# Patient Record
Sex: MALE | Race: WHITE | NOT HISPANIC OR LATINO | Employment: OTHER | URBAN - METROPOLITAN AREA
[De-identification: names, ages, dates, MRNs, and addresses within clinical notes are randomized per-mention and may not be internally consistent; named-entity substitution may affect disease eponyms.]

---

## 2017-09-25 ENCOUNTER — ANESTHESIA EVENT (OUTPATIENT)
Dept: SURGERY | Facility: CLINIC | Age: 77
End: 2017-09-25
Payer: COMMERCIAL

## 2017-09-25 ENCOUNTER — HOSPITAL ENCOUNTER (INPATIENT)
Facility: CLINIC | Age: 77
LOS: 1 days | Discharge: HOME | End: 2017-09-26
Attending: ORTHOPAEDIC SURGERY | Admitting: ORTHOPAEDIC SURGERY
Payer: COMMERCIAL

## 2017-09-25 ENCOUNTER — APPOINTMENT (OUTPATIENT)
Dept: GENERAL RADIOLOGY | Facility: CLINIC | Age: 77
End: 2017-09-25
Attending: ORTHOPAEDIC SURGERY
Payer: COMMERCIAL

## 2017-09-25 ENCOUNTER — ANESTHESIA (OUTPATIENT)
Dept: SURGERY | Facility: CLINIC | Age: 77
End: 2017-09-25
Payer: COMMERCIAL

## 2017-09-25 DIAGNOSIS — M48.02 SPINAL STENOSIS IN CERVICAL REGION: Primary | ICD-10-CM

## 2017-09-25 LAB
ABO + RH BLD: NORMAL
ABO + RH BLD: NORMAL
BLD GP AB SCN SERPL QL: NORMAL
BLD PROD TYP BPU: NORMAL
BLOOD BANK CMNT PATIENT-IMP: NORMAL
CREAT SERPL-MCNC: 1.01 MG/DL (ref 0.66–1.25)
GFR SERPL CREATININE-BSD FRML MDRD: 71 ML/MIN/1.7M2
HGB BLD-MCNC: 10.6 G/DL (ref 13.3–17.7)
NUM BPU REQUESTED: 2
POTASSIUM SERPL-SCNC: 4.6 MMOL/L (ref 3.4–5.3)
SPECIMEN EXP DATE BLD: NORMAL

## 2017-09-25 PROCEDURE — 25000128 H RX IP 250 OP 636: Performed by: ANESTHESIOLOGY

## 2017-09-25 PROCEDURE — 95865 NEEDLE EMG LARYNX: CPT | Performed by: ORTHOPAEDIC SURGERY

## 2017-09-25 PROCEDURE — 37000009 ZZH ANESTHESIA TECHNICAL FEE, EACH ADDTL 15 MIN: Performed by: ORTHOPAEDIC SURGERY

## 2017-09-25 PROCEDURE — 36415 COLL VENOUS BLD VENIPUNCTURE: CPT | Performed by: ANESTHESIOLOGY

## 2017-09-25 PROCEDURE — 95940 IONM IN OPERATNG ROOM 15 MIN: CPT | Performed by: ORTHOPAEDIC SURGERY

## 2017-09-25 PROCEDURE — 71000013 ZZH RECOVERY PHASE 1 LEVEL 1 EA ADDTL HR: Performed by: ORTHOPAEDIC SURGERY

## 2017-09-25 PROCEDURE — C1713 ANCHOR/SCREW BN/BN,TIS/BN: HCPCS | Performed by: ORTHOPAEDIC SURGERY

## 2017-09-25 PROCEDURE — 86900 BLOOD TYPING SEROLOGIC ABO: CPT | Performed by: ANESTHESIOLOGY

## 2017-09-25 PROCEDURE — 36000071 ZZH SURGERY LEVEL 5 W FLUORO 1ST 30 MIN: Performed by: ORTHOPAEDIC SURGERY

## 2017-09-25 PROCEDURE — C1762 CONN TISS, HUMAN(INC FASCIA): HCPCS | Performed by: ORTHOPAEDIC SURGERY

## 2017-09-25 PROCEDURE — A9270 NON-COVERED ITEM OR SERVICE: HCPCS | Mod: GY | Performed by: PHYSICIAN ASSISTANT

## 2017-09-25 PROCEDURE — 95926 SOMATOSENSORY TESTING: CPT | Performed by: ORTHOPAEDIC SURGERY

## 2017-09-25 PROCEDURE — 71000012 ZZH RECOVERY PHASE 1 LEVEL 1 FIRST HR: Performed by: ORTHOPAEDIC SURGERY

## 2017-09-25 PROCEDURE — 86901 BLOOD TYPING SEROLOGIC RH(D): CPT | Performed by: ANESTHESIOLOGY

## 2017-09-25 PROCEDURE — 01N10ZZ RELEASE CERVICAL NERVE, OPEN APPROACH: ICD-10-PCS | Performed by: ORTHOPAEDIC SURGERY

## 2017-09-25 PROCEDURE — 85018 HEMOGLOBIN: CPT | Performed by: ANESTHESIOLOGY

## 2017-09-25 PROCEDURE — 25000128 H RX IP 250 OP 636: Performed by: ORTHOPAEDIC SURGERY

## 2017-09-25 PROCEDURE — 95885 MUSC TST DONE W/NERV TST LIM: CPT | Performed by: ORTHOPAEDIC SURGERY

## 2017-09-25 PROCEDURE — 07DR3ZZ EXTRACTION OF ILIAC BONE MARROW, PERCUTANEOUS APPROACH: ICD-10-PCS | Performed by: ORTHOPAEDIC SURGERY

## 2017-09-25 PROCEDURE — 37000008 ZZH ANESTHESIA TECHNICAL FEE, 1ST 30 MIN: Performed by: ORTHOPAEDIC SURGERY

## 2017-09-25 PROCEDURE — 99207 ZZC CONSULT E&M CHANGED TO INITIAL LEVEL: CPT | Performed by: PHYSICIAN ASSISTANT

## 2017-09-25 PROCEDURE — A9270 NON-COVERED ITEM OR SERVICE: HCPCS | Mod: GY | Performed by: ORTHOPAEDIC SURGERY

## 2017-09-25 PROCEDURE — 40000170 ZZH STATISTIC PRE-PROCEDURE ASSESSMENT II: Performed by: ORTHOPAEDIC SURGERY

## 2017-09-25 PROCEDURE — 84132 ASSAY OF SERUM POTASSIUM: CPT | Performed by: ANESTHESIOLOGY

## 2017-09-25 PROCEDURE — 25000125 ZZHC RX 250: Performed by: NURSE ANESTHETIST, CERTIFIED REGISTERED

## 2017-09-25 PROCEDURE — 86850 RBC ANTIBODY SCREEN: CPT | Performed by: ANESTHESIOLOGY

## 2017-09-25 PROCEDURE — 99222 1ST HOSP IP/OBS MODERATE 55: CPT | Performed by: PHYSICIAN ASSISTANT

## 2017-09-25 PROCEDURE — 25000128 H RX IP 250 OP 636: Performed by: NURSE ANESTHETIST, CERTIFIED REGISTERED

## 2017-09-25 PROCEDURE — 0RG10A0 FUSION OF CERVICAL VERTEBRAL JOINT WITH INTERBODY FUSION DEVICE, ANTERIOR APPROACH, ANTERIOR COLUMN, OPEN APPROACH: ICD-10-PCS | Performed by: ORTHOPAEDIC SURGERY

## 2017-09-25 PROCEDURE — 95925 SOMATOSENSORY TESTING: CPT | Performed by: ORTHOPAEDIC SURGERY

## 2017-09-25 PROCEDURE — 25000125 ZZHC RX 250: Performed by: ORTHOPAEDIC SURGERY

## 2017-09-25 PROCEDURE — 25000132 ZZH RX MED GY IP 250 OP 250 PS 637: Mod: GY | Performed by: PHYSICIAN ASSISTANT

## 2017-09-25 PROCEDURE — 40000277 XR SURGERY CARM FLUORO LESS THAN 5 MIN W STILLS

## 2017-09-25 PROCEDURE — 27210794 ZZH OR GENERAL SUPPLY STERILE: Performed by: ORTHOPAEDIC SURGERY

## 2017-09-25 PROCEDURE — 36000069 ZZH SURGERY LEVEL 5 EA 15 ADDTL MIN: Performed by: ORTHOPAEDIC SURGERY

## 2017-09-25 PROCEDURE — 27211022 ZZHC OR IOM SUPPLIES OPNP: Performed by: ORTHOPAEDIC SURGERY

## 2017-09-25 PROCEDURE — 4A10X4G MONITORING OF CENTRAL NERVOUS ELECTRICAL ACTIVITY, INTRAOPERATIVE, EXTERNAL APPROACH: ICD-10-PCS | Performed by: ORTHOPAEDIC SURGERY

## 2017-09-25 PROCEDURE — 95928 C MOTOR EVOKED UPPR LIMBS: CPT | Performed by: ORTHOPAEDIC SURGERY

## 2017-09-25 PROCEDURE — 72052 X-RAY EXAM NECK SPINE 6/>VWS: CPT

## 2017-09-25 PROCEDURE — 82565 ASSAY OF CREATININE: CPT | Performed by: ANESTHESIOLOGY

## 2017-09-25 PROCEDURE — 95929 C MOTOR EVOKED LWR LIMBS: CPT | Performed by: ORTHOPAEDIC SURGERY

## 2017-09-25 PROCEDURE — 25000125 ZZHC RX 250

## 2017-09-25 PROCEDURE — 12000007 ZZH R&B INTERMEDIATE

## 2017-09-25 PROCEDURE — 25000125 ZZHC RX 250: Performed by: ANESTHESIOLOGY

## 2017-09-25 PROCEDURE — 0RB30ZZ EXCISION OF CERVICAL VERTEBRAL DISC, OPEN APPROACH: ICD-10-PCS | Performed by: ORTHOPAEDIC SURGERY

## 2017-09-25 PROCEDURE — 27210995 ZZH RX 272: Performed by: ORTHOPAEDIC SURGERY

## 2017-09-25 PROCEDURE — 25000132 ZZH RX MED GY IP 250 OP 250 PS 637: Mod: GY | Performed by: ORTHOPAEDIC SURGERY

## 2017-09-25 DEVICE — IMP PLATE CERV MEDT ZEVO 21MM 1 LVL 3001021: Type: IMPLANTABLE DEVICE | Site: SPINE CERVICAL | Status: FUNCTIONAL

## 2017-09-25 DEVICE — IMP SCR MEDT ZEVO 3.5X13MM ST VA 7723513: Type: IMPLANTABLE DEVICE | Site: SPINE CERVICAL | Status: FUNCTIONAL

## 2017-09-25 DEVICE — IMPLANTABLE DEVICE: Type: IMPLANTABLE DEVICE | Site: SPINE CERVICAL | Status: FUNCTIONAL

## 2017-09-25 RX ORDER — ONDANSETRON 4 MG/1
4 TABLET, ORALLY DISINTEGRATING ORAL EVERY 6 HOURS PRN
Status: DISCONTINUED | OUTPATIENT
Start: 2017-09-25 | End: 2017-09-26 | Stop reason: HOSPADM

## 2017-09-25 RX ORDER — OXYCODONE HYDROCHLORIDE 5 MG/1
5-10 TABLET ORAL EVERY 4 HOURS PRN
Status: DISCONTINUED | OUTPATIENT
Start: 2017-09-25 | End: 2017-09-26 | Stop reason: HOSPADM

## 2017-09-25 RX ORDER — ACETAMINOPHEN 325 MG/1
975 TABLET ORAL EVERY 8 HOURS
Status: DISCONTINUED | OUTPATIENT
Start: 2017-09-25 | End: 2017-09-26 | Stop reason: HOSPADM

## 2017-09-25 RX ORDER — HYDROMORPHONE HYDROCHLORIDE 1 MG/ML
.3-.5 INJECTION, SOLUTION INTRAMUSCULAR; INTRAVENOUS; SUBCUTANEOUS
Status: DISCONTINUED | OUTPATIENT
Start: 2017-09-25 | End: 2017-09-26 | Stop reason: HOSPADM

## 2017-09-25 RX ORDER — HYDROMORPHONE HYDROCHLORIDE 1 MG/ML
.3-.5 INJECTION, SOLUTION INTRAMUSCULAR; INTRAVENOUS; SUBCUTANEOUS EVERY 5 MIN PRN
Status: CANCELLED | OUTPATIENT
Start: 2017-09-25

## 2017-09-25 RX ORDER — FINASTERIDE 5 MG/1
5 TABLET, FILM COATED ORAL
Status: DISCONTINUED | OUTPATIENT
Start: 2017-09-25 | End: 2017-09-26 | Stop reason: HOSPADM

## 2017-09-25 RX ORDER — METOCLOPRAMIDE HYDROCHLORIDE 5 MG/ML
5 INJECTION INTRAMUSCULAR; INTRAVENOUS EVERY 6 HOURS PRN
Status: DISCONTINUED | OUTPATIENT
Start: 2017-09-25 | End: 2017-09-26 | Stop reason: HOSPADM

## 2017-09-25 RX ORDER — ONDANSETRON 2 MG/ML
INJECTION INTRAMUSCULAR; INTRAVENOUS PRN
Status: DISCONTINUED | OUTPATIENT
Start: 2017-09-25 | End: 2017-09-25

## 2017-09-25 RX ORDER — ACETAMINOPHEN 325 MG/1
650 TABLET ORAL EVERY 4 HOURS PRN
Status: DISCONTINUED | OUTPATIENT
Start: 2017-09-28 | End: 2017-09-26 | Stop reason: HOSPADM

## 2017-09-25 RX ORDER — HYDROMORPHONE HYDROCHLORIDE 1 MG/ML
.3-.5 INJECTION, SOLUTION INTRAMUSCULAR; INTRAVENOUS; SUBCUTANEOUS EVERY 5 MIN PRN
Status: DISCONTINUED | OUTPATIENT
Start: 2017-09-25 | End: 2017-09-25 | Stop reason: HOSPADM

## 2017-09-25 RX ORDER — PROCHLORPERAZINE MALEATE 5 MG
5 TABLET ORAL EVERY 6 HOURS PRN
Status: DISCONTINUED | OUTPATIENT
Start: 2017-09-25 | End: 2017-09-26 | Stop reason: HOSPADM

## 2017-09-25 RX ORDER — PREDNISONE 5 MG/1
5 TABLET ORAL EVERY OTHER DAY
Status: DISCONTINUED | OUTPATIENT
Start: 2017-09-25 | End: 2017-09-26 | Stop reason: HOSPADM

## 2017-09-25 RX ORDER — ONDANSETRON 2 MG/ML
4 INJECTION INTRAMUSCULAR; INTRAVENOUS EVERY 30 MIN PRN
Status: CANCELLED | OUTPATIENT
Start: 2017-09-25

## 2017-09-25 RX ORDER — ONDANSETRON 2 MG/ML
4 INJECTION INTRAMUSCULAR; INTRAVENOUS EVERY 6 HOURS PRN
Status: DISCONTINUED | OUTPATIENT
Start: 2017-09-25 | End: 2017-09-26 | Stop reason: HOSPADM

## 2017-09-25 RX ORDER — LIDOCAINE 40 MG/G
CREAM TOPICAL
Status: DISCONTINUED | OUTPATIENT
Start: 2017-09-25 | End: 2017-09-26 | Stop reason: HOSPADM

## 2017-09-25 RX ORDER — ONDANSETRON 2 MG/ML
4 INJECTION INTRAMUSCULAR; INTRAVENOUS EVERY 30 MIN PRN
Status: DISCONTINUED | OUTPATIENT
Start: 2017-09-25 | End: 2017-09-25 | Stop reason: HOSPADM

## 2017-09-25 RX ORDER — SODIUM CHLORIDE, SODIUM LACTATE, POTASSIUM CHLORIDE, CALCIUM CHLORIDE 600; 310; 30; 20 MG/100ML; MG/100ML; MG/100ML; MG/100ML
INJECTION, SOLUTION INTRAVENOUS CONTINUOUS
Status: DISCONTINUED | OUTPATIENT
Start: 2017-09-25 | End: 2017-09-25 | Stop reason: HOSPADM

## 2017-09-25 RX ORDER — HYDROXYZINE HYDROCHLORIDE 10 MG/1
10 TABLET, FILM COATED ORAL EVERY 6 HOURS PRN
Status: DISCONTINUED | OUTPATIENT
Start: 2017-09-25 | End: 2017-09-26 | Stop reason: HOSPADM

## 2017-09-25 RX ORDER — FENTANYL CITRATE 50 UG/ML
25-50 INJECTION, SOLUTION INTRAMUSCULAR; INTRAVENOUS
Status: CANCELLED | OUTPATIENT
Start: 2017-09-25

## 2017-09-25 RX ORDER — CEFAZOLIN SODIUM 1 G/3ML
1 INJECTION, POWDER, FOR SOLUTION INTRAMUSCULAR; INTRAVENOUS EVERY 8 HOURS
Status: COMPLETED | OUTPATIENT
Start: 2017-09-25 | End: 2017-09-26

## 2017-09-25 RX ORDER — EPHEDRINE SULFATE 50 MG/ML
INJECTION, SOLUTION INTRAMUSCULAR; INTRAVENOUS; SUBCUTANEOUS PRN
Status: DISCONTINUED | OUTPATIENT
Start: 2017-09-25 | End: 2017-09-25

## 2017-09-25 RX ORDER — SODIUM CHLORIDE, SODIUM LACTATE, POTASSIUM CHLORIDE, CALCIUM CHLORIDE 600; 310; 30; 20 MG/100ML; MG/100ML; MG/100ML; MG/100ML
INJECTION, SOLUTION INTRAVENOUS CONTINUOUS
Status: CANCELLED | OUTPATIENT
Start: 2017-09-25

## 2017-09-25 RX ORDER — CEFAZOLIN SODIUM 2 G/100ML
2 INJECTION, SOLUTION INTRAVENOUS
Status: COMPLETED | OUTPATIENT
Start: 2017-09-25 | End: 2017-09-25

## 2017-09-25 RX ORDER — TAMSULOSIN HYDROCHLORIDE 0.4 MG/1
0.4 CAPSULE ORAL EVERY EVENING
Status: DISCONTINUED | OUTPATIENT
Start: 2017-09-25 | End: 2017-09-26 | Stop reason: HOSPADM

## 2017-09-25 RX ORDER — PROPOFOL 10 MG/ML
INJECTION, EMULSION INTRAVENOUS PRN
Status: DISCONTINUED | OUTPATIENT
Start: 2017-09-25 | End: 2017-09-25

## 2017-09-25 RX ORDER — TIMOLOL MALEATE 5 MG/ML
1 SOLUTION/ DROPS OPHTHALMIC DAILY
Status: ON HOLD | COMMUNITY
End: 2018-06-11

## 2017-09-25 RX ORDER — PROPOFOL 10 MG/ML
INJECTION, EMULSION INTRAVENOUS CONTINUOUS PRN
Status: DISCONTINUED | OUTPATIENT
Start: 2017-09-25 | End: 2017-09-25

## 2017-09-25 RX ORDER — ROSUVASTATIN CALCIUM 20 MG/1
20 TABLET, COATED ORAL
Status: DISCONTINUED | OUTPATIENT
Start: 2017-09-25 | End: 2017-09-26 | Stop reason: HOSPADM

## 2017-09-25 RX ORDER — NALOXONE HYDROCHLORIDE 0.4 MG/ML
.1-.4 INJECTION, SOLUTION INTRAMUSCULAR; INTRAVENOUS; SUBCUTANEOUS
Status: DISCONTINUED | OUTPATIENT
Start: 2017-09-25 | End: 2017-09-26 | Stop reason: HOSPADM

## 2017-09-25 RX ORDER — SODIUM CHLORIDE 450 MG/100ML
INJECTION, SOLUTION INTRAVENOUS CONTINUOUS
Status: DISCONTINUED | OUTPATIENT
Start: 2017-09-25 | End: 2017-09-26 | Stop reason: HOSPADM

## 2017-09-25 RX ORDER — CYCLOBENZAPRINE HCL 5 MG
5 TABLET ORAL 3 TIMES DAILY PRN
Status: DISCONTINUED | OUTPATIENT
Start: 2017-09-25 | End: 2017-09-26 | Stop reason: HOSPADM

## 2017-09-25 RX ORDER — SODIUM CHLORIDE, SODIUM LACTATE, POTASSIUM CHLORIDE, CALCIUM CHLORIDE 600; 310; 30; 20 MG/100ML; MG/100ML; MG/100ML; MG/100ML
INJECTION, SOLUTION INTRAVENOUS CONTINUOUS PRN
Status: DISCONTINUED | OUTPATIENT
Start: 2017-09-25 | End: 2017-09-25

## 2017-09-25 RX ORDER — ONDANSETRON 4 MG/1
4 TABLET, ORALLY DISINTEGRATING ORAL EVERY 30 MIN PRN
Status: CANCELLED | OUTPATIENT
Start: 2017-09-25

## 2017-09-25 RX ORDER — FENTANYL CITRATE 50 UG/ML
25-50 INJECTION, SOLUTION INTRAMUSCULAR; INTRAVENOUS
Status: DISCONTINUED | OUTPATIENT
Start: 2017-09-25 | End: 2017-09-25 | Stop reason: HOSPADM

## 2017-09-25 RX ORDER — TIMOLOL MALEATE 5 MG/ML
1 SOLUTION/ DROPS OPHTHALMIC DAILY
Status: DISCONTINUED | OUTPATIENT
Start: 2017-09-26 | End: 2017-09-26 | Stop reason: HOSPADM

## 2017-09-25 RX ORDER — LISINOPRIL 10 MG/1
10 TABLET ORAL EVERY EVENING
Status: DISCONTINUED | OUTPATIENT
Start: 2017-09-25 | End: 2017-09-26 | Stop reason: HOSPADM

## 2017-09-25 RX ORDER — FENTANYL CITRATE 50 UG/ML
INJECTION, SOLUTION INTRAMUSCULAR; INTRAVENOUS PRN
Status: DISCONTINUED | OUTPATIENT
Start: 2017-09-25 | End: 2017-09-25

## 2017-09-25 RX ORDER — METOCLOPRAMIDE 5 MG/1
5 TABLET ORAL EVERY 6 HOURS PRN
Status: DISCONTINUED | OUTPATIENT
Start: 2017-09-25 | End: 2017-09-26 | Stop reason: HOSPADM

## 2017-09-25 RX ORDER — LATANOPROST 50 UG/ML
1 SOLUTION/ DROPS OPHTHALMIC AT BEDTIME
Status: DISCONTINUED | OUTPATIENT
Start: 2017-09-25 | End: 2017-09-26 | Stop reason: HOSPADM

## 2017-09-25 RX ORDER — CEFAZOLIN SODIUM 1 G/3ML
1 INJECTION, POWDER, FOR SOLUTION INTRAMUSCULAR; INTRAVENOUS SEE ADMIN INSTRUCTIONS
Status: DISCONTINUED | OUTPATIENT
Start: 2017-09-25 | End: 2017-09-25 | Stop reason: HOSPADM

## 2017-09-25 RX ORDER — ONDANSETRON 4 MG/1
4 TABLET, ORALLY DISINTEGRATING ORAL EVERY 30 MIN PRN
Status: DISCONTINUED | OUTPATIENT
Start: 2017-09-25 | End: 2017-09-25 | Stop reason: HOSPADM

## 2017-09-25 RX ORDER — ALLOPURINOL 300 MG/1
300 TABLET ORAL DAILY
Status: DISCONTINUED | OUTPATIENT
Start: 2017-09-26 | End: 2017-09-26 | Stop reason: HOSPADM

## 2017-09-25 RX ORDER — MEPERIDINE HYDROCHLORIDE 25 MG/ML
12.5 INJECTION INTRAMUSCULAR; INTRAVENOUS; SUBCUTANEOUS EVERY 5 MIN PRN
Status: DISCONTINUED | OUTPATIENT
Start: 2017-09-25 | End: 2017-09-25 | Stop reason: HOSPADM

## 2017-09-25 RX ORDER — LIDOCAINE HYDROCHLORIDE 20 MG/ML
INJECTION, SOLUTION INFILTRATION; PERINEURAL PRN
Status: DISCONTINUED | OUTPATIENT
Start: 2017-09-25 | End: 2017-09-25

## 2017-09-25 RX ADMIN — ROSUVASTATIN CALCIUM 20 MG: 20 TABLET, FILM COATED ORAL at 14:16

## 2017-09-25 RX ADMIN — LIDOCAINE HYDROCHLORIDE 100 MG: 20 INJECTION, SOLUTION INFILTRATION; PERINEURAL at 10:05

## 2017-09-25 RX ADMIN — HYDROMORPHONE HYDROCHLORIDE 0.5 MG: 1 INJECTION, SOLUTION INTRAMUSCULAR; INTRAVENOUS; SUBCUTANEOUS at 13:08

## 2017-09-25 RX ADMIN — FENTANYL CITRATE 100 MCG: 50 INJECTION, SOLUTION INTRAMUSCULAR; INTRAVENOUS at 10:05

## 2017-09-25 RX ADMIN — PREDNISONE 5 MG: 5 TABLET ORAL at 14:16

## 2017-09-25 RX ADMIN — PHENYLEPHRINE HYDROCHLORIDE 0.25 MCG/KG/MIN: 10 INJECTION, SOLUTION INTRAMUSCULAR; INTRAVENOUS; SUBCUTANEOUS at 10:19

## 2017-09-25 RX ADMIN — SUCCINYLCHOLINE CHLORIDE 100 MG: 20 INJECTION, SOLUTION INTRAMUSCULAR; INTRAVENOUS at 10:05

## 2017-09-25 RX ADMIN — HYDROCORTISONE SODIUM SUCCINATE 100 MG: 100 INJECTION, POWDER, FOR SOLUTION INTRAMUSCULAR; INTRAVENOUS at 10:13

## 2017-09-25 RX ADMIN — ACETAMINOPHEN 975 MG: 325 TABLET, FILM COATED ORAL at 22:05

## 2017-09-25 RX ADMIN — ONDANSETRON 4 MG: 2 INJECTION INTRAMUSCULAR; INTRAVENOUS at 11:39

## 2017-09-25 RX ADMIN — ACETAMINOPHEN 975 MG: 325 TABLET, FILM COATED ORAL at 14:16

## 2017-09-25 RX ADMIN — SODIUM CHLORIDE, POTASSIUM CHLORIDE, SODIUM LACTATE AND CALCIUM CHLORIDE: 600; 310; 30; 20 INJECTION, SOLUTION INTRAVENOUS at 09:53

## 2017-09-25 RX ADMIN — LIDOCAINE HYDROCHLORIDE 10 ML: 20 JELLY TOPICAL at 22:08

## 2017-09-25 RX ADMIN — SODIUM CHLORIDE, POTASSIUM CHLORIDE, SODIUM LACTATE AND CALCIUM CHLORIDE: 600; 310; 30; 20 INJECTION, SOLUTION INTRAVENOUS at 10:13

## 2017-09-25 RX ADMIN — FENTANYL CITRATE 50 MCG: 50 INJECTION, SOLUTION INTRAMUSCULAR; INTRAVENOUS at 10:42

## 2017-09-25 RX ADMIN — LIDOCAINE HYDROCHLORIDE 1 ML: 10 INJECTION, SOLUTION EPIDURAL; INFILTRATION; INTRACAUDAL; PERINEURAL at 09:30

## 2017-09-25 RX ADMIN — RANITIDINE 150 MG: 150 TABLET ORAL at 22:05

## 2017-09-25 RX ADMIN — DEXMEDETOMIDINE HYDROCHLORIDE 0.5 MCG/KG/HR: 100 INJECTION, SOLUTION INTRAVENOUS at 10:10

## 2017-09-25 RX ADMIN — CEFAZOLIN SODIUM 2 G: 2 INJECTION, SOLUTION INTRAVENOUS at 10:09

## 2017-09-25 RX ADMIN — SODIUM CHLORIDE: 4.5 INJECTION, SOLUTION INTRAVENOUS at 14:17

## 2017-09-25 RX ADMIN — CEFAZOLIN SODIUM 1 G: 1 INJECTION, POWDER, FOR SOLUTION INTRAMUSCULAR; INTRAVENOUS at 18:07

## 2017-09-25 RX ADMIN — MIDAZOLAM HYDROCHLORIDE 1 MG: 1 INJECTION, SOLUTION INTRAMUSCULAR; INTRAVENOUS at 10:01

## 2017-09-25 RX ADMIN — PROPOFOL 180 MG: 10 INJECTION, EMULSION INTRAVENOUS at 10:05

## 2017-09-25 RX ADMIN — LIDOCAINE HYDROCHLORIDE 10 ML: 20 JELLY TOPICAL at 17:18

## 2017-09-25 RX ADMIN — SODIUM CHLORIDE, POTASSIUM CHLORIDE, SODIUM LACTATE AND CALCIUM CHLORIDE: 600; 310; 30; 20 INJECTION, SOLUTION INTRAVENOUS at 09:30

## 2017-09-25 RX ADMIN — TAMSULOSIN HYDROCHLORIDE 0.4 MG: 0.4 CAPSULE ORAL at 20:06

## 2017-09-25 RX ADMIN — FENTANYL CITRATE 50 MCG: 50 INJECTION, SOLUTION INTRAMUSCULAR; INTRAVENOUS at 10:21

## 2017-09-25 RX ADMIN — LISINOPRIL 10 MG: 10 TABLET ORAL at 20:06

## 2017-09-25 RX ADMIN — Medication 5 MG: at 10:30

## 2017-09-25 RX ADMIN — FINASTERIDE 5 MG: 5 TABLET, FILM COATED ORAL at 14:16

## 2017-09-25 RX ADMIN — PROPOFOL 200 MCG/KG/MIN: 10 INJECTION, EMULSION INTRAVENOUS at 10:08

## 2017-09-25 RX ADMIN — CYCLOBENZAPRINE HYDROCHLORIDE 5 MG: 5 TABLET, FILM COATED ORAL at 22:47

## 2017-09-25 ASSESSMENT — ACTIVITIES OF DAILY LIVING (ADL): COGNITION: 0 - NO COGNITION ISSUES REPORTED

## 2017-09-25 NOTE — OP NOTE
DATE OF PROCEDURE:  09/25/2017       ASSISTANT:  APOORVA Moe      PREOPERATIVE DIAGNOSIS:  Severe cervical stenosis with progressive myelopathy.      POSTOPERATIVE DIAGNOSIS:  Severe cervical stenosis with progressive myelopathy.      PROCEDURE:  Anterior partial corpectomy, C3 and C4, with anterior interbody reconstruction C3-C4 with nonsegmental instrumentation and left iliac crest bone marrow aspiration.      INDICATION:  Rose Duong is a 77-year-old male who has severe focal stenosis associated with obvious intramedullary signal changes.  There is suggestion of an inherently unstable segment, a cervical spondylolisthesis.  Associated with this, he had classical features of ongoing and slowly progressive cervical myelopathy.  Based on his symptoms and the nature of the radiographic findings, surgical procedure was chosen.      DESCRIPTION OF PROCEDURE:  The patient was taken to the operating room, given satisfactory general endotracheal anesthetic, was appropriately positioned, prepared and draped for anterior cervical surgery.  Great care was used to maintain the neck well within the objectively adhered to preoperative range of motion.  No tapes were used.  Careful positioning, prepping and draping ensued.      Four-lead spinal cord monitoring was applied.  This registered barely detectable findings on the right upper and lower extremity.  This is consistent with his preoperative clinical features.  These were felt, however, to be reproducible and thus sufficient enough to allow for operative intervention.       The anterior aspect of the neck was widely and aseptically prepped and draped.  Standard timeout was accomplished.  Intravenous antibiotics were assured.  The left iliac crest region was widely and aseptically prepped and draped as well.      A transverse incision was fashioned just under the angle of the jaw.  This location was necessary in order to obtain reasonable visualization of the  C3-C4 interspace.  Skin, subcutaneous tissue and platysma were divided transversely.  The anterior border of sternocleidomastoid was mobilized proximally and distally and released, the middle layer bluntly dissected.  Great care was used in the digital dissection just medial to the common carotid neurovascular structures in order to avoid the tracheoesophageal groove and recurrent laryngeal nerve, neither of which were seen or sought after.  The pretracheal and prevertebral fascia were bluntly split, thus exposing the anterior spine.  We carefully cleared the pretracheal and prevertebral fascia off the anterior aspect of the spine, identified the disk space, presumed to be C3-C4, and inserted a needle.  Two independent observers reached the same conclusion.  C3-C4 was indelibly marked.  Careful symmetric bilateral subperiosteal reflection of the longus colli ensued.  Self-retaining retractors were placed.  Up-down exposure was maintained with a [place] pin post device.  Minimal distraction was performed until we completed the majority of the decompression.      Once the annulus was identified, we marked out the local registering and landmarks for corpectomy, then proximal one-half corpectomy at C3 and C4 was removed with motorized dissection.  This was done parallel to the endplate down to the posterior vertebral cortex.  We identified an island of bone and disk material as we circumferentially dissected about the disk space itself.  We identified the posterior longitudinal ligament.  There were obvious changes of thickening due to abnormal segmental mobility.  This was consistent with a degenerative spondylolisthesis.  We cautiously circumferentially cleared these fairly densely adherent posterior longitudinal ligament to the epidural space and identified the dura.  We carefully circumferentially removed this island of bone and disk material, effecting a suitable decompression.  We also undercut the remaining  vertebral body edges and identified the takeoff of the nerve roots bilaterally.  This was all done in order to preserve some remnants of stability with some residual posterior ligament.  Having said that, at the termination of decompression, all appeared to be sound and secure.  The dura clearly had bulged more appropriately.  Interestingly, as the surgical procedure was being performed, particularly with right-sided decompression, the monitoring signals actually improved in the arms and legs.      From time to time we did perform motor evoked potentials on a observational basis.  We carefully prepared the endplates for arthrodesis.  It was noted that this gentleman had fairly obvious osteopenia.  He required judicious attention to hemostasis with topical application of Gelfoam on the soft tissue aspects and bone wax for complete bleeding surfaces.  A flat bleeding bone surface was generated, and an 11 mm graft was chosen with good fit, feel without significant preload.  Pre-manufactured graft of that size (Medtronic Cornerstone) was inserted after soaking the graft in a 2 mL aliquot of bone marrow aspirate, which was harvested from the left anterior superior iliac spine laterally cortical laterally-based puncture.  This was used to soak the graft. The graft inserted with good fit and feel.  So we did have minimal amounts of autogenous bone.  This was used to bone graft around the sides of the graft.      A 21 mm Medtronic Zevo plate was sculpted to accept the anterior cervical lordosis, and incremental insertion of 13 mm purchase length screws ensued.  The fixation was sufficient to allow for retention of the graft, but he was osteopenic.  Locking mechanisms were deployed per 's instructions.      We inspected the posterior aspect of esophagus.  It was clean and dry.  There was no evidence of trauma, tear or leakage.  Indigo carmine was instilled.  There was no evidence of leakage.  A suction drain  consisting of 3 in the platysma, 4 in the subcutaneous tissue and 5-0 Prolene in the skin completed the operation.  Blood loss was 25 mL, primarily due to the osseous bleeding and somewhat fragile nature of his soft tissues.  He was quite dry at the time of bone graft placement and closure.  Final motors and sensory were perfectly stable.      Final clinical diagnosis was marked cervical stenosis with the intraoperative findings notable for acute on chronic disk herniations and cord compression requiring anterior decompression and fusion.      Nurse Alina Sanchez was in attendance at all times.  She was critical to the safe and effective performance of this procedure.  Her tasks included protection, retraction and mobilization of neurovascular and visceral structures.  She was critical to the safe and efficient performance of this surgery.         QUEENIE JOHNSTON MD             D: 2017 11:53   T: 2017 13:57   MT: [email or web address]#[id number]      Name:     VALERIE BALLARD   MRN:      [id number]        Account:        [id number]   :      1940           Procedure Date: 2017      Document: [id number]

## 2017-09-25 NOTE — PROGRESS NOTES
Admission medication history interview status for the 9/25/2017  admission is complete. See EPIC admission navigator for prior to admission medications     Medication history source reliability:Good    Medication history interview source(s):Patient    Medication history resources (including written lists, pill bottles, clinic record):None    Primary [facility]    Additional medication history information not noted on PTA med list :None    Time spent in this activity: 45 minutes    Prior to Admission medications    Medication Sig Last Dose Taking? Auth Provider   timolol (TIMOPTIC) 0.5 % ophthalmic solution Place 1 drop into both eyes daily  9/25/2017 at am Yes Reported, Patient   FINASTERIDE PO Take 5 mg by mouth daily 9/24/2017 at 1200 Yes Reported, Patient   Acetaminophen (TYLENOL PO) Take 500 mg by mouth every 3 hours as needed for mild pain or fever 9/25/2017 at am Yes Reported, Patient   Rosuvastatin Calcium (CRESTOR PO) Take 20 mg by mouth daily 9/24/2017 at 1200 Yes Unknown, Entered By History   tamsulosin (FLOMAX) 0.4 MG 24 hr capsule Take 0.4 mg by mouth every evening  9/24/2017 at pm Yes Unknown, Entered By History   LISINOPRIL PO Take 10 mg by mouth daily 9/24/2017 at pm Yes Unknown, Entered By History   latanoprost (XALATAN) 0.005 % ophthalmic solution Place 1 drop into the right eye At Bedtime 9/24/2017 at pm Yes Unknown, Entered By History   Ranitidine HCl (ZANTAC PO) Take 150 mg by mouth At Bedtime 9/24/2017 at pm Yes Unknown, Entered By History   allopurinol (ZYLOPRIM) 300 MG tablet Take 300 mg by mouth daily. 9/24/2017 at am Yes Reported, Patient   predniSONE (DELTASONE) 5 MG tablet Take 5 mg by mouth every other day  9/23/2017 at am Yes Reported, Patient

## 2017-09-25 NOTE — CONSULTS
ADDENDUM:  Case discussed with Namrata Cruz PA-C. Her note reflects our joint assessment and plans.    Lv Baker Jr., MD  [phone] (p)

## 2017-09-25 NOTE — BRIEF OP NOTE
[facility] Operative Note    Pre-operative diagnosis: CERVICAL MYELOPATHY   Post-operative diagnosis same   Procedure: Procedure(s):  ANTERIOR CERVICAL DECOMPRESSION AND FUSIONC3-4 WITH INSTRUMENTATION, ALLOGRAFT AND BONE MARROW ASPIRATE OF THE LEFT ILIAC CREST  - Wound Class: I-Clean   - Wound Class: I-Clean   Surgeon(s): Surgeon(s) and Role:     * Moises Elias MD - Primary   Estimated blood loss: same    Specimens: none   Findings: See dictated op note

## 2017-09-25 NOTE — ANESTHESIA POSTPROCEDURE EVALUATION
Patient: Rose SANCHEZ Pelo    Procedure(s):  ANTERIOR CERVICAL DECOMPRESSION AND FUSIONC3-4 WITH INSTRUMENTATION, ALLOGRAFT AND BONE MARROW ASPIRATE OF THE LEFT ILIAC CREST  - Wound Class: I-Clean   - Wound Class: I-Clean    Diagnosis:CERVICAL MYELOPATHY  Diagnosis Additional Information: No value filed.    Anesthesia Type:  General    Note:  Anesthesia Post Evaluation    Patient location during evaluation: bedside  Patient participation: Able to fully participate in evaluation  Level of consciousness: awake  Pain management: adequate  Airway patency: patent  Cardiovascular status: acceptable  Respiratory status: acceptable  Hydration status: acceptable  PONV: none     Anesthetic complications: None    Comments: No anesthetic complications noted.         Last vitals:  Vitals:    09/25/17 1320 09/25/17 1350 09/25/17 1420   BP: 118/61 130/61 138/73   Resp: 13 14 14   Temp:  36.5  C (97.7  F) 36.4  C (97.5  F)   SpO2: 97% 100% 99%         Electronically Signed By: John Phan DO, DO  September 25, 2017  2:51 PM

## 2017-09-25 NOTE — IP AVS SNAPSHOT
MRN:[id number]                      After Visit Summary   9/25/2017    Rose Duong    MRN: [id number]           Thank you!     Thank you for choosing [facility] for your care. Our goal is always to provide you with excellent care. Hearing back from our patients is one way we can continue to improve our services. Please take a few minutes to complete the written survey that you may receive in the mail after you visit with us. Thank you!        Patient Information     Date Of Birth          1940        Designated Caregiver       Most Recent Value    Caregiver    Will someone help with your care after discharge? yes    Name of designated caregiver Kassidy Duong    Phone number of caregiver [phone]    Caregiver address [facility]       About your hospital stay     You were admitted on:  September 25, 2017 You last received care in the:  [facility] [address] [facility]    You were discharged on:  September 26, 2017       Who to Call     For medical emergencies, please call 911.  For non-urgent questions about your medical care, please call your primary care provider or clinic, [phone]  For questions related to your surgery, please call your surgery clinic        Attending Provider     Provider Moises Cunha MD Orthopedics       Primary Care Provider Office Phone # Fax #    Cedrick Siddiqi [phone]      Further instructions from your care team       Discharge Instructions following Cervical Spine Surgery      Wound Care:    Your incision(s) are closed with a suture that will need to be removed.  You will have steri strips (butterfly tapes) across your incision(s).  Leave the steri strips in place until you come to the office to have your sutures removed.  You will need to make an appointment for 1 to 2 weeks following your surgery to have your suture(s) removed and to have an x-ray check.  Please call [phone] to make an appointment  if you haven t already done so.      You do not need to cover your incisions with plastic when you are showering, once you are home from the hospital.  Let the water run over your incision when in the shower, then pat the incision dry.  Cover your incision with a clean bandage after you shower to keep you incision clean until your sutures are removed.    If you had bone graft taken from your iliac crest for your procedure, do not take a bath or sit in a hot tub until your sutures are removed and your incision is well healed.    Pain Medication:    You will be sent home with pain medication from the hospital.  Gradually decrease your usage of the pain medication as you start to feel better.  If you are in need of a refill of pain medication, please call the office and talk to Alina.  Please plan ahead and call during regular office hours if you need a refill.  Our office hours are Monday through Friday from 8:00 am until 4:30 pm.  Pain medication cannot be refilled in the evening or on weekends.  Narcotic pain medication is addictive and will not be prescribed on a long term basis.  Alina will be able to assist you in weaning from these medications.    Avoid the use of any non-steroidal anti-inflammatory medications (Advil, Aleve, Celebrex etc.) for 6 weeks after your surgery.  These medications inhibit bone growth and Dr. Elias would prefer if you would avoid these medications.  If you do not need narcotic pain medication, Tylenol products would be a good alternative for mild pain control.      Activity:    You have a 20 pound lifting restriction for at least 6 weeks.  Dr. Elias will inform you at your 6-week follow-up appointment if you can increase your weight limit.      Walking is your best activity.  Remember to walk daily and increase your distance and time as tolerated. Continue the daily isometric exercises that you were taught by the physical therapist while you were in the hospital.  This will help with the  "stiffness caused by the brace.    You cannot drive if you have taken narcotic pain medication within eight hours.  You may resume driving once you are not taking narcotic pain medication and you feel comfortable to operate your vehicle safely.    If you have any questions or concerns once you are home from the hospital, please call the office and speak with Dr. Elias s nurse, Alina.  She can be reached at [phone].      Pending Results     Date and Time Order Name Status Description    2017 1147 XR Surgery STEVIE L/T 5 Min Fluoro w Stills In process     2017 0828 XR Cervical Spine Comp w Obl & Flex/Ext In process             Statement of Approval     Ordered          17 0825  I have reviewed and agree with all the recommendations and orders detailed in this document.  EFFECTIVE NOW     Approved and electronically signed by:  Moises Elias MD             Admission Information     Date & Time Provider Department Dept. Phone    2017 Moises Elias MD [address] [facility] [phone]      Your Vitals Were     Blood Pressure Pulse Temperature Respirations Height Weight    115/54 78 98.5  F (36.9  C) (Oral) 16 1.702 m (5' 7\") 62.5 kg (137 lb 12.6 oz)    Pulse Oximetry BMI (Body Mass Index)                97% 21.58 kg/m2          [employer] Information     [employer] lets you send messages to your doctor, view your test results, renew your prescriptions, schedule appointments and more. To sign up, go to [email or web address] . Click on \"Log in\" on the left side of the screen, which will take you to the Welcome page. Then click on \"Sign up Now\" on the right side of the page.     You will be asked to enter the access code listed below, as well as some personal information. Please follow the directions to create your username and password.     Your access code is: [email or web address]  Expires: 2017 12:07 PM     Your access code will  in 90 days. If you need help " or a new code, please call your [facility] clinic or [phone].        Care EveryWhere ID     This is your Care EveryWhere ID. This could be used by other organizations to access your [facility] medical records  [id number]        Equal Access to Services     KANE KNOTT : Hadii aad ku hadpennysaira Carmen, wadustyda luqadaha, qaybta kaalmada adecorrie, brad mick neyelmer irenelucy silveira slick joseph. So [facility] [phone].    ATENCIÓN: Si habla español, tiene a horn disposición servicios gratuitos de asistencia lingüística. Llame al [phone].    We comply with applicable federal civil rights laws and [place] laws. We do not discriminate on the basis of race, color, national origin, age, disability sex, sexual orientation or gender identity.               Review of your medicines      START taking        Dose / Directions    cyclobenzaprine 5 MG tablet   Commonly known as:  FLEXERIL        Dose:  5 mg   Take 1 tablet (5 mg) by mouth 3 times daily as needed for muscle spasms   Quantity:  30 tablet   Refills:  0       HYDROcodone-acetaminophen 5-325 MG per tablet   Commonly known as:  NORCO        Dose:  1-2 tablet   Take 1-2 tablets by mouth every 4 hours as needed for moderate to severe pain   Quantity:  50 tablet   Refills:  0         CONTINUE these medicines which have NOT CHANGED        Dose / Directions    allopurinol 300 MG tablet   Commonly known as:  ZYLOPRIM        Dose:  300 mg   Take 300 mg by mouth daily.   Refills:  0       CRESTOR PO        Dose:  20 mg   Take 20 mg by mouth daily   Refills:  0       FINASTERIDE PO        Dose:  5 mg   Take 5 mg by mouth daily   Refills:  0       latanoprost 0.005 % ophthalmic solution   Commonly known as:  XALATAN        Dose:  1 drop   Place 1 drop into the right eye At Bedtime   Refills:  0       LISINOPRIL PO        Dose:  10 mg   Take 10 mg by mouth daily   Refills:  0       predniSONE 5 MG tablet   Commonly known as:  DELTASONE        Dose:  5 mg   Take 5 mg by mouth every  other day   Refills:  0       tamsulosin 0.4 MG capsule   Commonly known as:  FLOMAX        Dose:  0.4 mg   Take 0.4 mg by mouth every evening   Refills:  0       timolol 0.5 % ophthalmic solution   Commonly known as:  TIMOPTIC        Dose:  1 drop   Place 1 drop into both eyes daily   Refills:  0       TYLENOL PO        Dose:  500 mg   Take 500 mg by mouth every 3 hours as needed for mild pain or fever   Refills:  0       ZANTAC PO        Dose:  150 mg   Take 150 mg by mouth At Bedtime   Refills:  0            Where to get your medicines      Some of these will need a paper prescription and others can be bought over the counter. Ask your nurse if you have questions.     You don't need a prescription for these medications     cyclobenzaprine 5 MG tablet    HYDROcodone-acetaminophen 5-325 MG per tablet                Protect others around you: Learn how to safely use, store and throw away your medicines at www.disposemymeds.org.             Medication List: This is a list of all your medications and when to take them. Check marks below indicate your daily home schedule. Keep this list as a reference.      Medications           Morning Afternoon Evening Bedtime As Needed    allopurinol 300 MG tablet   Commonly known as:  ZYLOPRIM   Take 300 mg by mouth daily.   Last time this was given:  300 mg on 9/26/2017  8:28 AM                                   CRESTOR PO   Take 20 mg by mouth daily   Last time this was given:  20 mg on 9/25/2017  2:16 PM                                   cyclobenzaprine 5 MG tablet   Commonly known as:  FLEXERIL   Take 1 tablet (5 mg) by mouth 3 times daily as needed for muscle spasms   Last time this was given:  5 mg on 9/26/2017  6:09 AM                                   FINASTERIDE PO   Take 5 mg by mouth daily   Last time this was given:  5 mg on 9/25/2017  2:16 PM                                   HYDROcodone-acetaminophen 5-325 MG per tablet   Commonly known as:  NORCO   Take 1-2 tablets  by mouth every 4 hours as needed for moderate to severe pain                                   latanoprost 0.005 % ophthalmic solution   Commonly known as:  XALATAN   Place 1 drop into the right eye At Bedtime                                   LISINOPRIL PO   Take 10 mg by mouth daily   Last time this was given:  10 mg on 9/25/2017  8:06 PM                                   predniSONE 5 MG tablet   Commonly known as:  DELTASONE   Take 5 mg by mouth every other day   Last time this was given:  5 mg on 9/25/2017  2:16 PM   Next Dose Due:  9/27/2017                                tamsulosin 0.4 MG capsule   Commonly known as:  FLOMAX   Take 0.4 mg by mouth every evening   Last time this was given:  0.4 mg on 9/25/2017  8:06 PM                                   timolol 0.5 % ophthalmic solution   Commonly known as:  TIMOPTIC   Place 1 drop into both eyes daily   Last time this was given:  1 drop on 9/26/2017  8:28 AM                                   TYLENOL PO   Take 500 mg by mouth every 3 hours as needed for mild pain or fever   Last time this was given:  975 mg on 9/26/2017  6:09 AM                                   ZANTAC PO   Take 150 mg by mouth At Bedtime   Last time this was given:  150 mg on 9/25/2017 10:05 PM

## 2017-09-25 NOTE — CONSULTS
PRIMARY CARE PROVIDER:  Cedrick Siddiqi MD.       REQUESTING PHYSICIAN:  Moises Elias MD.       REASON FOR CONSULTATION:  Hospitalist consultation.      HISTORY OF PRESENT ILLNESS:  Rose Duong is a 77-year-old male with past medical history of gout and BPH as well as hypertension who is admitted in the care of orthopedics and is status post anterior cervical decompression and fusion of C3 through C4.  Procedure was performed by Dr. Moises Elias under general anesthesia.  The patient tolerated the procedure well with blood loss of 25 cc.      Presently the patient is evaluated in his room.  He offers no complaints at this time.  Denies chest pain, shortness breath.  No nausea or vomiting.      PAST MEDICAL HISTORY:   1.  BPH   2.  Dyslipidemia.   3.  Gout.   4.  Hypertension.        PRIOR TO ADMISSION MEDICATIONS:    Prior to Admission medications    Medication Sig Last Dose Taking? Auth Provider   HYDROcodone-acetaminophen (NORCO) 5-325 MG per tablet Take 1-2 tablets by mouth every 4 hours as needed for moderate to severe pain  Yes Moises Elias MD   cyclobenzaprine (FLEXERIL) 5 MG tablet Take 1 tablet (5 mg) by mouth 3 times daily as needed for muscle spasms  Yes Moises Elias MD   timolol (TIMOPTIC) 0.5 % ophthalmic solution Place 1 drop into both eyes daily  9/25/2017 at am Yes Reported, Patient   FINASTERIDE PO Take 5 mg by mouth daily 9/24/2017 at 1200 Yes Reported, Patient   Acetaminophen (TYLENOL PO) Take 500 mg by mouth every 3 hours as needed for mild pain or fever 9/25/2017 at am Yes Reported, Patient   Rosuvastatin Calcium (CRESTOR PO) Take 20 mg by mouth daily 9/24/2017 at 1200 Yes Unknown, Entered By History   tamsulosin (FLOMAX) 0.4 MG 24 hr capsule Take 0.4 mg by mouth every evening  9/24/2017 at pm Yes Unknown, Entered By History   LISINOPRIL PO Take 10 mg by mouth daily 9/24/2017 at pm Yes Unknown, Entered By History   latanoprost (XALATAN) 0.005 % ophthalmic solution Place 1  drop into the right eye At Bedtime 9/24/2017 at pm Yes Unknown, Entered By History   Ranitidine HCl (ZANTAC PO) Take 150 mg by mouth At Bedtime 9/24/2017 at pm Yes Unknown, Entered By History   allopurinol (ZYLOPRIM) 300 MG tablet Take 300 mg by mouth daily. 9/24/2017 at am Yes Reported, Patient   predniSONE (DELTASONE) 5 MG tablet Take 5 mg by mouth every other day  9/23/2017 at am Yes Reported, Patient          PAST SURGICAL HISTORY:     1.  Rotator cuff repair.   2.  Colonoscopy   3.  appendectomy.      FAMILY HISTORY:  Father had oral cancer.  Mother had uterine cancer.       SOCIAL HISTORY:  No nonsmoker, drinks alcohol 3 beers per night.  Denies any history of withdrawal.        REVIEW OF SYSTEMS:  A 10-point review of systems was completed.  Pertinent positives are noted in HPI.  All other systems negative.      PHYSICAL EXAMINATION:   GENERAL:  The patient is a well-developed, well-nourished 77-year-old male who appears his stated age, in no acute distress.     VITAL SIGNS:  Blood pressure is 130/73, heart rate 74, temperature 97.5.   HEENT:  Normocephalic, atraumatic.   NECK:  C collar in place.   CARDIOVASCULAR:  Regular rate and rhythm, no murmurs.   PULMONARY:  Normal effort.  Lungs clear to auscultation anteriorly.   ABDOMEN:  Nondistended, nontender, normal bowel sounds.   EXTREMITIES:  Moves all 4 extremities.  Dorsalis pedis radial pulses palpated bilaterally.   NEUROLOGIC:  Alert and oriented.  Cranial nerves 2 through grossly intact.      LABORATORY EVALUATION:  Reviewed in Epic.        ASSESSMENT:  Rose Duong is a 77-year-old male with past medical history of gout and hypertension admitted under the care of orthopedics and is status post an anterior C3 through C4 fusion.  Hospitalist Service was consulted for medical management.   1.  Status post anterior C3 through C4 fusion  Postoperative day 0.  Routine postoperative cares and pain control will be deferred to orthopedics.   2.  Hypertension.   Blood pressure is well controlled.  Will continue prior to admission lisinopril 10 mg daily.   3.  Gout.  Denies any acute flares.  He takes allopurinol daily as well as prednisone 5 mg every other day.  Will continue this regimen.  No indication for stress dose steroids.   4.  Benign prostatic hypertrophy.  Continue prior to admission finasteride.   5.  Deep venous thrombosis prophylaxis.  PCDS per ortho.      CODE STATUS:  Full code.      We, the Hospitalist Service, thank you for this consult.  Will follow along with you.  Please call if questions.      This patient was discussed with Dr. Britany Fernández of the Hospitalist Service who independently interviewed the patient.  He is in agreement with the above plan.         BRITANY FERNÁNDEZ MD       As dictated by NINA TINSLEY PA-C            D: 2017 15:28   T: 2017 16:02   MT: KATE      Name:     VALERIE BALLARD   MRN:      [id number]        Account:       [id number]   :      1940           Consult Date:  2017      Document: [id number]

## 2017-09-25 NOTE — ANESTHESIA PREPROCEDURE EVALUATION
Anesthesia Evaluation     .             ROS/MED HX    ENT/Pulmonary:      (-) sleep apnea   Neurologic:       Cardiovascular:     (+) Dyslipidemia, hypertension----. : . . . :. .       METS/Exercise Tolerance:     Hematologic:     (+) Anemia, Other Hematologic Disorder-Anemia/leukocytosis with recent BMB      Musculoskeletal:         GI/Hepatic:     (+) GERD Asymptomatic on medication,       Renal/Genitourinary:     (+) BPH,       Endo:         Psychiatric:         Infectious Disease:         Malignancy:         Other:                     Physical Exam  Normal systems: cardiovascular, pulmonary and dental    Airway   Mallampati: II  TM distance: >3 FB  Neck ROM: full    Dental     Cardiovascular       Pulmonary                     Anesthesia Plan      History & Physical Review  History and physical reviewed and following examination; no interval change.    ASA Status:  2 .    NPO Status:  > 8 hours    Plan for General and ETT with Intravenous induction. Maintenance will be Balanced.    PONV prophylaxis:  Ondansetron (or other 5HT-3) and Dexamethasone or Solumedrol  Additional equipment: Videolaryngoscope 100mg hydrocortisone      Postoperative Care  Postoperative pain management:  IV analgesics.      Consents  Anesthetic plan, risks, benefits and alternatives discussed with:  Patient..          Procedure: Procedure(s):  FUSION CERVICAL ANTERIOR ONE LEVEL WITH BONE ALLOGRAFT  Preop diagnosis: CERVICAL MYELOPATHY    No Known Allergies  Past Medical History:   Diagnosis Date     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      Anemia      Bacteremia      BPH (benign prostatic hyperplasia)      Colon polyps      DJD (degenerative joint disease)      Elevated BP without diagnosis of hypertension      Gastroesophageal reflux disease      Gout      Gout      Hyperlipidemia      Hypertension      Leukocytosis      Rotator cuff tear arthropathy, left      Sinusitis      Past Surgical  History:   Procedure Laterality Date     APPENDECTOMY       COLONOSCOPY       ROTATOR CUFF REPAIR RT/LT  2004     Prior to Admission medications    Medication Sig Start Date End Date Taking? Authorizing Provider   timolol (TIMOPTIC) 0.5 % ophthalmic solution Place 1 drop into both eyes daily    Yes Reported, Patient   FINASTERIDE PO Take 5 mg by mouth daily   Yes Reported, Patient   Acetaminophen (TYLENOL PO) Take 500 mg by mouth every 3 hours as needed for mild pain or fever   Yes Reported, Patient   Rosuvastatin Calcium (CRESTOR PO) Take 20 mg by mouth daily   Yes Unknown, Entered By History   tamsulosin (FLOMAX) 0.4 MG 24 hr capsule Take 0.4 mg by mouth every evening    Yes Unknown, Entered By History   LISINOPRIL PO Take 10 mg by mouth daily   Yes Unknown, Entered By History   latanoprost (XALATAN) 0.005 % ophthalmic solution Place 1 drop into the right eye At Bedtime   Yes Unknown, Entered By History   Ranitidine HCl (ZANTAC PO) Take 150 mg by mouth At Bedtime   Yes Unknown, Entered By History   allopurinol (ZYLOPRIM) 300 MG tablet Take 300 mg by mouth daily.   Yes Reported, Patient   predniSONE (DELTASONE) 5 MG tablet Take 5 mg by mouth every other day    Yes Reported, Patient     Current Facility-Administered Medications Ordered in Epic   Medication Dose Route Frequency Last Rate Last Dose     ceFAZolin sodium-dextrose (ANCEF) infusion 2 g  2 g Intravenous Pre-Op/Pre-procedure x 1 dose         ceFAZolin (ANCEF) 1 g vial to attach to  ml bag for ADULT or 50 ml bag for PEDS  1 g Intravenous See Admin Instructions         lidocaine 1 % 1 mL  1 mL Other Q1H PRN         sodium chloride (PF) 0.9% PF flush 3 mL  3 mL Intracatheter Q8H         lactated ringers infusion   Intravenous Continuous         No current [facility]-ordered outpatient prescriptions on file.     Wt Readings from Last 1 Encounters:   09/25/17 62.8 kg (138 lb 7.2 oz)     Temp Readings from Last 1 Encounters:   09/25/17 36.1  C (97  F) (Temporal)      BP Readings from Last 6 Encounters:   09/25/17 131/64   03/21/16 132/71   12/29/11 136/80   12/13/11 144/84     Pulse Readings from Last 4 Encounters:   03/20/16 77     Resp Readings from Last 1 Encounters:   09/25/17 16     SpO2 Readings from Last 1 Encounters:   09/25/17 100%     Recent Labs   Lab Test  03/20/16   0620  03/18/16   0650   NA  143  144   POTASSIUM  3.7  3.4   CHLORIDE  108  110*   CO2  28  27   ANIONGAP  7  7   GLC  95  91   BUN  10  8   CR  0.81  0.85   CONNIE  8.4*  8.1*     No results for input(s): AST, ALT in the last 84522 hours.    Invalid input(s): ALP, BILT, LPSE  Recent Labs   Lab Test  03/20/16   0620  03/19/16   0615   WBC  12.8*  14.2*   HGB  9.6*  9.6*   PLT  253  265     No results for input(s): INR in the last 06240 hours.    Invalid input(s): APTT   No results for input(s): TROPI in the last 38022 hours.  RECENT LABS:   ECG:   ECHO:   CXR:                        .

## 2017-09-25 NOTE — IP AVS SNAPSHOT
[facility]    [address]    [place] [address]    Phone:  [phone]                                       After Visit Summary   9/25/2017    Rose Duong    MRN: [id number]           After Visit Summary Signature Page     I have received my discharge instructions, and my questions have been answered. I have discussed any challenges I see with this plan with the nurse or doctor.    ..........................................................................................................................................  Patient/Patient Representative Signature      ..........................................................................................................................................  Patient Representative Print Name and Relationship to Patient    ..................................................               ................................................  Date                                            Time    ..........................................................................................................................................  Reviewed by Signature/Title    ...................................................              ..............................................  Date                                                            Time

## 2017-09-25 NOTE — PLAN OF CARE
Problem: Patient Care Overview  Goal: Plan of Care/Patient Progress Review  Outcome: No Change  A&Ox4. 2L NC otherwise VSS. Denies pain. Denies nausea. BLE dorsiflexion strong/plantarflexion moderate. BUE numbness/tingling; baseline. LS diminished. Left hip bandaid; CDI. Dressing CDI; soft collar in place. RUDI drain patent. Tolerating sips of clears. Voided in urinal. RN will continue to monitor.

## 2017-09-25 NOTE — ANESTHESIA CARE TRANSFER NOTE
Patient: Roes SANCHEZ Pelo    Procedure(s):  ANTERIOR CERVICAL DECOMPRESSION AND FUSIONC3-4 WITH INSTRUMENTATION, ALLOGRAFT AND BONE MARROW ASPIRATE OF THE LEFT ILIAC CREST  - Wound Class: I-Clean   - Wound Class: I-Clean    Diagnosis: CERVICAL MYELOPATHY  Diagnosis Additional Information: No value filed.    Anesthesia Type:   General     Note:  Airway :Face Mask  Patient transferred to:PACU  Comments: Spontaneous respirations, tidal volume > 500, oxygen saturation > 97%, TOF 4/4 with > 5 seconds sustained tetany, follows commands.  Suctioned and extubated with cuff down to facemask.  Exchanging well.Transferred to PACU, spontaneous respirations, 10L oxygen via facemask.  All monitors and alarms on and functioning, VSS.  Patient awake, comfortable.  Report to PACU RN.      Vitals: (Last set prior to Anesthesia Care Transfer)    CRNA VITALS  9/25/2017 1133 - 9/25/2017 1213      9/25/2017             NIBP: 115/69    Pulse: 66    NIBP Mean: 78    SpO2: 99 %    Resp Rate (set): 10                Electronically Signed By: DRE Alvarenga CRNA  September 25, 2017  12:13 PM

## 2017-09-26 ENCOUNTER — APPOINTMENT (OUTPATIENT)
Dept: PHYSICAL THERAPY | Facility: CLINIC | Age: 77
End: 2017-09-26
Attending: ORTHOPAEDIC SURGERY
Payer: COMMERCIAL

## 2017-09-26 VITALS
BODY MASS INDEX: 21.63 KG/M2 | OXYGEN SATURATION: 97 % | WEIGHT: 137.79 LBS | DIASTOLIC BLOOD PRESSURE: 54 MMHG | TEMPERATURE: 98.5 F | SYSTOLIC BLOOD PRESSURE: 115 MMHG | RESPIRATION RATE: 16 BRPM | HEART RATE: 78 BPM | HEIGHT: 67 IN

## 2017-09-26 LAB
ANION GAP SERPL CALCULATED.3IONS-SCNC: 9 MMOL/L (ref 3–14)
BUN SERPL-MCNC: 14 MG/DL (ref 7–30)
CALCIUM SERPL-MCNC: 9 MG/DL (ref 8.5–10.1)
CHLORIDE SERPL-SCNC: 110 MMOL/L (ref 94–109)
CO2 SERPL-SCNC: 22 MMOL/L (ref 20–32)
CREAT SERPL-MCNC: 0.91 MG/DL (ref 0.66–1.25)
GFR SERPL CREATININE-BSD FRML MDRD: 80 ML/MIN/1.7M2
GLUCOSE SERPL-MCNC: 126 MG/DL (ref 70–99)
POTASSIUM SERPL-SCNC: 3.7 MMOL/L (ref 3.4–5.3)
SODIUM SERPL-SCNC: 141 MMOL/L (ref 133–144)

## 2017-09-26 PROCEDURE — 90662 IIV NO PRSV INCREASED AG IM: CPT | Performed by: INTERNAL MEDICINE

## 2017-09-26 PROCEDURE — 99232 SBSQ HOSP IP/OBS MODERATE 35: CPT | Performed by: PHYSICIAN ASSISTANT

## 2017-09-26 PROCEDURE — 25000132 ZZH RX MED GY IP 250 OP 250 PS 637: Mod: GY | Performed by: ORTHOPAEDIC SURGERY

## 2017-09-26 PROCEDURE — 25000125 ZZHC RX 250: Performed by: ORTHOPAEDIC SURGERY

## 2017-09-26 PROCEDURE — 97161 PT EVAL LOW COMPLEX 20 MIN: CPT | Mod: GP | Performed by: PHYSICAL THERAPIST

## 2017-09-26 PROCEDURE — 25000128 H RX IP 250 OP 636: Performed by: ORTHOPAEDIC SURGERY

## 2017-09-26 PROCEDURE — 97116 GAIT TRAINING THERAPY: CPT | Mod: GP | Performed by: PHYSICAL THERAPIST

## 2017-09-26 PROCEDURE — 25000132 ZZH RX MED GY IP 250 OP 250 PS 637: Mod: GY | Performed by: PHYSICIAN ASSISTANT

## 2017-09-26 PROCEDURE — 25000128 H RX IP 250 OP 636: Performed by: INTERNAL MEDICINE

## 2017-09-26 PROCEDURE — 97530 THERAPEUTIC ACTIVITIES: CPT | Mod: GP | Performed by: PHYSICAL THERAPIST

## 2017-09-26 PROCEDURE — 40000894 ZZH STATISTIC OT IP EVAL DEFER: Performed by: OCCUPATIONAL THERAPIST

## 2017-09-26 PROCEDURE — A9270 NON-COVERED ITEM OR SERVICE: HCPCS | Mod: GY | Performed by: ORTHOPAEDIC SURGERY

## 2017-09-26 PROCEDURE — 40000193 ZZH STATISTIC PT WARD VISIT: Performed by: PHYSICAL THERAPIST

## 2017-09-26 PROCEDURE — A9270 NON-COVERED ITEM OR SERVICE: HCPCS | Mod: GY | Performed by: PHYSICIAN ASSISTANT

## 2017-09-26 PROCEDURE — 36415 COLL VENOUS BLD VENIPUNCTURE: CPT | Performed by: PHYSICIAN ASSISTANT

## 2017-09-26 PROCEDURE — 80048 BASIC METABOLIC PNL TOTAL CA: CPT | Performed by: PHYSICIAN ASSISTANT

## 2017-09-26 RX ORDER — HYDROCODONE BITARTRATE AND ACETAMINOPHEN 5; 325 MG/1; MG/1
1-2 TABLET ORAL EVERY 4 HOURS PRN
Qty: 50 TABLET | Refills: 0 | Status: ON HOLD
Start: 2017-09-26 | End: 2018-05-01

## 2017-09-26 RX ORDER — HYDROCODONE BITARTRATE AND ACETAMINOPHEN 5; 325 MG/1; MG/1
1-2 TABLET ORAL EVERY 4 HOURS PRN
Status: DISCONTINUED | OUTPATIENT
Start: 2017-09-26 | End: 2017-09-26 | Stop reason: HOSPADM

## 2017-09-26 RX ORDER — CYCLOBENZAPRINE HCL 5 MG
5 TABLET ORAL 3 TIMES DAILY PRN
Qty: 30 TABLET | Refills: 0 | Status: ON HOLD
Start: 2017-09-26 | End: 2018-05-01

## 2017-09-26 RX ADMIN — ACETAMINOPHEN 975 MG: 325 TABLET, FILM COATED ORAL at 06:09

## 2017-09-26 RX ADMIN — CYCLOBENZAPRINE HYDROCHLORIDE 5 MG: 5 TABLET, FILM COATED ORAL at 06:09

## 2017-09-26 RX ADMIN — INFLUENZA A VIRUSA/MICHIGAN/45/2015 X-275 (H1N1) ANTIGEN (FORMALDEHYDE INACTIVATED), INFLUENZA A VIRUS A/HONG KONG/4801/2014 X-263B (H3N2) ANTIGEN (FORMALDEHYDE INACTIVATED), AND INFLUENZA B VIRUS B/BRISBANE/60/2008 ANTIGEN (FORMALDEHYDE INACTIVATED) 0.5 ML: 60; 60; 60 INJECTION, SUSPENSION INTRAMUSCULAR at 08:35

## 2017-09-26 RX ADMIN — CEFAZOLIN SODIUM 1 G: 1 INJECTION, POWDER, FOR SOLUTION INTRAMUSCULAR; INTRAVENOUS at 02:34

## 2017-09-26 RX ADMIN — ALLOPURINOL 300 MG: 300 TABLET ORAL at 08:28

## 2017-09-26 RX ADMIN — TIMOLOL MALEATE 1 DROP: 5 SOLUTION/ DROPS OPHTHALMIC at 08:28

## 2017-09-26 NOTE — PROGRESS NOTES
[facility]    Hospitalist Progress Note      Assessment & Plan   Rose Duong is a 77-year-old male with past medical history of gout and hypertension admitted under the care of orthopedics and is status post an anterior C3 through C4 fusion.  Hospitalist Service was consulted for medical management.     Status post anterior C3 through C4 fusion:  - POD 1  - Routine post-op cares and pain control per Ortho      Essential Hypertension:  - BP well controlled on PTA lisinopril 10 mg daily.     Gout:  - Denies any acute flares.   - Continue PTA allopurinol and prednisone qod     Benign prostatic hypertrophy.    -Continue prior to admission finasteride.     DVT Prophylaxis: Pneumatic Compression Devices  Code Status: Full Code    Disposition: Ok with d/c today per Ortho    Namrata Cruz    Interval History   Doing well today. Pain well controlled. Denies dizziness. No N/V    -Data reviewed today: I reviewed all new labs and imaging results over the last 24 hours. I personally reviewed no images or EKG's today.    Physical Exam   Temp: 98.4  F (36.9  C) Temp src: Oral BP: 118/46 Pulse: 81 Heart Rate: 96 Resp: 16 SpO2: 97 % O2 Device: None (Room air) Oxygen Delivery: 2 LPM  Vitals:    09/25/17 0830 09/26/17 0700   Weight: 62.8 kg (138 lb 7.2 oz) 62.5 kg (137 lb 12.6 oz)     Vital Signs with Ranges  Temp:  [97.5  F (36.4  C)-98.4  F (36.9  C)] 98.4  F (36.9  C)  Pulse:  [77-82] 81  Heart Rate:  [55-99] 96  Resp:  [12-30] 16  BP: (107-138)/(46-73) 118/46  SpO2:  [96 %-100 %] 97 %  I/O last 3 completed shifts:  In: 4160 [P.O.:1200; I.V.:2960]  Out: 3782 [Urine:3750; Drains:7; Blood:25]    Constitutional: Alert, resting comfortably in NAD  Respiratory: Normal effort, symmetric expansion, no crackles or wheezing  Cardiovascular: RRR, soft systolic murmur noted  GI: Non distended, normal bowels sounds, no tenderness or guarding  MSK: LE without edema. Dorsalis pedis pulse palpated bilaterally.    Skin/Integumen: Clear  Neuro: CN II-XII grossly intact  Psych:  Alert and oriented x 3. Normal affect      Medications     NaCl Stopped (09/26/17 0427)       finasteride (PROSCAR) tablet 5 mg  5 mg Oral Daily with lunch     latanoprost  1 drop Right Eye At Bedtime     lisinopril (PRINIVIL/ZESTRIL) tablet 10 mg  10 mg Oral QPM     predniSONE  5 mg Oral Every Other Day     ranitidine  150 mg Oral At Bedtime     rosuvastatin (CRESTOR) tablet 20 mg  20 mg Oral Daily with lunch     tamsulosin  0.4 mg Oral QPM     timolol  1 drop Both Eyes Daily     sodium chloride (PF)  3 mL Intracatheter Q8H     acetaminophen  975 mg Oral Q8H     allopurinol  300 mg Oral Daily       Data     Recent Labs  Lab 09/26/17  0811 09/25/17  0905   HGB  --  10.6*     --    POTASSIUM 3.7 4.6   CHLORIDE 110*  --    CO2 22  --    BUN 14  --    CR 0.91 1.01   ANIONGAP 9  --    CONNIE 9.0  --    *  --        No results found for this or any previous visit (from the past 24 hour(s)).

## 2017-09-26 NOTE — PLAN OF CARE
Problem: Patient Care Overview  Goal: Plan of Care/Patient Progress Review  OT: Following chart review and consult with PT, pt's IP needs are met with PT. Orders completed

## 2017-09-26 NOTE — PROGRESS NOTES
09/26/17 1018   Quick Adds   Type of Visit Initial PT Evaluation   Living Environment   Lives With spouse   Living Arrangements house   Home Accessibility stairs to enter home;stairs within home   Number of Stairs to Enter Home 1   Number of Stairs Within Home 6   Stair Railings at Home outside, present at both sides;inside, present at both sides   Transportation Available family or friend will provide;car   Self-Care   Usual Activity Tolerance moderate   Current Activity Tolerance moderate   Regular Exercise no   Equipment Currently Used at Home none   Functional Level Prior   Ambulation 0-->independent   Transferring 0-->independent   Toileting 0-->independent   Bathing 0-->independent   Dressing 0-->independent   Eating 0-->independent   Communication 0-->understands/communicates without difficulty   Swallowing 0-->swallows foods/liquids without difficulty   Cognition 0 - no cognition issues reported   Fall history within last six months no   Which of the above functional risks had a recent onset or change? ambulation   Prior Functional Level Comment Pt was having increasing difficulty getting around at home d/t LE weakness from cervical stenosis   General Information   Onset of Illness/Injury or Date of Surgery - Date 09/25/17   Referring Physician Dr. Moises Elias   Patient/Family Goals Statement return home today   Pertinent History of Current Problem (include personal factors and/or comorbidities that impact the POC) Pt had increasing numbness/tingling in hands, weakness in LE's over past month, found to have severe cervical stenosis. S/p ant cervical decompression and fusion C3-4 on 9/25/17. PMH also includes L rotator cuff tear, gout, BPH, HTN.   Precautions/Limitations spinal precautions   General Info Comments Pt is to avoid lifting more than 5 pounds   Cognitive Status Examination   Orientation orientation to person, place and time   Level of Consciousness alert   Follows Commands and Answers Questions  "100% of the time   Personal Safety and Judgment intact   Memory intact   Pain Assessment   Patient Currently in Pain No   Integumentary/Edema   Integumentary/Edema Comments soft collar on, ant incision underneath.   Posture    Posture Not impaired   Range of Motion (ROM)   ROM Comment UE ROM mildly limited on L d/t RC injury, WFL on R.   Strength   Strength Comments Pt had greater than antigravity strength all 4 extremities, weakest on L UE d/t rotator cuff injury (chronic)   Bed Mobility   Bed Mobility Comments Supine to/from sit with rail and mod ind.   Transfer Skills   Transfer Comments sit to/from stand ind   Gait   Gait Comments gait 50' without AD and supervision   Balance   Balance Comments static and dynamic sitting balance intact, standing balance mildly impaired   Coordination   Coordination Comments pt had trouble finger to nose on L d/t shoulder pain. R WFL   General Therapy Interventions   Planned Therapy Interventions gait training   Clinical Impression   Criteria for Skilled Therapeutic Intervention yes, treatment indicated   PT Diagnosis impaired functional mobility   Influenced by the following impairments weakness and pain, generalized deconditioning   Functional limitations due to impairments limited ability to ambulate in community   Clinical Presentation Stable/Uncomplicated   Clinical Presentation Rationale per clinical judgement   Clinical Decision Making (Complexity) Low complexity   Therapy Frequency` daily   Predicted Duration of Therapy Intervention (days/wks) one session - eval/treatment combined   Anticipated Discharge Disposition Home with Assist   Risk & Benefits of therapy have been explained Yes   Patient, Family & other staff in agreement with plan of care Yes   [facility] [email or web address] TM \"6 Clicks\"   2016, Trustees of [facility], under license to [employer].  All rights reserved.   6 Clicks Short Forms Basic Mobility Inpatient Short Form   [facility] AM-PAC  \"6 " "Clicks\" V.2 Basic Mobility Inpatient Short Form   1. Turning from your back to your side while in a flat bed without using bedrails? 4 - None   2. Moving from lying on your back to sitting on the side of a flat bed without using bedrails? 3 - A Little   3. Moving to and from a bed to a chair (including a wheelchair)? 3 - A Little   4. Standing up from a chair using your arms (e.g., wheelchair, or bedside chair)? 3 - A Little   5. To walk in hospital room? 4 - None   6. Climbing 3-5 steps with a railing? 3 - A Little   Basic Mobility Raw Score (Score out of 24.Lower scores equate to lower levels of function) 20   Total Evaluation Time   Total Evaluation Time (Minutes) 15     "

## 2017-09-26 NOTE — PLAN OF CARE
Problem: Patient Care Overview  Goal: Plan of Care/Patient Progress Review  Outcome: Improving  Pt a/ox4 with VSS and CMS with baseline BUE numbness and tingling in hands, improving since OR. Equally strong dorsi/plantar flexion on exam but pt feels still slightly weaker when up ambulating, no pain in buttocks/hips noted. Ant c-spine dressing CDI with RUDI x1 in place, scant output. L hip graft site CDI. Up in room with SBA. Pain managed with scheduled tylenol and PRN flexeril. Minimal swallow discomfort and no change in vocal quality, encouraging ice and liquids over NOC. Soft collar for comfort at bedside, pt prefers to wear OOB only. BS + and passing flatus, advanced to regular and educated on mechanical soft items for am. Straight cathed x2 for retention, 800/600, has hx of BPH on meds. Fx voiding with PVRs now in low 80s. Plan for d/c home 9/26 with family support.

## 2017-09-26 NOTE — PLAN OF CARE
Problem: Patient Care Overview  Goal: Plan of Care/Patient Progress Review  PT - PT eval completed, pt is a 78 y/o male admitted for anterior C3-4 cervical decompression and fusion on 9/25/17. 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 He was having increasing difficulty getting around over the past month due to numbness/tingling/weakness in extremities d/t severe cervical stenosis, and reports he is already feeling better post-surgery. Currently pt able to ambulate 200' without AD independently, navigated 6 steps with supervision. Pt is doing well, educated r.e. cervical spine surgery precautions and technique for transfers in/out of bed and the car as well as gait and stairs. No further PT indicated, agree with plan for home with spouse when medically ready. Screened for OT needs, pt is doing well with ADL's. He does have chronic L shoulder issues, but this is not new and pt can manage at home; no need for inpatient OT.     Discharge Planner PT   Patient plan for discharge: home with spouse  Current status: ambulating 200' without AD ind., stairs with supervision  Barriers to return to prior living situation: pt has stairs  Recommendations for discharge: home with spouse  Rationale for recommendations: Pt is moving well, transfers ind, gait ind, stairs with supervision.        Entered by: Rachel Gonzalez 09/26/2017 10:17 AM        Physical Therapy Discharge Summary     Reason for therapy discharge:    All goals and outcomes met, no further needs identified.     Progress towards therapy goal(s). See goals on [facility] Plan [facility] electronic [facility] record for goal details.  Goals met     Therapy recommendation(s):    No further therapy is recommended. Continue with walking program at home and following cervical spine precautions.

## 2017-09-26 NOTE — DISCHARGE INSTRUCTIONS
Discharge Instructions following Cervical Spine Surgery      Wound Care:    Your incision(s) are closed with a suture that will need to be removed.  You will have steri strips (butterfly tapes) across your incision(s).  Leave the steri strips in place until you come to the office to have your sutures removed.  You will need to make an appointment for 1 to 2 weeks following your surgery to have your suture(s) removed and to have an x-ray check.  Please call [phone] to make an appointment if you haven t already done so.      You do not need to cover your incisions with plastic when you are showering, once you are home from the hospital.  Let the water run over your incision when in the shower, then pat the incision dry.  Cover your incision with a clean bandage after you shower to keep you incision clean until your sutures are removed.    If you had bone graft taken from your iliac crest for your procedure, do not take a bath or sit in a hot tub until your sutures are removed and your incision is well healed.    Pain Medication:    You will be sent home with pain medication from the hospital.  Gradually decrease your usage of the pain medication as you start to feel better.  If you are in need of a refill of pain medication, please call the office and talk to Alina.  Please plan ahead and call during regular office hours if you need a refill.  Our office hours are Monday through Friday from 8:00 am until 4:30 pm.  Pain medication cannot be refilled in the evening or on weekends.  Narcotic pain medication is addictive and will not be prescribed on a long term basis.  Alina will be able to assist you in weaning from these medications.    Avoid the use of any non-steroidal anti-inflammatory medications (Advil, Aleve, Celebrex etc.) for 6 weeks after your surgery.  These medications inhibit bone growth and Dr. Elias would prefer if you would avoid these medications.  If you do not need narcotic pain medication,  Tylenol products would be a good alternative for mild pain control.      Activity:    You have a 20 pound lifting restriction for at least 6 weeks.  Dr. Elias will inform you at your 6-week follow-up appointment if you can increase your weight limit.      Walking is your best activity.  Remember to walk daily and increase your distance and time as tolerated. Continue the daily isometric exercises that you were taught by the physical therapist while you were in the hospital.  This will help with the stiffness caused by the brace.    You cannot drive if you have taken narcotic pain medication within eight hours.  You may resume driving once you are not taking narcotic pain medication and you feel comfortable to operate your vehicle safely.    If you have any questions or concerns once you are home from the hospital, please call the office and speak with Dr. Elias s nurse, Alina.  She can be reached at [phone].

## 2017-09-26 NOTE — PROGRESS NOTES
VSS. Denies pain. Baseline numbness/tingling improving, moderate hand grasps. RUDI drain pulled, dressing changed. Up with SBA, voiding in BR. William reg diet, soft foods. +gas. Discharged home with wife. All belongings and meds sent at discharge.

## 2017-09-26 NOTE — PROGRESS NOTES
POD #1   Doing well.  Good relief of low back pain - a pleasant event.  Neuro intact, voice ok, swallowing ok  No arm pain, good strength   Drain typical   Plan- mobilization, po pain medication, PT, discharge likely today  Moises Elias MD

## 2017-09-28 NOTE — DISCHARGE SUMMARY
Mr. 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  He underwent uncomplicated partial corpectomies of C3 and C4 and reconstruction.  Spinal cord monitoring remained stable and unchanged and he awoke neurologically intact with good strength, good sensation, good swallowing, good vocal cord function.  He reported good early relief and a positive trajectory for neurologic recovery.  On the first postoperative day morning, he continued to do well.  His pain was easily controlled.  He was able handle a regular diet and he noticed a considerable improvement in his truncal and lower extremity difficulties.  He was able to be discharged home shortly thereafter, to be seen in the outpatient setting in 1 week's time for suture removal and clinical and radiographic followup.         QUEENIE JOHNSTON MD             D: 2017 09:44   T: 2017 11:44   MT: ESTELA      Name:     VALERIE DUONG   MRN:      [id number]        Account:        [id number]   :      1940           Admit Date:     [id number]                                  Discharge Date: 2017      Document: [id number]

## 2017-12-12 ENCOUNTER — ANESTHESIA (OUTPATIENT)
Dept: SURGERY | Facility: CLINIC | Age: 77
End: 2017-12-12
Payer: COMMERCIAL

## 2017-12-12 ENCOUNTER — SURGERY (OUTPATIENT)
Age: 77
End: 2017-12-12

## 2017-12-12 ENCOUNTER — HOSPITAL ENCOUNTER (OUTPATIENT)
Facility: CLINIC | Age: 77
Discharge: HOME | End: 2017-12-12
Attending: OPHTHALMOLOGY | Admitting: OPHTHALMOLOGY
Payer: COMMERCIAL

## 2017-12-12 ENCOUNTER — ANESTHESIA EVENT (OUTPATIENT)
Dept: SURGERY | Facility: CLINIC | Age: 77
End: 2017-12-12
Payer: COMMERCIAL

## 2017-12-12 VITALS
HEIGHT: 66 IN | RESPIRATION RATE: 14 BRPM | SYSTOLIC BLOOD PRESSURE: 126 MMHG | OXYGEN SATURATION: 99 % | WEIGHT: 140.21 LBS | TEMPERATURE: 96.3 F | DIASTOLIC BLOOD PRESSURE: 74 MMHG | BODY MASS INDEX: 22.53 KG/M2

## 2017-12-12 PROCEDURE — 71000028 ZZH EYE RECOVERY PHASE 2 EACH 15 MINS: Performed by: OPHTHALMOLOGY

## 2017-12-12 PROCEDURE — 37000009 ZZH ANESTHESIA TECHNICAL FEE, EACH ADDTL 15 MIN: Performed by: OPHTHALMOLOGY

## 2017-12-12 PROCEDURE — 25000125 ZZHC RX 250: Performed by: NURSE ANESTHETIST, CERTIFIED REGISTERED

## 2017-12-12 PROCEDURE — 25000125 ZZHC RX 250: Performed by: ANESTHESIOLOGY

## 2017-12-12 PROCEDURE — 40000170 ZZH STATISTIC PRE-PROCEDURE ASSESSMENT II: Performed by: OPHTHALMOLOGY

## 2017-12-12 PROCEDURE — 25000128 H RX IP 250 OP 636: Performed by: NURSE ANESTHETIST, CERTIFIED REGISTERED

## 2017-12-12 PROCEDURE — 25000128 H RX IP 250 OP 636: Performed by: OPHTHALMOLOGY

## 2017-12-12 PROCEDURE — 36000104 ZZH EYE SURGERY LEVEL 4 1ST 30 MIN: Performed by: OPHTHALMOLOGY

## 2017-12-12 PROCEDURE — 25000128 H RX IP 250 OP 636: Performed by: ANESTHESIOLOGY

## 2017-12-12 PROCEDURE — 25000132 ZZH RX MED GY IP 250 OP 250 PS 637: Performed by: OPHTHALMOLOGY

## 2017-12-12 PROCEDURE — 66711 ECP CILIARY BODY DESTRUCTION: CPT | Performed by: OPHTHALMOLOGY

## 2017-12-12 PROCEDURE — 27210794 ZZH OR GENERAL SUPPLY STERILE: Performed by: OPHTHALMOLOGY

## 2017-12-12 PROCEDURE — 25000125 ZZHC RX 250: Performed by: OPHTHALMOLOGY

## 2017-12-12 PROCEDURE — V2632 POST CHMBR INTRAOCULAR LENS: HCPCS | Performed by: OPHTHALMOLOGY

## 2017-12-12 PROCEDURE — 37000008 ZZH ANESTHESIA TECHNICAL FEE, 1ST 30 MIN: Performed by: OPHTHALMOLOGY

## 2017-12-12 PROCEDURE — 36000105 ZZH EYE SURGERY LEVEL 4 EA 15 ADDTL MIN: Performed by: OPHTHALMOLOGY

## 2017-12-12 DEVICE — EYE IMP IOL AMO PCL TECNIS ZCB00 21.5: Type: IMPLANTABLE DEVICE | Site: EYE | Status: FUNCTIONAL

## 2017-12-12 RX ORDER — DICLOFENAC SODIUM 1 MG/ML
1 SOLUTION/ DROPS OPHTHALMIC
Status: COMPLETED | OUTPATIENT
Start: 2017-12-12 | End: 2017-12-12

## 2017-12-12 RX ORDER — LIDOCAINE HYDROCHLORIDE 10 MG/ML
INJECTION, SOLUTION EPIDURAL; INFILTRATION; INTRACAUDAL; PERINEURAL PRN
Status: DISCONTINUED | OUTPATIENT
Start: 2017-12-12 | End: 2017-12-12 | Stop reason: HOSPADM

## 2017-12-12 RX ORDER — HYDROMORPHONE HYDROCHLORIDE 1 MG/ML
.3-.5 INJECTION, SOLUTION INTRAMUSCULAR; INTRAVENOUS; SUBCUTANEOUS EVERY 5 MIN PRN
Status: DISCONTINUED | OUTPATIENT
Start: 2017-12-12 | End: 2017-12-12 | Stop reason: HOSPADM

## 2017-12-12 RX ORDER — SODIUM CHLORIDE, SODIUM LACTATE, POTASSIUM CHLORIDE, CALCIUM CHLORIDE 600; 310; 30; 20 MG/100ML; MG/100ML; MG/100ML; MG/100ML
INJECTION, SOLUTION INTRAVENOUS CONTINUOUS
Status: DISCONTINUED | OUTPATIENT
Start: 2017-12-12 | End: 2017-12-12 | Stop reason: HOSPADM

## 2017-12-12 RX ORDER — ONDANSETRON 2 MG/ML
4 INJECTION INTRAMUSCULAR; INTRAVENOUS EVERY 30 MIN PRN
Status: DISCONTINUED | OUTPATIENT
Start: 2017-12-12 | End: 2017-12-12 | Stop reason: HOSPADM

## 2017-12-12 RX ORDER — TROPICAMIDE 10 MG/ML
1 SOLUTION/ DROPS OPHTHALMIC
Status: COMPLETED | OUTPATIENT
Start: 2017-12-12 | End: 2017-12-12

## 2017-12-12 RX ORDER — PHENYLEPHRINE HYDROCHLORIDE 25 MG/ML
1 SOLUTION/ DROPS OPHTHALMIC
Status: COMPLETED | OUTPATIENT
Start: 2017-12-12 | End: 2017-12-12

## 2017-12-12 RX ORDER — FENTANYL CITRATE 50 UG/ML
25-50 INJECTION, SOLUTION INTRAMUSCULAR; INTRAVENOUS
Status: DISCONTINUED | OUTPATIENT
Start: 2017-12-12 | End: 2017-12-12 | Stop reason: HOSPADM

## 2017-12-12 RX ORDER — PROPARACAINE HYDROCHLORIDE 5 MG/ML
1 SOLUTION/ DROPS OPHTHALMIC ONCE
Status: COMPLETED | OUTPATIENT
Start: 2017-12-12 | End: 2017-12-12

## 2017-12-12 RX ORDER — MOXIFLOXACIN 5 MG/ML
1 SOLUTION/ DROPS OPHTHALMIC
Status: COMPLETED | OUTPATIENT
Start: 2017-12-12 | End: 2017-12-12

## 2017-12-12 RX ORDER — ACETAZOLAMIDE 500 MG/1
500 CAPSULE, EXTENDED RELEASE ORAL ONCE
Status: COMPLETED | OUTPATIENT
Start: 2017-12-12 | End: 2017-12-12

## 2017-12-12 RX ORDER — ONDANSETRON 4 MG/1
4 TABLET, ORALLY DISINTEGRATING ORAL EVERY 30 MIN PRN
Status: DISCONTINUED | OUTPATIENT
Start: 2017-12-12 | End: 2017-12-12 | Stop reason: HOSPADM

## 2017-12-12 RX ORDER — BALANCED SALT SOLUTION 6.4; .75; .48; .3; 3.9; 1.7 MG/ML; MG/ML; MG/ML; MG/ML; MG/ML; MG/ML
SOLUTION OPHTHALMIC PRN
Status: DISCONTINUED | OUTPATIENT
Start: 2017-12-12 | End: 2017-12-12 | Stop reason: HOSPADM

## 2017-12-12 RX ADMIN — PROPARACAINE HYDROCHLORIDE 1 DROP: 5 SOLUTION/ DROPS OPHTHALMIC at 08:00

## 2017-12-12 RX ADMIN — MOXIFLOXACIN 1 DROP: 5 SOLUTION/ DROPS OPHTHALMIC at 08:07

## 2017-12-12 RX ADMIN — Medication 1 ML: at 10:04

## 2017-12-12 RX ADMIN — BALANCED SALT SOLUTION 15 ML: 6.4; .75; .48; .3; 3.9; 1.7 SOLUTION OPHTHALMIC at 10:03

## 2017-12-12 RX ADMIN — DICLOFENAC SODIUM 1 DROP: 1 SOLUTION OPHTHALMIC at 08:01

## 2017-12-12 RX ADMIN — PHENYLEPHRINE HYDROCHLORIDE 1 DROP: 2.5 SOLUTION/ DROPS OPHTHALMIC at 08:06

## 2017-12-12 RX ADMIN — EPINEPHRINE 500 ML: 1 INJECTION, SOLUTION, CONCENTRATE INTRAVENOUS at 10:04

## 2017-12-12 RX ADMIN — DEXMEDETOMIDINE HYDROCHLORIDE 8 MCG: 100 INJECTION, SOLUTION INTRAVENOUS at 10:27

## 2017-12-12 RX ADMIN — MIDAZOLAM 1 MG: 1 INJECTION INTRAMUSCULAR; INTRAVENOUS at 09:53

## 2017-12-12 RX ADMIN — SODIUM CHLORIDE, POTASSIUM CHLORIDE, SODIUM LACTATE AND CALCIUM CHLORIDE: 600; 310; 30; 20 INJECTION, SOLUTION INTRAVENOUS at 08:12

## 2017-12-12 RX ADMIN — DICLOFENAC SODIUM 1 DROP: 1 SOLUTION OPHTHALMIC at 08:06

## 2017-12-12 RX ADMIN — DICLOFENAC SODIUM 1 DROP: 1 SOLUTION OPHTHALMIC at 08:12

## 2017-12-12 RX ADMIN — MOXIFLOXACIN 1 DROP: 5 SOLUTION/ DROPS OPHTHALMIC at 08:12

## 2017-12-12 RX ADMIN — TROPICAMIDE 1 DROP: 10 SOLUTION/ DROPS OPHTHALMIC at 08:00

## 2017-12-12 RX ADMIN — ACETAZOLAMIDE 500 MG: 500 CAPSULE, EXTENDED RELEASE ORAL at 10:48

## 2017-12-12 RX ADMIN — Medication 0.1 ML: at 10:23

## 2017-12-12 RX ADMIN — MIDAZOLAM 1 MG: 1 INJECTION INTRAMUSCULAR; INTRAVENOUS at 09:56

## 2017-12-12 RX ADMIN — LIDOCAINE HYDROCHLORIDE 0.5 ML: 35 GEL OPHTHALMIC at 10:04

## 2017-12-12 RX ADMIN — PHENYLEPHRINE HYDROCHLORIDE 1 DROP: 2.5 SOLUTION/ DROPS OPHTHALMIC at 08:01

## 2017-12-12 RX ADMIN — TROPICAMIDE 1 DROP: 10 SOLUTION/ DROPS OPHTHALMIC at 08:12

## 2017-12-12 RX ADMIN — MOXIFLOXACIN 1 DROP: 5 SOLUTION/ DROPS OPHTHALMIC at 08:01

## 2017-12-12 RX ADMIN — Medication 0.8 ML: at 10:05

## 2017-12-12 RX ADMIN — LIDOCAINE HYDROCHLORIDE 1 ML: 10 INJECTION, SOLUTION EPIDURAL; INFILTRATION; INTRACAUDAL; PERINEURAL at 08:12

## 2017-12-12 RX ADMIN — LIDOCAINE HYDROCHLORIDE 1 ML: 10 INJECTION, SOLUTION EPIDURAL; INFILTRATION; INTRACAUDAL; PERINEURAL at 10:04

## 2017-12-12 RX ADMIN — TROPICAMIDE 1 DROP: 10 SOLUTION/ DROPS OPHTHALMIC at 08:06

## 2017-12-12 RX ADMIN — PHENYLEPHRINE HYDROCHLORIDE 1 DROP: 2.5 SOLUTION/ DROPS OPHTHALMIC at 08:12

## 2017-12-12 RX ADMIN — Medication 1 DROP: at 10:24

## 2017-12-12 ASSESSMENT — ENCOUNTER SYMPTOMS
DYSRHYTHMIAS: 0
SEIZURES: 0

## 2017-12-12 NOTE — IP AVS SNAPSHOT
[facility]    [address]    [place] [address]    Phone:  [phone]    Fax:  [phone]                                       After Visit Summary   12/12/2017    Rose Duong    MRN: [id number]           After Visit Summary Signature Page     I have received my discharge instructions, and my questions have been answered. I have discussed any challenges I see with this plan with the nurse or doctor.    ..........................................................................................................................................  Patient/Patient Representative Signature      ..........................................................................................................................................  Patient Representative Print Name and Relationship to Patient    ..................................................               ................................................  Date                                            Time    ..........................................................................................................................................  Reviewed by Signature/Title    ...................................................              ..............................................  Date                                                            Time

## 2017-12-12 NOTE — DISCHARGE INSTRUCTIONS
[facility] Discharge  Instructions  Anesthesia ([facility])   Adult Discharge Instructions    For 24 hours after surgery    1. Get plenty of rest.  Make arrangements to have a responsible adult stay with you for at least 6 hours after you leave the hospital.  2. Do not drive or use heavy equipment for 24 hours.    3. Do not drink alcohol for 24 hours.  4. Do not sign legal documents or make important decisions for 24 hours.  5. Avoid strenuous or risky activities. You may feel lightheaded.  If so, sit for a few minutes before standing.  Have someone help you get up.   6. Conscious sedation patients may resume a regular diet..  7. Any questions of medical nature, call your physician.    Dr. Alli Mott  [facility]  [phone]  Post Operative Cataract Instructions    If you have a clear eye shield on, you should wear the eye shield or glasses to protect the eye on the day of surgery and begin eye drops today as directed.         OR    If you have a gauze eye patch on, you may remove it in approximately 3 hours and begin your eye drops at this time.  Wear just the clear eye shield or glasses for the rest of the day to protect the eye.    The clear eye shield should be worn overnight and brought to the clinic at your post op visit.    Do not rub the operated eye.    Light sensitivity may be noticed. Sunglasses may be worn for comfort.    Some discomfort and irritation may be noticed. Acetaminophen (Tylenol) or Ibuprofen (Advil) may be taken for discomfort. If pain persists please call Dr. Mott's office.    Keep the operated eye dry. You may wash your hair, bathe or shower, but keep the operated eye closed while doing so. No swimming pools, hot tubs, saunas, etc. for 10 days.    Use medication exactly as prescribed by your doctor. You may restart your regular home medications.    If your procedure included an ECP (Endoscopic Cyclophotocoagulation), you should take  Diamox 500 mg at bedtime as directed by your physician.     Bring all eye medications with you to the clinic on your first post operative visit.    Call Dr. Mott's office if any of the following should occur:      Any sudden vision changes    Nausea or severe headache    Increase in pain not controlled      Or signs of infection (pus, increasing redness or tenderness)        11/8/16

## 2017-12-12 NOTE — IP AVS SNAPSHOT
MRN:[id number]                      After Visit Summary   12/12/2017    Rose Duong    MRN: [id number]           Thank you!     Thank you for choosing [facility] for your care. Our goal is always to provide you with excellent care. Hearing back from our patients is one way we can continue to improve our services. Please take a few minutes to complete the written survey that you may receive in the mail after you visit with us. Thank you!        Patient Information     Date Of Birth          1940        About your hospital stay     You were admitted on:  December 12, 2017 You last received care in the:  [facility]    You were discharged on:  December 12, 2017       Who to Call     For medical emergencies, please call 911.  For non-urgent questions about your medical care, please call your primary care provider or clinic, [phone]  For questions related to your surgery, please call your surgery clinic        Attending Provider     Provider Specialty    Alli Mott MD Ophthalmology       Primary Care Provider Office Phone # Fax #    Cedrick Siddiqi [phone]      Further instructions from your care team       [facility] Discharge  Instructions  Anesthesia ([facility])   Adult Discharge Instructions    For 24 hours after surgery    1. Get plenty of rest.  Make arrangements to have a responsible adult stay with you for at least 6 hours after you leave the hospital.  2. Do not drive or use heavy equipment for 24 hours.    3. Do not drink alcohol for 24 hours.  4. Do not sign legal documents or make important decisions for 24 hours.  5. Avoid strenuous or risky activities. You may feel lightheaded.  If so, sit for a few minutes before standing.  Have someone help you get up.   6. Conscious sedation patients may resume a regular diet..  7. Any questions of medical nature, call your physician.    Dr. Carson  [place]  [facility]  [phone]  Post Operative Cataract Instructions    If you have a clear eye shield on, you should wear the eye shield or glasses to protect the eye on the day of surgery and begin eye drops today as directed.         OR    If you have a gauze eye patch on, you may remove it in approximately 3 hours and begin your eye drops at this time.  Wear just the clear eye shield or glasses for the rest of the day to protect the eye.    The clear eye shield should be worn overnight and brought to the clinic at your post op visit.    Do not rub the operated eye.    Light sensitivity may be noticed. Sunglasses may be worn for comfort.    Some discomfort and irritation may be noticed. Acetaminophen (Tylenol) or Ibuprofen (Advil) may be taken for discomfort. If pain persists please call Dr. Mott's office.    Keep the operated eye dry. You may wash your hair, bathe or shower, but keep the operated eye closed while doing so. No swimming pools, hot tubs, saunas, etc. for 10 days.    Use medication exactly as prescribed by your doctor. You may restart your regular home medications.    If your procedure included an ECP (Endoscopic Cyclophotocoagulation), you should take Diamox 500 mg at bedtime as directed by your physician.     Bring all eye medications with you to the clinic on your first post operative visit.    Call Dr. Mott's office if any of the following should occur:      Any sudden vision changes    Nausea or severe headache    Increase in pain not controlled      Or signs of infection (pus, increasing redness or tenderness)        11/8/16    Pending Results     No orders found from 12/10/2017 to 12/13/2017.            Admission Information     Date & Time Provider Department Dept. Phone    12/12/2017 Alli Mott MD [facility] [phone]      Your Vitals Were     Blood Pressure Temperature Respirations Height Weight Pulse Oximetry    133/76 96.3  F (35.7  C)  "(Temporal) 16 1.664 m (5' 5.5\") 63.6 kg (140 lb 3.4 oz) 100%    BMI (Body Mass Index)                   22.98 kg/m2           [facility] Information     [facility] lets you send messages to your doctor, view your test results, renew your prescriptions, schedule appointments and more. To sign up, go to [email or web address] . Click on \"Log in\" on the left side of the screen, which will take you to the Welcome page. Then click on \"Sign up Now\" on the right side of the page.     You will be asked to enter the access code listed below, as well as some personal information. Please follow the directions to create your username and password.     Your access code is: [email or web address]  Expires: 2017 11:07 AM     Your access code will  in 90 days. If you need help or a new code, please call your [facility] clinic or [phone].        Care EveryWhere ID     This is your Care EveryWhere ID. This could be used by other organizations to access your [facility] medical records  [id number]        Equal Access to Services     TAI KNOTT : Hadii miguel Morales, wadustyda altaf, qaybta kaalmada thomas, brad kruger . So [facility] [phone].    ATENCIÓN: Si habla español, tiene a horn disposición servicios gratuitos de asistencia lingüística. Llame al [phone].    We comply with applicable federal civil rights laws and [place] laws. We do not discriminate on the basis of race, color, national origin, age, disability, sex, sexual orientation, or gender identity.               Review of your medicines      UNREVIEWED medicines. Ask your doctor about these medicines        Dose / Directions    allopurinol 300 MG tablet   Commonly known as:  ZYLOPRIM        Dose:  300 mg   Take 300 mg by mouth daily.   Refills:  0       CRESTOR PO        Dose:  20 mg   Take 20 mg by mouth daily   Refills:  0       cyclobenzaprine 5 MG tablet   Commonly known as:  FLEXERIL   Used for:  Spinal stenosis in " cervical region        Dose:  5 mg   Take 1 tablet (5 mg) by mouth 3 times daily as needed for muscle spasms   Quantity:  30 tablet   Refills:  0       FINASTERIDE PO        Dose:  5 mg   Take 5 mg by mouth daily   Refills:  0       HYDROcodone-acetaminophen 5-325 MG per tablet   Commonly known as:  NORCO   Used for:  Spinal stenosis in cervical region        Dose:  1-2 tablet   Take 1-2 tablets by mouth every 4 hours as needed for moderate to severe pain   Quantity:  50 tablet   Refills:  0       latanoprost 0.005 % ophthalmic solution   Commonly known as:  XALATAN        Dose:  1 drop   Place 1 drop into the right eye At Bedtime   Refills:  0       LISINOPRIL PO        Dose:  10 mg   Take 10 mg by mouth daily   Refills:  0       predniSONE 5 MG tablet   Commonly known as:  DELTASONE        Dose:  5 mg   Take 5 mg by mouth every other day   Refills:  0       PRILOSEC PO        Dose:  20 mg   Take 20 mg by mouth every morning   Refills:  0       sulfacetaminde-prednisoLONE ophthalmic ointment   Commonly known as:  BLEPHAMIDE        Dose:  1 drop   1 drop every 3 hours   Refills:  0       tamsulosin 0.4 MG capsule   Commonly known as:  FLOMAX        Dose:  0.4 mg   Take 0.4 mg by mouth every evening   Refills:  0       timolol 0.5 % ophthalmic solution   Commonly known as:  TIMOPTIC        Dose:  1 drop   Place 1 drop into both eyes daily   Refills:  0       TYLENOL PO        Dose:  500 mg   Take 500 mg by mouth every 3 hours as needed for mild pain or fever   Refills:  0       ZANTAC PO        Dose:  150 mg   Take 150 mg by mouth At Bedtime   Refills:  0                Protect others around you: Learn how to safely use, store and throw away your medicines at www.disposemymeds.org.             Medication List: This is a list of all your medications and when to take them. Check marks below indicate your daily home schedule. Keep this list as a reference.      Medications           Morning Afternoon Evening Bedtime As  Needed    allopurinol 300 MG tablet   Commonly known as:  ZYLOPRIM   Take 300 mg by mouth daily.                                CRESTOR PO   Take 20 mg by mouth daily                                cyclobenzaprine 5 MG tablet   Commonly known as:  FLEXERIL   Take 1 tablet (5 mg) by mouth 3 times daily as needed for muscle spasms                                FINASTERIDE PO   Take 5 mg by mouth daily                                HYDROcodone-acetaminophen 5-325 MG per tablet   Commonly known as:  NORCO   Take 1-2 tablets by mouth every 4 hours as needed for moderate to severe pain                                latanoprost 0.005 % ophthalmic solution   Commonly known as:  XALATAN   Place 1 drop into the right eye At Bedtime                                LISINOPRIL PO   Take 10 mg by mouth daily                                predniSONE 5 MG tablet   Commonly known as:  DELTASONE   Take 5 mg by mouth every other day                                PRILOSEC PO   Take 20 mg by mouth every morning                                sulfacetaminde-prednisoLONE ophthalmic ointment   Commonly known as:  BLEPHAMIDE   1 drop every 3 hours                                tamsulosin 0.4 MG capsule   Commonly known as:  FLOMAX   Take 0.4 mg by mouth every evening                                timolol 0.5 % ophthalmic solution   Commonly known as:  TIMOPTIC   Place 1 drop into both eyes daily                                TYLENOL PO   Take 500 mg by mouth every 3 hours as needed for mild pain or fever                                ZANTAC PO   Take 150 mg by mouth At Bedtime

## 2017-12-12 NOTE — ANESTHESIA CARE TRANSFER NOTE
Patient: Rose SANCHEZ Pelo    Procedure(s):  COMPLEX RIGHT EYE COMBINED PHACOEMULSIFICATION CLEAR CORNEA WITH STANDARD INTRAOCULAR LENS IMPLANT, ENDOSCOPIC CYCLOPHOTOCOAGULATION  - Wound Class: I-Clean    Diagnosis: CATARACT AND GLAUCOMA   Diagnosis Additional Information: No value filed.    Anesthesia Type:   MAC     Note:  Airway :Room Air  Patient transferred to:PACU  Handoff Report: Identifed the Patient, Identified the Reponsible Provider, Reviewed the pertinent medical history, Discussed the surgical course, Reviewed Intra-OP anesthesia mangement and issues during anesthesia, Set expectations for post-procedure period and Allowed opportunity for questions and acknowledgement of understanding    [facility]red to [facility] recovery room in recliner with armrests up, spontaneous respirations, O2 saturation maintained greater than 92% with oxygen via room air. All monitors and alarms on and functioning, clinically stable vital signs. Report given to recovery RN and questions answered. Patient alert and following verbal directions.    Electronically Signed By: DRE Gonzalez CRNA  December 12, 2017  10:36 AM

## 2017-12-12 NOTE — ANESTHESIA POSTPROCEDURE EVALUATION
Patient: Rose SANCHEZ Pelo    Procedure(s):  COMPLEX RIGHT EYE COMBINED PHACOEMULSIFICATION CLEAR CORNEA WITH STANDARD INTRAOCULAR LENS IMPLANT, ENDOSCOPIC CYCLOPHOTOCOAGULATION  - Wound Class: I-Clean    Diagnosis:CATARACT AND GLAUCOMA   Diagnosis Additional Information: No value filed.    Anesthesia Type:  MAC    Note:  Anesthesia Post Evaluation    Patient location during evaluation: Phase 2  Patient participation: Able to fully participate in evaluation  Level of consciousness: awake and alert  Pain management: adequate  Airway patency: patent  Cardiovascular status: acceptable  Respiratory status: acceptable  Hydration status: acceptable  PONV: none     Anesthetic complications: None          Last vitals:  Vitals:    12/12/17 0809 12/12/17 1000 12/12/17 1053   BP: 111/49 133/76 126/74   Resp: 18 16 14   Temp: 35.7  C (96.3  F)     SpO2: 100% 100% 99%         Electronically Signed By: Viola Cronin MD  December 12, 2017  1:05 PM

## 2017-12-12 NOTE — OR NURSING
Patient came out of OR alert, vitals are stable, Mlucila gave verbal order for diamox 500 mg stat,same was given.Patient is ready for discharge, waiting for daughter to come for discharge instruction and to take him home.

## 2017-12-12 NOTE — OP NOTE
Pre-Operative Diagnosis: 1. Visually Significant Cataract, right eye.  2. Primary open angle glaucoma, right eye.  3. Pupillary miosis, right eye    Post-Operative Diagnosis: 1. Visually Significant Cataract, right eye.  2. Primary open angle glaucoma, right eye.  3. Floppy iris syndrome (tamsulosin related), right eye    Procedure:  1.  Complex cataract Extraction requiring mechanical pupillary dilation with iris prolapse requiring repositioning with Intraocular Lens placement, right eye.  2. Endocyclophotocoagulation, right eye    Surgeon: Alli Mott M.D.    Anesthesia: MAC, Topical.    Estimated blood loss: None.    Implant: ERIKA model ZCB00 21.5 Diopter lens.    Complication: None.    Description of Procedure:  After informed consent was obtained, and operative site was marked, the patient was brought to the operative room and prepped and draped in the standard ophthalmic fashion.  A paracentesis was made.  Lidocaine 1% non preserved was injected into the anterior chamber.  The anterior chamber was filled with viscoelastic.A beveled incision was made. Due to flomax use and a relatively small pupil, a malyugin ring was used to augment dilation and secure the iris. A cystotome and utrata forceps were used to create a continuous capsulorhexis.  The lens was hydrodissected from the capsular bag until it rotated freely.  The lens was phacoemulsified in the stop-and-chop method.  The cortex was aspirated out of the eye.  The above-named lens was injected into the capsular bag after it was re-inflated with viscoelastic.  Amvisc plus was placed in the anterior chamber and used to inflate the sulcus.  endocyhclophotocoagulation was used on approximately 280 degrees with good shrinkage of the ciliary processes.  The viscoelastic was aspirated out of the eye.  The iris prolapsed with some bleeding through both the main wound and the paracentesis.  This was repositioned with corneal massage and BSS fluid jet.  The wounds  were hydrated and found to be water-tight.   1 mg (1mg/0.1cc in non preserved sterile BSS)of cefuroxime was injected into the anterior chamber.  A drop of 5% betadine was placed on the ocular surface.  A drop of gatifloxacin 0.5%, prednisolone acetate 1%, and Ketorolac were placed in the eye. The eye was shielded.     The patient tolerated the procedure well, and left the operative in stable and satisfactory condition.  He will follow up tomorrow in the eye clinic.  500 mg diamox sequel was administered orally prior to discharge - side effects were discussed.

## 2017-12-12 NOTE — ANESTHESIA PREPROCEDURE EVALUATION
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      Anemia      Bacteremia      BPH (benign prostatic hyperplasia)      Colon polyps      DJD (degenerative joint disease)      Elevated BP without diagnosis of hypertension      Gastroesophageal reflux disease      Gout      Gout      Hyperlipidemia      Hypertension      Leukocytosis      Rotator cuff tear arthropathy, left      Sinusitis      Past Surgical History:   Procedure Laterality Date     APPENDECTOMY       ASPIRATION NEEDLE HIP Left 9/25/2017    Procedure: ASPIRATION NEEDLE HIP;;  Surgeon: Moises Elias MD;  Location:  OR     BACK SURGERY  11/07/2017    cervical spine surgery      COLONOSCOPY       FUSION CERVICAL ANTERIOR ONE LEVEL WITH BONE ALLOGRAFT N/A 9/25/2017    Procedure: FUSION CERVICAL ANTERIOR ONE LEVEL WITH BONE ALLOGRAFT;  ANTERIOR CERVICAL DECOMPRESSION AND FUSIONC3-4 WITH INSTRUMENTATION, ALLOGRAFT AND BONE MARROW ASPIRATE OF THE LEFT ILIAC CREST ;  Surgeon: Moises Elias MD;  Location:  OR     ROTATOR CUFF REPAIR RT/LT  2004     Prior to Admission medications    Medication Sig Start Date End Date Taking? Authorizing Provider   cyclobenzaprine (FLEXERIL) 5 MG tablet Take 1 tablet (5 mg) by mouth 3 times daily as needed for muscle spasms 9/26/17  Yes Moises Elias MD   timolol (TIMOPTIC) 0.5 % ophthalmic solution Place 1 drop into both eyes daily    Yes Reported, Patient   FINASTERIDE PO Take 5 mg by mouth daily   Yes Reported, Patient   Acetaminophen (TYLENOL PO) Take 500 mg by mouth every 3 hours as needed for mild pain or fever   Yes Reported, Patient   Rosuvastatin Calcium (CRESTOR PO) Take 20 mg by mouth daily   Yes Unknown,  Entered By History   tamsulosin (FLOMAX) 0.4 MG 24 hr capsule Take 0.4 mg by mouth every evening    Yes Unknown, Entered By History   LISINOPRIL PO Take 10 mg by mouth daily   Yes Unknown, Entered By History   latanoprost (XALATAN) 0.005 % ophthalmic solution Place 1 drop into the right eye At Bedtime   Yes Unknown, Entered By History   Ranitidine HCl (ZANTAC PO) Take 150 mg by mouth At Bedtime   Yes Unknown, Entered By History   allopurinol (ZYLOPRIM) 300 MG tablet Take 300 mg by mouth daily.   Yes Reported, Patient   predniSONE (DELTASONE) 5 MG tablet Take 5 mg by mouth every other day    Yes Reported, Patient   Omeprazole (PRILOSEC PO) Take 20 mg by mouth every morning    Reported, Patient   sulfacetaminde-prednisoLONE (BLEPHAMIDE) ophthalmic ointment 1 drop every 3 hours    Reported, Patient   HYDROcodone-acetaminophen (NORCO) 5-325 MG per tablet Take 1-2 tablets by mouth every 4 hours as needed for moderate to severe pain 9/26/17   Moises Elias MD     Current Facility-Administered Medications Ordered in Epic   Medication Dose Route Frequency Last Rate Last Dose     lidocaine (AKTEN) ophthalmic gel 0.5 mL  0.5 mL Ophthalmic Once         lidocaine 1 % 1 mL  1 mL Other Q1H PRN   1 mL at 12/12/17 0812     lactated ringers infusion   Intravenous Continuous 25 mL/hr at 12/12/17 0812       No current [facility]-ordered outpatient prescriptions on file.     Wt Readings from Last 1 Encounters:   12/11/17 63.6 kg (140 lb 3.4 oz)     Temp Readings from Last 1 Encounters:   12/12/17 35.7  C (96.3  F) (Temporal)     BP Readings from Last 6 Encounters:   12/12/17 111/49   09/26/17 115/54   03/21/16 132/71   12/29/11 136/80   12/13/11 144/84     Pulse Readings from Last 4 Encounters:   09/26/17 78   03/20/16 77     Resp Readings from Last 1 Encounters:   12/12/17 18     SpO2 Readings from Last 1 Encounters:   12/12/17 100%     Recent Labs   Lab Test  09/26/17   0811  09/25/17   0905  03/20/16   0620   NA  141   --   143    POTASSIUM  3.7  4.6  3.7   CHLORIDE  110*   --   108   CO2  22   --   28   ANIONGAP  9   --   7   GLC  126*   --   95   BUN  14   --   10   CR  0.91  1.01  0.81   CONNIE  9.0   --   8.4*     Recent Labs   Lab Test  09/25/17   0905  03/20/16   0620  03/19/16   0615   WBC   --   12.8*  14.2*   HGB  10.6*  9.6*  9.6*   PLT   --   253  265     No results for input(s): INR in the last 26596 hours.    Invalid input(s): APTT   RECENT LABS:   ECG:   ECHO:   CXR:        Anesthesia Evaluation     .             ROS/MED HX    ENT/Pulmonary:      (-) asthma and sleep apnea   Neurologic:     (+)other neuro s/p ACDF   (-) seizures, CVA and migraines   Cardiovascular:     (+) Dyslipidemia, hypertension----. : . . . :. .      (-) SANFORD and arrhythmias   METS/Exercise Tolerance:     Hematologic:     (+) Anemia, Other Hematologic Disorder-Anemia/leukocytosis with recent BMB      Musculoskeletal:   (+) arthritis, , , other musculoskeletal- gout, DJD      GI/Hepatic:     (+) GERD Asymptomatic on medication,      (-) liver disease   Renal/Genitourinary:     (+) BPH,    (-) renal disease   Endo:      (-) Type I DM, Type II DM and thyroid disease   Psychiatric:         Infectious Disease:        (-) Recent Fever   Malignancy:         Other:                     Physical Exam  Normal systems: cardiovascular, pulmonary and dental    Airway   Mallampati: III  TM distance: >3 FB  Neck ROM: full    Dental     Cardiovascular   Rhythm and rate: regular and normal  (-) no murmur    Pulmonary    breath sounds clear to auscultation                    Anesthesia Plan      History & Physical Review      ASA Status:  3 .    NPO Status:  > 8 hours    Plan for MAC Reason for MAC:  Deep or markedly invasive procedure (G8)  PONV prophylaxis:  Ondansetron (or other 5HT-3)  Precedex      Postoperative Care  Postoperative pain management:  Multi-modal analgesia.      Consents  Anesthetic plan, risks, benefits and alternatives discussed with:  Patient..                           .

## 2018-05-01 ENCOUNTER — HOSPITAL ENCOUNTER (OUTPATIENT)
Facility: CLINIC | Age: 78
Discharge: HOME | End: 2018-05-01
Attending: OPHTHALMOLOGY | Admitting: OPHTHALMOLOGY
Payer: COMMERCIAL

## 2018-05-01 ENCOUNTER — SURGERY (OUTPATIENT)
Age: 78
End: 2018-05-01

## 2018-05-01 ENCOUNTER — ANESTHESIA EVENT (OUTPATIENT)
Dept: SURGERY | Facility: CLINIC | Age: 78
End: 2018-05-01
Payer: COMMERCIAL

## 2018-05-01 ENCOUNTER — ANESTHESIA (OUTPATIENT)
Dept: SURGERY | Facility: CLINIC | Age: 78
End: 2018-05-01
Payer: COMMERCIAL

## 2018-05-01 VITALS
WEIGHT: 145.06 LBS | HEIGHT: 67 IN | OXYGEN SATURATION: 98 % | SYSTOLIC BLOOD PRESSURE: 130 MMHG | TEMPERATURE: 97.5 F | BODY MASS INDEX: 22.77 KG/M2 | RESPIRATION RATE: 20 BRPM | DIASTOLIC BLOOD PRESSURE: 72 MMHG

## 2018-05-01 PROCEDURE — 25000128 H RX IP 250 OP 636: Performed by: ANESTHESIOLOGY

## 2018-05-01 PROCEDURE — 37000008 ZZH ANESTHESIA TECHNICAL FEE, 1ST 30 MIN: Performed by: OPHTHALMOLOGY

## 2018-05-01 PROCEDURE — 25000132 ZZH RX MED GY IP 250 OP 250 PS 637: Performed by: OPHTHALMOLOGY

## 2018-05-01 PROCEDURE — 25000125 ZZHC RX 250: Performed by: OPHTHALMOLOGY

## 2018-05-01 PROCEDURE — 66711 ECP CILIARY BODY DESTRUCTION: CPT | Performed by: OPHTHALMOLOGY

## 2018-05-01 PROCEDURE — 40000170 ZZH STATISTIC PRE-PROCEDURE ASSESSMENT II: Performed by: OPHTHALMOLOGY

## 2018-05-01 PROCEDURE — 25000128 H RX IP 250 OP 636: Performed by: OPHTHALMOLOGY

## 2018-05-01 PROCEDURE — 71000028 ZZH EYE RECOVERY PHASE 2 EACH 15 MINS: Performed by: OPHTHALMOLOGY

## 2018-05-01 PROCEDURE — 25000125 ZZHC RX 250: Performed by: NURSE ANESTHETIST, CERTIFIED REGISTERED

## 2018-05-01 PROCEDURE — V2632 POST CHMBR INTRAOCULAR LENS: HCPCS | Performed by: OPHTHALMOLOGY

## 2018-05-01 PROCEDURE — 37000009 ZZH ANESTHESIA TECHNICAL FEE, EACH ADDTL 15 MIN: Performed by: OPHTHALMOLOGY

## 2018-05-01 PROCEDURE — 25000128 H RX IP 250 OP 636: Performed by: NURSE ANESTHETIST, CERTIFIED REGISTERED

## 2018-05-01 PROCEDURE — 36000105 ZZH EYE SURGERY LEVEL 4 EA 15 ADDTL MIN: Performed by: OPHTHALMOLOGY

## 2018-05-01 PROCEDURE — 36000104 ZZH EYE SURGERY LEVEL 4 1ST 30 MIN: Performed by: OPHTHALMOLOGY

## 2018-05-01 PROCEDURE — 27210794 ZZH OR GENERAL SUPPLY STERILE: Performed by: OPHTHALMOLOGY

## 2018-05-01 DEVICE — EYE IMP IOL AMO PCL TECNIS ZCB00 20.5: Type: IMPLANTABLE DEVICE | Site: EYE | Status: FUNCTIONAL

## 2018-05-01 RX ORDER — TROPICAMIDE 10 MG/ML
1 SOLUTION/ DROPS OPHTHALMIC
Status: COMPLETED | OUTPATIENT
Start: 2018-05-01 | End: 2018-05-01

## 2018-05-01 RX ORDER — MOXIFLOXACIN 5 MG/ML
1 SOLUTION/ DROPS OPHTHALMIC
Status: COMPLETED | OUTPATIENT
Start: 2018-05-01 | End: 2018-05-01

## 2018-05-01 RX ORDER — ACETAZOLAMIDE 500 MG/1
500 CAPSULE, EXTENDED RELEASE ORAL ONCE
Status: COMPLETED | OUTPATIENT
Start: 2018-05-01 | End: 2018-05-01

## 2018-05-01 RX ORDER — DICLOFENAC SODIUM 1 MG/ML
1 SOLUTION/ DROPS OPHTHALMIC
Status: COMPLETED | OUTPATIENT
Start: 2018-05-01 | End: 2018-05-01

## 2018-05-01 RX ORDER — ONDANSETRON 2 MG/ML
INJECTION INTRAMUSCULAR; INTRAVENOUS PRN
Status: DISCONTINUED | OUTPATIENT
Start: 2018-05-01 | End: 2018-05-01

## 2018-05-01 RX ORDER — PROPARACAINE HYDROCHLORIDE 5 MG/ML
1 SOLUTION/ DROPS OPHTHALMIC ONCE
Status: COMPLETED | OUTPATIENT
Start: 2018-05-01 | End: 2018-05-01

## 2018-05-01 RX ORDER — SODIUM CHLORIDE, SODIUM LACTATE, POTASSIUM CHLORIDE, CALCIUM CHLORIDE 600; 310; 30; 20 MG/100ML; MG/100ML; MG/100ML; MG/100ML
500 INJECTION, SOLUTION INTRAVENOUS CONTINUOUS
Status: DISCONTINUED | OUTPATIENT
Start: 2018-05-01 | End: 2018-05-01 | Stop reason: HOSPADM

## 2018-05-01 RX ORDER — BALANCED SALT SOLUTION 6.4; .75; .48; .3; 3.9; 1.7 MG/ML; MG/ML; MG/ML; MG/ML; MG/ML; MG/ML
SOLUTION OPHTHALMIC PRN
Status: DISCONTINUED | OUTPATIENT
Start: 2018-05-01 | End: 2018-05-01 | Stop reason: HOSPADM

## 2018-05-01 RX ORDER — LIDOCAINE HYDROCHLORIDE 10 MG/ML
INJECTION, SOLUTION EPIDURAL; INFILTRATION; INTRACAUDAL; PERINEURAL PRN
Status: DISCONTINUED | OUTPATIENT
Start: 2018-05-01 | End: 2018-05-01 | Stop reason: HOSPADM

## 2018-05-01 RX ORDER — PROPARACAINE HYDROCHLORIDE 5 MG/ML
1 SOLUTION/ DROPS OPHTHALMIC ONCE
Status: DISCONTINUED | OUTPATIENT
Start: 2018-05-01 | End: 2018-05-01 | Stop reason: HOSPADM

## 2018-05-01 RX ORDER — PHENYLEPHRINE HYDROCHLORIDE 25 MG/ML
1 SOLUTION/ DROPS OPHTHALMIC
Status: COMPLETED | OUTPATIENT
Start: 2018-05-01 | End: 2018-05-01

## 2018-05-01 RX ADMIN — MOXIFLOXACIN 1 DROP: 5 SOLUTION/ DROPS OPHTHALMIC at 08:13

## 2018-05-01 RX ADMIN — Medication 0.8 ML: at 09:55

## 2018-05-01 RX ADMIN — ONDANSETRON 4 MG: 2 INJECTION INTRAMUSCULAR; INTRAVENOUS at 09:41

## 2018-05-01 RX ADMIN — SODIUM CHLORIDE, SODIUM LACTATE, POTASSIUM CHLORIDE, CALCIUM CHLORIDE: 600; 310; 30; 20 INJECTION, SOLUTION INTRAVENOUS at 09:43

## 2018-05-01 RX ADMIN — ACETAZOLAMIDE 500 MG: 500 CAPSULE, EXTENDED RELEASE ORAL at 10:26

## 2018-05-01 RX ADMIN — DICLOFENAC SODIUM 1 DROP: 1 SOLUTION/ DROPS OPHTHALMIC at 08:13

## 2018-05-01 RX ADMIN — PHENYLEPHRINE HYDROCHLORIDE 1 DROP: 2.5 SOLUTION/ DROPS OPHTHALMIC at 08:13

## 2018-05-01 RX ADMIN — PHENYLEPHRINE HYDROCHLORIDE 1 DROP: 2.5 SOLUTION/ DROPS OPHTHALMIC at 08:06

## 2018-05-01 RX ADMIN — TROPICAMIDE 1 DROP: 10 SOLUTION/ DROPS OPHTHALMIC at 08:06

## 2018-05-01 RX ADMIN — LIDOCAINE HYDROCHLORIDE 1 ML: 10 INJECTION, SOLUTION EPIDURAL; INFILTRATION; INTRACAUDAL; PERINEURAL at 09:55

## 2018-05-01 RX ADMIN — Medication 1 ML: at 09:55

## 2018-05-01 RX ADMIN — PHENYLEPHRINE HYDROCHLORIDE 1 DROP: 2.5 SOLUTION/ DROPS OPHTHALMIC at 08:28

## 2018-05-01 RX ADMIN — EPINEPHRINE 500 ML: 1 INJECTION, SOLUTION, CONCENTRATE INTRAVENOUS at 09:54

## 2018-05-01 RX ADMIN — SODIUM CHLORIDE, SODIUM LACTATE, POTASSIUM CHLORIDE, CALCIUM CHLORIDE 500 ML: 600; 310; 30; 20 INJECTION, SOLUTION INTRAVENOUS at 08:29

## 2018-05-01 RX ADMIN — LIDOCAINE HYDROCHLORIDE 0.5 ML: 35 GEL OPHTHALMIC at 09:55

## 2018-05-01 RX ADMIN — CEFUROXIME 0.1 ML: 1.5 INJECTION, POWDER, FOR SOLUTION INTRAVENOUS at 09:55

## 2018-05-01 RX ADMIN — DICLOFENAC SODIUM 1 DROP: 1 SOLUTION/ DROPS OPHTHALMIC at 08:28

## 2018-05-01 RX ADMIN — DEXMEDETOMIDINE HYDROCHLORIDE 8 MCG: 100 INJECTION, SOLUTION INTRAVENOUS at 09:43

## 2018-05-01 RX ADMIN — BALANCED SALT SOLUTION 1 APPLICATOR: 6.4; .75; .48; .3; 3.9; 1.7 SOLUTION OPHTHALMIC at 09:54

## 2018-05-01 RX ADMIN — TROPICAMIDE 1 DROP: 10 SOLUTION/ DROPS OPHTHALMIC at 08:13

## 2018-05-01 RX ADMIN — TROPICAMIDE 1 DROP: 10 SOLUTION/ DROPS OPHTHALMIC at 08:28

## 2018-05-01 RX ADMIN — MOXIFLOXACIN 1 DROP: 5 SOLUTION/ DROPS OPHTHALMIC at 08:07

## 2018-05-01 RX ADMIN — MOXIFLOXACIN 1 DROP: 5 SOLUTION/ DROPS OPHTHALMIC at 08:28

## 2018-05-01 RX ADMIN — MIDAZOLAM 1 MG: 1 INJECTION INTRAMUSCULAR; INTRAVENOUS at 09:41

## 2018-05-01 RX ADMIN — PROPARACAINE HYDROCHLORIDE 1 DROP: 5 SOLUTION/ DROPS OPHTHALMIC at 08:06

## 2018-05-01 RX ADMIN — DICLOFENAC SODIUM 1 DROP: 1 SOLUTION/ DROPS OPHTHALMIC at 08:07

## 2018-05-01 ASSESSMENT — ENCOUNTER SYMPTOMS
DYSRHYTHMIAS: 0
SEIZURES: 0

## 2018-05-01 NOTE — ANESTHESIA PREPROCEDURE EVALUATION
Anesthesia Evaluation     .             ROS/MED HX    ENT/Pulmonary:      (-) asthma and sleep apnea   Neurologic:     (+)other neuro s/p ACDF   (-) seizures, CVA and migraines   Cardiovascular:     (+) Dyslipidemia, hypertension----. : . . . :. .      (-) SANFORD and arrhythmias   METS/Exercise Tolerance:     Hematologic:     (+) Anemia, Other Hematologic Disorder-Anemia/leukocytosis with recent BMB      Musculoskeletal:   (+) arthritis, , , other musculoskeletal- gout, DJD      GI/Hepatic:     (+) GERD Asymptomatic on medication,      (-) liver disease   Renal/Genitourinary:     (+) BPH,    (-) renal disease   Endo:      (-) Type I DM, Type II DM and thyroid disease   Psychiatric:         Infectious Disease:        (-) Recent Fever   Malignancy:         Other:                     Physical Exam  Normal systems: cardiovascular, pulmonary and dental    Airway   Mallampati: III  TM distance: >3 FB  Neck ROM: full    Dental     Cardiovascular   Rhythm and rate: regular and normal  (-) no murmur    Pulmonary    breath sounds clear to auscultation                        Anesthesia Plan      History & Physical Review  History and physical reviewed and following examination; no interval change.    ASA Status:  3 .    NPO Status:  > 8 hours    Plan for MAC Reason for MAC:  Procedure to face, neck, head or breast  PONV prophylaxis:  Ondansetron (or other 5HT-3)  Precedex      Postoperative Care  Postoperative pain management:  Multi-modal analgesia.      Consents  Anesthetic plan, risks, benefits and alternatives discussed with:  Patient..          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      Anemia      Bacteremia      BPH (benign prostatic  hyperplasia)      Colon polyps      DJD (degenerative joint disease)      Elevated BP without diagnosis of hypertension      Gastroesophageal reflux disease      Gout      Gout      Hyperlipidemia      Hypertension      Leukocytosis      Rotator cuff tear arthropathy, left      Sinusitis      Past Surgical History:   Procedure Laterality Date     APPENDECTOMY       ASPIRATION NEEDLE HIP Left 9/25/2017    Procedure: ASPIRATION NEEDLE HIP;;  Surgeon: Moises Elias MD;  Location:  OR     BACK SURGERY  11/07/2017    cervical spine surgery      COLONOSCOPY       FUSION CERVICAL ANTERIOR ONE LEVEL WITH BONE ALLOGRAFT N/A 9/25/2017    Procedure: FUSION CERVICAL ANTERIOR ONE LEVEL WITH BONE ALLOGRAFT;  ANTERIOR CERVICAL DECOMPRESSION AND FUSIONC3-4 WITH INSTRUMENTATION, ALLOGRAFT AND BONE MARROW ASPIRATE OF THE LEFT ILIAC CREST ;  Surgeon: Moises Elias MD;  Location:  OR     PHACOEMULSIFICATION CLEAR CORNEA W/ STANDARD IOL IMPLANT, ENDOSCOPIC CYCLOPHOTOCOAGULATION, COMBINED Right 12/12/2017    Procedure: COMBINED PHACOEMULSIFICATION CLEAR CORNEA WITH STANDARD INTRAOCULAR LENS IMPLANT, ENDOSCOPIC CYCLOPHOTOCOAGULATION;  COMPLEX RIGHT EYE COMBINED PHACOEMULSIFICATION CLEAR CORNEA WITH STANDARD INTRAOCULAR LENS IMPLANT, ENDOSCOPIC CYCLOPHOTOCOAGULATION ;  Surgeon: Alli Mott MD;  Location:  [facility]     ROTATOR CUFF REPAIR RT/LT  2004     Prior to Admission medications    Medication Sig Start Date End Date Taking? Authorizing Provider   Acetaminophen (TYLENOL PO) Take 500 mg by mouth every 3 hours as needed for mild pain or fever   Yes Reported, Patient   allopurinol (ZYLOPRIM) 300 MG tablet Take 300 mg by mouth daily.   Yes Reported, Patient   FINASTERIDE PO Take 5 mg by mouth daily   Yes Reported, Patient   latanoprost (XALATAN) 0.005 % ophthalmic solution Place 1 drop into the right eye At Bedtime   Yes Unknown, Entered By History   LISINOPRIL PO Take 10 mg by mouth daily   Yes Unknown, Entered By  History   Omeprazole (PRILOSEC PO) Take 20 mg by mouth every morning   Yes Reported, Patient   predniSONE (DELTASONE) 5 MG tablet Take 5 mg by mouth every other day    Yes Reported, Patient   Ranitidine HCl (ZANTAC PO) Take 150 mg by mouth At Bedtime   Yes Unknown, Entered By History   Rosuvastatin Calcium (CRESTOR PO) Take 20 mg by mouth daily   Yes Unknown, Entered By History   sulfacetaminde-prednisoLONE (BLEPHAMIDE) ophthalmic ointment 1 drop every 3 hours   Yes Reported, Patient   tamsulosin (FLOMAX) 0.4 MG 24 hr capsule Take 0.4 mg by mouth every evening    Yes Unknown, Entered By History   timolol (TIMOPTIC) 0.5 % ophthalmic solution Place 1 drop into both eyes daily    Yes Reported, Patient     Current Facility-Administered Medications Ordered in Epic   Medication Dose Route Frequency Last Rate Last Dose     diclofenac (VOLTAREN) 0.1 % ophthalmic solution 1 drop  1 drop Ophthalmic q5 Min Prior to Surgery   1 drop at 05/01/18 0813     lactated ringers infusion  500 mL Intravenous Continuous         lidocaine (AKTEN) ophthalmic gel 0.5 mL  0.5 mL Ophthalmic Once         lidocaine 1 % 1 mL  1 mL Other Q1H PRN         moxifloxacin (VIGAMOX) 0.5 % ophthalmic solution 1 drop  1 drop Ophthalmic q5 Min Prior to Surgery   1 drop at 05/01/18 0813     phenylephrine (MYDFRIN /WENDI-SYNEPHRINE) 2.5 % ophthalmic solution 1 drop  1 drop Ophthalmic q5 Min Prior to Surgery   1 drop at 05/01/18 0813     povidone-iodine 5 % ophthalmic solution 1 drop  1 drop Ophthalmic Once         proparacaine (ALCAINE) 0.5 % ophthalmic solution 1 drop  1 drop Ophthalmic Once         tropicamide (MYDRIACYL) 1 % ophthalmic solution 1 drop  1 drop Ophthalmic q5 Min Prior to Surgery   1 drop at 05/01/18 0813     No current [facility]-ordered outpatient prescriptions on file.     Wt Readings from Last 1 Encounters:   05/01/18 65.8 kg (145 lb 1 oz)     Temp Readings from Last 1 Encounters:   05/01/18 36.4  C (97.5  F) (Temporal)     BP Readings from Last  6 Encounters:   05/01/18 125/60   12/12/17 126/74   09/26/17 115/54   03/21/16 132/71   12/29/11 136/80   12/13/11 144/84     Pulse Readings from Last 4 Encounters:   09/26/17 78   03/20/16 77     Resp Readings from Last 1 Encounters:   05/01/18 16     SpO2 Readings from Last 1 Encounters:   05/01/18 96%     Recent Labs   Lab Test  09/26/17   0811  09/25/17   0905  03/20/16   0620   NA  141   --   143   POTASSIUM  3.7  4.6  3.7   CHLORIDE  110*   --   108   CO2  22   --   28   ANIONGAP  9   --   7   GLC  126*   --   95   BUN  14   --   10   CR  0.91  1.01  0.81   CONNIE  9.0   --   8.4*     Recent Labs   Lab Test  09/25/17   0905  03/20/16   0620  03/19/16   0615   WBC   --   12.8*  14.2*   HGB  10.6*  9.6*  9.6*   PLT   --   253  265     No results for input(s): INR in the last 82757 hours.    Invalid input(s): APTT   RECENT LABS:   ECG:   ECHO:   CXR:                    .

## 2018-05-01 NOTE — OP NOTE
Pre-Operative Diagnosis: 1. Visually Significant Cataract, left eye. 2. Primary open angle glaucoma, moderate stage, left eye. 2. Flomax-induced floppy iris syndrome, left eye.    Post-Operative Diagnosis: 1. Visually Significant Cataract, left eye. 2. Primary open angle glaucoma, moderate stage, left eye. 2. Flomax-induced floppy iris syndrome, left eye.    Procedure: 1. Complicated cataract Extraction requiring mechanical pupillary dilation due to floppy iris syndrome with Intraocular Lens placement, left eye.  2. Endocyclophotocoagulation, left eye.    Surgeon: Alli Mtot M.D.    Anesthesia: MAC, Topical.    Estimated blood loss: None.    Implant: ERIKA model [id number] 20.5 Diopter lens.    Complication: None.    Description of Procedure:  After informed consent was obtained, and operative site was marked, the patient was brought to the operative room and prepped and draped in the standard ophthalmic fashion.  A paracentesis was made.  Lidocaine 1% non preserved was injected into the anterior chamber.  The anterior chamber was filled with viscoelastic. A 7.0 mm malyugin ring was placed without complication due to floppy iris syndrome.  A beveled incision was made.  A cystotome and utrata forceps were used to create a continuous capsulorhexis.  The lens was hydrodissected from the capsular bag until it rotated freely.  The lens was phacoemulsified in the stop-and-chop method.  The cortex was aspirated out of the eye.  The above-named lens was injected into the capsular bag after it was re-inflated with viscoelastic.  The viscoelastic was aspirated out of the eye.   Lidocaine 1% non preserved was instilled in the sulcus, and the anterior chamber and sulcus were refilled with amvisc plus.  endocyclophotocoagulation was performed with sufficient power to shrink the ciliary processes for about 270 degrees. The viscoelastic was aspirated out of the eye.  The wounds were hydrated and found to be water-tight.   1 mg  (1mg/0.1cc in non preserved sterile BSS)of cefuroxime was injected into the anterior chamber.  A drop of 5% betadine was placed on the ocular surface.  A drop of gatifloxacin 0.5%, prednisolone acetate 1%, and Ketorolac were placed in the eye along with timolol.  The eye was shielded.     The patient tolerated the procedure well, and left the operative in stable and satisfactory condition.  He will follow up tomorrow in the eye clinic.

## 2018-05-01 NOTE — DISCHARGE INSTRUCTIONS
[facility] Anesthesia [facility] Discharge  Instructions  Anesthesia ([facility] Center)   Adult Discharge Instructions    For 24 hours after surgery    1. Get plenty of rest.  Make arrangements to have a responsible adult stay with you for at least 6 hours after you leave the hospital.  2. Do not drive or use heavy equipment for 24 hours.    3. Do not drink alcohol for 24 hours.  4. Do not sign legal documents or make important decisions for 24 hours.  5. Avoid strenuous or risky activities. You may feel lightheaded.  If so, sit for a few minutes before standing.  Have someone help you get up.   6. Conscious sedation patients may resume a regular diet..  7. Any questions of medical nature, call your physician.    Dr. Alli Mott  [facility]  [phone]  Post Operative Cataract Instructions    If you have a clear eye shield on, you should wear the eye shield or glasses to protect the eye on the day of surgery and begin eye drops today as directed.    Eyedrops 1 drop into affected eye three times a day.    The clear eye shield should be worn overnight and brought to the clinic at your post op visit.    Do not rub the operated eye.    Light sensitivity may be noticed. Sunglasses may be worn for comfort.    Some discomfort and irritation may be noticed. Acetaminophen (Tylenol) or Ibuprofen (Advil) may be taken for discomfort. If pain persists please call Dr. Mott's office.    Keep the operated eye dry. You may wash your hair, bathe or shower, but keep the operated eye closed while doing so. No swimming pools, hot tubs, saunas, etc. for 10 days.    Use medication exactly as prescribed by your doctor. You may restart your regular home medications.      Bring all eye medications with you to the clinic on your first post operative visit.    Call Dr. Mott's office if any of the following should occur:      Any sudden vision changes    Nausea or severe headache    Increase in pain not  controlled      Or signs of infection (pus, increasing redness or tenderness)          11/8/16

## 2018-05-01 NOTE — ANESTHESIA POSTPROCEDURE EVALUATION
Patient: Rose SANCHEZ Pelo    Procedure(s):  COMPLEX LEFT EYE PHACOEMULSIFICATION CLEAR CORNEA WITH STANDARD INTRAOCULAR LENS IMPLANT, ENDOSCOPIC CYCLOPHOTOCOAGULATION  - Wound Class: I-Clean    Diagnosis:CATARACTS  Diagnosis Additional Information: No value filed.    Anesthesia Type:  MAC    Note:  Anesthesia Post Evaluation    Patient location during evaluation: PACU  Patient participation: Able to fully participate in evaluation  Level of consciousness: awake and alert  Pain management: satisfactory to patient  Airway patency: patent  Cardiovascular status: hemodynamically stable  Respiratory status: acceptable and unassisted  Hydration status: balanced  PONV: none     Anesthetic complications: None          Last vitals:  Vitals:    05/01/18 0811 05/01/18 1015 05/01/18 1030   BP: 125/60 120/68 130/72   Resp: 16 20 20   Temp: 36.4  C (97.5  F)     SpO2: 96% 98%          Electronically Signed By: Aj Sierra MD  May 1, 2018  11:10 AM

## 2018-05-01 NOTE — ANESTHESIA CARE TRANSFER NOTE
Patient: Rose SANCHEZ Pelo    Procedure(s):  COMPLEX LEFT EYE PHACOEMULSIFICATION CLEAR CORNEA WITH STANDARD INTRAOCULAR LENS IMPLANT, ENDOSCOPIC CYCLOPHOTOCOAGULATION  - Wound Class: I-Clean    Diagnosis: CATARACTS  Diagnosis Additional Information: No value filed.    Anesthesia Type:   MAC     Note:  Airway :Room Air  Patient transferred to:PACU  Comments: Transferred to Eye Center recovery room in recliner with armrests up, spontaneous respirations, O2 saturation maintained at 98% on room air. All monitors and alarms on and functioning, clinically stable vital signs. Report given to recovery RN and questions answered. Patient alert and following verbal directions.Handoff Report: Identifed the Patient, Identified the Reponsible Provider, Reviewed the pertinent medical history, Discussed the surgical course, Reviewed Intra-OP anesthesia mangement and issues during anesthesia, Set expectations for post-procedure period and Allowed opportunity for questions and acknowledgement of understanding      Vitals: (Last set prior to Anesthesia Care Transfer)    CRNA VITALS  5/1/2018 0943 - 5/1/2018 1017      5/1/2018             Resp Rate (set): 10                Electronically Signed By: DRE Centeno CRNA  May 1, 2018  10:17 AM

## 2018-05-01 NOTE — IP AVS SNAPSHOT
MRN:[id number]                      After Visit Summary   5/1/2018    Rose Duong    MRN: [id number]           Thank you!     Thank you for choosing [facility] for your care. Our goal is always to provide you with excellent care. Hearing back from our patients is one way we can continue to improve our services. Please take a few minutes to complete the written survey that you may receive in the mail after you visit with us. Thank you!        Patient Information     Date Of Birth          1940        About your hospital stay     You were admitted on:  May 1, 2018 You last received care in the:  [facility]    You were discharged on:  May 1, 2018       Who to Call     For medical emergencies, please call 911.  For non-urgent questions about your medical care, please call your primary care provider or clinic, [phone]  For questions related to your surgery, please call your surgery clinic        Attending Provider     Provider Alli Steve MD Ophthalmology       Primary Care Provider Office Phone # Fax #    Cedrick Siddiqi [phone]      Further instructions from your care team       [facility] Discharge  Instructions  Anesthesia ([facility])   Adult Discharge Instructions    For 24 hours after surgery    1. Get plenty of rest.  Make arrangements to have a responsible adult stay with you for at least 6 hours after you leave the hospital.  2. Do not drive or use heavy equipment for 24 hours.    3. Do not drink alcohol for 24 hours.  4. Do not sign legal documents or make important decisions for 24 hours.  5. Avoid strenuous or risky activities. You may feel lightheaded.  If so, sit for a few minutes before standing.  Have someone help you get up.   6. Conscious sedation patients may resume a regular diet..  7. Any questions of medical nature, call your physician.    Dr. Alli Mott  [facility]  "Clinic  [phone]  Post Operative Cataract Instructions    If you have a clear eye shield on, you should wear the eye shield or glasses to protect the eye on the day of surgery and begin eye drops today as directed.    Eyedrops 1 drop into affected eye three times a day.    The clear eye shield should be worn overnight and brought to the clinic at your post op visit.    Do not rub the operated eye.    Light sensitivity may be noticed. Sunglasses may be worn for comfort.    Some discomfort and irritation may be noticed. Acetaminophen (Tylenol) or Ibuprofen (Advil) may be taken for discomfort. If pain persists please call Dr. Mott's office.    Keep the operated eye dry. You may wash your hair, bathe or shower, but keep the operated eye closed while doing so. No swimming pools, hot tubs, saunas, etc. for 10 days.    Use medication exactly as prescribed by your doctor. You may restart your regular home medications.      Bring all eye medications with you to the clinic on your first post operative visit.    Call Dr. Mott's office if any of the following should occur:      Any sudden vision changes    Nausea or severe headache    Increase in pain not controlled      Or signs of infection (pus, increasing redness or tenderness)          11/8/16    Pending Results     No orders found from 4/29/2018 to 5/2/2018.            Admission Information     Date & Time Provider Department Dept. Phone    5/1/2018 Alli Mott MD [facility] [phone]      Your Vitals Were     Blood Pressure Temperature Respirations Height Weight Pulse Oximetry    120/68 97.5  F (36.4  C) (Temporal) 20 1.702 m (5' 7\") 65.8 kg (145 lb 1 oz) 98%    BMI (Body Mass Index)                   22.72 kg/m2           MyChart Information     [facility] lets you send messages to your doctor, view your test results, renew your prescriptions, schedule appointments and more. To sign up, go to [email or web address] . Click on \"Log " "in\" on the left side of the screen, which will take you to the Welcome page. Then click on \"Sign up Now\" on the right side of the page.     You will be asked to enter the access code listed below, as well as some personal information. Please follow the directions to create your username and password.     Your access code is: [email or web address]  Expires: 2018 10:27 AM     Your access code will  in 90 days. If you need help or a new code, please call your [facility] clinic or [phone].        Care EveryWhere ID     This is your Care EveryWhere ID. This could be used by other organizations to access your [facility] medical records  [id number]        Equal Access to Services     KANE KNOTT : Catarina Morales, wadiogenes solitario, qaybjustin kaalmaildefonso guzman, brad kruger . [facility] [phone].    ATENCIÓN: Si habla español, tiene a horn disposición servicios gratuitos de asistencia lingüística. Llame al [phone].    We comply with applicable federal civil rights laws and [place] laws. We do not discriminate on the basis of race, color, national origin, age, disability, sex, sexual orientation, or gender identity.               Review of your medicines      UNREVIEWED medicines. Ask your doctor about these medicines        Dose / Directions    allopurinol 300 MG tablet   Commonly known as:  ZYLOPRIM        Dose:  300 mg   Take 300 mg by mouth daily.   Refills:  0       CRESTOR PO        Dose:  20 mg   Take 20 mg by mouth daily   Refills:  0       FINASTERIDE PO        Dose:  5 mg   Take 5 mg by mouth daily   Refills:  0       latanoprost 0.005 % ophthalmic solution   Commonly known as:  XALATAN        Dose:  1 drop   Place 1 drop into the right eye At Bedtime   Refills:  0       LISINOPRIL PO        Dose:  10 mg   Take 10 mg by mouth daily   Refills:  0       predniSONE 5 MG tablet   Commonly known as:  DELTASONE        Dose:  5 mg   Take 5 mg by mouth every other day "   Refills:  0       PRILOSEC PO        Dose:  20 mg   Take 20 mg by mouth every morning   Refills:  0       sulfacetaminde-prednisoLONE ophthalmic ointment   Commonly known as:  BLEPHAMIDE        Dose:  1 drop   1 drop every 3 hours   Refills:  0       tamsulosin 0.4 MG capsule   Commonly known as:  FLOMAX        Dose:  0.4 mg   Take 0.4 mg by mouth every evening   Refills:  0       timolol 0.5 % ophthalmic solution   Commonly known as:  TIMOPTIC        Dose:  1 drop   Place 1 drop into both eyes daily   Refills:  0       TYLENOL PO        Dose:  500 mg   Take 500 mg by mouth every 3 hours as needed for mild pain or fever   Refills:  0       ZANTAC PO        Dose:  150 mg   Take 150 mg by mouth At Bedtime   Refills:  0                Protect others around you: Learn how to safely use, store and throw away your medicines at www.disposemymeds.org.             Medication List: This is a list of all your medications and when to take them. Check marks below indicate your daily home schedule. Keep this list as a reference.      Medications           Morning Afternoon Evening Bedtime As Needed    allopurinol 300 MG tablet   Commonly known as:  ZYLOPRIM   Take 300 mg by mouth daily.                                CRESTOR PO   Take 20 mg by mouth daily                                FINASTERIDE PO   Take 5 mg by mouth daily                                latanoprost 0.005 % ophthalmic solution   Commonly known as:  XALATAN   Place 1 drop into the right eye At Bedtime                                LISINOPRIL PO   Take 10 mg by mouth daily                                predniSONE 5 MG tablet   Commonly known as:  DELTASONE   Take 5 mg by mouth every other day                                PRILOSEC PO   Take 20 mg by mouth every morning                                sulfacetaminde-prednisoLONE ophthalmic ointment   Commonly known as:  BLEPHAMIDE   1 drop every 3 hours                                tamsulosin 0.4 MG capsule    Commonly known as:  FLOMAX   Take 0.4 mg by mouth every evening                                timolol 0.5 % ophthalmic solution   Commonly known as:  TIMOPTIC   Place 1 drop into both eyes daily                                TYLENOL PO   Take 500 mg by mouth every 3 hours as needed for mild pain or fever                                ZANTAC PO   Take 150 mg by mouth At Bedtime

## 2018-05-01 NOTE — IP AVS SNAPSHOT
[facility]    [address]    [place] [address]    Phone:  [phone]    Fax:  [phone]                                       After Visit Summary   5/1/2018    Rose Duong    MRN: [id number]           After Visit Summary Signature Page     I have received my discharge instructions, and my questions have been answered. I have discussed any challenges I see with this plan with the nurse or doctor.    ..........................................................................................................................................  Patient/Patient Representative Signature      ..........................................................................................................................................  Patient Representative Print Name and Relationship to Patient    ..................................................               ................................................  Date                                            Time    ..........................................................................................................................................  Reviewed by Signature/Title    ...................................................              ..............................................  Date                                                            Time

## 2018-06-05 ENCOUNTER — TRANSFERRED RECORDS (OUTPATIENT)
Dept: HEALTH INFORMATION MANAGEMENT | Facility: CLINIC | Age: 78
End: 2018-06-05

## 2018-06-07 ENCOUNTER — TRANSFERRED RECORDS (OUTPATIENT)
Dept: HEALTH INFORMATION MANAGEMENT | Facility: CLINIC | Age: 78
End: 2018-06-07

## 2018-06-07 LAB — EJECTION FRACTION: 73

## 2018-06-08 ENCOUNTER — TRANSFERRED RECORDS (OUTPATIENT)
Dept: HEALTH INFORMATION MANAGEMENT | Facility: CLINIC | Age: 78
End: 2018-06-08

## 2018-06-08 LAB — EJECTION FRACTION: 75

## 2018-06-11 ENCOUNTER — APPOINTMENT (OUTPATIENT)
Dept: GENERAL RADIOLOGY | Facility: CLINIC | Age: 78
End: 2018-06-11
Attending: ORTHOPAEDIC SURGERY
Payer: COMMERCIAL

## 2018-06-11 ENCOUNTER — ANESTHESIA EVENT (OUTPATIENT)
Dept: SURGERY | Facility: CLINIC | Age: 78
End: 2018-06-11
Payer: COMMERCIAL

## 2018-06-11 ENCOUNTER — ANESTHESIA (OUTPATIENT)
Dept: SURGERY | Facility: CLINIC | Age: 78
End: 2018-06-11
Payer: COMMERCIAL

## 2018-06-11 ENCOUNTER — HOSPITAL ENCOUNTER (OUTPATIENT)
Facility: CLINIC | Age: 78
Discharge: HOME | End: 2018-06-12
Attending: ORTHOPAEDIC SURGERY | Admitting: ORTHOPAEDIC SURGERY
Payer: COMMERCIAL

## 2018-06-11 DIAGNOSIS — Z98.890 S/P LAMINECTOMY: Primary | ICD-10-CM

## 2018-06-11 PROBLEM — M48.00 SPINAL STENOSIS: Status: ACTIVE | Noted: 2018-06-11

## 2018-06-11 LAB
ABO + RH BLD: NORMAL
ABO + RH BLD: NORMAL
BLD GP AB SCN SERPL QL: NORMAL
BLOOD BANK CMNT PATIENT-IMP: NORMAL
CREAT SERPL-MCNC: 1.25 MG/DL (ref 0.66–1.25)
GFR SERPL CREATININE-BSD FRML MDRD: 56 ML/MIN/1.7M2
SPECIMEN EXP DATE BLD: NORMAL

## 2018-06-11 PROCEDURE — 25000564 ZZH DESFLURANE, EA 15 MIN: Performed by: ORTHOPAEDIC SURGERY

## 2018-06-11 PROCEDURE — 37000009 ZZH ANESTHESIA TECHNICAL FEE, EACH ADDTL 15 MIN: Performed by: ORTHOPAEDIC SURGERY

## 2018-06-11 PROCEDURE — 40000170 ZZH STATISTIC PRE-PROCEDURE ASSESSMENT II: Performed by: ORTHOPAEDIC SURGERY

## 2018-06-11 PROCEDURE — 71000013 ZZH RECOVERY PHASE 1 LEVEL 1 EA ADDTL HR: Performed by: ORTHOPAEDIC SURGERY

## 2018-06-11 PROCEDURE — 37000008 ZZH ANESTHESIA TECHNICAL FEE, 1ST 30 MIN: Performed by: ORTHOPAEDIC SURGERY

## 2018-06-11 PROCEDURE — 25000128 H RX IP 250 OP 636: Performed by: ORTHOPAEDIC SURGERY

## 2018-06-11 PROCEDURE — 86850 RBC ANTIBODY SCREEN: CPT | Performed by: ANESTHESIOLOGY

## 2018-06-11 PROCEDURE — 36000063 ZZH SURGERY LEVEL 4 EA 15 ADDTL MIN: Performed by: ORTHOPAEDIC SURGERY

## 2018-06-11 PROCEDURE — 71000012 ZZH RECOVERY PHASE 1 LEVEL 1 FIRST HR: Performed by: ORTHOPAEDIC SURGERY

## 2018-06-11 PROCEDURE — 27210794 ZZH OR GENERAL SUPPLY STERILE: Performed by: ORTHOPAEDIC SURGERY

## 2018-06-11 PROCEDURE — 27210995 ZZH RX 272: Performed by: ORTHOPAEDIC SURGERY

## 2018-06-11 PROCEDURE — 25000128 H RX IP 250 OP 636: Performed by: NURSE ANESTHETIST, CERTIFIED REGISTERED

## 2018-06-11 PROCEDURE — 25000125 ZZHC RX 250: Performed by: NURSE ANESTHETIST, CERTIFIED REGISTERED

## 2018-06-11 PROCEDURE — 25000125 ZZHC RX 250: Performed by: ORTHOPAEDIC SURGERY

## 2018-06-11 PROCEDURE — 40000277 XR SURGERY CARM FLUORO LESS THAN 5 MIN W STILLS

## 2018-06-11 PROCEDURE — 36000065 ZZH SURGERY LEVEL 4 W FLUORO 1ST 30 MIN: Performed by: ORTHOPAEDIC SURGERY

## 2018-06-11 PROCEDURE — 25000128 H RX IP 250 OP 636: Performed by: PHYSICIAN ASSISTANT

## 2018-06-11 PROCEDURE — 86900 BLOOD TYPING SEROLOGIC ABO: CPT | Performed by: ANESTHESIOLOGY

## 2018-06-11 PROCEDURE — 25000132 ZZH RX MED GY IP 250 OP 250 PS 637: Performed by: PHYSICIAN ASSISTANT

## 2018-06-11 PROCEDURE — 36415 COLL VENOUS BLD VENIPUNCTURE: CPT | Performed by: ORTHOPAEDIC SURGERY

## 2018-06-11 PROCEDURE — 25000566 ZZH SEVOFLURANE, EA 15 MIN: Performed by: ORTHOPAEDIC SURGERY

## 2018-06-11 PROCEDURE — 86901 BLOOD TYPING SEROLOGIC RH(D): CPT | Performed by: ANESTHESIOLOGY

## 2018-06-11 PROCEDURE — 36415 COLL VENOUS BLD VENIPUNCTURE: CPT | Performed by: ANESTHESIOLOGY

## 2018-06-11 PROCEDURE — 82565 ASSAY OF CREATININE: CPT | Performed by: ORTHOPAEDIC SURGERY

## 2018-06-11 RX ORDER — ONDANSETRON 2 MG/ML
4 INJECTION INTRAMUSCULAR; INTRAVENOUS EVERY 6 HOURS PRN
Status: DISCONTINUED | OUTPATIENT
Start: 2018-06-11 | End: 2018-06-12 | Stop reason: HOSPADM

## 2018-06-11 RX ORDER — PROPOFOL 10 MG/ML
INJECTION, EMULSION INTRAVENOUS PRN
Status: DISCONTINUED | OUTPATIENT
Start: 2018-06-11 | End: 2018-06-11

## 2018-06-11 RX ORDER — NEOSTIGMINE METHYLSULFATE 1 MG/ML
VIAL (ML) INJECTION PRN
Status: DISCONTINUED | OUTPATIENT
Start: 2018-06-11 | End: 2018-06-11

## 2018-06-11 RX ORDER — EPHEDRINE SULFATE 50 MG/ML
INJECTION, SOLUTION INTRAMUSCULAR; INTRAVENOUS; SUBCUTANEOUS PRN
Status: DISCONTINUED | OUTPATIENT
Start: 2018-06-11 | End: 2018-06-11

## 2018-06-11 RX ORDER — NALOXONE HYDROCHLORIDE 0.4 MG/ML
.1-.4 INJECTION, SOLUTION INTRAMUSCULAR; INTRAVENOUS; SUBCUTANEOUS
Status: DISCONTINUED | OUTPATIENT
Start: 2018-06-11 | End: 2018-06-12 | Stop reason: HOSPADM

## 2018-06-11 RX ORDER — LATANOPROST 50 UG/ML
1 SOLUTION/ DROPS OPHTHALMIC AT BEDTIME
Status: DISCONTINUED | OUTPATIENT
Start: 2018-06-11 | End: 2018-06-12 | Stop reason: HOSPADM

## 2018-06-11 RX ORDER — PREDNISONE 5 MG/1
5 TABLET ORAL EVERY OTHER DAY
Status: DISCONTINUED | OUTPATIENT
Start: 2018-06-12 | End: 2018-06-12 | Stop reason: HOSPADM

## 2018-06-11 RX ORDER — LISINOPRIL 10 MG/1
10 TABLET ORAL EVERY EVENING
Status: DISCONTINUED | OUTPATIENT
Start: 2018-06-11 | End: 2018-06-12 | Stop reason: HOSPADM

## 2018-06-11 RX ORDER — HYDROMORPHONE HYDROCHLORIDE 1 MG/ML
.3-.5 INJECTION, SOLUTION INTRAMUSCULAR; INTRAVENOUS; SUBCUTANEOUS EVERY 5 MIN PRN
Status: DISCONTINUED | OUTPATIENT
Start: 2018-06-11 | End: 2018-06-11 | Stop reason: HOSPADM

## 2018-06-11 RX ORDER — ONDANSETRON 4 MG/1
4 TABLET, ORALLY DISINTEGRATING ORAL EVERY 30 MIN PRN
Status: DISCONTINUED | OUTPATIENT
Start: 2018-06-11 | End: 2018-06-11 | Stop reason: HOSPADM

## 2018-06-11 RX ORDER — FINASTERIDE 5 MG/1
5 TABLET, FILM COATED ORAL EVERY MORNING
Status: DISCONTINUED | OUTPATIENT
Start: 2018-06-12 | End: 2018-06-12 | Stop reason: HOSPADM

## 2018-06-11 RX ORDER — ACETAMINOPHEN 325 MG/1
650 TABLET ORAL EVERY 6 HOURS PRN
Status: DISCONTINUED | OUTPATIENT
Start: 2018-06-11 | End: 2018-06-12 | Stop reason: HOSPADM

## 2018-06-11 RX ORDER — TAMSULOSIN HYDROCHLORIDE 0.4 MG/1
0.4 CAPSULE ORAL EVERY EVENING
Status: DISCONTINUED | OUTPATIENT
Start: 2018-06-11 | End: 2018-06-12 | Stop reason: HOSPADM

## 2018-06-11 RX ORDER — ONDANSETRON 4 MG/1
4 TABLET, ORALLY DISINTEGRATING ORAL EVERY 6 HOURS PRN
Status: DISCONTINUED | OUTPATIENT
Start: 2018-06-11 | End: 2018-06-12 | Stop reason: HOSPADM

## 2018-06-11 RX ORDER — ONDANSETRON 2 MG/ML
INJECTION INTRAMUSCULAR; INTRAVENOUS PRN
Status: DISCONTINUED | OUTPATIENT
Start: 2018-06-11 | End: 2018-06-11

## 2018-06-11 RX ORDER — CEFAZOLIN SODIUM 1 G/3ML
1 INJECTION, POWDER, FOR SOLUTION INTRAMUSCULAR; INTRAVENOUS SEE ADMIN INSTRUCTIONS
Status: DISCONTINUED | OUTPATIENT
Start: 2018-06-11 | End: 2018-06-11 | Stop reason: HOSPADM

## 2018-06-11 RX ORDER — GLYCOPYRROLATE 0.2 MG/ML
INJECTION, SOLUTION INTRAMUSCULAR; INTRAVENOUS PRN
Status: DISCONTINUED | OUTPATIENT
Start: 2018-06-11 | End: 2018-06-11

## 2018-06-11 RX ORDER — LIDOCAINE HYDROCHLORIDE 20 MG/ML
INJECTION, SOLUTION INFILTRATION; PERINEURAL PRN
Status: DISCONTINUED | OUTPATIENT
Start: 2018-06-11 | End: 2018-06-11

## 2018-06-11 RX ORDER — LABETALOL HYDROCHLORIDE 5 MG/ML
10 INJECTION, SOLUTION INTRAVENOUS
Status: DISCONTINUED | OUTPATIENT
Start: 2018-06-11 | End: 2018-06-11 | Stop reason: HOSPADM

## 2018-06-11 RX ORDER — FENTANYL CITRATE 50 UG/ML
25-50 INJECTION, SOLUTION INTRAMUSCULAR; INTRAVENOUS EVERY 5 MIN PRN
Status: DISCONTINUED | OUTPATIENT
Start: 2018-06-11 | End: 2018-06-11 | Stop reason: HOSPADM

## 2018-06-11 RX ORDER — CEFAZOLIN SODIUM 1 G/3ML
1 INJECTION, POWDER, FOR SOLUTION INTRAMUSCULAR; INTRAVENOUS EVERY 8 HOURS
Status: DISCONTINUED | OUTPATIENT
Start: 2018-06-11 | End: 2018-06-12 | Stop reason: HOSPADM

## 2018-06-11 RX ORDER — NALOXONE HYDROCHLORIDE 0.4 MG/ML
.1-.4 INJECTION, SOLUTION INTRAMUSCULAR; INTRAVENOUS; SUBCUTANEOUS
Status: DISCONTINUED | OUTPATIENT
Start: 2018-06-11 | End: 2018-06-11

## 2018-06-11 RX ORDER — SODIUM CHLORIDE, SODIUM LACTATE, POTASSIUM CHLORIDE, CALCIUM CHLORIDE 600; 310; 30; 20 MG/100ML; MG/100ML; MG/100ML; MG/100ML
INJECTION, SOLUTION INTRAVENOUS CONTINUOUS
Status: DISCONTINUED | OUTPATIENT
Start: 2018-06-11 | End: 2018-06-11 | Stop reason: HOSPADM

## 2018-06-11 RX ORDER — LIDOCAINE 40 MG/G
CREAM TOPICAL
Status: DISCONTINUED | OUTPATIENT
Start: 2018-06-11 | End: 2018-06-12 | Stop reason: HOSPADM

## 2018-06-11 RX ORDER — HYDROMORPHONE HYDROCHLORIDE 1 MG/ML
0.2 INJECTION, SOLUTION INTRAMUSCULAR; INTRAVENOUS; SUBCUTANEOUS EVERY 30 MIN PRN
Status: DISCONTINUED | OUTPATIENT
Start: 2018-06-11 | End: 2018-06-12 | Stop reason: HOSPADM

## 2018-06-11 RX ORDER — CEFAZOLIN SODIUM 2 G/100ML
2 INJECTION, SOLUTION INTRAVENOUS
Status: COMPLETED | OUTPATIENT
Start: 2018-06-11 | End: 2018-06-11

## 2018-06-11 RX ORDER — SODIUM CHLORIDE, SODIUM LACTATE, POTASSIUM CHLORIDE, CALCIUM CHLORIDE 600; 310; 30; 20 MG/100ML; MG/100ML; MG/100ML; MG/100ML
INJECTION, SOLUTION INTRAVENOUS CONTINUOUS PRN
Status: DISCONTINUED | OUTPATIENT
Start: 2018-06-11 | End: 2018-06-11

## 2018-06-11 RX ORDER — FENTANYL CITRATE 50 UG/ML
INJECTION, SOLUTION INTRAMUSCULAR; INTRAVENOUS PRN
Status: DISCONTINUED | OUTPATIENT
Start: 2018-06-11 | End: 2018-06-11

## 2018-06-11 RX ORDER — ONDANSETRON 2 MG/ML
4 INJECTION INTRAMUSCULAR; INTRAVENOUS EVERY 30 MIN PRN
Status: DISCONTINUED | OUTPATIENT
Start: 2018-06-11 | End: 2018-06-11 | Stop reason: HOSPADM

## 2018-06-11 RX ORDER — BETAMETHASONE SODIUM PHOSPHATE AND BETAMETHASONE ACETATE 3; 3 MG/ML; MG/ML
INJECTION, SUSPENSION INTRA-ARTICULAR; INTRALESIONAL; INTRAMUSCULAR; SOFT TISSUE PRN
Status: DISCONTINUED | OUTPATIENT
Start: 2018-06-11 | End: 2018-06-11 | Stop reason: HOSPADM

## 2018-06-11 RX ORDER — ALLOPURINOL 300 MG/1
300 TABLET ORAL DAILY
Status: DISCONTINUED | OUTPATIENT
Start: 2018-06-11 | End: 2018-06-12 | Stop reason: HOSPADM

## 2018-06-11 RX ORDER — OXYCODONE HYDROCHLORIDE 5 MG/1
5-10 TABLET ORAL
Status: DISCONTINUED | OUTPATIENT
Start: 2018-06-11 | End: 2018-06-12 | Stop reason: HOSPADM

## 2018-06-11 RX ORDER — ROSUVASTATIN CALCIUM 20 MG/1
20 TABLET, COATED ORAL
Status: DISCONTINUED | OUTPATIENT
Start: 2018-06-12 | End: 2018-06-12 | Stop reason: HOSPADM

## 2018-06-11 RX ORDER — HYDRALAZINE HYDROCHLORIDE 20 MG/ML
2.5-5 INJECTION INTRAMUSCULAR; INTRAVENOUS EVERY 10 MIN PRN
Status: DISCONTINUED | OUTPATIENT
Start: 2018-06-11 | End: 2018-06-11 | Stop reason: HOSPADM

## 2018-06-11 RX ORDER — SODIUM CHLORIDE 9 MG/ML
INJECTION, SOLUTION INTRAVENOUS CONTINUOUS
Status: DISCONTINUED | OUTPATIENT
Start: 2018-06-11 | End: 2018-06-12 | Stop reason: HOSPADM

## 2018-06-11 RX ADMIN — LISINOPRIL 10 MG: 10 TABLET ORAL at 20:24

## 2018-06-11 RX ADMIN — ROCURONIUM BROMIDE 50 MG: 10 INJECTION INTRAVENOUS at 10:48

## 2018-06-11 RX ADMIN — PHENYLEPHRINE HYDROCHLORIDE 100 MCG: 10 INJECTION, SOLUTION INTRAMUSCULAR; INTRAVENOUS; SUBCUTANEOUS at 11:30

## 2018-06-11 RX ADMIN — ACETAMINOPHEN 650 MG: 325 TABLET, FILM COATED ORAL at 22:58

## 2018-06-11 RX ADMIN — SODIUM CHLORIDE, POTASSIUM CHLORIDE, SODIUM LACTATE AND CALCIUM CHLORIDE: 600; 310; 30; 20 INJECTION, SOLUTION INTRAVENOUS at 11:30

## 2018-06-11 RX ADMIN — NEOSTIGMINE METHYLSULFATE 3.5 MG: 1 INJECTION, SOLUTION INTRAVENOUS at 12:33

## 2018-06-11 RX ADMIN — SODIUM CHLORIDE: 9 INJECTION, SOLUTION INTRAVENOUS at 14:57

## 2018-06-11 RX ADMIN — TAMSULOSIN HYDROCHLORIDE 0.4 MG: 0.4 CAPSULE ORAL at 20:24

## 2018-06-11 RX ADMIN — SODIUM CHLORIDE, POTASSIUM CHLORIDE, SODIUM LACTATE AND CALCIUM CHLORIDE: 600; 310; 30; 20 INJECTION, SOLUTION INTRAVENOUS at 10:39

## 2018-06-11 RX ADMIN — PHENYLEPHRINE HYDROCHLORIDE 100 MCG: 10 INJECTION, SOLUTION INTRAMUSCULAR; INTRAVENOUS; SUBCUTANEOUS at 11:22

## 2018-06-11 RX ADMIN — PROPOFOL 140 MG: 10 INJECTION, EMULSION INTRAVENOUS at 10:48

## 2018-06-11 RX ADMIN — ONDANSETRON 4 MG: 2 INJECTION INTRAMUSCULAR; INTRAVENOUS at 12:15

## 2018-06-11 RX ADMIN — Medication 5 MG: at 12:12

## 2018-06-11 RX ADMIN — CEFAZOLIN SODIUM 2 G: 2 INJECTION, SOLUTION INTRAVENOUS at 10:58

## 2018-06-11 RX ADMIN — FENTANYL CITRATE 50 MCG: 50 INJECTION, SOLUTION INTRAMUSCULAR; INTRAVENOUS at 10:48

## 2018-06-11 RX ADMIN — PHENYLEPHRINE HYDROCHLORIDE 100 MCG: 10 INJECTION, SOLUTION INTRAMUSCULAR; INTRAVENOUS; SUBCUTANEOUS at 11:27

## 2018-06-11 RX ADMIN — FENTANYL CITRATE 50 MCG: 50 INJECTION, SOLUTION INTRAMUSCULAR; INTRAVENOUS at 12:00

## 2018-06-11 RX ADMIN — PHENYLEPHRINE HYDROCHLORIDE 100 MCG: 10 INJECTION, SOLUTION INTRAMUSCULAR; INTRAVENOUS; SUBCUTANEOUS at 11:37

## 2018-06-11 RX ADMIN — CEFAZOLIN 1 G: 1 INJECTION, POWDER, FOR SOLUTION INTRAMUSCULAR; INTRAVENOUS at 18:22

## 2018-06-11 RX ADMIN — PROPOFOL 40 MG: 10 INJECTION, EMULSION INTRAVENOUS at 11:59

## 2018-06-11 RX ADMIN — ACETAMINOPHEN 650 MG: 325 TABLET, FILM COATED ORAL at 16:58

## 2018-06-11 RX ADMIN — PHENYLEPHRINE HYDROCHLORIDE 100 MCG: 10 INJECTION, SOLUTION INTRAMUSCULAR; INTRAVENOUS; SUBCUTANEOUS at 11:20

## 2018-06-11 RX ADMIN — PHENYLEPHRINE HYDROCHLORIDE 50 MCG: 10 INJECTION, SOLUTION INTRAMUSCULAR; INTRAVENOUS; SUBCUTANEOUS at 11:10

## 2018-06-11 RX ADMIN — OMEPRAZOLE 20 MG: 20 CAPSULE, DELAYED RELEASE ORAL at 20:24

## 2018-06-11 RX ADMIN — Medication 5 MG: at 11:40

## 2018-06-11 RX ADMIN — HYDROMORPHONE HYDROCHLORIDE 0.5 MG: 1 INJECTION, SOLUTION INTRAMUSCULAR; INTRAVENOUS; SUBCUTANEOUS at 12:00

## 2018-06-11 RX ADMIN — LIDOCAINE HYDROCHLORIDE 100 MG: 20 INJECTION, SOLUTION INFILTRATION; PERINEURAL at 10:48

## 2018-06-11 RX ADMIN — FENTANYL CITRATE 50 MCG: 50 INJECTION, SOLUTION INTRAMUSCULAR; INTRAVENOUS at 10:43

## 2018-06-11 RX ADMIN — SODIUM CHLORIDE, POTASSIUM CHLORIDE, SODIUM LACTATE AND CALCIUM CHLORIDE: 600; 310; 30; 20 INJECTION, SOLUTION INTRAVENOUS at 11:19

## 2018-06-11 RX ADMIN — PHENYLEPHRINE HYDROCHLORIDE 50 MCG: 10 INJECTION, SOLUTION INTRAMUSCULAR; INTRAVENOUS; SUBCUTANEOUS at 11:15

## 2018-06-11 RX ADMIN — PHENYLEPHRINE HYDROCHLORIDE 0.25 MCG/KG/MIN: 10 INJECTION, SOLUTION INTRAMUSCULAR; INTRAVENOUS; SUBCUTANEOUS at 11:27

## 2018-06-11 RX ADMIN — GLYCOPYRROLATE 0.4 MG: 0.2 INJECTION, SOLUTION INTRAMUSCULAR; INTRAVENOUS at 12:33

## 2018-06-11 ASSESSMENT — ENCOUNTER SYMPTOMS
SEIZURES: 0
DYSRHYTHMIAS: 0

## 2018-06-11 NOTE — ANESTHESIA PREPROCEDURE EVALUATION
Anesthesia Evaluation     . Pt has had prior anesthetic.     No history of anesthetic complications          ROS/MED HX    ENT/Pulmonary:      (-) asthma and sleep apnea   Neurologic:     (+)other neuro s/p ACDF   (-) seizures, CVA and migraines   Cardiovascular:     (+) Dyslipidemia, hypertension----. : . . . :. .      (-) [place] and arrhythmias   METS/Exercise Tolerance:     Hematologic:     (+) Anemia, Other Hematologic Disorder-Anemia/leukocytosis with recent BMB      Musculoskeletal:   (+) arthritis, , , other musculoskeletal- gout, DJD      GI/Hepatic:     (+) GERD Asymptomatic on medication,      (-) liver disease   Renal/Genitourinary:     (+) BPH,    (-) renal disease   Endo:      (-) Type I DM, Type II DM and thyroid disease   Psychiatric:         Infectious Disease:        (-) Recent Fever   Malignancy:         Other:                     Physical Exam  Normal systems: cardiovascular, pulmonary and dental    Airway   Mallampati: II  TM distance: >3 FB  Neck ROM: limited    Dental     Cardiovascular       Pulmonary                     Anesthesia Plan      History & Physical Review  History and physical reviewed and following examination; no interval change.    ASA Status:  2 .    NPO Status:  > 8 hours    Plan for General with Intravenous and Propofol induction. Maintenance will be Balanced.    PONV prophylaxis:  Ondansetron (or other 5HT-3)       Postoperative Care  Postoperative pain management:  IV analgesics.      Consents  Anesthetic plan, risks, benefits and alternatives discussed with:  Patient..                          .

## 2018-06-11 NOTE — PROGRESS NOTES
Admission medication history interview status for the 6/11/2018  admission is complete. See EPIC admission navigator for prior to admission medications     Medication history source reliability:Good    Medication history interview source(s):Patient    Medication history resources (including written lists, pill bottles, clinic record):phoned in list    Primary [facility]    Additional medication history information not noted on PTA med list :None    Time spent in this activity: 25 minutes    Prior to Admission medications    Medication Sig Last Dose Taking? Auth Provider   Acetaminophen (TYLENOL PO) Take 500 mg by mouth 2 times daily as needed for mild pain or fever  6/9/2018 at Unknown Yes Reported, Patient   allopurinol (ZYLOPRIM) 300 MG tablet Take 300 mg by mouth daily. 6/10/2018 at 0930 Yes Reported, Patient   FINASTERIDE PO Take 5 mg by mouth every morning  6/10/2018 at 0930 Yes Reported, Patient   latanoprost (XALATAN) 0.005 % ophthalmic solution Place 1 drop into both eyes At Bedtime  6/10/2018 at 2000 Yes Unknown, Entered By History   LISINOPRIL PO Take 10 mg by mouth every evening  6/10/2018 at 2000 Yes Unknown, Entered By History   Omeprazole (PRILOSEC PO) Take 20 mg by mouth every evening  6/10/2018 at 1800 Yes Reported, Patient   predniSONE (DELTASONE) 5 MG tablet Take 5 mg by mouth every other day  6/10/2018 at 0900 Yes Reported, Patient   Rosuvastatin Calcium (CRESTOR PO) Take 20 mg by mouth daily 6/10/2018 at 1200 Yes Unknown, Entered By History   tamsulosin (FLOMAX) 0.4 MG 24 hr capsule Take 0.4 mg by mouth every evening  6/10/2018 at 2000 Yes Unknown, Entered By History

## 2018-06-11 NOTE — ANESTHESIA CARE TRANSFER NOTE
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    Anesthesia Type:   General     Note:  Airway :Face Mask  Patient transferred to:PACU  Comments: Transferred to PACU, spontaneous respirations, 6L oxygen via facemask.  All monitors and alarms on and functioning, VSS.  Patient awake, comfortable.  Report to PACU RN.Handoff Report: Identifed the Patient, Identified the Reponsible Provider, Reviewed the pertinent medical history, Discussed the surgical course, Reviewed Intra-OP anesthesia mangement and issues during anesthesia, Set expectations for post-procedure period and Allowed opportunity for questions and acknowledgement of understanding      Vitals: (Last set prior to Anesthesia Care Transfer)    CRNA VITALS  6/11/2018 1210 - 6/11/2018 1247      6/11/2018             Resp Rate (set): 10                Electronically Signed By: DRE Centeno CRNA  June 11, 2018  12:47 PM

## 2018-06-11 NOTE — PLAN OF CARE
Problem: Patient Care Overview  Goal: Plan of Care/Patient Progress Review  Outcome: Improving  Pt up to floor late afternoon. VSS, capnography stable. On 2 LPM oxygen via NC. CMS/NV intact. Good equal strength. Dressing CDI, hemovac to accordian suction. Mild pain in lower back, taking PO prn Tylenol and using ice packs to back along with repositioning. Burns patent. Advanced to full liquid diet, BS positive but not yet passing flatus. No nausea. Orders to begin ambulating this evening but pt not yet OOB.

## 2018-06-11 NOTE — IP AVS SNAPSHOT
MRN:[id number]                      After Visit Summary   6/11/2018    Rose Duong    MRN: [id number]           Thank you!     Thank you for choosing [facility] for your care. Our goal is always to provide you with excellent care. Hearing back from our patients is one way we can continue to improve our services. Please take a few minutes to complete the written survey that you may receive in the mail after you visit with us. Thank you!        Patient Information     Date Of Birth          1940        Designated Caregiver       Most Recent Value    Caregiver    Will someone help with your care after discharge? yes    Name of designated caregiver Kassidy  - wife    Phone number of caregiver [phone]    Caregiver address [address] [place] [address] [place]   [address]      About your hospital stay     You were admitted on:  June 11, 2018 You last received care in the:  [facility] [address] [facility]    You were discharged on:  June 12, 2018       Who to Call     For medical emergencies, please call 911.  For non-urgent questions about your medical care, please call your primary care provider or clinic, [phone]  For questions related to your surgery, please call your surgery clinic        Attending Provider     Provider Sd Lang MD Orthopedics       Primary Care Provider Office Phone # Fax #    Cedrick Siddiqi [phone]      Follow-up Appointments     Follow-up and recommended labs and tests        Give patient discharge instruction sheet and dressing supplies  He has a fu appt to see me                  Further instructions from your care team                 Care after a Discectomy/Decompression - Dr. Sd Vallejo    The following information will help you through your recovery at home.  Pain    It is normal for you to experience some pain in the area of your incision after your surgery.  Some of your leg pain may go away immediately after your  surgery.  Some of the pain will gradually decrease over the next few days or weeks depending on the amount of inflammation present in the nerve.      Sometimes Dr. Vallejo will place a steroid preparation on the nerve during surgery.   This may help decrease the nerve inflammation for the first few days after surgery.  If some of your leg pain returns 3 to 5 days after surgery it may be due to the steroid wearing off.  The pain should not be as bad as your pre-op pain and will diminish over a period of weeks.      You should call Dr. Vallejo s office if your leg pain returns suddenly and does not improve over 24 hours.    Activity    You may increase your activity as tolerated; walking is the best form of exercise after spine surgery.      Avoid bending, lifting, and twisting (BLT).  Avoid activities such as vacuuming, raking and shoveling.    Try to limit your lifting to 10-15lbs during the first 2 weeks and then increase to 20-25lbs over the next 6 weeks.    You may return to work approximately 1- 2 weeks after your surgery, if you have a sedentary or desk type job.  If you have a physical job Dr. Vallejo and his staff will help you determine a return to work plan.      You may resume sexual activity when you feel ready.  Stop if you have pain.    Driving    You may drive if you feel strong enough and are not taking any narcotic pain medications.    Incision Site     The first 3 days after surgery Dr. Vallejo would like you to keep your incision dry.   You may take a sponge bath during this time or cover your dressing with a transparent material called Tegaderm (sold at most pharmacies).      The first 3 days after surgery you should change your gauze dressing at least once a day.  After 3 days you may remove the gauze dressing and leave it off, at this point you may shower without covering your incision, allow water and soap to run over your incision but do not scrub the area or soak in a tub.    Your incision is covered  with steri-strips (narrow white tapes); they will get loose and fall of in 7-10 days.  If they do not fall off after 10 days you may remove them or Dr. Yoni fong staff will remove them at your post-op visit.    Most patients have dissolvable stitches which do not need to be removed.  In some cases nylon stitches are used and they need to be removed, 10-14 days after surgery.  If you have staples or nylon sutures please call for a removal appointment with Dr. Yoni fong nurse.    Pain Management     Take your prescribed pain medication as needed and directed.  Use Tylenol and Ibuprofen for your discomfort when you no longer need the narcotic pain medication.      If you need a refill on your pain medication call [phone], please allow 24 hours for your prescription to be refilled, Dr. Yoni fong office does not refill pain medications on Friday.   Diet     Eat a healthy diet; this will help your recovery.    Drink plenty of fluids, water, milk or juice.    If you have trouble with constipation you should eat more fiber, drink more fluids, increase your walking or try an over the counter laxative.    Follow-up Visits    You should have a post-op appointment that was scheduled for you at the same time your surgery was scheduled. If you do not, please call Dr. Yoni fong office when you get home from your surgery.    Write down any questions you have about your surgery, recovery, return to work and other topics you wished to be covered at your post-op visit.  This way, we will be able to address all of your questions at your next visit.    Call Dr. Yoni fong office if you have any questions or concerns.    When to Call your Doctor:    If you have any redness, warmth or swelling at the incision site.    If your incision opens up.    If you have increasing drainage from your incision.    If you have a temperature greater than 100.5 degrees Fahrenheit.    [facility] [address] [address]  Phone: [phone]  Fax:  [phone]  Web site: [email or web address]    Same Day Surgery Discharge Instructions for  Sedation and General Anesthesia       It's not unusual to feel dizzy, light-headed or faint for up to 24 hours after surgery or while taking pain medication.  If you have these symptoms: sit for a few minutes before standing and have someone assist you when you get up to walk or use the bathroom.      You should rest and relax for the next 24 hours. We recommend you make arrangements to have an adult stay with you for at least 24 hours after your discharge.  Avoid hazardous and strenuous activity.      DO NOT DRIVE any vehicle or operate mechanical equipment for 24 hours following the end of your surgery.  Even though you may feel normal, your reactions may be affected by the medication you have received.      Do not drink alcoholic beverages for 24 hours following surgery.       Slowly progress to your regular diet as you feel able. It's not unusual to feel nauseated and/or vomit after receiving anesthesia.  If you develop these symptoms, drink clear liquids (apple juice, ginger ale, broth, 7-up, etc. ) until you feel better.  If your nausea and vomiting persists for 24 hours, please notify your surgeon.        All narcotic pain medications, along with inactivity and anesthesia, can cause constipation. Drinking plenty of liquids and increasing fiber intake will help.      For any questions of a medical nature, call your surgeon.      Do not make important decisions for 24 hours.      If you had general anesthesia, you may have a sore throat for a couple of days related to the breathing tube used during surgery.  You may use Cepacol lozenges to help with this discomfort.  If it worsens or if you develop a fever, contact your surgeon.       If you feel your pain is not well managed with the pain medications prescribed by your surgeon, please contact your surgeon's office to let them know so they can address your concerns.  "          Pending Results     No orders found for last 3 day(s).            Statement of Approval     Ordered          18 1521  I have reviewed and agree with all the recommendations and orders detailed in this document.  EFFECTIVE NOW     Approved and electronically signed by:  Sd Vallejo MD             Admission Information     Date & Time Provider Department Dept. Phone    2018 Sd Vallejo MD [address] [facility] [phone]      Your Vitals Were     Blood Pressure Temperature Respirations Height Weight Pulse Oximetry    138/64 (BP Location: Left arm) 98  F (36.7  C) (Oral) 16 1.702 m (5' 7\") 64 kg (141 lb) 98%    BMI (Body Mass Index)                   22.08 kg/m2           MyChart Information     [facility] lets you send messages to your doctor, view your test results, renew your prescriptions, schedule appointments and more. To sign up, go to [email or web address] . Click on \"Log in\" on the left side of the screen, which will take you to the Welcome page. Then click on \"Sign up Now\" on the right side of the page.     You will be asked to enter the access code listed below, as well as some personal information. Please follow the directions to create your username and password.     Your access code is: [email or web address]  Expires: 2018 10:27 AM     Your access code will  in 90 days. If you need help or a new code, please call your [facility] clinic or [phone].        Care EveryWhere ID     This is your Care EveryWhere ID. This could be used by other organizations to access your [facility] medical records  [id number]        Equal Access to Services     [facility]: Hadii miguel Morales, wadiogenes solitario, qaaroldo mccurdyalbrad cade. So [facility] [phone].    ATENCIÓN: Si habla español, tiene a horn disposición servicios gratuitos de asistencia lingüística. Llame al [phone].    We comply with applicable " federal civil rights laws and [place] laws. We do not discriminate on the basis of race, color, national origin, age, disability, sex, sexual orientation, or gender identity.               Review of your medicines      START taking        Dose / Directions    oxyCODONE IR 5 MG tablet   Commonly known as:  ROXICODONE   Used for:  S/P laminectomy        Dose:  5-10 mg   Take 1-2 tablets (5-10 mg) by mouth every 3 hours as needed for other (pain control or improvement in physical function. Hold dose for analgesic side effects.)   Quantity:  20 tablet   Refills:  0         CONTINUE these medicines which have NOT CHANGED        Dose / Directions    allopurinol 300 MG tablet   Commonly known as:  ZYLOPRIM        Dose:  300 mg   Take 300 mg by mouth daily.   Refills:  0       CRESTOR PO        Dose:  20 mg   Take 20 mg by mouth daily   Refills:  0       FINASTERIDE PO        Dose:  5 mg   Take 5 mg by mouth every morning   Refills:  0       latanoprost 0.005 % ophthalmic solution   Commonly known as:  XALATAN        Dose:  1 drop   Place 1 drop into both eyes At Bedtime   Refills:  0       LISINOPRIL PO        Dose:  10 mg   Take 10 mg by mouth every evening   Refills:  0       predniSONE 5 MG tablet   Commonly known as:  DELTASONE        Dose:  5 mg   Take 5 mg by mouth every other day   Refills:  0       PRILOSEC PO        Dose:  20 mg   Take 20 mg by mouth every evening   Refills:  0       tamsulosin 0.4 MG capsule   Commonly known as:  FLOMAX        Dose:  0.4 mg   Take 0.4 mg by mouth every evening   Refills:  0       TYLENOL PO        Dose:  500 mg   Take 500 mg by mouth 2 times daily as needed for mild pain or fever   Refills:  0            Where to get your medicines      Some of these will need a paper prescription and others can be bought over the counter. Ask your nurse if you have questions.     Bring a paper prescription for each of these medications     oxyCODONE IR 5 MG tablet                Protect others  around you: Learn how to safely use, store and throw away your medicines at www.disposemymeds.org.        Information about OPIOIDS     PRESCRIPTION OPIOIDS: WHAT YOU NEED TO KNOW   We gave you an opioid (narcotic) pain medicine. It is important to manage your pain, but opioids are not always the best choice. You should first try all the other options your care team gave you. Take this medicine for as short a time (and as few doses) as possible.     These medicines have risks:    DO NOT drive when on new or higher doses of pain medicine. These medicines can affect your alertness and reaction times, and you could be arrested for driving under the influence (DUI). If you need to use opioids long-term, talk to your care team about driving.    DO NOT operate heave machinery    DO NOT do any other dangerous activities while taking these medicines.     DO NOT drink any alcohol while taking these medicines.      If the opioid prescribed includes acetaminophen, DO NOT take with any other medicines that contain acetaminophen. Read all labels carefully. Look for the word  acetaminophen  or  Tylenol.  Ask your pharmacist if you have questions or are unsure.    You can get addicted to pain medicines, especially if you have a history of addiction (chemical, alcohol or substance dependence). Talk to your care team about ways to reduce this risk.    Store your pills in a secure place, locked if possible. We will not replace any lost or stolen medicine. If you don t finish your medicine, please throw away (dispose) as directed by your pharmacist. The [place] Pollution Control Agency has more information about safe disposal: https://www.pca.formerly [facility].[place].us/living-green/managing-unwanted-medications.     All opioids tend to cause constipation. Drink plenty of water and eat foods that have a lot of fiber, such as fruits, vegetables, prune juice, apple juice and high-fiber cereal. Take a laxative (Miralax, milk of magnesia, Colace,  Senna) if you don t move your bowels at least every other day.              Medication List: This is a list of all your medications and when to take them. Check marks below indicate your daily home schedule. Keep this list as a reference.      Medications           Morning Afternoon Evening Bedtime As Needed    allopurinol 300 MG tablet   Commonly known as:  ZYLOPRIM   Take 300 mg by mouth daily.   Last time this was given:  300 mg on 6/12/2018  8:39 AM   Next Dose Due:  Tomorrow 6/13 morning                                   CRESTOR PO   Take 20 mg by mouth daily   Last time this was given:  20 mg on 6/12/2018 12:32 PM   Next Dose Due:  Tomorrow 6/13 morning                                   FINASTERIDE PO   Take 5 mg by mouth every morning   Last time this was given:  5 mg on 6/12/2018  8:39 AM   Next Dose Due:  Tomorrow 6/13 morning                                   latanoprost 0.005 % ophthalmic solution   Commonly known as:  XALATAN   Place 1 drop into both eyes At Bedtime   Next Dose Due:  Tonight 6/12 at bedtime                                   LISINOPRIL PO   Take 10 mg by mouth every evening   Last time this was given:  10 mg on 6/11/2018  8:24 PM   Next Dose Due:  Tonight 6/12                                   oxyCODONE IR 5 MG tablet   Commonly known as:  ROXICODONE   Take 1-2 tablets (5-10 mg) by mouth every 3 hours as needed for other (pain control or improvement in physical function. Hold dose for analgesic side effects.)                                   predniSONE 5 MG tablet   Commonly known as:  DELTASONE   Take 5 mg by mouth every other day   Last time this was given:  5 mg on 6/12/2018  8:39 AM   Next Dose Due:  Thursday 6/14 in the morning                                   PRILOSEC PO   Take 20 mg by mouth every evening   Last time this was given:  20 mg on 6/11/2018  8:24 PM   Next Dose Due:  Tonight 6/12                                     tamsulosin 0.4 MG capsule   Commonly known as:   FLOMAX   Take 0.4 mg by mouth every evening   Last time this was given:  0.4 mg on 6/11/2018  8:24 PM   Next Dose Due:  Tonight 6/12                                   TYLENOL PO   Take 500 mg by mouth 2 times daily as needed for mild pain or fever   Last time this was given:  650 mg on 6/12/2018 12:33 PM

## 2018-06-11 NOTE — ANESTHESIA POSTPROCEDURE EVALUATION
Patient: Rodric J Pelo    Procedure(s):  BILATERAL LUMBAR 3-4 AND LUMBAR 4-5 DECOMPRESSION  - Wound Class: I-Clean    Diagnosis:SPINAL STENOSIS L3-4,  L4-5 WITH BILATERAL LEG RADICULOPATHY  Diagnosis Additional Information: No value filed.    Anesthesia Type:  General    Note:  Anesthesia Post Evaluation    Patient location during evaluation: PACU  Patient participation: Able to fully participate in evaluation  Level of consciousness: awake and alert  Pain management: adequate  Airway patency: patent  Cardiovascular status: acceptable  Respiratory status: acceptable and unassisted  Hydration status: acceptable  PONV: none             Last vitals:  Vitals:    06/11/18 1330 06/11/18 1345 06/11/18 1400   BP: 119/58 112/56 114/56   Resp: 16 17 13   Temp:  36.6  C (97.8  F)    SpO2: 95% 97%          Electronically Signed By: Sophie Fajardo MD  June 11, 2018  2:21 PM

## 2018-06-11 NOTE — BRIEF OP NOTE
[facility]  Spinal Surgery Brief Operative Note    Pre-operative diagnosis: SPINAL STENOSIS L3-4,  L4-5 WITH BILATERAL LEG RADICULOPATHY   Post-operative diagnosis: Same   Procedure: BILATERAL L3-4 AND L4-5 DECOMPRESSION   Surgeon: NOBLE CARBALLO MD   Assistant(s): SEKOU SNIDER PA-C   Anesthesia: General endotracheal anesthesia   Estimated blood loss: 40ML   Total IV fluids: (See anesthesia record)   Blood transfusion: NONE   Drains: Hemovac   Specimens: NONE   Implants: AS ABOVE   Findings: AS ABOVE   Complications: NONE   Condition: STABLE   Comments: SEE DICTATED OPERATIVE REPORT FOR DETAILS

## 2018-06-11 NOTE — OP NOTE
Procedure Date: 06/11/2018      PREOPERATIVE DIAGNOSIS:  Severe spinal stenosis L3-L4 and L4-L5 with bilateral leg radiculopathy.        POSTOPERATIVE DIAGNOSIS:  Severe spinal stenosis L3-L4 and L4-L5 with bilateral leg radiculopathy.      PROCEDURE:  Bilateral L3-L4 and L4-L5 decompression.      SURGEON:  Sd Vallejo MD       ASSISTANT:  Danielle Adorno PA-C       ANESTHESIA:  General.      RI REQUIREMENTS:   1.  The patient was fitted with pneumatic compression boots prior to the surgery.  These were kept operational throughout the surgery and will be continued until he is ambulatory.  Chemical anticoagulants cannot be utilized because of the risk of epidural hematoma.   2.  The patient was given 2 grams of Ancef within 1 hour prior to starting the surgery.  He will be kept on antibiotics for 24 hours and then stopped as appropriate.      DESCRIPTION OF PROCEDURE:  The patient was brought to the operating room.  A general anesthetic was administered.  A Burns catheter was placed.  The patient was positioned prone on the Ledesma frame, carefully padded and positioned and his back was prepped and draped in routine sterile fashion.      A marking needle was placed at what was felt to be the L3-L4 interspace.  A lateral x-ray was obtained.  This confirmed that it was at L3-L4.  This was marked.  The planned skin incision over the L3-L4 and L4-L5 level was then injected with 10 mL of 0.5% Marcaine with epinephrine.      After the timeout, a midline skin incision was made from L3-L4 and L4-L5 in the midline.  Electrocautery was used to develop the incision down to the fascia.  The fascia was incised and a clamp was placed on the spinous process of what was felt to be L4.  Another lateral x-ray was obtained.  This confirmed our level.  A bilateral exposure out to the medial aspect of the facet joints at both L3, L4 and L5 was then performed.  Chidi retractor was placed.  Decompression was performed by removing  the L4 spinous process down to its base.  Then, a partial spinous process removal of L3 and partial spinous process removal of L5.  It is notable that his bone was quite soft.  The L3-L4 level was then decompressed first.  All of the ligamentum flavum between L3 and L4 was removed.  He had very thick hypertrophic facet capsules bilaterally.  The ligamentum flavum was very thick.  The facet joints were arthritic.  Decompression was performed with Kerrisons.  Care was taken not to remove any significant amount of the facet joints.  They were just trimmed slightly.  The hypertrophic facet capsule had to be removed all the way down to the base.      Attention was then turned to the L4-L5 level.  This was the most severely stenotic level.  Again, the ligamentum flavum was removed between both L4 and L5.  The hypertrophic facet capsules were removed.  Both L5 nerve roots were markedly compressed in the lateral recess due to the thickened ligamentum flavum and hypertrophic facet capsule.  Ligamentum flavum was removed all the way up to its attachment on the undersurface of L4 and all the way down to the pedicle level of L5.  When I was done, we had an excellent decompression.  I could pass a probe all the way under the remaining lamina of L4 up to the L3-L4 level on both sides.  The foramen appeared to be open.      The wound was irrigated with antibiotic solution.  Hemostasis was checked with bipolar cautery.  Bone wax was placed over the bleeding cancellous surfaces.  Gelfoam was then placed over the exposed dura.  A Hemovac drain was placed into the fascia.  The wound was closed in routine fashion with interrupted #1 Vicryl suture for the fascia, 2-0 for the subq, and 3-0 Vicryl for the skin.  Dressings were applied, drapes removed, he was transferred to the hospital cart and taken to the recovery room in good condition.        ESTIMATED BLOOD LOSS:   40 mL.      COMPLICATIONS:  None.      DRAINS:  One Hemovac.          NOBLE CARBALLO MD             D: 2018   T: 2018   MT: CANDICE      Name:     VALERIE BALLARD   MRN:      [id number]        Account:        [id number]   :      1940           Procedure Date: 2018      Document: [id number]       cc: Kian Johns MD

## 2018-06-11 NOTE — IP AVS SNAPSHOT
[address] [facility]    [address]    [place] [address]    Phone:  [phone]                                       After Visit Summary   6/11/2018    Rose Duong    MRN: [id number]           After Visit Summary Signature Page     I have received my discharge instructions, and my questions have been answered. I have discussed any challenges I see with this plan with the nurse or doctor.    ..........................................................................................................................................  Patient/Patient Representative Signature      ..........................................................................................................................................  Patient Representative Print Name and Relationship to Patient    ..................................................               ................................................  Date                                            Time    ..........................................................................................................................................  Reviewed by Signature/Title    ...................................................              ..............................................  Date                                                            Time

## 2018-06-11 NOTE — OR NURSING
Report called to [facility], Sally RN.  Pt to room via cart with NA in escort.  All belongings sent with pt.  Family michael notified of transfer.

## 2018-06-12 VITALS
BODY MASS INDEX: 22.13 KG/M2 | HEIGHT: 67 IN | SYSTOLIC BLOOD PRESSURE: 138 MMHG | DIASTOLIC BLOOD PRESSURE: 64 MMHG | TEMPERATURE: 98 F | OXYGEN SATURATION: 98 % | RESPIRATION RATE: 16 BRPM | WEIGHT: 141 LBS

## 2018-06-12 LAB — GLUCOSE BLDC GLUCOMTR-MCNC: 106 MG/DL (ref 70–99)

## 2018-06-12 PROCEDURE — 25000125 ZZHC RX 250: Performed by: PHYSICIAN ASSISTANT

## 2018-06-12 PROCEDURE — 25000132 ZZH RX MED GY IP 250 OP 250 PS 637: Performed by: PHYSICIAN ASSISTANT

## 2018-06-12 PROCEDURE — 25000128 H RX IP 250 OP 636: Performed by: PHYSICIAN ASSISTANT

## 2018-06-12 PROCEDURE — 25000132 ZZH RX MED GY IP 250 OP 250 PS 637: Performed by: ORTHOPAEDIC SURGERY

## 2018-06-12 PROCEDURE — 82962 GLUCOSE BLOOD TEST: CPT

## 2018-06-12 RX ORDER — BISACODYL 10 MG
10 SUPPOSITORY, RECTAL RECTAL DAILY PRN
Status: DISCONTINUED | OUTPATIENT
Start: 2018-06-12 | End: 2018-06-12 | Stop reason: HOSPADM

## 2018-06-12 RX ORDER — BISACODYL 5 MG
10 TABLET, DELAYED RELEASE (ENTERIC COATED) ORAL DAILY PRN
Status: DISCONTINUED | OUTPATIENT
Start: 2018-06-12 | End: 2018-06-12 | Stop reason: HOSPADM

## 2018-06-12 RX ORDER — OXYCODONE HYDROCHLORIDE 5 MG/1
5-10 TABLET ORAL
Qty: 20 TABLET | Refills: 0 | Status: SHIPPED | OUTPATIENT
Start: 2018-06-12 | End: 2019-06-10

## 2018-06-12 RX ORDER — AMOXICILLIN 250 MG
1 CAPSULE ORAL 2 TIMES DAILY
Status: DISCONTINUED | OUTPATIENT
Start: 2018-06-12 | End: 2018-06-12 | Stop reason: HOSPADM

## 2018-06-12 RX ORDER — BISACODYL 5 MG
15 TABLET, DELAYED RELEASE (ENTERIC COATED) ORAL DAILY PRN
Status: DISCONTINUED | OUTPATIENT
Start: 2018-06-12 | End: 2018-06-12 | Stop reason: HOSPADM

## 2018-06-12 RX ORDER — AMOXICILLIN 250 MG
2 CAPSULE ORAL 2 TIMES DAILY
Status: DISCONTINUED | OUTPATIENT
Start: 2018-06-12 | End: 2018-06-12 | Stop reason: HOSPADM

## 2018-06-12 RX ORDER — BISACODYL 5 MG
5 TABLET, DELAYED RELEASE (ENTERIC COATED) ORAL DAILY PRN
Status: DISCONTINUED | OUTPATIENT
Start: 2018-06-12 | End: 2018-06-12 | Stop reason: HOSPADM

## 2018-06-12 RX ADMIN — ALLOPURINOL 300 MG: 300 TABLET ORAL at 08:39

## 2018-06-12 RX ADMIN — ROSUVASTATIN CALCIUM 20 MG: 20 TABLET, FILM COATED ORAL at 12:32

## 2018-06-12 RX ADMIN — ACETAMINOPHEN 650 MG: 325 TABLET, FILM COATED ORAL at 12:33

## 2018-06-12 RX ADMIN — PREDNISONE 5 MG: 5 TABLET ORAL at 08:39

## 2018-06-12 RX ADMIN — BISACODYL 5 MG: 5 TABLET, COATED ORAL at 13:39

## 2018-06-12 RX ADMIN — ACETAMINOPHEN 650 MG: 325 TABLET, FILM COATED ORAL at 06:22

## 2018-06-12 RX ADMIN — CEFAZOLIN 1 G: 1 INJECTION, POWDER, FOR SOLUTION INTRAMUSCULAR; INTRAVENOUS at 01:54

## 2018-06-12 RX ADMIN — FINASTERIDE 5 MG: 5 TABLET, FILM COATED ORAL at 08:39

## 2018-06-12 RX ADMIN — CEFAZOLIN 1 G: 1 INJECTION, POWDER, FOR SOLUTION INTRAMUSCULAR; INTRAVENOUS at 10:55

## 2018-06-12 NOTE — PLAN OF CARE
Problem: Patient Care Overview  Goal: Plan of Care/Patient Progress Review  Outcome: No Change  A&O, VSS on 2L O2.  Pain managed with prn tylenol.  Lumbar incision dressing CDI, ice in place.  Accordion RUDI drain to suction, dark red output, dressing CDI.  Neuros intact.  +BS, no flatus.  Burns patent, good urine output.  Tolerating full liquids.  Up w/asst x1.

## 2018-06-12 NOTE — DISCHARGE INSTRUCTIONS
Care after a Discectomy/Decompression - Dr. Sd Vallejo    The following information will help you through your recovery at home.  Pain    It is normal for you to experience some pain in the area of your incision after your surgery.  Some of your leg pain may go away immediately after your surgery.  Some of the pain will gradually decrease over the next few days or weeks depending on the amount of inflammation present in the nerve.      Sometimes Dr. Vallejo will place a steroid preparation on the nerve during surgery.   This may help decrease the nerve inflammation for the first few days after surgery.  If some of your leg pain returns 3 to 5 days after surgery it may be due to the steroid wearing off.  The pain should not be as bad as your pre-op pain and will diminish over a period of weeks.      You should call Dr. Vallejo s office if your leg pain returns suddenly and does not improve over 24 hours.    Activity    You may increase your activity as tolerated; walking is the best form of exercise after spine surgery.      Avoid bending, lifting, and twisting (BLT).  Avoid activities such as vacuuming, raking and shoveling.    Try to limit your lifting to 10-15lbs during the first 2 weeks and then increase to 20-25lbs over the next 6 weeks.    You may return to work approximately 1- 2 weeks after your surgery, if you have a sedentary or desk type job.  If you have a physical job Dr. Vallejo and his staff will help you determine a return to work plan.      You may resume sexual activity when you feel ready.  Stop if you have pain.    Driving    You may drive if you feel strong enough and are not taking any narcotic pain medications.    Incision Site     The first 3 days after surgery Dr. Vallejo would like you to keep your incision dry.   You may take a sponge bath during this time or cover your dressing with a transparent material called Tegaderm (sold at most pharmacies).      The first 3 days after surgery you  should change your gauze dressing at least once a day.  After 3 days you may remove the gauze dressing and leave it off, at this point you may shower without covering your incision, allow water and soap to run over your incision but do not scrub the area or soak in a tub.    Your incision is covered with steri-strips (narrow white tapes); they will get loose and fall of in 7-10 days.  If they do not fall off after 10 days you may remove them or Dr. Yoni fong staff will remove them at your post-op visit.    Most patients have dissolvable stitches which do not need to be removed.  In some cases nylon stitches are used and they need to be removed, 10-14 days after surgery.  If you have staples or nylon sutures please call for a removal appointment with Dr. Yoni fong nurse.    Pain Management     Take your prescribed pain medication as needed and directed.  Use Tylenol and Ibuprofen for your discomfort when you no longer need the narcotic pain medication.      If you need a refill on your pain medication call [phone], please allow 24 hours for your prescription to be refilled, Dr. Yoni fong office does not refill pain medications on Friday.   Diet     Eat a healthy diet; this will help your recovery.    Drink plenty of fluids, water, milk or juice.    If you have trouble with constipation you should eat more fiber, drink more fluids, increase your walking or try an over the counter laxative.    Follow-up Visits    You should have a post-op appointment that was scheduled for you at the same time your surgery was scheduled. If you do not, please call Dr. Yoni fong office when you get home from your surgery.    Write down any questions you have about your surgery, recovery, return to work and other topics you wished to be covered at your post-op visit.  This way, we will be able to address all of your questions at your next visit.    Call Dr. Yoni fong office if you have any questions or concerns.    When to Call your  Doctor:    If you have any redness, warmth or swelling at the incision site.    If your incision opens up.    If you have increasing drainage from your incision.    If you have a temperature greater than 100.5 degrees Fahrenheit.    [address] [place] [id number]  Phone: [phone]  Fax: [phone]  Web site: [email or web address]    Same Day Surgery Discharge Instructions for  Sedation and General Anesthesia       It's not unusual to feel dizzy, light-headed or faint for up to 24 hours after surgery or while taking pain medication.  If you have these symptoms: sit for a few minutes before standing and have someone assist you when you get up to walk or use the bathroom.      You should rest and relax for the next 24 hours. We recommend you make arrangements to have an adult stay with you for at least 24 hours after your discharge.  Avoid hazardous and strenuous activity.      DO NOT DRIVE any vehicle or operate mechanical equipment for 24 hours following the end of your surgery.  Even though you may feel normal, your reactions may be affected by the medication you have received.      Do not drink alcoholic beverages for 24 hours following surgery.       Slowly progress to your regular diet as you feel able. It's not unusual to feel nauseated and/or vomit after receiving anesthesia.  If you develop these symptoms, drink clear liquids (apple juice, ginger ale, broth, 7-up, etc. ) until you feel better.  If your nausea and vomiting persists for 24 hours, please notify your surgeon.        All narcotic pain medications, along with inactivity and anesthesia, can cause constipation. Drinking plenty of liquids and increasing fiber intake will help.      For any questions of a medical nature, call your surgeon.      Do not make important decisions for 24 hours.      If you had general anesthesia, you may have a sore throat for a couple of days related to the breathing tube used during surgery.  You may use Cepacol  lozenges to help with this discomfort.  If it worsens or if you develop a fever, contact your surgeon.       If you feel your pain is not well managed with the pain medications prescribed by your surgeon, please contact your surgeon's office to let them know so they can address your concerns.

## 2018-06-12 NOTE — PROGRESS NOTES
[facility]    Spine Surgery  Daily Post-Op Note    Assessment & Plan   Procedure(s):  DECOMPRESSION LUMBAR TWO LEVELS   -1 Day Post-Op      ASSESSMENT:  Stable postop day #1, status post bilateral L3 4 and L4 5 decompression    PLAN:  Mobilized today  The Hemovac drain in place, removed later today if less than 25 mL of shift  DC Burns catheter, DC capnography    Sd Vallejo MD      Interval History   Doing well, legs feel good, minimal back pain, Burns catheter still in place, passing flatus.    Physical Exam   Temp: 98.4  F (36.9  C) Temp src: Oral BP: 119/58   Heart Rate: 64 Resp: 16 SpO2: 97 % O2 Device: Nasal cannula Oxygen Delivery: 1/2 LPM  Vitals:    06/11/18 0905   Weight: 64 kg (141 lb)     Vital Signs with Ranges  Temp:  [97.3  F (36.3  C)-98.4  F (36.9  C)] 98.4  F (36.9  C)  Heart Rate:  [63-78] 64  Resp:  [11-17] 16  BP: (111-149)/(46-64) 119/58  SpO2:  [94 %-100 %] 97 %  I/O last 3 completed shifts:  In: 3068 [P.O.:340; I.V.:2728]  Out: 2203 [Urine:1975; Drains:188; Blood:40]    Alert, Oriented  Abd: S,NT,ND  Wound / Dressing: Clean, dry, and intact  Motor: Intact L3 through S1  Sensory: Normal light touch bilaterally  Vascular: No edema  Hemovac 60 mL    Medications     sodium chloride 75 mL/hr at 06/12/18 0355        allopurinol  300 mg Oral Daily     ceFAZolin  1 g Intravenous Q8H     finasteride (PROSCAR) tablet 5 mg  5 mg Oral QAM     latanoprost  1 drop Both Eyes At Bedtime     lisinopril (PRINIVIL/ZESTRIL) tablet 10 mg  10 mg Oral QPM     omeprazole (priLOSEC) CR capsule 20 mg  20 mg Oral QPM     predniSONE  5 mg Oral Every Other Day     rosuvastatin (CRESTOR) tablet 20 mg  20 mg Oral Daily with lunch     sodium chloride (PF)  3 mL Intracatheter Q8H     tamsulosin  0.4 mg Oral QPM       Data   No lab results found in last 7 days.    Recent Results (from the past 24 hour(s))   XR Surgery STEVIE L/T 5 Min Fluoro w Stills    Narrative    SURGERY C-ARM FLUOROSCOPY LESS THAN  FIVE MINUTES WITH STILLS   6/11/2018 1:00 PM     COMPARISON: None.    HISTORY:  Bilateral L3-L4 and L4-L5 discectomy.     NUMBER OF IMAGES ACQUIRED: 2    VIEWS: Lateral    FLUOROSCOPY TIME: 0.1      Impression    IMPRESSION: Intraoperative images demonstrate surgical instruments  directed towards the posterior aspect of the L4-L5 disc space.    HALIE COLIN MD

## 2018-06-12 NOTE — PLAN OF CARE
"Problem: Patient Care Overview  Goal: Plan of Care/Patient Progress Review  Outcome: Improving  POD 1. CMS intact. Bowel sounds +x4, passing \"a little\" gas per pt, -BM. Tolerating full liquid diet. VSS/tachypnea at times, SiO2 stable on 0.5L . Dressing CDI. Hemovac draining 58 cc bloody drainage. Burns patent 600 cc out NOC. Up with A1 & GB. C/o mild pain, decreased with PRN Tylenol. Plan to advance activity as tolerated, ambulate. Continue to monitor.        "

## 2018-06-13 NOTE — PLAN OF CARE
Problem: Patient Care Overview  Goal: Plan of Care/Patient Progress Review  Outcome: Adequate for Discharge Date Met: 06/12/18  POD1 3 level posterior lumbar decompression. VSS. CMS intact. BLE strength 5/5. Lung sounds clear. Voiding without difficulty. +BS and passing flatus. Hemovac drain o/p 20 ml over 8 hrs, drain d/corinne and dressing changed. Incision WDL with intact steri-strips. Ambulated in hallway 3x with SBA. Pt requested tylenol only for incision pain, rated pain no greater than 3/10. Reviewed written d/c instructions with pt and his spouse, all questions answered. D/corinne home.

## 2018-12-13 ENCOUNTER — OFFICE VISIT (OUTPATIENT)
Dept: CARDIOLOGY | Facility: CLINIC | Age: 78
End: 2018-12-13
Payer: COMMERCIAL

## 2018-12-13 VITALS
HEIGHT: 67 IN | SYSTOLIC BLOOD PRESSURE: 138 MMHG | HEART RATE: 84 BPM | DIASTOLIC BLOOD PRESSURE: 62 MMHG | BODY MASS INDEX: 23.23 KG/M2 | WEIGHT: 148 LBS

## 2018-12-13 DIAGNOSIS — I35.0 NONRHEUMATIC AORTIC VALVE STENOSIS: ICD-10-CM

## 2018-12-13 DIAGNOSIS — R94.39 POSITIVE CARDIAC STRESS TEST: ICD-10-CM

## 2018-12-13 DIAGNOSIS — E78.2 MIXED HYPERLIPIDEMIA: Primary | ICD-10-CM

## 2018-12-13 PROCEDURE — 99204 OFFICE O/P NEW MOD 45 MIN: CPT | Performed by: INTERNAL MEDICINE

## 2018-12-13 PROCEDURE — 93000 ELECTROCARDIOGRAM COMPLETE: CPT | Performed by: INTERNAL MEDICINE

## 2018-12-13 RX ORDER — AMLODIPINE BESYLATE 2.5 MG/1
2.5 TABLET ORAL DAILY
COMMUNITY
End: 2018-12-13

## 2018-12-13 RX ORDER — AMLODIPINE BESYLATE 2.5 MG/1
5 TABLET ORAL DAILY
Qty: 90 TABLET | Refills: 3 | Status: SHIPPED | OUTPATIENT
Start: 2018-12-13 | End: 2018-12-13

## 2018-12-13 RX ORDER — ROSUVASTATIN CALCIUM 20 MG/1
20 TABLET, COATED ORAL DAILY
COMMUNITY

## 2018-12-13 RX ORDER — AMLODIPINE BESYLATE 2.5 MG/1
5 TABLET ORAL DAILY
Qty: 90 TABLET | Refills: 3 | Status: SHIPPED | OUTPATIENT
Start: 2018-12-13 | End: 2019-06-10

## 2018-12-13 ASSESSMENT — MIFFLIN-ST. JEOR: SCORE: 1344.95

## 2018-12-13 NOTE — LETTER
12/13/2018    Kian Johns MD  [facility] [address] [facility] [address]    RE: Rose Duong       Dear Colleague,    I had the pleasure of seeing Rose Duong in the [facility].    HPI and Plan:   See dictation    Orders Placed This Encounter   Procedures     NM Exercise stress test (nuc card)     Follow-Up with Cardiologist     EKG 12-lead complete w/read - Clinics (performed today)     Echocardiogram Complete       Orders Placed This Encounter   Medications     rosuvastatin (CRESTOR) 20 MG tablet     Sig: Take 20 mg by mouth daily     ranitidine (ZANTAC) 150 MG tablet     Sig: Take 150 mg by mouth daily     amLODIPine (NORVASC) 2.5 MG tablet     Sig: Take 2.5 mg by mouth daily       Encounter Diagnoses   Name Primary?     Nonrheumatic aortic valve stenosis      Mixed hyperlipidemia Yes     Positive cardiac stress test        CURRENT MEDICATIONS:  Current Outpatient Medications   Medication Sig Dispense Refill     Acetaminophen (TYLENOL PO) Take 500 mg by mouth 2 times daily as needed for mild pain or fever        allopurinol (ZYLOPRIM) 300 MG tablet Take 300 mg by mouth daily.       amLODIPine (NORVASC) 2.5 MG tablet Take 2.5 mg by mouth daily       FINASTERIDE PO Take 5 mg by mouth every morning        latanoprost (XALATAN) 0.005 % ophthalmic solution Place 1 drop into both eyes At Bedtime        predniSONE (DELTASONE) 5 MG tablet Take 5 mg by mouth every other day        ranitidine (ZANTAC) 150 MG tablet Take 150 mg by mouth daily       rosuvastatin (CRESTOR) 20 MG tablet Take 20 mg by mouth daily       tamsulosin (FLOMAX) 0.4 MG 24 hr capsule Take 0.4 mg by mouth every evening        oxyCODONE IR (ROXICODONE) 5 MG tablet Take 1-2 tablets (5-10 mg) by mouth every 3 hours as needed for other (pain control or improvement in physical function. 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      Anemia      Bacteremia      BPH (benign prostatic hyperplasia)      Colon polyps      DJD (degenerative joint disease)      Elevated BP without diagnosis of hypertension      Gastroesophageal reflux disease      Gout      Gout      Hyperlipidemia      Hypertension      Leukocytosis      Rotator cuff tear arthropathy, left      Sinusitis        PAST SURGICAL HISTORY:  Past Surgical History:   Procedure Laterality Date     APPENDECTOMY       ASPIRATION NEEDLE HIP Left 9/25/2017    Procedure: ASPIRATION NEEDLE HIP;;  Surgeon: Moises Elias MD;  Location:  OR     BACK SURGERY  11/07/2017    cervical spine surgery      COLONOSCOPY       DECOMPRESSION LUMBAR TWO LEVELS Bilateral 6/11/2018    Procedure: DECOMPRESSION LUMBAR TWO LEVELS;  BILATERAL LUMBAR 3-4 AND LUMBAR 4-5 DECOMPRESSION ;  Surgeon: Sd Vallejo MD;  Location:  OR     FUSION CERVICAL ANTERIOR ONE LEVEL WITH BONE ALLOGRAFT N/A 9/25/2017    Procedure: FUSION CERVICAL ANTERIOR ONE LEVEL WITH BONE ALLOGRAFT;  ANTERIOR CERVICAL DECOMPRESSION AND FUSIONC3-4 WITH INSTRUMENTATION, ALLOGRAFT AND BONE MARROW ASPIRATE OF THE LEFT ILIAC CREST ;  Surgeon: Moises Elias MD;  Location:  OR     PHACOEMULSIFICATION CLEAR CORNEA W/ STANDARD IOL IMPLANT, ENDOSCOPIC CYCLOPHOTOCOAGULATION, COMBINED Right 12/12/2017    Procedure: COMBINED PHACOEMULSIFICATION CLEAR CORNEA WITH STANDARD INTRAOCULAR LENS IMPLANT, ENDOSCOPIC CYCLOPHOTOCOAGULATION;  COMPLEX RIGHT EYE COMBINED PHACOEMULSIFICATION CLEAR CORNEA WITH STANDARD INTRAOCULAR LENS IMPLANT, ENDOSCOPIC CYCLOPHOTOCOAGULATION ;  Surgeon: Alli Mott MD;  Location:  [facility]     PHACOEMULSIFICATION CLEAR CORNEA W/ STANDARD IOL IMPLANT, ENDOSCOPIC CYCLOPHOTOCOAGULATION, COMBINED Left 5/1/2018    Procedure: COMBINED PHACOEMULSIFICATION  "CLEAR CORNEA WITH STANDARD INTRAOCULAR LENS IMPLANT, ENDOSCOPIC CYCLOPHOTOCOAGULATION;  COMPLEX LEFT EYE PHACOEMULSIFICATION CLEAR CORNEA WITH STANDARD INTRAOCULAR LENS IMPLANT, ENDOSCOPIC CYCLOPHOTOCOAGULATION ;  Surgeon: Alli Mott MD;  Location: [facility]     ROTATOR CUFF REPAIR RT/LT  2004       FAMILY HISTORY:  Family History   Problem Relation Age of Onset     Heart Disease Father        SOCIAL HISTORY:  Social History     Socioeconomic History     Marital status:      Spouse name: None     Number of children: None     Years of education: None     Highest education level: None   Social Needs     Financial resource strain: None     Food insecurity - worry: None     Food insecurity - inability: None     Transportation needs - medical: None     Transportation needs - non-medical: None   Occupational History     None   Tobacco Use     Smoking status: Never Smoker     Smokeless tobacco: Never Used   Substance and Sexual Activity     Alcohol use: Yes     Comment: beer or two a day     Drug use: No     Sexual activity: None   Other Topics Concern     Parent/sibling w/ CABG, MI or angioplasty before 65F 55M? Not Asked   Social History Narrative     None       Review of Systems:  Skin:  Positive for itching   Eyes:  Positive for glasses  ENT:  Negative    Respiratory:  Positive for shortness of breath  Cardiovascular:  Negative    Gastroenterology: Positive for heartburn  Genitourinary:  Negative    Musculoskeletal:  Negative    Neurologic:  Negative    Psychiatric:  Negative    Heme/Lymph/Imm:  Negative    Endocrine:  Negative      Physical Exam:  Vitals: /62   Pulse 84   Ht 1.702 m (5' 7\")   Wt 67.1 kg (148 lb)   BMI 23.18 kg/m       Constitutional:  cooperative, alert and oriented, well developed, well nourished, in no acute distress        Skin:  warm and dry to the touch, no apparent skin lesions or masses noted          Head:  normocephalic, no masses or lesions        Eyes:  pupils equal and " round, conjunctivae and lids unremarkable, sclera white, no xanthalasma, EOMS intact, no nystagmus        Lymph:No Cervical lymphadenopathy present     ENT:  no pallor or cyanosis, dentition good        Neck:  carotid pulses are full and equal bilaterally, JVP normal, no carotid bruit        Respiratory:  normal breath sounds, clear to auscultation, normal A-P diameter, normal symmetry, normal respiratory excursion, no use of accessory muscles         Cardiac: regular rhythm;normal S1 and S2;apical impulse not displaced       systolic murmur;grade 2        pulses full and equal, no bruits auscultated                                        GI:  abdomen soft, non-tender, BS normoactive, no mass, no HSM, no bruits        Extremities and Muscular Skeletal:  no deformities, clubbing, cyanosis, erythema observed              Neurological:  no gross motor deficits        Psych:  Alert and Oriented x 3        Recent Lab Results:  LIPID RESULTS:  No results found for: CHOL, HDL, LDL, TRIG, CHOLHDLRATIO    LIVER ENZYME RESULTS:  No results found for: AST, ALT    CBC RESULTS:  Lab Results   Component Value Date    WBC 12.8 (H) 03/20/2016    RBC 3.39 (L) 03/20/2016    HGB 10.6 (L) 09/25/2017    HCT 29.4 (L) 03/20/2016    MCV 87 03/20/2016    MCH 28.3 03/20/2016    MCHC 32.7 03/20/2016    RDW 12.8 03/20/2016     03/20/2016       BMP RESULTS:  Lab Results   Component Value Date     09/26/2017    POTASSIUM 3.7 09/26/2017    CHLORIDE 110 (H) 09/26/2017    CO2 22 09/26/2017    ANIONGAP 9 09/26/2017     (H) 09/26/2017    BUN 14 09/26/2017    CR 1.25 06/11/2018    GFRESTIMATED 56 (L) 06/11/2018    GFRESTBLACK 67 06/11/2018    CONNIE 9.0 09/26/2017        A1C RESULTS:  No results found for: A1C    INR RESULTS:  No results found for: INR        CC  Cedrick Siddiqi MD  No address on file                  Thank you for allowing me to participate in the care of your patient.      Sincerely,     DR SHILPA ALFRED MD      [facility]    cc:   Cedrick Siddiqi MD  No address on file

## 2018-12-13 NOTE — PROGRESS NOTES
HPI and Plan:   See dictation    Orders Placed This Encounter   Procedures     NM Exercise stress test (nuc card)     Follow-Up with Cardiologist     EKG 12-lead complete w/read - Clinics (performed today)     Echocardiogram Complete       Orders Placed This Encounter   Medications     rosuvastatin (CRESTOR) 20 MG tablet     Sig: Take 20 mg by mouth daily     ranitidine (ZANTAC) 150 MG tablet     Sig: Take 150 mg by mouth daily     amLODIPine (NORVASC) 2.5 MG tablet     Sig: Take 2.5 mg by mouth daily       Encounter Diagnoses   Name Primary?     Nonrheumatic aortic valve stenosis      Mixed hyperlipidemia Yes     Positive cardiac stress test        CURRENT MEDICATIONS:  Current Outpatient Medications   Medication Sig Dispense Refill     Acetaminophen (TYLENOL PO) Take 500 mg by mouth 2 times daily as needed for mild pain or fever        allopurinol (ZYLOPRIM) 300 MG tablet Take 300 mg by mouth daily.       amLODIPine (NORVASC) 2.5 MG tablet Take 2.5 mg by mouth daily       FINASTERIDE PO Take 5 mg by mouth every morning        latanoprost (XALATAN) 0.005 % ophthalmic solution Place 1 drop into both eyes At Bedtime        predniSONE (DELTASONE) 5 MG tablet Take 5 mg by mouth every other day        ranitidine (ZANTAC) 150 MG tablet Take 150 mg by mouth daily       rosuvastatin (CRESTOR) 20 MG tablet Take 20 mg by mouth daily       tamsulosin (FLOMAX) 0.4 MG 24 hr capsule Take 0.4 mg by mouth every evening        oxyCODONE IR (ROXICODONE) 5 MG tablet Take 1-2 tablets (5-10 mg) by mouth every 3 hours as needed for other (pain control or improvement in physical function. 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      Anemia      Bacteremia      BPH (benign prostatic hyperplasia)       Colon polyps      DJD (degenerative joint disease)      Elevated BP without diagnosis of hypertension      Gastroesophageal reflux disease      Gout      Gout      Hyperlipidemia      Hypertension      Leukocytosis      Rotator cuff tear arthropathy, left      Sinusitis        PAST SURGICAL HISTORY:  Past Surgical History:   Procedure Laterality Date     APPENDECTOMY       ASPIRATION NEEDLE HIP Left 9/25/2017    Procedure: ASPIRATION NEEDLE HIP;;  Surgeon: Moises Elias MD;  Location:  OR     BACK SURGERY  11/07/2017    cervical spine surgery      COLONOSCOPY       DECOMPRESSION LUMBAR TWO LEVELS Bilateral 6/11/2018    Procedure: DECOMPRESSION LUMBAR TWO LEVELS;  BILATERAL LUMBAR 3-4 AND LUMBAR 4-5 DECOMPRESSION ;  Surgeon: Sd Vallejo MD;  Location: [facility]     FUSION CERVICAL ANTERIOR ONE LEVEL WITH BONE ALLOGRAFT N/A 9/25/2017    Procedure: FUSION CERVICAL ANTERIOR ONE LEVEL WITH BONE ALLOGRAFT;  ANTERIOR CERVICAL DECOMPRESSION AND FUSIONC3-4 WITH INSTRUMENTATION, ALLOGRAFT AND BONE MARROW ASPIRATE OF THE LEFT ILIAC CREST ;  Surgeon: Moises Elias MD;  Location:  OR     PHACOEMULSIFICATION CLEAR CORNEA W/ STANDARD IOL IMPLANT, ENDOSCOPIC CYCLOPHOTOCOAGULATION, COMBINED Right 12/12/2017    Procedure: COMBINED PHACOEMULSIFICATION CLEAR CORNEA WITH STANDARD INTRAOCULAR LENS IMPLANT, ENDOSCOPIC CYCLOPHOTOCOAGULATION;  COMPLEX RIGHT EYE COMBINED PHACOEMULSIFICATION CLEAR CORNEA WITH STANDARD INTRAOCULAR LENS IMPLANT, ENDOSCOPIC CYCLOPHOTOCOAGULATION ;  Surgeon: Alli Mott MD;  Location:  [facility]     PHACOEMULSIFICATION CLEAR CORNEA W/ STANDARD IOL IMPLANT, ENDOSCOPIC CYCLOPHOTOCOAGULATION, COMBINED Left 5/1/2018    Procedure: COMBINED PHACOEMULSIFICATION CLEAR CORNEA WITH STANDARD INTRAOCULAR LENS IMPLANT, ENDOSCOPIC CYCLOPHOTOCOAGULATION;  COMPLEX LEFT EYE PHACOEMULSIFICATION CLEAR CORNEA WITH STANDARD INTRAOCULAR LENS IMPLANT, ENDOSCOPIC CYCLOPHOTOCOAGULATION ;  Surgeon: Pantera  "Alli PERRY MD;  Location: [facility]     ROTATOR CUFF REPAIR RT/LT  2004       FAMILY HISTORY:  Family History   Problem Relation Age of Onset     Heart Disease Father        SOCIAL HISTORY:  Social History     Socioeconomic History     Marital status:      Spouse name: None     Number of children: None     Years of education: None     Highest education level: None   Social Needs     Financial resource strain: None     Food insecurity - worry: None     Food insecurity - inability: None     Transportation needs - medical: None     Transportation needs - non-medical: None   Occupational History     None   Tobacco Use     Smoking status: Never Smoker     Smokeless tobacco: Never Used   Substance and Sexual Activity     Alcohol use: Yes     Comment: beer or two a day     Drug use: No     Sexual activity: None   Other Topics Concern     Parent/sibling w/ CABG, MI or angioplasty before 65F 55M? Not Asked   Social History Narrative     None       Review of Systems:  Skin:  Positive for itching   Eyes:  Positive for glasses  ENT:  Negative    Respiratory:  Positive for shortness of breath  Cardiovascular:  Negative    Gastroenterology: Positive for heartburn  Genitourinary:  Negative    Musculoskeletal:  Negative    Neurologic:  Negative    Psychiatric:  Negative    Heme/Lymph/Imm:  Negative    Endocrine:  Negative      Physical Exam:  Vitals: /62   Pulse 84   Ht 1.702 m (5' 7\")   Wt 67.1 kg (148 lb)   BMI 23.18 kg/m      Constitutional:  cooperative, alert and oriented, well developed, well nourished, in no acute distress        Skin:  warm and dry to the touch, no apparent skin lesions or masses noted          Head:  normocephalic, no masses or lesions        Eyes:  pupils equal and round, conjunctivae and lids unremarkable, sclera white, no xanthalasma, EOMS intact, no nystagmus        Lymph:No Cervical lymphadenopathy present     ENT:  no pallor or cyanosis, dentition good        Neck:  carotid pulses are full " and equal bilaterally, JVP normal, no carotid bruit        Respiratory:  normal breath sounds, clear to auscultation, normal A-P diameter, normal symmetry, normal respiratory excursion, no use of accessory muscles         Cardiac: regular rhythm;normal S1 and S2;apical impulse not displaced       systolic murmur;grade 2        pulses full and equal, no bruits auscultated                                        GI:  abdomen soft, non-tender, BS normoactive, no mass, no HSM, no bruits        Extremities and Muscular Skeletal:  no deformities, clubbing, cyanosis, erythema observed              Neurological:  no gross motor deficits        Psych:  Alert and Oriented x 3        Recent Lab Results:  LIPID RESULTS:  No results found for: CHOL, HDL, LDL, TRIG, CHOLHDLRATIO    LIVER ENZYME RESULTS:  No results found for: AST, ALT    CBC RESULTS:  Lab Results   Component Value Date    WBC 12.8 (H) 03/20/2016    RBC 3.39 (L) 03/20/2016    HGB 10.6 (L) 09/25/2017    HCT 29.4 (L) 03/20/2016    MCV 87 03/20/2016    MCH 28.3 03/20/2016    MCHC 32.7 03/20/2016    RDW 12.8 03/20/2016     03/20/2016       BMP RESULTS:  Lab Results   Component Value Date     09/26/2017    POTASSIUM 3.7 09/26/2017    CHLORIDE 110 (H) 09/26/2017    CO2 22 09/26/2017    ANIONGAP 9 09/26/2017     (H) 09/26/2017    BUN 14 09/26/2017    CR 1.25 06/11/2018    GFRESTIMATED 56 (L) 06/11/2018    GFRESTBLACK 67 06/11/2018    CONNIE 9.0 09/26/2017        A1C RESULTS:  No results found for: A1C    INR RESULTS:  No results found for: INR        CC  Cedrick Siddiqi MD  No address on file

## 2018-12-13 NOTE — PROGRESS NOTES
Service Date: 12/13/2018      HISTORY OF PRESENT ILLNESS:  It is a pleasure for me to see Mr. Duong today at the request of his primary physician for evaluation of an abnormal stress nuclear study.  This study was done in the [facility] system in June of this year which was 6 months ago.  It was reported as showing a small area of moderate ischemia in the apical inferior wall.  Left ventricular systolic function is normal.  At about the same time, he also had an echocardiogram which demonstrated an aortic valve area of 1.5 cm2, described as moderate aortic stenosis.      This gentleman has a history of hypertension.  He was on lisinopril and that was recently changed because he had hyperkalemia.  He is now on amlodipine.  He thinks this gives him a skin rash on his abdomen.  It is itchy.  I certainly do not see any significant skin rashes in his abdomen and I reassured him that amlodipine is probably unlikely to be doing this.  Initial blood pressure was a little high when he came to clinic.  When I remeasured it, it was acceptable for a gentleman of his age.  He has no history of coronary artery disease or any other cardiac issues.  He is not diabetic.  EKG today is within normal limits.  He denies chest pains.  The only symptom which bothered him is shortness of breath when he moves his front yard.  Otherwise, activities of daily living provokes no cardiac symptoms.  I do note that he is being evaluated for anemia as well as a high white cell count recently.  Hemoglobin is 9.9.  Family history is negative for premature atherosclerotic disease.  He does not smoke, drink or abuse drugs.  He still works as a .      PHYSICAL EXAMINATION:  His first and second heart sounds are well preserved.  He has a 2-3/6 systolic murmur, maximal at the aortic area consistent with his aortic valve disease.      SUMMARY:  I do not think this gentleman is having any cardiac symptoms.  His shortness of breath when he really exerts  himself could well be due to anemia.  He is actively followed by his hematologist at this time.      In a gentleman of this age group, it would not be uncommon to find ischemic stress nuclear testing.  The area of ischemia in question is small and involves the distal portion of the heart.  I think conservative management should be fine.  He is already on Crestor and aspirin.  He tells me his blood pressure is around 130 at home and I think there is room for us to increase the dose of his amlodipine.  I will increase it to 5 mg once daily and I think this would serve to better control his blood pressure as well as possibly alleviating ischemia.      Going forward, I would like to repeat his echocardiogram in 6 months' time.  That will be a year after the last one.  I will also repeat his stress nuclear study to follow any progression of ischemia.  He will call us if he perceives any worsening of symptoms which may be cardiac.      IMPRESSION:   1.  Mild aortic stenosis, unlikely to be symptomatic.   2.  Positive stress nuclear study with only a small amount of ischemic myocardium.   3.  Hypertension.   4.  Anemia, uncertain etiology at this time.         SHILPA ALFRED MD, [facility]             D: 2018   T: 2018   MT: JINA      Name:     VALERIE BALLARD   MRN:      [id number]        Account:      [id number]   :      1940           Service Date: 2018      Document: [id number]

## 2018-12-13 NOTE — LETTER
12/13/2018      Kian Johns MD  [facility] [address] [place] [address]      RE: Rose Duong       Dear Colleague,    I had the pleasure of seeing Rose Duong in the [facility].    Service Date: 12/13/2018      HISTORY OF PRESENT ILLNESS:  It is a pleasure for me to see Mr. Duong today at the request of his primary physician for evaluation of an abnormal stress nuclear study.  This study was done in the [facility] system in June of this year which was 6 months ago.  It was reported as showing a small area of moderate ischemia in the apical inferior wall.  Left ventricular systolic function is normal.  At about the same time, he also had an echocardiogram which demonstrated an aortic valve area of 1.5 cm2, described as moderate aortic stenosis.      This gentleman has a history of hypertension.  He was on lisinopril and that was recently changed because he had hyperkalemia.  He is now on amlodipine.  He thinks this gives him a skin rash on his abdomen.  It is itchy.  I certainly do not see any significant skin rashes in his abdomen and I reassured him that amlodipine is probably unlikely to be doing this.  Initial blood pressure was a little high when he came to clinic.  When I remeasured it, it was acceptable for a gentleman of his age.  He has no history of coronary artery disease or any other cardiac issues.  He is not diabetic.  EKG today is within normal limits.  He denies chest pains.  The only symptom which bothered him is shortness of breath when he moves his front yard.  Otherwise, activities of daily living provokes no cardiac symptoms.  I do note that he is being evaluated for anemia as well as a high white cell count recently.  Hemoglobin is 9.9.  Family history is negative for premature atherosclerotic disease.  He does not smoke, drink or abuse drugs.  He still works as a .      PHYSICAL EXAMINATION:  His first and second heart sounds are well preserved.   He has a 2-3/6 systolic murmur, maximal at the aortic area consistent with his aortic valve disease.      SUMMARY:  I do not think this gentleman is having any cardiac symptoms.  His shortness of breath when he really exerts himself could well be due to anemia.  He is actively followed by his hematologist at this time.      In a gentleman of this age group, it would not be uncommon to find ischemic stress nuclear testing.  The area of ischemia in question is small and involves the distal portion of the heart.  I think conservative management should be fine.  He is already on Crestor and aspirin.  He tells me his blood pressure is around 130 at home and I think there is room for us to increase the dose of his amlodipine.  I will increase it to 5 mg once daily and I think this would serve to better control his blood pressure as well as possibly alleviating ischemia.      Going forward, I would like to repeat his echocardiogram in 6 months' time.  That will be a year after the last one.  I will also repeat his stress nuclear study to follow any progression of ischemia.  He will call us if he perceives any worsening of symptoms which may be cardiac.      IMPRESSION:   1.  Mild aortic stenosis, unlikely to be symptomatic.   2.  Positive stress nuclear study with only a small amount of ischemic myocardium.   3.  Hypertension.   4.  Anemia, uncertain etiology at this time.         [facility]FRED [facility], [facility]             D: 2018   T: 2018   MT: JINA      Name:     VALERIE BALLARD   MRN:      [id number]        Account:      [id number]   :      1940           Service Date: 2018      Document: [id number]         Outpatient Encounter Medications as of 2018   Medication Sig Dispense Refill     Acetaminophen (TYLENOL PO) Take 500 mg by mouth 2 times daily as needed for mild pain or fever        allopurinol (ZYLOPRIM) 300 MG tablet Take 300 mg by mouth daily.       amLODIPine (NORVASC) 2.5 MG tablet Take 2  tablets (5 mg) by mouth daily 90 tablet 3     FINASTERIDE PO Take 5 mg by mouth every morning        latanoprost (XALATAN) 0.005 % ophthalmic solution Place 1 drop into both eyes At Bedtime        predniSONE (DELTASONE) 5 MG tablet Take 5 mg by mouth every other day        ranitidine (ZANTAC) 150 MG tablet Take 150 mg by mouth daily       rosuvastatin (CRESTOR) 20 MG tablet Take 20 mg by mouth daily       tamsulosin (FLOMAX) 0.4 MG 24 hr capsule Take 0.4 mg by mouth every evening        oxyCODONE IR (ROXICODONE) 5 MG tablet Take 1-2 tablets (5-10 mg) by mouth every 3 hours as needed for other (pain control or improvement in physical function. Hold dose for analgesic side effects.) (Patient not taking: Reported on 12/13/2018) 20 tablet 0     [DISCONTINUED] amLODIPine (NORVASC) 2.5 MG tablet Take 2.5 mg by mouth daily       [DISCONTINUED] amLODIPine (NORVASC) 2.5 MG tablet Take 2 tablets (5 mg) by mouth daily 90 tablet 3     [DISCONTINUED] amLODIPine (NORVASC) 2.5 MG tablet Take 2 tablets (5 mg) by mouth daily 90 tablet 3     [DISCONTINUED] LISINOPRIL PO Take 10 mg by mouth every evening        [DISCONTINUED] Omeprazole (PRILOSEC PO) Take 20 mg by mouth every evening        [DISCONTINUED] Rosuvastatin Calcium (CRESTOR PO) Take 20 mg by mouth daily       No facility-administered encounter medications on file as of 12/13/2018.        Again, thank you for allowing me to participate in the care of your patient.      Sincerely,    DR SHILPA ALFRED MD     [facility]    cc:   Cedrick Siddiqi MD

## 2019-05-20 ENCOUNTER — HOSPITAL ENCOUNTER (OUTPATIENT)
Dept: CARDIOLOGY | Facility: CLINIC | Age: 79
Discharge: HOME | End: 2019-05-20
Attending: INTERNAL MEDICINE | Admitting: INTERNAL MEDICINE
Payer: COMMERCIAL

## 2019-05-20 DIAGNOSIS — I35.0 NONRHEUMATIC AORTIC VALVE STENOSIS: ICD-10-CM

## 2019-05-20 DIAGNOSIS — E78.2 MIXED HYPERLIPIDEMIA: ICD-10-CM

## 2019-05-20 DIAGNOSIS — R94.39 POSITIVE CARDIAC STRESS TEST: ICD-10-CM

## 2019-05-20 PROCEDURE — 93306 TTE W/DOPPLER COMPLETE: CPT | Mod: 26 | Performed by: INTERNAL MEDICINE

## 2019-05-20 PROCEDURE — 93306 TTE W/DOPPLER COMPLETE: CPT

## 2019-05-21 ENCOUNTER — TELEPHONE (OUTPATIENT)
Dept: CARDIOLOGY | Facility: CLINIC | Age: 79
End: 2019-05-21

## 2019-05-21 NOTE — TELEPHONE ENCOUNTER
Called and spoke with patient to update him that the diagnosis code used for his planned stress test tomorrow did not pass for insurance coverage of the test. Dr. Chun would like to hold off on the stress test for now and can discuss this with him further at his scheduled visit on 6/10/19. We will have the test rescheduled if it is still needed following the visit. Mr. Duong stated understanding and agreed with this plan. The stress test appointment for 5/22 was cancelled. RUEL Nagy RN - 05/21/19, 11:37 AM

## 2019-06-10 ENCOUNTER — OFFICE VISIT (OUTPATIENT)
Dept: CARDIOLOGY | Facility: CLINIC | Age: 79
End: 2019-06-10
Attending: INTERNAL MEDICINE
Payer: COMMERCIAL

## 2019-06-10 VITALS
BODY MASS INDEX: 22.73 KG/M2 | DIASTOLIC BLOOD PRESSURE: 60 MMHG | HEIGHT: 67 IN | SYSTOLIC BLOOD PRESSURE: 132 MMHG | HEART RATE: 85 BPM | WEIGHT: 144.8 LBS

## 2019-06-10 DIAGNOSIS — E78.2 MIXED HYPERLIPIDEMIA: ICD-10-CM

## 2019-06-10 DIAGNOSIS — R94.39 POSITIVE CARDIAC STRESS TEST: ICD-10-CM

## 2019-06-10 DIAGNOSIS — I35.0 NONRHEUMATIC AORTIC VALVE STENOSIS: ICD-10-CM

## 2019-06-10 PROCEDURE — 99214 OFFICE O/P EST MOD 30 MIN: CPT | Performed by: INTERNAL MEDICINE

## 2019-06-10 RX ORDER — AMLODIPINE BESYLATE 5 MG/1
10 TABLET ORAL DAILY
COMMUNITY
End: 2021-06-24

## 2019-06-10 ASSESSMENT — MIFFLIN-ST. JEOR: SCORE: 1325.44

## 2019-06-10 NOTE — PROGRESS NOTES
HPI and Plan:   Very pleasant 79-year-old gentleman who returns for a follow-up of coronary artery disease and aortic stenosis.    He had a stress nuclear study in 2018 as part of her preoperative assessment.  This showed a small area of moderate ischemia in the inferoapical wall.  He has a cardiac murmur and he had an echocardiogram showing mild aortic stenosis with a valve area 1.5 cm .  He is completely asymptomatic with high levels of exercise tolerance and we have been managing him medically.    He returns to clinic today telling me that he continues to feel well.  He denies chest pains, shortness of breath, exertional dizzy spells or syncope.  He has 2 homes and he has been busy doing yard work.  He feels well.  Cardiac examination reveals a 2\6 systolic murmur which is unchanged from previously.  Initial blood pressure was high and he tells me that this is always the case when he sees physicians.  Recheck blood pressure was normal. echocardiogram done recently shows that the aortic valve area is now 1.2 cm  with a mean gradient of 26 mmHg.    Impression  1.  Mild coronary artery disease.  Asymptomatic, continue medical management.  2.  Aortic stenosis.  Significant progression in 1 year.  Will recheck again in 6 months.  Currently asymptomatic  3.  Dyslipidemia, on 20 mg rosuvastatin.  Will check lipid numbers in 6 months time.  4.  Hypertension.  May have whitecoat hypertension, otherwise appears to be under good control.  5.  Anemia, has seen hematologist who told him diagnosis is uncertain.  Currently labeled as anemia of chronic disease.  6.  Chronic renal failure, stage II.        Orders Placed This Encounter   Procedures     Follow-Up with Cardiologist     Echocardiogram Complete       Orders Placed This Encounter   Medications     amLODIPine (NORVASC) 5 MG tablet     Sig: Take 5 mg by mouth daily       Encounter Diagnoses   Name Primary?     Nonrheumatic aortic valve stenosis      Mixed hyperlipidemia       Positive cardiac stress test        CURRENT MEDICATIONS:  Current Outpatient Medications   Medication Sig Dispense Refill     Acetaminophen (TYLENOL PO) Take 500 mg by mouth 2 times daily as needed for mild pain or fever        allopurinol (ZYLOPRIM) 300 MG tablet Take 300 mg by mouth daily.       amLODIPine (NORVASC) 5 MG tablet Take 5 mg by mouth daily       FINASTERIDE PO Take 5 mg by mouth every morning        latanoprost (XALATAN) 0.005 % ophthalmic solution Place 1 drop into both eyes At Bedtime        predniSONE (DELTASONE) 5 MG tablet Take 5 mg by mouth every other day        ranitidine (ZANTAC) 150 MG tablet Take 150 mg by mouth daily       rosuvastatin (CRESTOR) 20 MG tablet Take 20 mg by mouth daily       tamsulosin (FLOMAX) 0.4 MG 24 hr capsule Take 0.4 mg by mouth every evening          ALLERGIES   No Known Allergies    PAST MEDICAL HISTORY:  Past Medical History:   Diagnosis Date     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      Anemia      Bacteremia      BPH (benign prostatic hyperplasia)      Colon polyps      DJD (degenerative joint disease)      Elevated BP without diagnosis of hypertension      Gastroesophageal reflux disease      Gout      Gout      Hyperlipidemia      Hypertension      Leukocytosis      Rotator cuff tear arthropathy, left      Sinusitis        PAST SURGICAL HISTORY:  Past Surgical History:   Procedure Laterality Date     APPENDECTOMY       ASPIRATION NEEDLE HIP Left 9/25/2017    Procedure: ASPIRATION NEEDLE HIP;;  Surgeon: Moises Elias MD;  Location:  OR     BACK SURGERY  11/07/2017    cervical spine surgery      COLONOSCOPY       DECOMPRESSION LUMBAR TWO LEVELS Bilateral 6/11/2018    Procedure: DECOMPRESSION LUMBAR TWO LEVELS;  BILATERAL LUMBAR 3-4 AND LUMBAR 4-5 DECOMPRESSION ;  Surgeon: Sd Vallejo MD;  Location: [facility]     FUSION CERVICAL ANTERIOR ONE LEVEL WITH BONE ALLOGRAFT N/A 9/25/2017    Procedure:  FUSION CERVICAL ANTERIOR ONE LEVEL WITH BONE ALLOGRAFT;  ANTERIOR CERVICAL DECOMPRESSION AND FUSIONC3-4 WITH INSTRUMENTATION, ALLOGRAFT AND BONE MARROW ASPIRATE OF THE LEFT ILIAC CREST ;  Surgeon: Moises Elias MD;  Location:  OR     PHACOEMULSIFICATION CLEAR CORNEA W/ STANDARD IOL IMPLANT, ENDOSCOPIC CYCLOPHOTOCOAGULATION, COMBINED Right 12/12/2017    Procedure: COMBINED PHACOEMULSIFICATION CLEAR CORNEA WITH STANDARD INTRAOCULAR LENS IMPLANT, ENDOSCOPIC CYCLOPHOTOCOAGULATION;  COMPLEX RIGHT EYE COMBINED PHACOEMULSIFICATION CLEAR CORNEA WITH STANDARD INTRAOCULAR LENS IMPLANT, ENDOSCOPIC CYCLOPHOTOCOAGULATION ;  Surgeon: Alli Mott MD;  Location:  [facility]     PHACOEMULSIFICATION CLEAR CORNEA W/ STANDARD IOL IMPLANT, ENDOSCOPIC CYCLOPHOTOCOAGULATION, COMBINED Left 5/1/2018    Procedure: COMBINED PHACOEMULSIFICATION CLEAR CORNEA WITH STANDARD INTRAOCULAR LENS IMPLANT, ENDOSCOPIC CYCLOPHOTOCOAGULATION;  COMPLEX LEFT EYE PHACOEMULSIFICATION CLEAR CORNEA WITH STANDARD INTRAOCULAR LENS IMPLANT, ENDOSCOPIC CYCLOPHOTOCOAGULATION ;  Surgeon: Alli Mott MD;  Location:  [facility]     ROTATOR CUFF REPAIR RT/LT  2004       FAMILY HISTORY:  Family History   Problem Relation Age of Onset     Heart Disease Father        SOCIAL HISTORY:  Social History     Socioeconomic History     Marital status:      Spouse name: None     Number of children: None     Years of education: None     Highest education level: None   Occupational History     None   Social Needs     Financial resource strain: None     Food insecurity:     Worry: None     Inability: None     Transportation needs:     Medical: None     Non-medical: None   Tobacco Use     Smoking status: Never Smoker     Smokeless tobacco: Never Used   Substance and Sexual Activity     Alcohol use: Yes     Comment: beer or two a day     Drug use: No     Sexual activity: None   Lifestyle     Physical activity:     Days per week: None     Minutes per session: None      "Stress: None   Relationships     Social connections:     Talks on phone: None     Gets together: None     Attends Pentecostalism service: None     Active member of club or organization: None     Attends meetings of clubs or organizations: None     Relationship status: None     Intimate partner violence:     Fear of current or ex partner: None     Emotionally abused: None     Physically abused: None     Forced sexual activity: None   Other Topics Concern     Parent/sibling w/ CABG, MI or angioplasty before 65F 55M? Not Asked   Social History Narrative     None       Review of Systems:  Skin:  Positive for nail changes   Eyes:  Positive for glasses  ENT:  Negative    Respiratory:  Positive for dyspnea on exertion  Cardiovascular:    edema;Positive for  Gastroenterology: Positive for heartburn  Genitourinary:  Negative    Musculoskeletal:  Positive for arthritis  Neurologic:  Negative    Psychiatric:  Negative    Heme/Lymph/Imm:  Negative    Endocrine:  Negative      Physical Exam:  Vitals: /60   Pulse 85   Ht 1.702 m (5' 7\")   Wt 65.7 kg (144 lb 12.8 oz)   BMI 22.68 kg/m      Constitutional:  cooperative, alert and oriented, well developed, well nourished, in no acute distress        Skin:  warm and dry to the touch, no apparent skin lesions or masses noted          Head:  normocephalic, no masses or lesions        Eyes:  pupils equal and round, conjunctivae and lids unremarkable, sclera white, no xanthalasma, EOMS intact, no nystagmus        Lymph:No Cervical lymphadenopathy present     ENT:  no pallor or cyanosis, dentition good        Neck:  carotid pulses are full and equal bilaterally, JVP normal, no carotid bruit        Respiratory:  normal breath sounds, clear to auscultation, normal A-P diameter, normal symmetry, normal respiratory excursion, no use of accessory muscles         Cardiac: regular rhythm;normal S1 and S2;apical impulse not displaced       systolic murmur;grade 2        pulses full and equal, " no bruits auscultated                                        GI:  abdomen soft, non-tender, BS normoactive, no mass, no HSM, no bruits        Extremities and Muscular Skeletal:  no deformities, clubbing, cyanosis, erythema observed              Neurological:  no gross motor deficits        Psych:  Alert and Oriented x 3        Recent Lab Results:  LIPID RESULTS:  No results found for: CHOL, HDL, LDL, TRIG, CHOLHDLRATIO    LIVER ENZYME RESULTS:  No results found for: AST, ALT    CBC RESULTS:  Lab Results   Component Value Date    WBC 12.8 (H) 03/20/2016    RBC 3.39 (L) 03/20/2016    HGB 10.6 (L) 09/25/2017    HCT 29.4 (L) 03/20/2016    MCV 87 03/20/2016    MCH 28.3 03/20/2016    MCHC 32.7 03/20/2016    RDW 12.8 03/20/2016     03/20/2016       BMP RESULTS:  Lab Results   Component Value Date     09/26/2017    POTASSIUM 3.7 09/26/2017    CHLORIDE 110 (H) 09/26/2017    CO2 22 09/26/2017    ANIONGAP 9 09/26/2017     (H) 09/26/2017    BUN 14 09/26/2017    CR 1.25 06/11/2018    GFRESTIMATED 56 (L) 06/11/2018    GFRESTBLACK 67 06/11/2018    CONNIE 9.0 09/26/2017        A1C RESULTS:  No results found for: A1C    INR RESULTS:  No results found for: INR        CC  Casimiro Chun MD  [id number]  [place] [id number]

## 2019-06-10 NOTE — LETTER
6/10/2019    Kian Johns MD  [facility] [address] [facility] [address]    RE: Rose Duong       Dear Colleague,    I had the pleasure of seeing Isrraelalannah LAURA Duong in the [facility].    HPI and Plan:   Very pleasant 79-year-old gentleman who returns for a follow-up of coronary artery disease and aortic stenosis.    He had a stress nuclear study in 2018 as part of her preoperative assessment.  This showed a small area of moderate ischemia in the inferoapical wall.  He has a cardiac murmur and he had an echocardiogram showing mild aortic stenosis with a valve area 1.5 cm .  He is completely asymptomatic with high levels of exercise tolerance and we have been managing him medically.    He returns to clinic today telling me that he continues to feel well.  He denies chest pains, shortness of breath, exertional dizzy spells or syncope.  He has 2 homes and he has been busy doing yard work.  He feels well.  Cardiac examination reveals a 2\6 systolic murmur which is unchanged from previously.  Initial blood pressure was high and he tells me that this is always the case when he sees physicians.  Recheck blood pressure was normal. echocardiogram done recently shows that the aortic valve area is now 1.2 cm  with a mean gradient of 26 mmHg.    Impression  1.  Mild coronary artery disease.  Asymptomatic, continue medical management.  2.  Aortic stenosis.  Significant progression in 1 year.  Will recheck again in 6 months.  Currently asymptomatic  3.  Dyslipidemia, on 20 mg rosuvastatin.  Will check lipid numbers in 6 months time.  4.  Hypertension.  May have whitecoat hypertension, otherwise appears to be under good control.  5.  Anemia, has seen hematologist who told him diagnosis is uncertain.  Currently labeled as anemia of chronic disease.  6.  Chronic renal failure, stage II.        Orders Placed This Encounter   Procedures     Follow-Up with Cardiologist     Echocardiogram Complete        Orders Placed This Encounter   Medications     amLODIPine (NORVASC) 5 MG tablet     Sig: Take 5 mg by mouth daily       Encounter Diagnoses   Name Primary?     Nonrheumatic aortic valve stenosis      Mixed hyperlipidemia      Positive cardiac stress test        CURRENT MEDICATIONS:  Current Outpatient Medications   Medication Sig Dispense Refill     Acetaminophen (TYLENOL PO) Take 500 mg by mouth 2 times daily as needed for mild pain or fever        allopurinol (ZYLOPRIM) 300 MG tablet Take 300 mg by mouth daily.       amLODIPine (NORVASC) 5 MG tablet Take 5 mg by mouth daily       FINASTERIDE PO Take 5 mg by mouth every morning        latanoprost (XALATAN) 0.005 % ophthalmic solution Place 1 drop into both eyes At Bedtime        predniSONE (DELTASONE) 5 MG tablet Take 5 mg by mouth every other day        ranitidine (ZANTAC) 150 MG tablet Take 150 mg by mouth daily       rosuvastatin (CRESTOR) 20 MG tablet Take 20 mg by mouth daily       tamsulosin (FLOMAX) 0.4 MG 24 hr capsule Take 0.4 mg by mouth every evening          ALLERGIES   No Known Allergies    PAST MEDICAL HISTORY:  Past Medical History:   Diagnosis Date     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      Anemia      Bacteremia      BPH (benign prostatic hyperplasia)      Colon polyps      DJD (degenerative joint disease)      Elevated BP without diagnosis of hypertension      Gastroesophageal reflux disease      Gout      Gout      Hyperlipidemia      Hypertension      Leukocytosis      Rotator cuff tear arthropathy, left      Sinusitis        PAST SURGICAL HISTORY:  Past Surgical History:   Procedure Laterality Date     APPENDECTOMY       ASPIRATION NEEDLE HIP Left 9/25/2017    Procedure: ASPIRATION NEEDLE HIP;;  Surgeon: Moises Elias MD;  Location:  OR     BACK SURGERY  11/07/2017    cervical spine surgery      COLONOSCOPY       DECOMPRESSION LUMBAR TWO LEVELS Bilateral 6/11/2018    Procedure:  DECOMPRESSION LUMBAR TWO LEVELS;  BILATERAL LUMBAR 3-4 AND LUMBAR 4-5 DECOMPRESSION ;  Surgeon: Sd Vallejo MD;  Location: [facility]     FUSION CERVICAL ANTERIOR ONE LEVEL WITH BONE ALLOGRAFT N/A 9/25/2017    Procedure: FUSION CERVICAL ANTERIOR ONE LEVEL WITH BONE ALLOGRAFT;  ANTERIOR CERVICAL DECOMPRESSION AND FUSIONC3-4 WITH INSTRUMENTATION, ALLOGRAFT AND BONE MARROW ASPIRATE OF THE LEFT ILIAC CREST ;  Surgeon: Moises Elias MD;  Location: [facility]     PHACOEMULSIFICATION CLEAR CORNEA W/ STANDARD IOL IMPLANT, ENDOSCOPIC CYCLOPHOTOCOAGULATION, COMBINED Right 12/12/2017    Procedure: COMBINED PHACOEMULSIFICATION CLEAR CORNEA WITH STANDARD INTRAOCULAR LENS IMPLANT, ENDOSCOPIC CYCLOPHOTOCOAGULATION;  COMPLEX RIGHT EYE COMBINED PHACOEMULSIFICATION CLEAR CORNEA WITH STANDARD INTRAOCULAR LENS IMPLANT, ENDOSCOPIC CYCLOPHOTOCOAGULATION ;  Surgeon: Alli Mott MD;  Location: [facility]     PHACOEMULSIFICATION CLEAR CORNEA W/ STANDARD IOL IMPLANT, ENDOSCOPIC CYCLOPHOTOCOAGULATION, COMBINED Left 5/1/2018    Procedure: COMBINED PHACOEMULSIFICATION CLEAR CORNEA WITH STANDARD INTRAOCULAR LENS IMPLANT, ENDOSCOPIC CYCLOPHOTOCOAGULATION;  COMPLEX LEFT EYE PHACOEMULSIFICATION CLEAR CORNEA WITH STANDARD INTRAOCULAR LENS IMPLANT, ENDOSCOPIC CYCLOPHOTOCOAGULATION ;  Surgeon: Alli Mott MD;  Location:  [facility]     ROTATOR CUFF REPAIR RT/LT  2004       FAMILY HISTORY:  Family History   Problem Relation Age of Onset     Heart Disease Father        SOCIAL HISTORY:  Social History     Socioeconomic History     Marital status:      Spouse name: None     Number of children: None     Years of education: None     Highest education level: None   Occupational History     None   Social Needs     Financial resource strain: None     Food insecurity:     Worry: None     Inability: None     Transportation needs:     Medical: None     Non-medical: None   Tobacco Use     Smoking status: Never Smoker     Smokeless tobacco: Never Used  "  Substance and Sexual Activity     Alcohol use: Yes     Comment: beer or two a day     Drug use: No     Sexual activity: None   Lifestyle     Physical activity:     Days per week: None     Minutes per session: None     Stress: None   Relationships     Social connections:     Talks on phone: None     Gets together: None     Attends Yarsanism service: None     Active member of club or organization: None     Attends meetings of clubs or organizations: None     Relationship status: None     Intimate partner violence:     Fear of current or ex partner: None     Emotionally abused: None     Physically abused: None     Forced sexual activity: None   Other Topics Concern     Parent/sibling w/ CABG, MI or angioplasty before 65F 55M? Not Asked   Social History Narrative     None       Review of Systems:  Skin:  Positive for nail changes   Eyes:  Positive for glasses  ENT:  Negative    Respiratory:  Positive for dyspnea on exertion  Cardiovascular:    edema;Positive for  Gastroenterology: Positive for heartburn  Genitourinary:  Negative    Musculoskeletal:  Positive for arthritis  Neurologic:  Negative    Psychiatric:  Negative    Heme/Lymph/Imm:  Negative    Endocrine:  Negative      Physical Exam:  Vitals: /60   Pulse 85   Ht 1.702 m (5' 7\")   Wt 65.7 kg (144 lb 12.8 oz)   BMI 22.68 kg/m       Constitutional:  cooperative, alert and oriented, well developed, well nourished, in no acute distress        Skin:  warm and dry to the touch, no apparent skin lesions or masses noted          Head:  normocephalic, no masses or lesions        Eyes:  pupils equal and round, conjunctivae and lids unremarkable, sclera white, no xanthalasma, EOMS intact, no nystagmus        Lymph:No Cervical lymphadenopathy present     ENT:  no pallor or cyanosis, dentition good        Neck:  carotid pulses are full and equal bilaterally, JVP normal, no carotid bruit        Respiratory:  normal breath sounds, clear to auscultation, normal A-P " diameter, normal symmetry, normal respiratory excursion, no use of accessory muscles         Cardiac: regular rhythm;normal S1 and S2;apical impulse not displaced       systolic murmur;grade 2        pulses full and equal, no bruits auscultated                                        GI:  abdomen soft, non-tender, BS normoactive, no mass, no HSM, no bruits        Extremities and Muscular Skeletal:  no deformities, clubbing, cyanosis, erythema observed              Neurological:  no gross motor deficits        Psych:  Alert and Oriented x 3        Recent Lab Results:  LIPID RESULTS:  No results found for: CHOL, HDL, LDL, TRIG, CHOLHDLRATIO    LIVER ENZYME RESULTS:  No results found for: AST, ALT    CBC RESULTS:  Lab Results   Component Value Date    WBC 12.8 (H) 03/20/2016    RBC 3.39 (L) 03/20/2016    HGB 10.6 (L) 09/25/2017    HCT 29.4 (L) 03/20/2016    MCV 87 03/20/2016    MCH 28.3 03/20/2016    MCHC 32.7 03/20/2016    RDW 12.8 03/20/2016     03/20/2016       BMP RESULTS:  Lab Results   Component Value Date     09/26/2017    POTASSIUM 3.7 09/26/2017    CHLORIDE 110 (H) 09/26/2017    CO2 22 09/26/2017    ANIONGAP 9 09/26/2017     (H) 09/26/2017    BUN 14 09/26/2017    CR 1.25 06/11/2018    GFRESTIMATED 56 (L) 06/11/2018    GFRESTBLACK 67 06/11/2018    CONNIE 9.0 09/26/2017        A1C RESULTS:  No results found for: A1C    INR RESULTS:  No results found for: INR        CC  Shilpa Chun MD  [address]  [place] [address]              Thank you for allowing me to participate in the care of your patient.    Sincerely,     DR SHILPA CHUN MD     [facility]

## 2019-09-30 ENCOUNTER — TELEPHONE (OUTPATIENT)
Dept: CARDIOLOGY | Facility: CLINIC | Age: 79
End: 2019-09-30

## 2019-09-30 NOTE — TELEPHONE ENCOUNTER
Patient was contacted per the request of Scheduling to clarify order for an Echocardiogram.    Patient does need an Echo per Dr. Chun's last note and he states the Nuclear stress test that was ordered was not covered by his insurance and he discuss that at his OV with Dr. Chun in  December.   Patient verbalized understanding and agreed to plan of care.

## 2019-12-16 ENCOUNTER — HOSPITAL ENCOUNTER (OUTPATIENT)
Dept: CARDIOLOGY | Facility: CLINIC | Age: 79
Discharge: HOME | End: 2019-12-16
Attending: INTERNAL MEDICINE | Admitting: INTERNAL MEDICINE
Payer: COMMERCIAL

## 2019-12-16 DIAGNOSIS — R94.39 POSITIVE CARDIAC STRESS TEST: ICD-10-CM

## 2019-12-16 DIAGNOSIS — I35.0 NONRHEUMATIC AORTIC VALVE STENOSIS: ICD-10-CM

## 2019-12-16 DIAGNOSIS — E78.2 MIXED HYPERLIPIDEMIA: ICD-10-CM

## 2019-12-16 PROCEDURE — 93306 TTE W/DOPPLER COMPLETE: CPT

## 2019-12-16 PROCEDURE — 93306 TTE W/DOPPLER COMPLETE: CPT | Mod: 26 | Performed by: INTERNAL MEDICINE

## 2019-12-18 ENCOUNTER — OFFICE VISIT (OUTPATIENT)
Dept: CARDIOLOGY | Facility: CLINIC | Age: 79
End: 2019-12-18
Attending: INTERNAL MEDICINE
Payer: COMMERCIAL

## 2019-12-18 VITALS
DIASTOLIC BLOOD PRESSURE: 60 MMHG | BODY MASS INDEX: 22.7 KG/M2 | HEIGHT: 67 IN | HEART RATE: 88 BPM | SYSTOLIC BLOOD PRESSURE: 136 MMHG | WEIGHT: 144.6 LBS

## 2019-12-18 DIAGNOSIS — I35.0 NONRHEUMATIC AORTIC VALVE STENOSIS: ICD-10-CM

## 2019-12-18 DIAGNOSIS — E78.2 MIXED HYPERLIPIDEMIA: ICD-10-CM

## 2019-12-18 DIAGNOSIS — R94.39 POSITIVE CARDIAC STRESS TEST: ICD-10-CM

## 2019-12-18 PROCEDURE — 99214 OFFICE O/P EST MOD 30 MIN: CPT | Performed by: INTERNAL MEDICINE

## 2019-12-18 ASSESSMENT — MIFFLIN-ST. JEOR: SCORE: 1324.53

## 2019-12-18 NOTE — PROGRESS NOTES
HPI and Plan:   Very pleasant 79-year-old gentleman who returns for a follow-up of coronary artery disease and aortic stenosis.     He had a stress nuclear study in 2018 as part of preoperative assessment.  This showed a small area of moderate ischemia in the inferoapical wall.  He has a cardiac murmur and he had an echocardiogram showing mild aortic stenosis with a valve area 1.5 cm .  He is completely asymptomatic with high levels of exercise tolerance and we have been managing him medically.    In June 2019, aortic stenosis has progressed to a moderate with valve area 1.2 cm  and mean gradient in the high 20s.  He continues to be completely asymptomatic from the cardiac point of view with good exercise tolerance.  This continues to be the case today.  He has no cardiac symptoms.  I personally reviewed his most recent echocardiogram.  Mean gradient continues to be in the high 20s.  The valve area was now calculated at 1.8 cm  which is erroneous likely due to a large LVOT diameter measurement.  Cardiac examination is certainly consistent with moderate aortic stenosis.    Impression  1.  Mild coronary artery disease, manifesting as a mildly positive stress test..  Asymptomatic, continue medical management.  2.  Aortic stenosis.    No significant progression, recheck echo in 9 months time.  3.  Dyslipidemia, on 20 mg rosuvastatin.    LDL just under 100 with high HDL levels.  Adequate 4.  Hypertension.  May have whitecoat hypertension, otherwise appears to be under good control.  5.  Anemia of chronic disease, Hb between 9-10   6.  Chronic renal failure, stage II.    We once again talked about the symptoms he should look out for should there be progression of aortic valve disease.  Look forward to seeing him again in 9 months time for further follow-up.       Orders Placed This Encounter   Procedures     Follow-Up with Cardiologist     Echocardiogram Complete       No orders of the defined types were placed in this  encounter.      Encounter Diagnoses   Name Primary?     Nonrheumatic aortic valve stenosis      Mixed hyperlipidemia      Positive cardiac stress test        CURRENT MEDICATIONS:  Current Outpatient Medications   Medication Sig Dispense Refill     Acetaminophen (TYLENOL PO) Take 500 mg by mouth 2 times daily as needed for mild pain or fever        allopurinol (ZYLOPRIM) 300 MG tablet Take 300 mg by mouth daily.       amLODIPine (NORVASC) 5 MG tablet Take 5 mg by mouth daily       FINASTERIDE PO Take 5 mg by mouth every morning        latanoprost (XALATAN) 0.005 % ophthalmic solution Place 1 drop into both eyes At Bedtime        predniSONE (DELTASONE) 5 MG tablet Take 5 mg by mouth every other day        ranitidine (ZANTAC) 150 MG tablet Take 150 mg by mouth daily       rosuvastatin (CRESTOR) 20 MG tablet Take 20 mg by mouth daily       tamsulosin (FLOMAX) 0.4 MG 24 hr capsule Take 0.4 mg by mouth every evening          ALLERGIES   No Known Allergies    PAST MEDICAL HISTORY:  Past Medical History:   Diagnosis Date     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      Anemia      Bacteremia      BPH (benign prostatic hyperplasia)      Colon polyps      DJD (degenerative joint disease)      Elevated BP without diagnosis of hypertension      Gastroesophageal reflux disease      Gout      Gout      Hyperlipidemia      Hypertension      Leukocytosis      Rotator cuff tear arthropathy, left      Sinusitis        PAST SURGICAL HISTORY:  Past Surgical History:   Procedure Laterality Date     APPENDECTOMY       ASPIRATION NEEDLE HIP Left 9/25/2017    Procedure: ASPIRATION NEEDLE HIP;;  Surgeon: Moises Elias MD;  Location:  OR     BACK SURGERY  11/07/2017    cervical spine surgery      COLONOSCOPY       DECOMPRESSION LUMBAR TWO LEVELS Bilateral 6/11/2018    Procedure: DECOMPRESSION LUMBAR TWO LEVELS;  BILATERAL LUMBAR 3-4 AND LUMBAR 4-5 DECOMPRESSION ;  Surgeon: Sd Vallejo  MD Sammy;  Location: [facility]     FUSION CERVICAL ANTERIOR ONE LEVEL WITH BONE ALLOGRAFT N/A 9/25/2017    Procedure: FUSION CERVICAL ANTERIOR ONE LEVEL WITH BONE ALLOGRAFT;  ANTERIOR CERVICAL DECOMPRESSION AND FUSIONC3-4 WITH INSTRUMENTATION, ALLOGRAFT AND BONE MARROW ASPIRATE OF THE LEFT ILIAC CREST ;  Surgeon: Moises Elias MD;  Location:  OR     PHACOEMULSIFICATION CLEAR CORNEA W/ STANDARD IOL IMPLANT, ENDOSCOPIC CYCLOPHOTOCOAGULATION, COMBINED Right 12/12/2017    Procedure: COMBINED PHACOEMULSIFICATION CLEAR CORNEA WITH STANDARD INTRAOCULAR LENS IMPLANT, ENDOSCOPIC CYCLOPHOTOCOAGULATION;  COMPLEX RIGHT EYE COMBINED PHACOEMULSIFICATION CLEAR CORNEA WITH STANDARD INTRAOCULAR LENS IMPLANT, ENDOSCOPIC CYCLOPHOTOCOAGULATION ;  Surgeon: Alli Mott MD;  [facility]     PHACOEMULSIFICATION CLEAR CORNEA W/ STANDARD IOL IMPLANT, ENDOSCOPIC CYCLOPHOTOCOAGULATION, COMBINED Left 5/1/2018    Procedure: COMBINED PHACOEMULSIFICATION CLEAR CORNEA WITH STANDARD INTRAOCULAR LENS IMPLANT, ENDOSCOPIC CYCLOPHOTOCOAGULATION;  COMPLEX LEFT EYE PHACOEMULSIFICATION CLEAR CORNEA WITH STANDARD INTRAOCULAR LENS IMPLANT, ENDOSCOPIC CYCLOPHOTOCOAGULATION ;  Surgeon: Alli Mott MD;  [facility]:  [facility]     ROTATOR CUFF REPAIR RT/LT  2004       FAMILY HISTORY:  Family History   Problem Relation Age of Onset     Heart Disease Father        SOCIAL HISTORY:  Social History     Socioeconomic History     Marital status:      Spouse name: None     Number of children: None     Years of education: None     Highest education level: None   Occupational History     None   Social Needs     Financial resource strain: None     Food insecurity:     Worry: None     Inability: None     Transportation needs:     Medical: None     Non-medical: None   Tobacco Use     Smoking status: Never Smoker     Smokeless tobacco: Never Used   Substance and Sexual Activity     Alcohol use: Yes     Comment: beer or two a day     Drug use: No      "Sexual activity: None   Lifestyle     Physical activity:     Days per week: None     Minutes per session: None     Stress: None   Relationships     Social connections:     Talks on phone: None     Gets together: None     Attends Yazidi service: None     Active member of club or organization: None     Attends meetings of clubs or organizations: None     Relationship status: None     Intimate partner violence:     Fear of current or ex partner: None     Emotionally abused: None     Physically abused: None     Forced sexual activity: None   Other Topics Concern     Parent/sibling w/ CABG, MI or angioplasty before 65F 55M? Not Asked   Social History Narrative     None       Review of Systems:  Skin:  Positive for nail changes   Eyes:  Positive for glasses  ENT:  Negative    Respiratory:  Positive for dyspnea on exertion  Cardiovascular:    edema;Positive for  Gastroenterology: Positive for heartburn  Genitourinary:  Positive for urinary frequency;nocturia  Musculoskeletal:  Positive for arthritis  Neurologic:  Positive for numbness or tingling of hands  Psychiatric:  Negative    Heme/Lymph/Imm:  Negative    Endocrine:  Negative      Physical Exam:  Vitals: /60   Pulse 88   Ht 1.702 m (5' 7\")   Wt 65.6 kg (144 lb 9.6 oz)   BMI 22.65 kg/m      Constitutional:  cooperative, alert and oriented, well developed, well nourished, in no acute distress        Skin:  warm and dry to the touch, no apparent skin lesions or masses noted          Head:  normocephalic, no masses or lesions        Eyes:  pupils equal and round, conjunctivae and lids unremarkable, sclera white, no xanthalasma, EOMS intact, no nystagmus        Lymph:No Cervical lymphadenopathy present     ENT:  no pallor or cyanosis, dentition good        Neck:  carotid pulses are full and equal bilaterally, JVP normal, no carotid bruit        Respiratory:  normal breath sounds, clear to auscultation, normal A-P diameter, normal symmetry, normal respiratory " excursion, no use of accessory muscles         Cardiac: regular rhythm;normal S1 and S2;apical impulse not displaced       systolic murmur;grade 2        pulses full and equal, no bruits auscultated                                        GI:  abdomen soft, non-tender, BS normoactive, no mass, no HSM, no bruits        Extremities and Muscular Skeletal:  no deformities, clubbing, cyanosis, erythema observed              Neurological:  no gross motor deficits        Psych:  Alert and Oriented x 3        Recent Lab Results:  LIPID RESULTS:  No results found for: CHOL, HDL, LDL, TRIG, CHOLHDLRATIO    LIVER ENZYME RESULTS:  No results found for: AST, ALT    CBC RESULTS:  Lab Results   Component Value Date    WBC 12.8 (H) 03/20/2016    RBC 3.39 (L) 03/20/2016    HGB 10.6 (L) 09/25/2017    HCT 29.4 (L) 03/20/2016    MCV 87 03/20/2016    MCH 28.3 03/20/2016    MCHC 32.7 03/20/2016    RDW 12.8 03/20/2016     03/20/2016       BMP RESULTS:  Lab Results   Component Value Date     09/26/2017    POTASSIUM 3.7 09/26/2017    CHLORIDE 110 (H) 09/26/2017    CO2 22 09/26/2017    ANIONGAP 9 09/26/2017     (H) 09/26/2017    BUN 14 09/26/2017    CR 1.25 06/11/2018    GFRESTIMATED 56 (L) 06/11/2018    GFRESTBLACK 67 06/11/2018    CONNIE 9.0 09/26/2017        A1C RESULTS:  No results found for: A1C    INR RESULTS:  No results found for: INR        CC  Casimiro Chun MD  [id number]  [place] [id number]

## 2019-12-18 NOTE — LETTER
12/18/2019    Kian Johns MD  [facility] [address] [place], [place]. \[facility].\"" [id number]    RE: Rose Duong       Dear Colleague,    I had the pleasure of seeing Rose Duong in the [facility].    HPI and Plan:   Very pleasant 79-year-old gentleman who returns for a follow-up of coronary artery disease and aortic stenosis.     He had a stress nuclear study in 2018 as part of preoperative assessment.  This showed a small area of moderate ischemia in the inferoapical wall.  He has a cardiac murmur and he had an echocardiogram showing mild aortic stenosis with a valve area 1.5 cm .  He is completely asymptomatic with high levels of exercise tolerance and we have been managing him medically.    In June 2019, aortic stenosis has progressed to a moderate with valve area 1.2 cm  and mean gradient in the high 20s.  He continues to be completely asymptomatic from the cardiac point of view with good exercise tolerance.  This continues to be the case today.  He has no cardiac symptoms.  I personally reviewed his most recent echocardiogram.  Mean gradient continues to be in the high 20s.  The valve area was now calculated at 1.8 cm  which is erroneous likely due to a large LVOT diameter measurement.  Cardiac examination is certainly consistent with moderate aortic stenosis.    Impression  1.  Mild coronary artery disease, manifesting as a mildly positive stress test..  Asymptomatic, continue medical management.  2.  Aortic stenosis.     No significant progression, recheck echo in 9 months time.  3.  Dyslipidemia, on 20 mg rosuvastatin.     LDL just under 100 with high HDL levels.  Adequate 4.  Hypertension.  May have whitecoat hypertension, otherwise appears to be under good control.  5.  Anemia  of chronic disease, Hb between 9-10   6.  Chronic renal failure, stage II.    We once again talked about the symptoms he should look out for should there be progression of aortic valve disease.  Look forward to  seeing him again in 9 months time for further follow-up.       Orders Placed This Encounter   Procedures     Follow-Up with Cardiologist     Echocardiogram Complete       No orders of the defined types were placed in this encounter.      Encounter Diagnoses   Name Primary?     Nonrheumatic aortic valve stenosis      Mixed hyperlipidemia      Positive cardiac stress test        CURRENT MEDICATIONS:  Current Outpatient Medications   Medication Sig Dispense Refill     Acetaminophen (TYLENOL PO) Take 500 mg by mouth 2 times daily as needed for mild pain or fever        allopurinol (ZYLOPRIM) 300 MG tablet Take 300 mg by mouth daily.       amLODIPine (NORVASC) 5 MG tablet Take 5 mg by mouth daily       FINASTERIDE PO Take 5 mg by mouth every morning        latanoprost (XALATAN) 0.005 % ophthalmic solution Place 1 drop into both eyes At Bedtime        predniSONE (DELTASONE) 5 MG tablet Take 5 mg by mouth every other day        ranitidine (ZANTAC) 150 MG tablet Take 150 mg by mouth daily       rosuvastatin (CRESTOR) 20 MG tablet Take 20 mg by mouth daily       tamsulosin (FLOMAX) 0.4 MG 24 hr capsule Take 0.4 mg by mouth every evening          ALLERGIES   No Known Allergies    PAST MEDICAL HISTORY:  Past Medical History:   Diagnosis Date     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      Anemia      Bacteremia      BPH (benign prostatic hyperplasia)      Colon polyps      DJD (degenerative joint disease)      Elevated BP without diagnosis of hypertension      Gastroesophageal reflux disease      Gout      Gout      Hyperlipidemia      Hypertension      Leukocytosis      Rotator cuff tear arthropathy, left      Sinusitis        PAST SURGICAL HISTORY:  Past Surgical History:   Procedure Laterality Date     APPENDECTOMY       ASPIRATION NEEDLE HIP Left 9/25/2017    Procedure: ASPIRATION NEEDLE HIP;;  Surgeon: Moises Elias MD;  Location:  OR     BACK SURGERY  11/07/2017     cervical spine surgery      COLONOSCOPY       DECOMPRESSION LUMBAR TWO LEVELS Bilateral 6/11/2018    Procedure: DECOMPRESSION LUMBAR TWO LEVELS;  BILATERAL LUMBAR 3-4 AND LUMBAR 4-5 DECOMPRESSION ;  Surgeon: Sd Vallejo MD;  Location: [facility]     FUSION CERVICAL ANTERIOR ONE LEVEL WITH BONE ALLOGRAFT N/A 9/25/2017    Procedure: FUSION CERVICAL ANTERIOR ONE LEVEL WITH BONE ALLOGRAFT;  ANTERIOR CERVICAL DECOMPRESSION AND FUSIONC3-4 WITH INSTRUMENTATION, ALLOGRAFT AND BONE MARROW ASPIRATE OF THE LEFT ILIAC CREST ;  Surgeon: Moises Elias MD;  Location: [facility] OR     PHACOEMULSIFICATION CLEAR CORNEA W/ STANDARD IOL IMPLANT, ENDOSCOPIC CYCLOPHOTOCOAGULATION, COMBINED Right 12/12/2017    Procedure: COMBINED PHACOEMULSIFICATION CLEAR CORNEA WITH STANDARD INTRAOCULAR LENS IMPLANT, ENDOSCOPIC CYCLOPHOTOCOAGULATION;  COMPLEX RIGHT EYE COMBINED PHACOEMULSIFICATION CLEAR CORNEA WITH STANDARD INTRAOCULAR LENS IMPLANT, ENDOSCOPIC CYCLOPHOTOCOAGULATION ;  Surgeon: Alli Mott MD;  [facility]     PHACOEMULSIFICATION CLEAR CORNEA W/ STANDARD IOL IMPLANT, ENDOSCOPIC CYCLOPHOTOCOAGULATION, COMBINED Left 5/1/2018    Procedure: COMBINED PHACOEMULSIFICATION CLEAR CORNEA WITH STANDARD INTRAOCULAR LENS IMPLANT, ENDOSCOPIC CYCLOPHOTOCOAGULATION;  COMPLEX LEFT EYE PHACOEMULSIFICATION CLEAR CORNEA WITH STANDARD INTRAOCULAR LENS IMPLANT, ENDOSCOPIC CYCLOPHOTOCOAGULATION ;  Surgeon: Alli Mott MD;  Location:  [facility]     ROTATOR CUFF REPAIR RT/LT  2004       FAMILY HISTORY:  Family History   Problem Relation Age of Onset     Heart Disease Father        SOCIAL HISTORY:  Social History     Socioeconomic History     Marital status:      Spouse name: None     Number of children: None     Years of education: None     Highest education level: None   Occupational History     None   Social Needs     Financial resource strain: None     Food insecurity:     Worry: None     Inability: None     Transportation needs:      "Medical: None     Non-medical: None   Tobacco Use     Smoking status: Never Smoker     Smokeless tobacco: Never Used   Substance and Sexual Activity     Alcohol use: Yes     Comment: beer or two a day     Drug use: No     Sexual activity: None   Lifestyle     Physical activity:     Days per week: None     Minutes per session: None     Stress: None   Relationships     Social connections:     Talks on phone: None     Gets together: None     Attends Rastafarian service: None     Active member of club or organization: None     Attends meetings of clubs or organizations: None     Relationship status: None     Intimate partner violence:     Fear of current or ex partner: None     Emotionally abused: None     Physically abused: None     Forced sexual activity: None   Other Topics Concern     Parent/sibling w/ CABG, MI or angioplasty before 65F 55M? Not Asked   Social History Narrative     None       Review of Systems:  Skin:  Positive for nail changes   Eyes:  Positive for glasses  ENT:  Negative    Respiratory:  Positive for dyspnea on exertion  Cardiovascular:    edema;Positive for  Gastroenterology: Positive for heartburn  Genitourinary:  Positive for urinary frequency;nocturia  Musculoskeletal:  Positive for arthritis  Neurologic:  Positive for numbness or tingling of hands  Psychiatric:  Negative    Heme/Lymph/Imm:  Negative    Endocrine:  Negative      Physical Exam:  Vitals: /60   Pulse 88   Ht 1.702 m (5' 7\")   Wt 65.6 kg (144 lb 9.6 oz)   BMI 22.65 kg/m       Constitutional:  cooperative, alert and oriented, well developed, well nourished, in no acute distress        Skin:  warm and dry to the touch, no apparent skin lesions or masses noted          Head:  normocephalic, no masses or lesions        Eyes:  pupils equal and round, conjunctivae and lids unremarkable, sclera white, no xanthalasma, EOMS intact, no nystagmus        Lymph:No Cervical lymphadenopathy present     ENT:  no pallor or cyanosis, " dentition good        Neck:  carotid pulses are full and equal bilaterally, JVP normal, no carotid bruit        Respiratory:  normal breath sounds, clear to auscultation, normal A-P diameter, normal symmetry, normal respiratory excursion, no use of accessory muscles         Cardiac: regular rhythm;normal S1 and S2;apical impulse not displaced       systolic murmur;grade 2        pulses full and equal, no bruits auscultated                                        GI:  abdomen soft, non-tender, BS normoactive, no mass, no HSM, no bruits        Extremities and Muscular Skeletal:  no deformities, clubbing, cyanosis, erythema observed              Neurological:  no gross motor deficits        Psych:  Alert and Oriented x 3        Recent Lab Results:  LIPID RESULTS:  No results found for: CHOL, HDL, LDL, TRIG, CHOLHDLRATIO    LIVER ENZYME RESULTS:  No results found for: AST, ALT    CBC RESULTS:  Lab Results   Component Value Date    WBC 12.8 (H) 03/20/2016    RBC 3.39 (L) 03/20/2016    HGB 10.6 (L) 09/25/2017    HCT 29.4 (L) 03/20/2016    MCV 87 03/20/2016    MCH 28.3 03/20/2016    MCHC 32.7 03/20/2016    RDW 12.8 03/20/2016     03/20/2016       BMP RESULTS:  Lab Results   Component Value Date     09/26/2017    POTASSIUM 3.7 09/26/2017    CHLORIDE 110 (H) 09/26/2017    CO2 22 09/26/2017    ANIONGAP 9 09/26/2017     (H) 09/26/2017    BUN 14 09/26/2017    CR 1.25 06/11/2018    GFRESTIMATED 56 (L) 06/11/2018    GFRESTBLACK 67 06/11/2018    CONNIE 9.0 09/26/2017        A1C RESULTS:  No results found for: A1C    INR RESULTS:  No results found for: INR      Thank you for allowing me to participate in the care of your patient.    Sincerely,     DR SHILPA ALFRED MD     [facility]

## 2019-12-18 NOTE — LETTER
12/18/2019    Kian Johns MD  [facility] [address] [facility] [address]    RE: Rose Duong       Dear Colleague,    I had the pleasure of seeing Rose Duong in the [facility].    HPI and Plan:   Very pleasant 79-year-old gentleman who returns for a follow-up of coronary artery disease and aortic stenosis.     He had a stress nuclear study in 2018 as part of preoperative assessment.  This showed a small area of moderate ischemia in the inferoapical wall.  He has a cardiac murmur and he had an echocardiogram showing mild aortic stenosis with a valve area 1.5 cm .  He is completely asymptomatic with high levels of exercise tolerance and we have been managing him medically.    In June 2019, aortic stenosis has progressed to a moderate with valve area 1.2 cm  and mean gradient in the high 20s.  He continues to be completely asymptomatic from the cardiac point of view with good exercise tolerance.  This continues to be the case today.  He has no cardiac symptoms.  I personally reviewed his most recent echocardiogram.  Mean gradient continues to be in the high 20s.  The valve area was now calculated at 1.8 cm  which is erroneous likely due to a large LVOT diameter measurement.  Cardiac examination is certainly consistent with moderate aortic stenosis.    Impression  1.  Mild coronary artery disease, manifesting as a mildly positive stress test..  Asymptomatic, continue medical management.  2.  Aortic stenosis.     No significant progression, recheck echo in 9 months time.  3.  Dyslipidemia, on 20 mg rosuvastatin.     LDL just under 100 with high HDL levels.  Adequate 4.  Hypertension.  May have whitecoat hypertension, otherwise appears to be under good control.  5.  Anemia  of chronic disease, Hb between 9-10   6.  Chronic renal failure, stage II.    We once again talked about the symptoms he should look out for should there be progression of aortic valve disease.  Look forward to  seeing him again in 9 months time for further follow-up.       Orders Placed This Encounter   Procedures     Follow-Up with Cardiologist     Echocardiogram Complete       No orders of the defined types were placed in this encounter.      Encounter Diagnoses   Name Primary?     Nonrheumatic aortic valve stenosis      Mixed hyperlipidemia      Positive cardiac stress test        CURRENT MEDICATIONS:  Current Outpatient Medications   Medication Sig Dispense Refill     Acetaminophen (TYLENOL PO) Take 500 mg by mouth 2 times daily as needed for mild pain or fever        allopurinol (ZYLOPRIM) 300 MG tablet Take 300 mg by mouth daily.       amLODIPine (NORVASC) 5 MG tablet Take 5 mg by mouth daily       FINASTERIDE PO Take 5 mg by mouth every morning        latanoprost (XALATAN) 0.005 % ophthalmic solution Place 1 drop into both eyes At Bedtime        predniSONE (DELTASONE) 5 MG tablet Take 5 mg by mouth every other day        ranitidine (ZANTAC) 150 MG tablet Take 150 mg by mouth daily       rosuvastatin (CRESTOR) 20 MG tablet Take 20 mg by mouth daily       tamsulosin (FLOMAX) 0.4 MG 24 hr capsule Take 0.4 mg by mouth every evening          ALLERGIES   No Known Allergies    PAST MEDICAL HISTORY:  Past Medical History:   Diagnosis Date     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      Anemia      Bacteremia      BPH (benign prostatic hyperplasia)      Colon polyps      DJD (degenerative joint disease)      Elevated BP without diagnosis of hypertension      Gastroesophageal reflux disease      Gout      Gout      Hyperlipidemia      Hypertension      Leukocytosis      Rotator cuff tear arthropathy, left      Sinusitis        PAST SURGICAL HISTORY:  Past Surgical History:   Procedure Laterality Date     APPENDECTOMY       ASPIRATION NEEDLE HIP Left 9/25/2017    Procedure: ASPIRATION NEEDLE HIP;;  Surgeon: Moises Elias MD;  Location:  OR     BACK SURGERY  11/07/2017     cervical spine surgery      COLONOSCOPY       DECOMPRESSION LUMBAR TWO LEVELS Bilateral 6/11/2018    Procedure: DECOMPRESSION LUMBAR TWO LEVELS;  BILATERAL LUMBAR 3-4 AND LUMBAR 4-5 DECOMPRESSION ;  Surgeon: Sd Vallejo MD;  Location: [facility] OR     FUSION CERVICAL ANTERIOR ONE LEVEL WITH BONE ALLOGRAFT N/A 9/25/2017    Procedure: FUSION CERVICAL ANTERIOR ONE LEVEL WITH BONE ALLOGRAFT;  ANTERIOR CERVICAL DECOMPRESSION AND FUSIONC3-4 WITH INSTRUMENTATION, ALLOGRAFT AND BONE MARROW ASPIRATE OF THE LEFT ILIAC CREST ;  Surgeon: Moises Elias MD;  Location: [facility] OR     PHACOEMULSIFICATION CLEAR CORNEA W/ STANDARD IOL IMPLANT, ENDOSCOPIC CYCLOPHOTOCOAGULATION, COMBINED Right 12/12/2017    Procedure: COMBINED PHACOEMULSIFICATION CLEAR CORNEA WITH STANDARD INTRAOCULAR LENS IMPLANT, ENDOSCOPIC CYCLOPHOTOCOAGULATION;  COMPLEX RIGHT EYE COMBINED PHACOEMULSIFICATION CLEAR CORNEA WITH STANDARD INTRAOCULAR LENS IMPLANT, ENDOSCOPIC CYCLOPHOTOCOAGULATION ;  Surgeon: Alli Mott MD;  Location:  [facility]     PHACOEMULSIFICATION CLEAR CORNEA W/ STANDARD IOL IMPLANT, ENDOSCOPIC CYCLOPHOTOCOAGULATION, COMBINED Left 5/1/2018    Procedure: COMBINED PHACOEMULSIFICATION CLEAR CORNEA WITH STANDARD INTRAOCULAR LENS IMPLANT, ENDOSCOPIC CYCLOPHOTOCOAGULATION;  COMPLEX LEFT EYE PHACOEMULSIFICATION CLEAR CORNEA WITH STANDARD INTRAOCULAR LENS IMPLANT, ENDOSCOPIC CYCLOPHOTOCOAGULATION ;  Surgeon: Alli Mott MD;  Location:  [facility]     ROTATOR CUFF REPAIR RT/LT  2004       FAMILY HISTORY:  Family History   Problem Relation Age of Onset     Heart Disease Father        SOCIAL HISTORY:  Social History     Socioeconomic History     Marital status:      Spouse name: None     Number of children: None     Years of education: None     Highest education level: None   Occupational History     None   Social Needs     Financial resource strain: None     Food insecurity:     Worry: None     Inability: None     Transportation needs:      "Medical: None     Non-medical: None   Tobacco Use     Smoking status: Never Smoker     Smokeless tobacco: Never Used   Substance and Sexual Activity     Alcohol use: Yes     Comment: beer or two a day     Drug use: No     Sexual activity: None   Lifestyle     Physical activity:     Days per week: None     Minutes per session: None     Stress: None   Relationships     Social connections:     Talks on phone: None     Gets together: None     Attends Zoroastrian service: None     Active member of club or organization: None     Attends meetings of clubs or organizations: None     Relationship status: None     Intimate partner violence:     Fear of current or ex partner: None     Emotionally abused: None     Physically abused: None     Forced sexual activity: None   Other Topics Concern     Parent/sibling w/ CABG, MI or angioplasty before 65F 55M? Not Asked   Social History Narrative     None       Review of Systems:  Skin:  Positive for nail changes   Eyes:  Positive for glasses  ENT:  Negative    Respiratory:  Positive for dyspnea on exertion  Cardiovascular:    edema;Positive for  Gastroenterology: Positive for heartburn  Genitourinary:  Positive for urinary frequency;nocturia  Musculoskeletal:  Positive for arthritis  Neurologic:  Positive for numbness or tingling of hands  Psychiatric:  Negative    Heme/Lymph/Imm:  Negative    Endocrine:  Negative      Physical Exam:  Vitals: /60   Pulse 88   Ht 1.702 m (5' 7\")   Wt 65.6 kg (144 lb 9.6 oz)   BMI 22.65 kg/m       Constitutional:  cooperative, alert and oriented, well developed, well nourished, in no acute distress        Skin:  warm and dry to the touch, no apparent skin lesions or masses noted          Head:  normocephalic, no masses or lesions        Eyes:  pupils equal and round, conjunctivae and lids unremarkable, sclera white, no xanthalasma, EOMS intact, no nystagmus        Lymph:No Cervical lymphadenopathy present     ENT:  no pallor or cyanosis, " dentition good        Neck:  carotid pulses are full and equal bilaterally, JVP normal, no carotid bruit        Respiratory:  normal breath sounds, clear to auscultation, normal A-P diameter, normal symmetry, normal respiratory excursion, no use of accessory muscles         Cardiac: regular rhythm;normal S1 and S2;apical impulse not displaced       systolic murmur;grade 2        pulses full and equal, no bruits auscultated                                        GI:  abdomen soft, non-tender, BS normoactive, no mass, no HSM, no bruits        Extremities and Muscular Skeletal:  no deformities, clubbing, cyanosis, erythema observed              Neurological:  no gross motor deficits        Psych:  Alert and Oriented x 3        Recent Lab Results:  LIPID RESULTS:  No results found for: CHOL, HDL, LDL, TRIG, CHOLHDLRATIO    LIVER ENZYME RESULTS:  No results found for: AST, ALT    CBC RESULTS:  Lab Results   Component Value Date    WBC 12.8 (H) 03/20/2016    RBC 3.39 (L) 03/20/2016    HGB 10.6 (L) 09/25/2017    HCT 29.4 (L) 03/20/2016    MCV 87 03/20/2016    MCH 28.3 03/20/2016    MCHC 32.7 03/20/2016    RDW 12.8 03/20/2016     03/20/2016       BMP RESULTS:  Lab Results   Component Value Date     09/26/2017    POTASSIUM 3.7 09/26/2017    CHLORIDE 110 (H) 09/26/2017    CO2 22 09/26/2017    ANIONGAP 9 09/26/2017     (H) 09/26/2017    BUN 14 09/26/2017    CR 1.25 06/11/2018    GFRESTIMATED 56 (L) 06/11/2018    GFRESTBLACK 67 06/11/2018    CONNIE 9.0 09/26/2017        A1C RESULTS:  No results found for: A1C    INR RESULTS:  No results found for: INR        CC  Shilpa Chun MD  [address]  [place] [address]                  Thank you for allowing me to participate in the care of your patient.      Sincerely,     DR SHILPA CHUN MD     [facility]    cc:   Shilpa Chun MD  [address]  [place] [address]

## 2020-04-24 ENCOUNTER — TELEPHONE (OUTPATIENT)
Dept: CARDIOLOGY | Facility: CLINIC | Age: 80
End: 2020-04-24

## 2020-04-24 NOTE — TELEPHONE ENCOUNTER
Patient called in with concern of progressive shortness of breath that has been gradual in onset since he last saw Dr. Chun in December of 2019.His last echo showed moderate aortic stenosis and he was advised by Dr. Chun to call in if he noted symptoms. What got his attention is that it requires 'more effort' to take the stairs in his home ( seven steps) and if he lifts something heavy he gets' winded'.The other day he was pushing his  up a ramp to his truck and he got 'quite winded'. He denies any light headedness, pre- syncope or chest discomfort. He feels that this is not his norm. He is not due for an echo till this September. I told him that I will reach out to Dr. Chun and get back to him when I hear from him.

## 2020-04-30 ENCOUNTER — TELEPHONE (OUTPATIENT)
Dept: CARDIOLOGY | Facility: CLINIC | Age: 80
End: 2020-04-30

## 2020-04-30 NOTE — TELEPHONE ENCOUNTER
Wellness Screening Tool    Symptom Screening:    Do you have one of the following new symptoms:      Fever or reported chills?   No    A new cough (started within the past 14 days)?  No    Shortness of breath (started within the past 14 days)? No Has a cough , not new.    Nausea, vomiting or diarrhea?  No    Within the past 3 weeks, have you been exposed to someone with a known positive illness below?      COVID - 19 (known or suspected)  no    Chicken pox?r no    Measles?  no    Pertussis?  No          Patient notified of visitor restriction: Yes   Patient informed to wear a mask :YES     Patient's appointment status: Patient will be seen in clinic as scheduled on 5/1/2020.

## 2020-05-01 ENCOUNTER — OFFICE VISIT (OUTPATIENT)
Dept: CARDIOLOGY | Facility: CLINIC | Age: 80
End: 2020-05-01
Attending: INTERNAL MEDICINE
Payer: COMMERCIAL

## 2020-05-01 ENCOUNTER — HOSPITAL ENCOUNTER (OUTPATIENT)
Dept: CARDIOLOGY | Facility: CLINIC | Age: 80
Discharge: HOME | End: 2020-05-01
Attending: INTERNAL MEDICINE | Admitting: INTERNAL MEDICINE
Payer: COMMERCIAL

## 2020-05-01 ENCOUNTER — HOSPITAL ENCOUNTER (OUTPATIENT)
Facility: CLINIC | Age: 80
End: 2020-05-01
Payer: COMMERCIAL

## 2020-05-01 VITALS
SYSTOLIC BLOOD PRESSURE: 158 MMHG | BODY MASS INDEX: 23.29 KG/M2 | HEART RATE: 81 BPM | WEIGHT: 148.7 LBS | DIASTOLIC BLOOD PRESSURE: 72 MMHG | OXYGEN SATURATION: 99 %

## 2020-05-01 DIAGNOSIS — I25.10 CORONARY ARTERY DISEASE INVOLVING NATIVE CORONARY ARTERY, ANGINA PRESENCE UNSPECIFIED, UNSPECIFIED WHETHER NATIVE OR TRANSPLANTED HEART: ICD-10-CM

## 2020-05-01 DIAGNOSIS — E78.2 MIXED HYPERLIPIDEMIA: ICD-10-CM

## 2020-05-01 DIAGNOSIS — I35.0 NONRHEUMATIC AORTIC VALVE STENOSIS: ICD-10-CM

## 2020-05-01 DIAGNOSIS — R94.39 POSITIVE CARDIAC STRESS TEST: ICD-10-CM

## 2020-05-01 DIAGNOSIS — I25.10 CORONARY ARTERY DISEASE INVOLVING NATIVE CORONARY ARTERY, ANGINA PRESENCE UNSPECIFIED, UNSPECIFIED WHETHER NATIVE OR TRANSPLANTED HEART: Primary | ICD-10-CM

## 2020-05-01 PROCEDURE — 93306 TTE W/DOPPLER COMPLETE: CPT | Mod: 26 | Performed by: INTERNAL MEDICINE

## 2020-05-01 PROCEDURE — 93306 TTE W/DOPPLER COMPLETE: CPT

## 2020-05-01 PROCEDURE — 99214 OFFICE O/P EST MOD 30 MIN: CPT | Mod: 25 | Performed by: INTERNAL MEDICINE

## 2020-05-01 NOTE — PROGRESS NOTES
HPI and Plan:   Is a pleasure to see Mr. Duong again for follow-up of aortic valve disease, abnormal stress test.    I have the pleasure meeting this very delightful 79-year-old gentleman for the first time in 2018.  He had a stress nuclear study in the [facility] which demonstrated a small area of moderate inferoapical ischemia.  He also had mild aortic stenosis at that time with a valve area 1.5 cm .  He will did not have any cardiac symptoms at all and he was highly functional with good exercise tolerance.  I have been managing him medically with close follow-up of his aortic valve disease.  He also has anemia at least since 2016 with low normal Red cell indices.  He was diagnosed with having anemia of chronic disease.    In 2019 his aortic valve area was measured at 1.2 cm  and 1.7 cm , however the mean gradient has been 26 and 27 mmHg.  I think his aortic stenosis is moderate.    Since last seen, he has progressively developed shortness of breath on exertion.  He has no PND orthopnea.  He has trace bilateral ankle swelling which I think is due to venous stasis and perhaps use of amlodipine.  He also denies chest pains and exertional dizzy spells.  The shortness of breath on exertion does bother him a lot as he is always been quite physically active.  He tells me that he has to stop several times when he proud his driveway in the winter.    On physical examination he has no evidence of congestive heart failure.  He does have a 3 out of 6 ejection systolic murmur.  Perhaps I have advised after having read his echocardiogram report from today which shows that the mean gradient has increased to 31 mmHg though the valve area still 1.2 cm .  However I do think that his second heart sound may be a little bit more diminished compared with previously.    Impression  1.  Progressive shortness of breath on exertion which may be an anginal equivalent.  2.  Moderate aortic stenosis, some progression compared with  previously.  3.  Hypertension.  4.  Positive stress nuclear study, moderate to high likelihood of having underlying coronary artery disease.  Will need to see if this is hemodynamically significant.  5.  Anemia of chronic disease.    In view of his symptoms I recommended coronary angiography and right heart catheterization.  The latter will help us to further evaluate his aortic valve disease.  The risk of this approach including but not limited to MI, CVA, death, peripheral vascular injury, renal failure and aortic dissection were explained detail to the patient.  He understands and is agreeable to proceed.  I will arrange for this procedure within the next week or 2.    No orders of the defined types were placed in this encounter.      No orders of the defined types were placed in this encounter.      Encounter Diagnoses   Name Primary?     Nonrheumatic aortic valve stenosis      Mixed hyperlipidemia      Positive cardiac stress test      Coronary artery disease involving native coronary artery, angina presence unspecified, unspecified whether native or transplanted heart Yes       CURRENT MEDICATIONS:  Current Outpatient Medications   Medication Sig Dispense Refill     Acetaminophen (TYLENOL PO) Take 500 mg by mouth 2 times daily as needed for mild pain or fever        allopurinol (ZYLOPRIM) 300 MG tablet Take 300 mg by mouth daily.       amLODIPine (NORVASC) 5 MG tablet Take 5 mg by mouth daily       FINASTERIDE PO Take 5 mg by mouth every morning        latanoprost (XALATAN) 0.005 % ophthalmic solution Place 1 drop into both eyes At Bedtime        predniSONE (DELTASONE) 5 MG tablet Take 5 mg by mouth every other day        rosuvastatin (CRESTOR) 20 MG tablet Take 20 mg by mouth daily       tamsulosin (FLOMAX) 0.4 MG 24 hr capsule Take 0.4 mg by mouth every evening        ranitidine (ZANTAC) 150 MG tablet Take 150 mg by mouth daily         ALLERGIES   No Known Allergies    PAST MEDICAL HISTORY:  Past Medical  History:   Diagnosis Date     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      Anemia      Bacteremia      BPH (benign prostatic hyperplasia)      Colon polyps      DJD (degenerative joint disease)      Elevated BP without diagnosis of hypertension      Gastroesophageal reflux disease      Gout      Gout      Hyperlipidemia      Hypertension      Leukocytosis      Rotator cuff tear arthropathy, left      Sinusitis        PAST SURGICAL HISTORY:  Past Surgical History:   Procedure Laterality Date     APPENDECTOMY       ASPIRATION NEEDLE HIP Left 9/25/2017    Procedure: ASPIRATION NEEDLE HIP;;  Surgeon: Moises Elias MD;  Location:  OR     BACK SURGERY  11/07/2017    cervical spine surgery      COLONOSCOPY       DECOMPRESSION LUMBAR TWO LEVELS Bilateral 6/11/2018    Procedure: DECOMPRESSION LUMBAR TWO LEVELS;  BILATERAL LUMBAR 3-4 AND LUMBAR 4-5 DECOMPRESSION ;  Surgeon: Sd Vallejo MD;  Location: [facility] OR     FUSION CERVICAL ANTERIOR ONE LEVEL WITH BONE ALLOGRAFT N/A 9/25/2017    Procedure: FUSION CERVICAL ANTERIOR ONE LEVEL WITH BONE ALLOGRAFT;  ANTERIOR CERVICAL DECOMPRESSION AND FUSIONC3-4 WITH INSTRUMENTATION, ALLOGRAFT AND BONE MARROW ASPIRATE OF THE LEFT ILIAC CREST ;  Surgeon: Moises Elias MD;  Location:  OR     PHACOEMULSIFICATION CLEAR CORNEA W/ STANDARD IOL IMPLANT, ENDOSCOPIC CYCLOPHOTOCOAGULATION, COMBINED Right 12/12/2017    Procedure: COMBINED PHACOEMULSIFICATION CLEAR CORNEA WITH STANDARD INTRAOCULAR LENS IMPLANT, ENDOSCOPIC CYCLOPHOTOCOAGULATION;  COMPLEX RIGHT EYE COMBINED PHACOEMULSIFICATION CLEAR CORNEA WITH STANDARD INTRAOCULAR LENS IMPLANT, ENDOSCOPIC CYCLOPHOTOCOAGULATION ;  Surgeon: Alli Mott MD;  Location:  [facility]     PHACOEMULSIFICATION CLEAR CORNEA W/ STANDARD IOL IMPLANT, ENDOSCOPIC CYCLOPHOTOCOAGULATION, COMBINED Left 5/1/2018    Procedure: COMBINED PHACOEMULSIFICATION CLEAR CORNEA WITH STANDARD INTRAOCULAR LENS  IMPLANT, ENDOSCOPIC CYCLOPHOTOCOAGULATION;  COMPLEX LEFT EYE PHACOEMULSIFICATION CLEAR CORNEA WITH STANDARD INTRAOCULAR LENS IMPLANT, ENDOSCOPIC CYCLOPHOTOCOAGULATION ;  Surgeon: Alli Mott MD;  Location: [facility]     ROTATOR CUFF REPAIR RT/LT  2004       FAMILY HISTORY:  Family History   Problem Relation Age of Onset     Heart Disease Father        SOCIAL HISTORY:  Social History     Socioeconomic History     Marital status:      Spouse name: None     Number of children: None     Years of education: None     Highest education level: None   Occupational History     None   Social Needs     Financial resource strain: None     Food insecurity     Worry: None     Inability: None     Transportation needs     Medical: None     Non-medical: None   Tobacco Use     Smoking status: Never Smoker     Smokeless tobacco: Never Used   Substance and Sexual Activity     Alcohol use: Yes     Comment: beer or two a day     Drug use: No     Sexual activity: None   Lifestyle     Physical activity     Days per week: None     Minutes per session: None     Stress: None   Relationships     Social connections     Talks on phone: None     Gets together: None     Attends Rastafarian service: None     Active member of club or organization: None     Attends meetings of clubs or organizations: None     Relationship status: None     Intimate partner violence     Fear of current or ex partner: None     Emotionally abused: None     Physically abused: None     Forced sexual activity: None   Other Topics Concern     Parent/sibling w/ CABG, MI or angioplasty before 65F 55M? Not Asked   Social History Narrative     None       Review of Systems:  Skin:  Positive for nail changes   Eyes:  Positive for glasses  ENT:  Negative    Respiratory:  Positive for dyspnea on exertion;shortness of breath  Cardiovascular:  Negative    Gastroenterology: Positive for heartburn  Genitourinary:  Positive for urinary frequency;nocturia  Musculoskeletal:  Positive  for arthritis  Neurologic:  Positive for numbness or tingling of hands  Psychiatric:  Negative    Heme/Lymph/Imm:  Negative    Endocrine:  Negative      Physical Exam:  Vitals: BP (!) 158/72   Pulse 81   Wt 67.4 kg (148 lb 11.2 oz)   SpO2 99%   BMI 23.29 kg/m      Constitutional:  cooperative, alert and oriented, well developed, well nourished, in no acute distress        Skin:  warm and dry to the touch, no apparent skin lesions or masses noted          Head:  normocephalic, no masses or lesions        Eyes:  pupils equal and round, conjunctivae and lids unremarkable, sclera white, no xanthalasma, EOMS intact, no nystagmus        Lymph:No Cervical lymphadenopathy present     ENT:  no pallor or cyanosis, dentition good        Neck:  carotid pulses are full and equal bilaterally, JVP normal, no carotid bruit        Respiratory:  normal breath sounds, clear to auscultation, normal A-P diameter, normal symmetry, normal respiratory excursion, no use of accessory muscles         Cardiac: regular rhythm;apical impulse not displaced   soft or inaudible A2   systolic murmur;grade 2        pulses full and equal, no bruits auscultated                                        GI:  abdomen soft, non-tender, BS normoactive, no mass, no HSM, no bruits        Extremities and Muscular Skeletal:  no deformities, clubbing, cyanosis, erythema observed              Neurological:  no gross motor deficits        Psych:  Alert and Oriented x 3        Recent Lab Results:  LIPID RESULTS:  No results found for: CHOL, HDL, LDL, TRIG, CHOLHDLRATIO    LIVER ENZYME RESULTS:  No results found for: AST, ALT    CBC RESULTS:  Lab Results   Component Value Date    WBC 12.8 (H) 03/20/2016    RBC 3.39 (L) 03/20/2016    HGB 10.6 (L) 09/25/2017    HCT 29.4 (L) 03/20/2016    MCV 87 03/20/2016    MCH 28.3 03/20/2016    MCHC 32.7 03/20/2016    RDW 12.8 03/20/2016     03/20/2016       BMP RESULTS:  Lab Results   Component Value Date      09/26/2017    POTASSIUM 3.7 09/26/2017    CHLORIDE 110 (H) 09/26/2017    CO2 22 09/26/2017    ANIONGAP 9 09/26/2017     (H) 09/26/2017    BUN 14 09/26/2017    CR 1.25 06/11/2018    GFRESTIMATED 56 (L) 06/11/2018    GFRESTBLACK 67 06/11/2018    CONNIE 9.0 09/26/2017        A1C RESULTS:  No results found for: A1C    INR RESULTS:  No results found for: INR        CC  Casimiro Chun MD  [address]  [place] [id number]

## 2020-05-01 NOTE — LETTER
5/1/2020    Kian Johns MD  [facility] [address] [facility] [address]    RE: Rose Duong       Dear Colleague,    I had the pleasure of seeing Rose Duong in the [facility].  Is a pleasure to see Mr. Duong again for follow-up of aortic valve disease, abnormal stress test.    I have the pleasure meeting this very delightful 79-year-old gentleman for the first time in 2018.  He had a stress nuclear study in the Demi system which demonstrated a small area of moderate inferoapical ischemia.  He also had mild aortic stenosis at that time with a valve area 1.5 cm .  He will did not have any cardiac symptoms at all and he was highly functional with good exercise tolerance.  I have been managing him medically with close follow-up of his aortic valve disease.  He also has anemia at least since 2016 with low normal Red cell indices.  He was diagnosed with having anemia of chronic disease.    In 2019 his aortic valve area was measured at 1.2 cm  and 1.7 cm , however the mean gradient has been 26 and 27 mmHg.  I think his aortic stenosis is moderate.    Since last seen, he has progressively developed shortness of breath on exertion.  He has no PND orthopnea.  He has trace bilateral ankle swelling which I think is due to venous stasis and perhaps use of amlodipine.  He also denies chest pains and exertional dizzy spells.  The shortness of breath on exertion does bother him a lot as he is always been quite physically active.  He tells me that he has to stop several times when he proud his driveway in the winter.    On physical examination he has no evidence of congestive heart failure.  He does have a 3 out of 6 ejection systolic murmur.  Perhaps I have advised after having read his echocardiogram report from today which shows that the mean gradient has increased to 31 mmHg though the valve area still 1.2 cm .  However I do think that his second heart sound may be a little bit more  diminished compared with previously.    Impression  1.  Progressive shortness of breath on exertion which may be an anginal equivalent.  2.  Moderate aortic stenosis, some progression compared with previously.  3.  Hypertension.  4.  Positive stress nuclear study, moderate to high likelihood of having underlying coronary artery disease.  Will need to see if this is hemodynamically significant.  5.  Anemia of chronic disease.    In view of his symptoms I recommended coronary angiography and right heart catheterization.  The latter will help us to further evaluate his aortic valve disease.  The risk of this approach including but not limited to MI, CVA, death, peripheral vascular injury, renal failure and aortic dissection were explained detail to the patient.  He understands and is agreeable to proceed.  I will arrange for this procedure within the next week or 2.    No orders of the defined types were placed in this encounter.      No orders of the defined types were placed in this encounter.      Encounter Diagnoses   Name Primary?     Nonrheumatic aortic valve stenosis      Mixed hyperlipidemia      Positive cardiac stress test      Coronary artery disease involving native coronary artery, angina presence unspecified, unspecified whether native or transplanted heart Yes       CURRENT MEDICATIONS:  Current Outpatient Medications   Medication Sig Dispense Refill     Acetaminophen (TYLENOL PO) Take 500 mg by mouth 2 times daily as needed for mild pain or fever        allopurinol (ZYLOPRIM) 300 MG tablet Take 300 mg by mouth daily.       amLODIPine (NORVASC) 5 MG tablet Take 5 mg by mouth daily       FINASTERIDE PO Take 5 mg by mouth every morning        latanoprost (XALATAN) 0.005 % ophthalmic solution Place 1 drop into both eyes At Bedtime        predniSONE (DELTASONE) 5 MG tablet Take 5 mg by mouth every other day        rosuvastatin (CRESTOR) 20 MG tablet Take 20 mg by mouth daily       tamsulosin (FLOMAX) 0.4 MG  24 hr capsule Take 0.4 mg by mouth every evening        ranitidine (ZANTAC) 150 MG tablet Take 150 mg by mouth daily         ALLERGIES   No Known Allergies    PAST MEDICAL HISTORY:  Past Medical History:   Diagnosis Date     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      Anemia      Bacteremia      BPH (benign prostatic hyperplasia)      Colon polyps      DJD (degenerative joint disease)      Elevated BP without diagnosis of hypertension      Gastroesophageal reflux disease      Gout      Gout      Hyperlipidemia      Hypertension      Leukocytosis      Rotator cuff tear arthropathy, left      Sinusitis        PAST SURGICAL HISTORY:  Past Surgical History:   Procedure Laterality Date     APPENDECTOMY       ASPIRATION NEEDLE HIP Left 9/25/2017    Procedure: ASPIRATION NEEDLE HIP;;  Surgeon: Moises Elias MD;  Location:  OR     BACK SURGERY  11/07/2017    cervical spine surgery      COLONOSCOPY       DECOMPRESSION LUMBAR TWO LEVELS Bilateral 6/11/2018    Procedure: DECOMPRESSION LUMBAR TWO LEVELS;  BILATERAL LUMBAR 3-4 AND LUMBAR 4-5 DECOMPRESSION ;  Surgeon: Sd Vallejo MD;  Location:  OR     FUSION CERVICAL ANTERIOR ONE LEVEL WITH BONE ALLOGRAFT N/A 9/25/2017    Procedure: FUSION CERVICAL ANTERIOR ONE LEVEL WITH BONE ALLOGRAFT;  ANTERIOR CERVICAL DECOMPRESSION AND FUSIONC3-4 WITH INSTRUMENTATION, ALLOGRAFT AND BONE MARROW ASPIRATE OF THE LEFT ILIAC CREST ;  Surgeon: Moises Elias MD;  Location:  OR     PHACOEMULSIFICATION CLEAR CORNEA W/ STANDARD IOL IMPLANT, ENDOSCOPIC CYCLOPHOTOCOAGULATION, COMBINED Right 12/12/2017    Procedure: COMBINED PHACOEMULSIFICATION CLEAR CORNEA WITH STANDARD INTRAOCULAR LENS IMPLANT, ENDOSCOPIC CYCLOPHOTOCOAGULATION;  COMPLEX RIGHT EYE COMBINED PHACOEMULSIFICATION CLEAR CORNEA WITH STANDARD INTRAOCULAR LENS IMPLANT, ENDOSCOPIC CYCLOPHOTOCOAGULATION ;  Surgeon: Alli oMtt MD;  Location:  [facility]      PHACOEMULSIFICATION CLEAR CORNEA W/ STANDARD IOL IMPLANT, ENDOSCOPIC CYCLOPHOTOCOAGULATION, COMBINED Left 5/1/2018    Procedure: COMBINED PHACOEMULSIFICATION CLEAR CORNEA WITH STANDARD INTRAOCULAR LENS IMPLANT, ENDOSCOPIC CYCLOPHOTOCOAGULATION;  COMPLEX LEFT EYE PHACOEMULSIFICATION CLEAR CORNEA WITH STANDARD INTRAOCULAR LENS IMPLANT, ENDOSCOPIC CYCLOPHOTOCOAGULATION ;  Surgeon: Alli Mott MD;  Location: [facility]     ROTATOR CUFF REPAIR RT/LT  2004       FAMILY HISTORY:  Family History   Problem Relation Age of Onset     Heart Disease Father        SOCIAL HISTORY:  Social History     Socioeconomic History     Marital status:      Spouse name: None     Number of children: None     Years of education: None     Highest education level: None   Occupational History     None   Social Needs     Financial resource strain: None     Food insecurity     Worry: None     Inability: None     Transportation needs     Medical: None     Non-medical: None   Tobacco Use     Smoking status: Never Smoker     Smokeless tobacco: Never Used   Substance and Sexual Activity     Alcohol use: Yes     Comment: beer or two a day     Drug use: No     Sexual activity: None   Lifestyle     Physical activity     Days per week: None     Minutes per session: None     Stress: None   Relationships     Social connections     Talks on phone: None     Gets together: None     Attends Faith service: None     Active member of club or organization: None     Attends meetings of clubs or organizations: None     Relationship status: None     Intimate partner violence     Fear of current or ex partner: None     Emotionally abused: None     Physically abused: None     Forced sexual activity: None   Other Topics Concern     Parent/sibling w/ CABG, MI or angioplasty before 65F 55M? Not Asked   Social History Narrative     None       Review of Systems:  Skin:  Positive for nail changes   Eyes:  Positive for glasses  ENT:  Negative    Respiratory:   Positive for dyspnea on exertion;shortness of breath  Cardiovascular:  Negative    Gastroenterology: Positive for heartburn  Genitourinary:  Positive for urinary frequency;nocturia  Musculoskeletal:  Positive for arthritis  Neurologic:  Positive for numbness or tingling of hands  Psychiatric:  Negative    Heme/Lymph/Imm:  Negative    Endocrine:  Negative      Physical Exam:  Vitals: BP (!) 158/72   Pulse 81   Wt 67.4 kg (148 lb 11.2 oz)   SpO2 99%   BMI 23.29 kg/m      Constitutional:  cooperative, alert and oriented, well developed, well nourished, in no acute distress        Skin:  warm and dry to the touch, no apparent skin lesions or masses noted          Head:  normocephalic, no masses or lesions        Eyes:  pupils equal and round, conjunctivae and lids unremarkable, sclera white, no xanthalasma, EOMS intact, no nystagmus        Lymph:No Cervical lymphadenopathy present     ENT:  no pallor or cyanosis, dentition good        Neck:  carotid pulses are full and equal bilaterally, JVP normal, no carotid bruit        Respiratory:  normal breath sounds, clear to auscultation, normal A-P diameter, normal symmetry, normal respiratory excursion, no use of accessory muscles         Cardiac: regular rhythm;apical impulse not displaced   soft or inaudible A2   systolic murmur;grade 2        pulses full and equal, no bruits auscultated                                        GI:  abdomen soft, non-tender, BS normoactive, no mass, no HSM, no bruits        Extremities and Muscular Skeletal:  no deformities, clubbing, cyanosis, erythema observed              Neurological:  no gross motor deficits        Psych:  Alert and Oriented x 3        Recent Lab Results:  LIPID RESULTS:  No results found for: CHOL, HDL, LDL, TRIG, CHOLHDLRATIO    LIVER ENZYME RESULTS:  No results found for: AST, ALT    CBC RESULTS:  Lab Results   Component Value Date    WBC 12.8 (H) 03/20/2016    RBC 3.39 (L) 03/20/2016    HGB 10.6 (L) 09/25/2017     HCT 29.4 (L) 03/20/2016    MCV 87 03/20/2016    MCH 28.3 03/20/2016    MCHC 32.7 03/20/2016    RDW 12.8 03/20/2016     03/20/2016       BMP RESULTS:  Lab Results   Component Value Date     09/26/2017    POTASSIUM 3.7 09/26/2017    CHLORIDE 110 (H) 09/26/2017    CO2 22 09/26/2017    ANIONGAP 9 09/26/2017     (H) 09/26/2017    BUN 14 09/26/2017    CR 1.25 06/11/2018    GFRESTIMATED 56 (L) 06/11/2018    GFRESTBLACK 67 06/11/2018    CONNIE 9.0 09/26/2017        A1C RESULTS:  No results found for: A1C    INR RESULTS:  No results found for: INR      Thank you for allowing me to participate in the care of your patient.    Sincerely,     DR SHILPA ALFRED MD     [facility]

## 2020-05-01 NOTE — LETTER
5/1/2020    Kian Johns MD  [facility] [address] [facility] [address]    RE: Rose Duong       Dear Colleague,    I had the pleasure of seeing Rose Duong in the [facility].    HPI and Plan:   Is a pleasure to see Mr. Duong again for follow-up of aortic valve disease, abnormal stress test.    I have the pleasure meeting this very delightful 79-year-old gentleman for the first time in 2018.  He had a stress nuclear study in the [facility] system which demonstrated a small area of moderate inferoapical ischemia.  He also had mild aortic stenosis at that time with a valve area 1.5 cm .  He will did not have any cardiac symptoms at all and he was highly functional with good exercise tolerance.  I have been managing him medically with close follow-up of his aortic valve disease.  He also has anemia at least since 2016 with low normal Red cell indices.  He was diagnosed with having anemia of chronic disease.    In 2019 his aortic valve area was measured at 1.2 cm  and 1.7 cm , however the mean gradient has been 26 and 27 mmHg.  I think his aortic stenosis is moderate.    Since last seen, he has progressively developed shortness of breath on exertion.  He has no PND orthopnea.  He has trace bilateral ankle swelling which I think is due to venous stasis and perhaps use of amlodipine.  He also denies chest pains and exertional dizzy spells.  The shortness of breath on exertion does bother him a lot as he is always been quite physically active.  He tells me that he has to stop several times when he proud his driveway in the winter.    On physical examination he has no evidence of congestive heart failure.  He does have a 3 out of 6 ejection systolic murmur.  Perhaps I have advised after having read his echocardiogram report from today which shows that the mean gradient has increased to 31 mmHg though the valve area still 1.2 cm .  However I do think that his second heart sound may be a  little bit more diminished compared with previously.    Impression  1.  Progressive shortness of breath on exertion which may be an anginal equivalent.  2.  Moderate aortic stenosis, some progression compared with previously.  3.  Hypertension.  4.  Positive stress nuclear study, moderate to high likelihood of having underlying coronary artery disease.  Will need to see if this is hemodynamically significant.  5.  Anemia of chronic disease.    In view of his symptoms I recommended coronary angiography and right heart catheterization.  The latter will help us to further evaluate his aortic valve disease.  The risk of this approach including but not limited to MI, CVA, death, peripheral vascular injury, renal failure and aortic dissection were explained detail to the patient.  He understands and is agreeable to proceed.  I will arrange for this procedure within the next week or 2.    No orders of the defined types were placed in this encounter.      No orders of the defined types were placed in this encounter.      Encounter Diagnoses   Name Primary?     Nonrheumatic aortic valve stenosis      Mixed hyperlipidemia      Positive cardiac stress test      Coronary artery disease involving native coronary artery, angina presence unspecified, unspecified whether native or transplanted heart Yes       CURRENT MEDICATIONS:  Current Outpatient Medications   Medication Sig Dispense Refill     Acetaminophen (TYLENOL PO) Take 500 mg by mouth 2 times daily as needed for mild pain or fever        allopurinol (ZYLOPRIM) 300 MG tablet Take 300 mg by mouth daily.       amLODIPine (NORVASC) 5 MG tablet Take 5 mg by mouth daily       FINASTERIDE PO Take 5 mg by mouth every morning        latanoprost (XALATAN) 0.005 % ophthalmic solution Place 1 drop into both eyes At Bedtime        predniSONE (DELTASONE) 5 MG tablet Take 5 mg by mouth every other day        rosuvastatin (CRESTOR) 20 MG tablet Take 20 mg by mouth daily       tamsulosin  (FLOMAX) 0.4 MG 24 hr capsule Take 0.4 mg by mouth every evening        ranitidine (ZANTAC) 150 MG tablet Take 150 mg by mouth daily         ALLERGIES   No Known Allergies    PAST MEDICAL HISTORY:  Past Medical History:   Diagnosis Date     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      Anemia      Bacteremia      BPH (benign prostatic hyperplasia)      Colon polyps      DJD (degenerative joint disease)      Elevated BP without diagnosis of hypertension      Gastroesophageal reflux disease      Gout      Gout      Hyperlipidemia      Hypertension      Leukocytosis      Rotator cuff tear arthropathy, left      Sinusitis        PAST SURGICAL HISTORY:  Past Surgical History:   Procedure Laterality Date     APPENDECTOMY       ASPIRATION NEEDLE HIP Left 9/25/2017    Procedure: ASPIRATION NEEDLE HIP;;  Surgeon: Moises Elias MD;  Location:  OR     BACK SURGERY  11/07/2017    cervical spine surgery      COLONOSCOPY       DECOMPRESSION LUMBAR TWO LEVELS Bilateral 6/11/2018    Procedure: DECOMPRESSION LUMBAR TWO LEVELS;  BILATERAL LUMBAR 3-4 AND LUMBAR 4-5 DECOMPRESSION ;  Surgeon: Sd Vallejo MD;  Location:  OR     FUSION CERVICAL ANTERIOR ONE LEVEL WITH BONE ALLOGRAFT N/A 9/25/2017    Procedure: FUSION CERVICAL ANTERIOR ONE LEVEL WITH BONE ALLOGRAFT;  ANTERIOR CERVICAL DECOMPRESSION AND FUSIONC3-4 WITH INSTRUMENTATION, ALLOGRAFT AND BONE MARROW ASPIRATE OF THE LEFT ILIAC CREST ;  Surgeon: Moises Elias MD;  Location:  OR     PHACOEMULSIFICATION CLEAR CORNEA W/ STANDARD IOL IMPLANT, ENDOSCOPIC CYCLOPHOTOCOAGULATION, COMBINED Right 12/12/2017    Procedure: COMBINED PHACOEMULSIFICATION CLEAR CORNEA WITH STANDARD INTRAOCULAR LENS IMPLANT, ENDOSCOPIC CYCLOPHOTOCOAGULATION;  COMPLEX RIGHT EYE COMBINED PHACOEMULSIFICATION CLEAR CORNEA WITH STANDARD INTRAOCULAR LENS IMPLANT, ENDOSCOPIC CYCLOPHOTOCOAGULATION ;  Surgeon: Alli Mott MD;  Location: [facility]      PHACOEMULSIFICATION CLEAR CORNEA W/ STANDARD IOL IMPLANT, ENDOSCOPIC CYCLOPHOTOCOAGULATION, COMBINED Left 5/1/2018    Procedure: COMBINED PHACOEMULSIFICATION CLEAR CORNEA WITH STANDARD INTRAOCULAR LENS IMPLANT, ENDOSCOPIC CYCLOPHOTOCOAGULATION;  COMPLEX LEFT EYE PHACOEMULSIFICATION CLEAR CORNEA WITH STANDARD INTRAOCULAR LENS IMPLANT, ENDOSCOPIC CYCLOPHOTOCOAGULATION ;  Surgeon: Alli Mott MD;  Location: [facility]     ROTATOR CUFF REPAIR RT/LT  2004       FAMILY HISTORY:  Family History   Problem Relation Age of Onset     Heart Disease Father        SOCIAL HISTORY:  Social History     Socioeconomic History     Marital status:      Spouse name: None     Number of children: None     Years of education: None     Highest education level: None   Occupational History     None   Social Needs     Financial resource strain: None     Food insecurity     Worry: None     Inability: None     Transportation needs     Medical: None     Non-medical: None   Tobacco Use     Smoking status: Never Smoker     Smokeless tobacco: Never Used   Substance and Sexual Activity     Alcohol use: Yes     Comment: beer or two a day     Drug use: No     Sexual activity: None   Lifestyle     Physical activity     Days per week: None     Minutes per session: None     Stress: None   Relationships     Social connections     Talks on phone: None     Gets together: None     Attends Gnosticist service: None     Active member of club or organization: None     Attends meetings of clubs or organizations: None     Relationship status: None     Intimate partner violence     Fear of current or ex partner: None     Emotionally abused: None     Physically abused: None     Forced sexual activity: None   Other Topics Concern     Parent/sibling w/ CABG, MI or angioplasty before 65F 55M? Not Asked   Social History Narrative     None       Review of Systems:  Skin:  Positive for nail changes   Eyes:  Positive for glasses  ENT:  Negative    Respiratory:   Positive for dyspnea on exertion;shortness of breath  Cardiovascular:  Negative    Gastroenterology: Positive for heartburn  Genitourinary:  Positive for urinary frequency;nocturia  Musculoskeletal:  Positive for arthritis  Neurologic:  Positive for numbness or tingling of hands  Psychiatric:  Negative    Heme/Lymph/Imm:  Negative    Endocrine:  Negative      Physical Exam:  Vitals: BP (!) 158/72   Pulse 81   Wt 67.4 kg (148 lb 11.2 oz)   SpO2 99%   BMI 23.29 kg/m      Constitutional:  cooperative, alert and oriented, well developed, well nourished, in no acute distress        Skin:  warm and dry to the touch, no apparent skin lesions or masses noted          Head:  normocephalic, no masses or lesions        Eyes:  pupils equal and round, conjunctivae and lids unremarkable, sclera white, no xanthalasma, EOMS intact, no nystagmus        Lymph:No Cervical lymphadenopathy present     ENT:  no pallor or cyanosis, dentition good        Neck:  carotid pulses are full and equal bilaterally, JVP normal, no carotid bruit        Respiratory:  normal breath sounds, clear to auscultation, normal A-P diameter, normal symmetry, normal respiratory excursion, no use of accessory muscles         Cardiac: regular rhythm;apical impulse not displaced   soft or inaudible A2   systolic murmur;grade 2        pulses full and equal, no bruits auscultated                                        GI:  abdomen soft, non-tender, BS normoactive, no mass, no HSM, no bruits        Extremities and Muscular Skeletal:  no deformities, clubbing, cyanosis, erythema observed              Neurological:  no gross motor deficits        Psych:  Alert and Oriented x 3        Recent Lab Results:  LIPID RESULTS:  No results found for: CHOL, HDL, LDL, TRIG, CHOLHDLRATIO    LIVER ENZYME RESULTS:  No results found for: AST, ALT    CBC RESULTS:  Lab Results   Component Value Date    WBC 12.8 (H) 03/20/2016    RBC 3.39 (L) 03/20/2016    HGB 10.6 (L) 09/25/2017     HCT 29.4 (L) 03/20/2016    MCV 87 03/20/2016    MCH 28.3 03/20/2016    MCHC 32.7 03/20/2016    RDW 12.8 03/20/2016     03/20/2016       BMP RESULTS:  Lab Results   Component Value Date     09/26/2017    POTASSIUM 3.7 09/26/2017    CHLORIDE 110 (H) 09/26/2017    CO2 22 09/26/2017    ANIONGAP 9 09/26/2017     (H) 09/26/2017    BUN 14 09/26/2017    CR 1.25 06/11/2018    GFRESTIMATED 56 (L) 06/11/2018    GFRESTBLACK 67 06/11/2018    CONNIE 9.0 09/26/2017        A1C RESULTS:  No results found for: A1C    INR RESULTS:  No results found for: INR        CC  Shilpa Chun MD  [address]  [place] [address]                    Thank you for allowing me to participate in the care of your patient.      Sincerely,     DR SHILPA CHUN MD     [facility]    cc:   Shilpa Chun MD  [address]  [place] [address]

## 2020-05-05 ENCOUNTER — DOCUMENTATION ONLY (OUTPATIENT)
Dept: CARDIOLOGY | Facility: CLINIC | Age: 80
End: 2020-05-05

## 2020-05-05 DIAGNOSIS — I35.0 NONRHEUMATIC AORTIC VALVE STENOSIS: Primary | ICD-10-CM

## 2020-05-05 RX ORDER — SODIUM CHLORIDE 9 MG/ML
INJECTION, SOLUTION INTRAVENOUS CONTINUOUS
Status: CANCELLED | OUTPATIENT
Start: 2020-05-05

## 2020-05-05 RX ORDER — POTASSIUM CHLORIDE 1500 MG/1
20 TABLET, EXTENDED RELEASE ORAL
Status: CANCELLED | OUTPATIENT
Start: 2020-05-05

## 2020-05-05 RX ORDER — LIDOCAINE 40 MG/G
CREAM TOPICAL
Status: CANCELLED | OUTPATIENT
Start: 2020-05-05

## 2020-05-05 NOTE — PROGRESS NOTES
Wellness Screening Tool    Symptom Screening:    Do you have one of the following new symptoms:      Fever or reported chills?  No    A new cough (started within the past 14 days)?  No    Shortness of breath (started within the past 14 days)?  No    Nausea, vomiting or diarrhea?  No    Within the past 3 weeks, have you been exposed to someone with a known positive illness below?      COVID - 19 (known or suspected)  no    Chicken pox?  no    Measles?  no    Pertussis?  No          Patient notified of visitor restriction:Yes -Spouse has POA-will accompany patient  Patient informed to wear a mask: YES     Patient's appointment status: Patient will be seen in clinic as scheduled on 5/13        Patient called and prep reviewed with him and his wife.    1. NPO after midnight  2. No meds to hold  3.No contrast dye allergies  4. Took 325 of ASA today and will tomorrow  5.Wife will be  and care taker    All questions answered and return visit reviewed

## 2020-05-06 ENCOUNTER — TRANSFERRED RECORDS (OUTPATIENT)
Dept: MEDSURG UNIT | Facility: CLINIC | Age: 80
End: 2020-05-06

## 2020-05-06 ENCOUNTER — SURGERY (OUTPATIENT)
Age: 80
End: 2020-05-06
Payer: COMMERCIAL

## 2020-05-06 ENCOUNTER — HOSPITAL ENCOUNTER (OUTPATIENT)
Facility: CLINIC | Age: 80
Discharge: HOME | End: 2020-05-06
Attending: INTERNAL MEDICINE | Admitting: INTERNAL MEDICINE
Payer: COMMERCIAL

## 2020-05-06 VITALS
TEMPERATURE: 98.1 F | HEIGHT: 67 IN | BODY MASS INDEX: 22.9 KG/M2 | WEIGHT: 145.9 LBS | SYSTOLIC BLOOD PRESSURE: 137 MMHG | HEART RATE: 75 BPM | RESPIRATION RATE: 16 BRPM | DIASTOLIC BLOOD PRESSURE: 66 MMHG | OXYGEN SATURATION: 97 %

## 2020-05-06 DIAGNOSIS — I35.0 NONRHEUMATIC AORTIC VALVE STENOSIS: ICD-10-CM

## 2020-05-06 LAB
ANION GAP SERPL CALCULATED.3IONS-SCNC: 8 MMOL/L (ref 3–14)
APTT PPP: 35 SEC (ref 22–37)
BUN SERPL-MCNC: 35 MG/DL (ref 7–30)
CALCIUM SERPL-MCNC: 10 MG/DL (ref 8.5–10.1)
CHLORIDE SERPL-SCNC: 113 MMOL/L (ref 94–109)
CO2 BLD-SCNC: 16 MMOL/L (ref 21–28)
CO2 BLDCOV-SCNC: 18 MMOL/L (ref 21–28)
CO2 SERPL-SCNC: 20 MMOL/L (ref 20–32)
CREAT SERPL-MCNC: 1.55 MG/DL (ref 0.66–1.25)
ERYTHROCYTE [DISTWIDTH] IN BLOOD BY AUTOMATED COUNT: 14.5 % (ref 10–15)
GFR SERPL CREATININE-BSD FRML MDRD: 41 ML/MIN/{1.73_M2}
GLUCOSE SERPL-MCNC: 99 MG/DL (ref 70–99)
HCT VFR BLD AUTO: 30.8 % (ref 40–53)
HGB BLD-MCNC: 9.6 G/DL (ref 13.3–17.7)
INR PPP: 1.06 (ref 0.86–1.14)
KCT BLD-ACNC: 175 SEC (ref 75–150)
MCH RBC QN AUTO: 27.1 PG (ref 26.5–33)
MCHC RBC AUTO-ENTMCNC: 31.2 G/DL (ref 31.5–36.5)
MCV RBC AUTO: 87 FL (ref 78–100)
PCO2 BLD: 30 MM HG (ref 35–45)
PCO2 BLDV: 34 MM HG (ref 40–50)
PH BLD: 7.34 PH (ref 7.35–7.45)
PH BLDV: 7.33 PH (ref 7.32–7.43)
PLATELET # BLD AUTO: 359 10E9/L (ref 150–450)
PO2 BLD: 74 MM HG (ref 80–105)
PO2 BLDV: 37 MM HG (ref 25–47)
POTASSIUM SERPL-SCNC: 4.3 MMOL/L (ref 3.4–5.3)
RBC # BLD AUTO: 3.54 10E12/L (ref 4.4–5.9)
SAO2 % BLDA FROM PO2: 94 % (ref 92–100)
SAO2 % BLDV FROM PO2: 67 %
SODIUM SERPL-SCNC: 141 MMOL/L (ref 133–144)
WBC # BLD AUTO: 13.6 10E9/L (ref 4–11)

## 2020-05-06 PROCEDURE — 40000852 ZZH STATISTIC HEART CATH LAB OR EP LAB

## 2020-05-06 PROCEDURE — C1894 INTRO/SHEATH, NON-LASER: HCPCS | Performed by: INTERNAL MEDICINE

## 2020-05-06 PROCEDURE — 80048 BASIC METABOLIC PNL TOTAL CA: CPT | Performed by: INTERNAL MEDICINE

## 2020-05-06 PROCEDURE — 85730 THROMBOPLASTIN TIME PARTIAL: CPT | Performed by: INTERNAL MEDICINE

## 2020-05-06 PROCEDURE — 82803 BLOOD GASES ANY COMBINATION: CPT | Mod: 91

## 2020-05-06 PROCEDURE — 25000128 H RX IP 250 OP 636: Performed by: INTERNAL MEDICINE

## 2020-05-06 PROCEDURE — 93571 IV DOP VEL&/PRESS C FLO 1ST: CPT | Mod: 26 | Performed by: INTERNAL MEDICINE

## 2020-05-06 PROCEDURE — 99153 MOD SED SAME PHYS/QHP EA: CPT | Performed by: INTERNAL MEDICINE

## 2020-05-06 PROCEDURE — 85610 PROTHROMBIN TIME: CPT | Performed by: INTERNAL MEDICINE

## 2020-05-06 PROCEDURE — 40000065 ZZH STATISTIC EKG NON-CHARGEABLE

## 2020-05-06 PROCEDURE — 93571 IV DOP VEL&/PRESS C FLO 1ST: CPT | Performed by: INTERNAL MEDICINE

## 2020-05-06 PROCEDURE — 93010 ELECTROCARDIOGRAM REPORT: CPT | Performed by: INTERNAL MEDICINE

## 2020-05-06 PROCEDURE — 25800030 ZZH RX IP 258 OP 636: Performed by: INTERNAL MEDICINE

## 2020-05-06 PROCEDURE — C1760 CLOSURE DEV, VASC: HCPCS | Performed by: INTERNAL MEDICINE

## 2020-05-06 PROCEDURE — 93460 R&L HRT ART/VENTRICLE ANGIO: CPT | Mod: 26 | Performed by: INTERNAL MEDICINE

## 2020-05-06 PROCEDURE — 27210788 ZZH MANIFOLD CR3: Performed by: INTERNAL MEDICINE

## 2020-05-06 PROCEDURE — 27210794 ZZH OR GENERAL SUPPLY STERILE: Performed by: INTERNAL MEDICINE

## 2020-05-06 PROCEDURE — 93460 R&L HRT ART/VENTRICLE ANGIO: CPT | Performed by: INTERNAL MEDICINE

## 2020-05-06 PROCEDURE — 85027 COMPLETE CBC AUTOMATED: CPT | Performed by: INTERNAL MEDICINE

## 2020-05-06 PROCEDURE — 99152 MOD SED SAME PHYS/QHP 5/>YRS: CPT | Mod: 59 | Performed by: INTERNAL MEDICINE

## 2020-05-06 PROCEDURE — 25000125 ZZHC RX 250: Performed by: INTERNAL MEDICINE

## 2020-05-06 PROCEDURE — 93005 ELECTROCARDIOGRAM TRACING: CPT

## 2020-05-06 PROCEDURE — C1887 CATHETER, GUIDING: HCPCS | Performed by: INTERNAL MEDICINE

## 2020-05-06 PROCEDURE — 99152 MOD SED SAME PHYS/QHP 5/>YRS: CPT | Performed by: INTERNAL MEDICINE

## 2020-05-06 PROCEDURE — C1769 GUIDE WIRE: HCPCS | Performed by: INTERNAL MEDICINE

## 2020-05-06 PROCEDURE — 85347 COAGULATION TIME ACTIVATED: CPT

## 2020-05-06 RX ORDER — NALOXONE HYDROCHLORIDE 0.4 MG/ML
.1-.4 INJECTION, SOLUTION INTRAMUSCULAR; INTRAVENOUS; SUBCUTANEOUS
Status: DISCONTINUED | OUTPATIENT
Start: 2020-05-06 | End: 2020-05-06 | Stop reason: HOSPADM

## 2020-05-06 RX ORDER — NALOXONE HYDROCHLORIDE 0.4 MG/ML
.2-.4 INJECTION, SOLUTION INTRAMUSCULAR; INTRAVENOUS; SUBCUTANEOUS
Status: DISCONTINUED | OUTPATIENT
Start: 2020-05-06 | End: 2020-05-06 | Stop reason: HOSPADM

## 2020-05-06 RX ORDER — HEPARIN SODIUM 1000 [USP'U]/ML
INJECTION, SOLUTION INTRAVENOUS; SUBCUTANEOUS
Status: DISCONTINUED | OUTPATIENT
Start: 2020-05-06 | End: 2020-05-06 | Stop reason: HOSPADM

## 2020-05-06 RX ORDER — IOPAMIDOL 755 MG/ML
INJECTION, SOLUTION INTRAVASCULAR
Status: DISCONTINUED | OUTPATIENT
Start: 2020-05-06 | End: 2020-05-06 | Stop reason: HOSPADM

## 2020-05-06 RX ORDER — ACETAMINOPHEN 325 MG/1
650 TABLET ORAL EVERY 4 HOURS PRN
Status: DISCONTINUED | OUTPATIENT
Start: 2020-05-06 | End: 2020-05-06 | Stop reason: HOSPADM

## 2020-05-06 RX ORDER — FLUMAZENIL 0.1 MG/ML
0.2 INJECTION, SOLUTION INTRAVENOUS
Status: DISCONTINUED | OUTPATIENT
Start: 2020-05-06 | End: 2020-05-06 | Stop reason: HOSPADM

## 2020-05-06 RX ORDER — LIDOCAINE 40 MG/G
CREAM TOPICAL
Status: DISCONTINUED | OUTPATIENT
Start: 2020-05-06 | End: 2020-05-06 | Stop reason: HOSPADM

## 2020-05-06 RX ORDER — SODIUM CHLORIDE 9 MG/ML
INJECTION, SOLUTION INTRAVENOUS CONTINUOUS
Status: DISCONTINUED | OUTPATIENT
Start: 2020-05-06 | End: 2020-05-06 | Stop reason: HOSPADM

## 2020-05-06 RX ORDER — ATROPINE SULFATE 0.1 MG/ML
0.5 INJECTION INTRAVENOUS EVERY 5 MIN PRN
Status: DISCONTINUED | OUTPATIENT
Start: 2020-05-06 | End: 2020-05-06 | Stop reason: HOSPADM

## 2020-05-06 RX ORDER — POTASSIUM CHLORIDE 1500 MG/1
20 TABLET, EXTENDED RELEASE ORAL
Status: DISCONTINUED | OUTPATIENT
Start: 2020-05-06 | End: 2020-05-06 | Stop reason: HOSPADM

## 2020-05-06 RX ORDER — FENTANYL CITRATE 50 UG/ML
25-50 INJECTION, SOLUTION INTRAMUSCULAR; INTRAVENOUS
Status: DISCONTINUED | OUTPATIENT
Start: 2020-05-06 | End: 2020-05-06 | Stop reason: HOSPADM

## 2020-05-06 RX ORDER — FENTANYL CITRATE 50 UG/ML
INJECTION, SOLUTION INTRAMUSCULAR; INTRAVENOUS
Status: DISCONTINUED | OUTPATIENT
Start: 2020-05-06 | End: 2020-05-06 | Stop reason: HOSPADM

## 2020-05-06 RX ADMIN — HEPARIN SODIUM 7000 UNITS: 1000 INJECTION INTRAVENOUS; SUBCUTANEOUS at 09:45

## 2020-05-06 RX ADMIN — SODIUM CHLORIDE: 9 INJECTION, SOLUTION INTRAVENOUS at 07:32

## 2020-05-06 RX ADMIN — FENTANYL CITRATE 50 MCG: 50 INJECTION, SOLUTION INTRAMUSCULAR; INTRAVENOUS at 09:18

## 2020-05-06 RX ADMIN — LIDOCAINE HYDROCHLORIDE 1 ML: 10 INJECTION, SOLUTION EPIDURAL; INFILTRATION; INTRACAUDAL; PERINEURAL at 09:15

## 2020-05-06 RX ADMIN — LIDOCAINE HYDROCHLORIDE 10 ML: 10 INJECTION, SOLUTION EPIDURAL; INFILTRATION; INTRACAUDAL; PERINEURAL at 09:19

## 2020-05-06 RX ADMIN — FENTANYL CITRATE 25 MCG: 50 INJECTION, SOLUTION INTRAMUSCULAR; INTRAVENOUS at 08:59

## 2020-05-06 RX ADMIN — IOPAMIDOL 100 ML: 755 INJECTION, SOLUTION INTRAVENOUS at 09:53

## 2020-05-06 RX ADMIN — FENTANYL CITRATE 25 MCG: 50 INJECTION, SOLUTION INTRAMUSCULAR; INTRAVENOUS at 09:03

## 2020-05-06 RX ADMIN — MIDAZOLAM 0.5 MG: 1 INJECTION INTRAMUSCULAR; INTRAVENOUS at 09:18

## 2020-05-06 RX ADMIN — MIDAZOLAM 0.5 MG: 1 INJECTION INTRAMUSCULAR; INTRAVENOUS at 09:03

## 2020-05-06 RX ADMIN — LIDOCAINE HYDROCHLORIDE 5 ML: 10 INJECTION, SOLUTION EPIDURAL; INFILTRATION; INTRACAUDAL; PERINEURAL at 09:06

## 2020-05-06 RX ADMIN — MIDAZOLAM 1 MG: 1 INJECTION INTRAMUSCULAR; INTRAVENOUS at 08:59

## 2020-05-06 ASSESSMENT — MIFFLIN-ST. JEOR: SCORE: 1330.43

## 2020-05-06 NOTE — Clinical Note
151
175
6 Fr Angio-Seal utilized for closure of sight.  ; hemostasis achieved.  
All  images have been sent and verified in PACs  
Catheter removed over wire.  
FFR measurements completed.  pRCA 0.97
FFR wire advanced to right coronary artery.
FFR wire removed.  
Family has been updated via telephone  
Family has been updated.  
LCA Cine(s)  injected utilizing power injector system.
Lab results reviewed and abnormals discussed with physician.  
Oxygen saturations documented through [facility].  
Patient education provided.   
Patient education provided.   
Potential access sites were evaluated for patency using ultrasound.   The right femoral artery was selected. Access was obtained under with Sonosite and Fluoroscopic guidance using a standard 18 guage needle with direct visualization of needle entry.     
Potential access sites were evaluated for patency using ultrasound.   The right jugular vein was selected. Access was obtained under with Sonosite and Fluoroscopic guidance using a micropuncture 21 guage needle with direct visualization of needle entry.     
Procedure is scheduled in [facility].  
Procedure is scheduled in [facility].  
Procedure scheduled as Elective.  
Procedure scheduled as Urgent.  
RCA Cine(s)  injected utilizing power injector system.
Removed intact  
Sheath prepped and inserted in the right femoral artery.  
Sheath prepped and inserted in the right internal jugular vein.  
Tegaderm applied to wound securely. 
Tegaderm applied to wound securely. 
There were no immediate complications during the procedure. 
Total IV Fluids Infused 150 mL. 
Total contrast volume (ml) 100  
Wire Prepped.  
drawn
dry, intact and no hematoma. 
dry, intact, no bleeding and no hematoma. 
femoral artery Cine(s)  injected utilizing power injector system.
denies street drugs/coffee/soda use/caffeine

## 2020-05-06 NOTE — DISCHARGE INSTRUCTIONS
Cardiac Angiogram Discharge Instructions - Femoral & Neck    After you go home:      Have an adult stay with you until tomorrow.    Drink extra fluids for 2 days.    You may resume your normal diet.    No smoking       For 24 hours - due to the sedation you received:    Relax and take it easy.    Do NOT make any important or legal decisions.    Do NOT drive or operate machines at home or at work.    Do NOT drink alcohol.      Care of Neck & Groin Puncture Sites:      For the first 24 hrs - check the puncture site every 1-2 hours while awake.    For 2 days, when you cough, sneeze, laugh or move your bowels, hold your hand over the puncture site and press firmly.    Remove the bandaids after 24 hours. If there is minor oozing, apply another bandaid and remove it after 12 hours.    It is normal to have a small bruise, pea sized lump or soreness at the site.    You may shower tomorrow. Do NOT take a bath, or use a hot tub or pool for at least 3 days. Do NOT scrub the site. Do not use lotion or powder near the puncture site.      Activity - For 2 days:    Neck site:    Avoid heavy lifting or the overuse of your shoulder.        Groin site:      No stooping or squatting    Do NOT do any heavy activity such as exercise, lifting, or straining.     No housework, yard work or any activity that make you sweat    Do NOT lift more than 10 pounds    Bleeding:     Neck site:    If you start bleeding from the site in your neck, sit down and press gently on the site for 10 minutes.     Once bleeding stops, sit still for 2 hours.     Call [facility] as soon as you can    Groin site:    If you start bleeding from the site in your groin, lie down flat and press firmly on/above the site for 10 minutes.     Once bleeding stops, lay flat for 2 hours.    Call [facility] as soon as you can.    Call 911 right away if you have heavy bleeding or bleeding that does not stop.      Medicines:      Take your medications, , unless your provider  tells you not to.      If you have stopped any medicines, check with your provider about when to restart them.    Follow Up Appointments:      Follow up with [facility] Nurse Practitioner at [facility] of patient preference in 7-10 days.    Call the clinic if:      You have increased pain or a large or growing hard lump around the site.    The site is red, swollen, hot or tender.    Blood or fluid is draining from the site.    You have chills or a fever greater than 101 F (38 C).    Your leg feels numb, cool or changes color.    You have hives, a rash or unusual itching.    New pain in the back or belly that you cannot control with Tylenol.    Any questions or concerns.      [facility] [place]:    [phone] [facility] (7 days a week)

## 2020-05-06 NOTE — PROGRESS NOTES
PATIENT WELLNESS SCREENING    Step 1: Answer all screening questions 1-3.    1. In the last month, have you been in contact with someone who was confirmed or suspected to have Coronavirus/COVID-19? No    2. Do you have the following symptoms?   Fever? No   Cough? No   Shortness of breath? Yes: chronic shortness of breath due to cardiac work up   Skin rash? No    3. Have you traveled internationally in the last month? No   If so, where? N/A    Step 2: Refer to logic grid below for actions    NO SYMPTOM(S)    ACTIONS:  1. Standard rooming process  2. Provider to assess per normal protocol    POSITIVE SYMPTOM(S)  If positive for ANY of the following symptoms: fever, cough, shortness of breath, rash    ACTIONS  1. Mask patient  2. Room patient as soon as possible  3. Don appropriate PPE when entering room  4. Provider evaluation

## 2020-05-06 NOTE — PROGRESS NOTES
Pt returned to [facility] via bed -- stable and denies pain, nausea or vomiting. RIJ site dressing dry and intact. Right femoral site with angioseal and dressing without bleeding or hematoma.

## 2020-05-06 NOTE — PROGRESS NOTES
Pre procedure plan of care reviewed with pt prior to sedation. All questions answered and pt appears to accept and understand.

## 2020-05-06 NOTE — PROGRESS NOTES
Care Suites Admission Nursing Note    Patient Information  Name: Rose Duong  Age: 79 year old  Reason for admission: coronary angiobram  Care Suites arrival time: 0730    Patient Admission/Assessment   Pre-procedure assessment complete: Yes  If abnormal assessment/labs, provider notified: N/A  NPO: Yes  Medications held per instructions/orders: N/A  Consent: MD to obtain  If applicable, pregnancy test status: N/A  Patient oriented to room: Yes  Education/questions answered: Yes  Plan/other: per procedural plan of care    Discharge Planning  Accompanied by: self  Discharge name/phone number: see epic   Overnight post sedation caregiver: yes  Discharge location: home    Bruna Monique RN

## 2020-05-06 NOTE — PROGRESS NOTES
Care Suites Discharge Nursing Note    Patient Information  Name: Rose Duong  Age: 79 year old    Discharge Education:  Discharge instructions reviewed: Yes, AVS reviewed with patient at bedside and reinforced information with spouse via telephone.  Additional education/resources provided: angioseal and contrast information reviewed and given.  Patient/patient representative verbalizes understanding: Yes  Patient discharging on new medications: No  Medication education completed: N/A    Discharge Plans:   Discharge location: care suites to home  Discharge ride contacted: Yes, spouse Kassidy called  Approximate discharge time: 1250    Discharge Criteria:  Discharge criteria met and vital signs stable: Yes    Patient Belongs:  Patient belongings returned to patient: Yes    Amelia Smith RN

## 2020-05-08 LAB — INTERPRETATION ECG - MUSE: NORMAL

## 2020-05-14 ENCOUNTER — VIRTUAL VISIT (OUTPATIENT)
Dept: CARDIOLOGY | Facility: CLINIC | Age: 80
End: 2020-05-14
Payer: COMMERCIAL

## 2020-05-14 VITALS — BODY MASS INDEX: 22.76 KG/M2 | HEIGHT: 67 IN | WEIGHT: 145 LBS

## 2020-05-14 DIAGNOSIS — I35.0 NONRHEUMATIC AORTIC VALVE STENOSIS: Primary | ICD-10-CM

## 2020-05-14 DIAGNOSIS — R94.39 POSITIVE CARDIAC STRESS TEST: ICD-10-CM

## 2020-05-14 PROCEDURE — 99214 OFFICE O/P EST MOD 30 MIN: CPT | Mod: 95 | Performed by: INTERNAL MEDICINE

## 2020-05-14 ASSESSMENT — MIFFLIN-ST. JEOR: SCORE: 1326.35

## 2020-05-14 NOTE — LETTER
5/14/2020      RE: Rose Duong  [address] [place] [address]       Dear Colleague,    Thank you for the opportunity to participate in the care [facility] your [facility]. Please see a copy of my visit note below.    Follow up of aortic stenosis and coronary artery disease.  For full details please see my note from just a few weeks ago.  He had coronary angiography and right heart catheterization.  There was mild to moderate nonobstructive coronary artery disease which does not require intervention.  Aortic valve area by right heart cath was 1.2 cm2 and there was only mild pulmonary hypertension.  Hemoglobin at the time of procedure was 9.6.  It was 10.6 in 2017 but on further review it has been around 9.8 since last summer.  His shortness of breath started in the winter 2019.  He has no heart failure symptoms.  He continues to be out of breath.  He tells me that his right groin access site is fine with no hematoma or signs of infection.  He does measure his blood pressures at home and initially systolics could be around 1 50-1 60 but when he remeasured it again it is around 130.  I have asked him to continue taking his blood pressures at home.  I also explained to him that unless his systolics are consistently above 160 I do not think elevated blood pressure would account for his shortness of breath.  He is a former smoker who gave up many years ago..  There is a CT scan of his chest from the end of 2018 which shows very small pulmonary nodules which do not require follow-up.  There was no other lung pathology.  I did offer chest x-ray though he tells me he would hold off on this until he speaks with his hematologist.    Overall I reassured him that from a cardiac point of view neither his valve nor his coronary artery disease requires any form of intervention right now.  He should continue with  medical therapy for CAD and will continue to follow-up on his aortic valve.  I suspect that his shortness of breath is likely multifactorial due to a combination of age, anemia, aortic valve disease.    Impression  1.  Moderate aortic stenosis, continue regular follow-up with annual echocardiography.  2.  Nonobstructive coronary artery disease.  Continue aspirin and statin, last LDL 2019 was 93.  3.  Anemia, under regular follow-up with Dr. Bean.  4.  Hypertension.  Home monitoring, continue amlodipine.    We will see in 6 months time for continued follow-up.      General Appearance:    no distress, normal body habitus, upright.    ENT/Mouth:    membranes moist, no nasal discharge or bleeding gums. Normal head shape, no evidence of injury or laceration.    EYES:    no scleral icterus, normal conjunctivae    Neck:    no evidence of thyromegaly. Supple    Chest/Lungs:    No audible wheezing equal chest wall expansion. Non labored breathing. No cough.    Cardiovascular:    No evidence of elevated jugular venous pressure. No evidence of pitting edema bilaterally    Abdomen:    no evidence of abdominal distention. No observed jaundice.    Extremities:    no cyanosis or clubbing noted.    Skin:    no xanthelasma, normal skin collar. No evidence of facial lacerations.    Neurologic:    Normal arm motion bilateral, no tremors. No evidence of focal defect.    Psychiatric:    alert and oriented x3, calm    Please do not hesitate to contact me if you have any questions/concerns.     Sincerely,     DR SHILPA ALFRED MD

## 2020-05-14 NOTE — PROGRESS NOTES
"Rose Duong is a 79 year old male who is being evaluated via a billable video visit.      The patient has been notified of following:     \"This video visit will be conducted via a call between you and your physician/provider. We have found that certain health care needs can be provided without the need for an in-person physical exam.  This service lets us provide the care you need with a video conversation.  If a prescription is necessary we can send it directly to your pharmacy.  If lab work is needed we can place an order for that and you can then stop by our lab to have the test done at a later time.    Video visits are billed at different rates depending on your insurance coverage.  Please reach out to your insurance provider with any questions.    If during the course of the call the physician/provider feels a video visit is not appropriate, you will not be charged for this service.\"    Patient has given verbal consent for Video visit? Yes    How would you like to obtain your AVS? Mail a copy    Patient would like the video invitation sent by: Text to cell phone: [phone]    Will anyone else be joining your video visit? No      Bp:N/A  Pulse:N/A  Wt:145.0lb    Review Of Systems  Skin: Change in nails  Eyes:Ears/Nose/Throat: Glasses  Respiratory: SOB w/exertion  Cardiovascular:NEGATIVE  Gastrointestinal: Heartburn  Genitourinary:Urinary Frequency, Nocturia   Musculoskeletal: Arthritis  Neurologic: Numbness or tingling in hands  Psychiatric: NEGATIVE  Hematologic/Lymphatic/Immunologic: NEGATIVE  Endocrine:  NEGATIVE    Renee MALAGON      Video-Visit Details    Type of service:  Video Visit    Video Start Time: 8:28 AM  Video End Time: 8:52 AM    Originating Location (pt. Location): Home    Distant Location (provider location):  [facility]     Platform used for Video Visit: Doximity     Follow up of aortic stenosis and coronary artery disease.  For full details " please see my note from just a few weeks ago.  He had coronary angiography and right heart catheterization.  There was mild to moderate nonobstructive coronary artery disease which does not require intervention.  Aortic valve area by right heart cath was 1.2 cm2 and there was only mild pulmonary hypertension.  Hemoglobin at the time of procedure was 9.6.  It was 10.6 in 2017 but on further review it has been around 9.8 since last summer.  His shortness of breath started in the winter 2019.  He has no heart failure symptoms.  He continues to be out of breath.  He tells me that his right groin access site is fine with no hematoma or signs of infection.  He does measure his blood pressures at home and initially systolics could be around 1 50-1 60 but when he remeasured it again it is around 130.  I have asked him to continue taking his blood pressures at home.  I also explained to him that unless his systolics are consistently above 160 I do not think elevated blood pressure would account for his shortness of breath.  He is a former smoker who gave up many years ago..  There is a CT scan of his chest from the end of 2018 which shows very small pulmonary nodules which do not require follow-up.  There was no other lung pathology.  I did offer chest x-ray though he tells me he would hold off on this until he speaks with his hematologist.    Overall I reassured him that from a cardiac point of view neither his valve nor his coronary artery disease requires any form of intervention right now.  He should continue with medical therapy for CAD and will continue to follow-up on his aortic valve.  I suspect that his shortness of breath is likely multifactorial due to a combination of age, anemia, aortic valve disease.    Impression  1.  Moderate aortic stenosis, continue regular follow-up with annual echocardiography.  2.  Nonobstructive coronary artery disease.  Continue aspirin and statin, last LDL 2019 was 93.  3.  Anemia,  under regular follow-up with Dr. Bean.  4.  Hypertension.  Home monitoring, continue amlodipine.    We will see in 6 months time for continued follow-up.      General Appearance:    no distress, normal body habitus, upright.    ENT/Mouth:    membranes moist, no nasal discharge or bleeding gums. Normal head shape, no evidence of injury or laceration.    EYES:    no scleral icterus, normal conjunctivae    Neck:    no evidence of thyromegaly. Supple    Chest/Lungs:    No audible wheezing equal chest wall expansion. Non labored breathing. No cough.    Cardiovascular:    No evidence of elevated jugular venous pressure. No evidence of pitting edema bilaterally    Abdomen:    no evidence of abdominal distention. No observed jaundice.    Extremities:    no cyanosis or clubbing noted.    Skin:    no xanthelasma, normal skin collar. No evidence of facial lacerations.    Neurologic:    Normal arm motion bilateral, no tremors. No evidence of focal defect.    Psychiatric:    alert and oriented x3, calm  DR SHILPA ALFRED MD

## 2020-09-23 ENCOUNTER — APPOINTMENT (OUTPATIENT)
Dept: GENERAL RADIOLOGY | Facility: CLINIC | Age: 80
End: 2020-09-23
Attending: EMERGENCY MEDICINE
Payer: COMMERCIAL

## 2020-09-23 ENCOUNTER — HOSPITAL ENCOUNTER (EMERGENCY)
Facility: CLINIC | Age: 80
Discharge: HOME | End: 2020-09-23
Attending: EMERGENCY MEDICINE | Admitting: EMERGENCY MEDICINE
Payer: COMMERCIAL

## 2020-09-23 VITALS
SYSTOLIC BLOOD PRESSURE: 122 MMHG | OXYGEN SATURATION: 96 % | RESPIRATION RATE: 20 BRPM | HEART RATE: 83 BPM | TEMPERATURE: 99.8 F | WEIGHT: 145 LBS | HEIGHT: 68 IN | DIASTOLIC BLOOD PRESSURE: 62 MMHG | BODY MASS INDEX: 21.98 KG/M2

## 2020-09-23 DIAGNOSIS — J18.9 PNEUMONIA OF LEFT LUNG DUE TO INFECTIOUS ORGANISM, UNSPECIFIED PART OF LUNG: ICD-10-CM

## 2020-09-23 DIAGNOSIS — R79.89 ELEVATED SERUM CREATININE: ICD-10-CM

## 2020-09-23 DIAGNOSIS — D72.829 LEUKOCYTOSIS, UNSPECIFIED TYPE: ICD-10-CM

## 2020-09-23 LAB
ALBUMIN SERPL-MCNC: 3.9 G/DL (ref 3.4–5)
ALBUMIN UR-MCNC: 10 MG/DL
ALP SERPL-CCNC: 125 U/L (ref 40–150)
ALT SERPL W P-5'-P-CCNC: 22 U/L (ref 0–70)
ANION GAP SERPL CALCULATED.3IONS-SCNC: 6 MMOL/L (ref 3–14)
APPEARANCE UR: CLEAR
AST SERPL W P-5'-P-CCNC: 26 U/L (ref 0–45)
BASOPHILS # BLD AUTO: 0 10E9/L (ref 0–0.2)
BASOPHILS NFR BLD AUTO: 0 %
BILIRUB SERPL-MCNC: 0.6 MG/DL (ref 0.2–1.3)
BILIRUB UR QL STRIP: NEGATIVE
BUN SERPL-MCNC: 34 MG/DL (ref 7–30)
CALCIUM SERPL-MCNC: 9.5 MG/DL (ref 8.5–10.1)
CHLORIDE SERPL-SCNC: 115 MMOL/L (ref 94–109)
CO2 SERPL-SCNC: 17 MMOL/L (ref 20–32)
COLOR UR AUTO: ABNORMAL
CREAT SERPL-MCNC: 1.69 MG/DL (ref 0.66–1.25)
DIFFERENTIAL METHOD BLD: ABNORMAL
EOSINOPHIL # BLD AUTO: 0 10E9/L (ref 0–0.7)
EOSINOPHIL NFR BLD AUTO: 0 %
ERYTHROCYTE [DISTWIDTH] IN BLOOD BY AUTOMATED COUNT: 16.1 % (ref 10–15)
GFR SERPL CREATININE-BSD FRML MDRD: 37 ML/MIN/{1.73_M2}
GLUCOSE SERPL-MCNC: 108 MG/DL (ref 70–99)
GLUCOSE UR STRIP-MCNC: NEGATIVE MG/DL
HCT VFR BLD AUTO: 28.2 % (ref 40–53)
HGB BLD-MCNC: 8.7 G/DL (ref 13.3–17.7)
HGB UR QL STRIP: NEGATIVE
KETONES UR STRIP-MCNC: NEGATIVE MG/DL
LABORATORY COMMENT REPORT: NORMAL
LACTATE BLD-SCNC: 0.7 MMOL/L (ref 0.7–2)
LEUKOCYTE ESTERASE UR QL STRIP: NEGATIVE
LYMPHOCYTES # BLD AUTO: 0.3 10E9/L (ref 0.8–5.3)
LYMPHOCYTES NFR BLD AUTO: 1 %
MCH RBC QN AUTO: 25.3 PG (ref 26.5–33)
MCHC RBC AUTO-ENTMCNC: 30.9 G/DL (ref 31.5–36.5)
MCV RBC AUTO: 82 FL (ref 78–100)
MONOCYTES # BLD AUTO: 3.8 10E9/L (ref 0–1.3)
MONOCYTES NFR BLD AUTO: 12 %
NEUTROPHILS # BLD AUTO: 27.4 10E9/L (ref 1.6–8.3)
NEUTROPHILS NFR BLD AUTO: 87 %
NITRATE UR QL: NEGATIVE
PH UR STRIP: 5 PH (ref 5–7)
PLATELET # BLD AUTO: 393 10E9/L (ref 150–450)
PLATELET # BLD EST: ABNORMAL 10*3/UL
POTASSIUM SERPL-SCNC: 4.2 MMOL/L (ref 3.4–5.3)
PROT SERPL-MCNC: 6.9 G/DL (ref 6.8–8.8)
RBC # BLD AUTO: 3.44 10E12/L (ref 4.4–5.9)
RBC #/AREA URNS AUTO: <1 /HPF (ref 0–2)
RBC MORPH BLD: ABNORMAL
SARS-COV-2 RNA SPEC QL NAA+PROBE: NEGATIVE
SARS-COV-2 RNA SPEC QL NAA+PROBE: NORMAL
SODIUM SERPL-SCNC: 138 MMOL/L (ref 133–144)
SOURCE: ABNORMAL
SP GR UR STRIP: 1.01 (ref 1–1.03)
SPECIMEN SOURCE: NORMAL
SPECIMEN SOURCE: NORMAL
UROBILINOGEN UR STRIP-MCNC: NORMAL MG/DL (ref 0–2)
WBC # BLD AUTO: 31.5 10E9/L (ref 4–11)
WBC #/AREA URNS AUTO: <1 /HPF (ref 0–5)

## 2020-09-23 PROCEDURE — C9803 HOPD COVID-19 SPEC COLLECT: HCPCS

## 2020-09-23 PROCEDURE — U0003 INFECTIOUS AGENT DETECTION BY NUCLEIC ACID (DNA OR RNA); SEVERE ACUTE RESPIRATORY SYNDROME CORONAVIRUS 2 (SARS-COV-2) (CORONAVIRUS DISEASE [COVID-19]), AMPLIFIED PROBE TECHNIQUE, MAKING USE OF HIGH THROUGHPUT TECHNOLOGIES AS DESCRIBED BY CMS-2020-01-R: HCPCS | Performed by: EMERGENCY MEDICINE

## 2020-09-23 PROCEDURE — 99284 EMERGENCY DEPT VISIT MOD MDM: CPT | Mod: 25

## 2020-09-23 PROCEDURE — 81001 URINALYSIS AUTO W/SCOPE: CPT | Performed by: EMERGENCY MEDICINE

## 2020-09-23 PROCEDURE — 25800030 ZZH RX IP 258 OP 636: Performed by: EMERGENCY MEDICINE

## 2020-09-23 PROCEDURE — 96360 HYDRATION IV INFUSION INIT: CPT

## 2020-09-23 PROCEDURE — 80053 COMPREHEN METABOLIC PANEL: CPT | Performed by: EMERGENCY MEDICINE

## 2020-09-23 PROCEDURE — 83605 ASSAY OF LACTIC ACID: CPT | Performed by: EMERGENCY MEDICINE

## 2020-09-23 PROCEDURE — 71045 X-RAY EXAM CHEST 1 VIEW: CPT

## 2020-09-23 PROCEDURE — 96361 HYDRATE IV INFUSION ADD-ON: CPT

## 2020-09-23 PROCEDURE — 85025 COMPLETE CBC W/AUTO DIFF WBC: CPT | Performed by: EMERGENCY MEDICINE

## 2020-09-23 RX ORDER — DOXYCYCLINE 100 MG/1
100 CAPSULE ORAL 2 TIMES DAILY
Qty: 14 CAPSULE | Refills: 0 | Status: SHIPPED | OUTPATIENT
Start: 2020-09-23 | End: 2020-09-30

## 2020-09-23 RX ADMIN — SODIUM CHLORIDE 1000 ML: 9 INJECTION, SOLUTION INTRAVENOUS at 10:57

## 2020-09-23 ASSESSMENT — MIFFLIN-ST. JEOR: SCORE: 1343.57

## 2020-09-23 ASSESSMENT — ENCOUNTER SYMPTOMS
VOMITING: 0
ABDOMINAL PAIN: 1
SORE THROAT: 0
RHINORRHEA: 0

## 2020-09-23 NOTE — ED PROVIDER NOTES
"  History     Chief Complaint:  Generalized Weakness; Diarrhea     The history is provided by the patient.      Rose Duong is an 80 year old male with a history of hypertension and chronic anemia who presents for evaluation of generalized weakness and diarrhea. Rose's hemoglobin was 7.5 2 days ago (as below) but he was feeling well. Around 24 hours ago he developed a \"stomach ache\" which worsened until he had six episodes of nonbloody diarrhea. The abdominal pain resolved, as has his diarrhea, with last episode five hours ago. This morning, his wife took his temperature three times with three different results, maximum of 101F. He wonders about the accuracy of this given the wide range in temperatures. However, he has been feeling generally weak and is concerned that this may be due to anemia given the aforementioned result.    Rose denies emesis, rhinorrhea, sore throat, sick contacts, or known COVID exposure. He endorses cough at triage but tells me this is \"when I get acid reflux\". His wife has not had similar symptoms and that they both ate meatloaf for dinner.     Laboratory Results from [facility] Two Days ago:  CBC: WBC: 14.0 (high), HGB: 7.5 (Critically low), PLT: 310  Chemistries: BUN 29 (high), Creatinine 1.48 (high), CO2 18 (low), GFR 43.7 (low), Iron 14 (low), Iron % Saturation 6 (low), Ferritin 607.00 (high), Reticulocyte count % 2.11 (high), o/w WNL    Allergies:  No Known Drug Allergies    Medications:    Allopurinol  Amlodipine  Finasteride  Crestor    Past Medical History:    Bacteremia  Benign prostatic hyperplasia  DJD  Gout  GERD  Hyperlipidemia  Hypertension  Rotator cuff tear, left   CAD    Past Surgical History:    Appendectomy   Aspiration needle hip, left   Cervical spine surgery  Colonoscopy  Coronary angiogram  Left heart cath  Right heart cath  Decompression lumbar two levels  Fusion cervical anterior one level with bone allograft  Phacoemulsification clear cornea with " "standard intraocular lens implant, right  Phacoemulsification clear cornea with standard intraocular lens implant, left   Rotator cuff repair    Family History:    Heart disease  Cancer, uterine and mouth    Social History:  The patient presents to the ED alone.  Smoking Status: Never Smoker  Smokeless Tobacco: Never Used  Alcohol Use: Yes, beer or two a day  Drug Use: No  PCP: Kian Johns     Review of Systems   Constitutional: Positive for fever.   HENT: Negative for rhinorrhea and sore throat.    Respiratory: Positive for cough (occasional).    Gastrointestinal: Positive for abdominal pain (since resolved) and diarrhea (none in last 5 hours). Negative for vomiting.   Neurological: Positive for weakness (generalized).   All other systems reviewed and are negative.    Physical Exam     Patient Vitals for the past 24 hrs:   BP Temp Temp src Pulse Resp SpO2 Height Weight   09/23/20 1530 122/62 -- -- 83 20 96 % -- --   09/23/20 1323 -- 99.8  F (37.7  C) Oral -- -- -- -- --   09/23/20 1115 119/56 -- -- 87 -- 98 % -- --   09/23/20 1110 -- 99  F (37.2  C) Oral -- -- -- -- --   09/23/20 1100 116/52 -- -- 87 -- 99 % -- --   09/23/20 1012 115/49 99.8  F (37.7  C) Temporal 91 16 99 % 1.737 m (5' 8.4\") 65.8 kg (145 lb)       Physical Exam  General: Well-developed and well-nourished. Well appearing elderly  man. Cooperative.  Head:  Atraumatic.  Eyes:  Conjunctivae, lids, and sclerae are normal.  Neck:  Supple. Normal range of motion.  CV:  Regular rate and rhythm. Systolic murmur without rubs or gallops detected.  Resp:  No respiratory distress. Clear to auscultation bilaterally without decreased breath sounds, wheezing, rales, or rhonchi.  GI:  Soft. Non-distended. Non-tender.    MS:  Normal ROM.   Skin:  Warm. Non-diaphoretic. No pallor.  Neuro:  Awake. A&Ox3. Normal strength.  Psych: Normal mood and affect. Normal speech.  Vitals reviewed.    Emergency Department Course     Imaging:  Radiology findings were " "communicated with the patient who voiced understanding of the findings.    XR Chest Port 1 View:  Left-sided infiltrates seen on frontal view. Right lung clear.  As per radiology.      Laboratory:  Laboratory findings were communicated with the patient who voiced understanding of the findings.    CBC: WBC: 31.5 (high), HGB: 8.7 (low), PLT: 393    CMP: Glucose 108 (high), Chloride 115 (high), CO2 17 (low), Urea 34 (high), Creatinine 1.69 (high), GFR 37 (low) o/w WNL     1051 Lactic Acid Whole Blood: 0.7     UA with Microscopic: Albumin 10, o/w Negative    Symptomatic COVID-19 Virus (Coronavirus) PCR: Negative     Interventions:  1057 NS 1L IV     Emergency Department Course:  Past medical records, nursing notes, and vitals reviewed.    1056 I performed an exam of the patient as documented above.     IV was inserted and blood was drawn for laboratory testing, results above.    The patient provided a urine sample here in the emergency department. This was sent for laboratory testing, findings above.    Portable chest x-ray was used at the patient's bedside, see results above.      1321 I rechecked the patient and discussed the results of his workup thus far. The patient states that he feels fine and on repeat abdominal exam he has no abdominal pain or tenderness.     1526 I rechecked the patient who is feeling \"okay\" and would like to go home.    Findings and plan explained to the patient. Patient discharged home with instructions regarding supportive care, medications, and reasons to return. The importance of close follow-up was reviewed. The patient was prescribed Vibramycin.     I personally reviewed the laboratory and imaging results with the patient and answered all related questions prior to discharge.     Impression & Plan     Covid-19  Rose Duong was evaluated during a global COVID-19 pandemic, which necessitated consideration that the patient might be at risk for infection with the SARS-CoV-2 virus that " "causes COVID-19.   Applicable protocols for evaluation were followed during the patient's care. COVID-19 was considered as part of the patient's evaluation. The plan for testing is: a test was obtained during this visit.    Medical Decision Making:  Rose is an 80-year-old man who had a \"stomachache\" yesterday with several hours of diarrhea.  Since early this morning his symptoms have both resolved although he is feeling quite weak.  He has a history of anemia and became concerned his weakness may be related.  He denies all other concerns or complaints remarking cough occurs \"when I have acid reflux\" and he cannot trust his thermometer at home which gave him 3 different readings in a row although one was febrile.  His exam is unremarkable with a chronic murmu.  His abdominal exam is benign without any tenderness.  Imaging in the abdomen can safely be deferred.  Fortunately, his hemoglobin is above a prior value at 8.7.  Creatinine 1.69 is minimally increased from prior value of 1.48 and he was given IV fluids.  I suspect his diarrhea may be contributing and this will likely improve with the aforementioned fluids.  There is no evidence of biliary obstruction or hepatitis.  Lactic acid is also normal.  However, he has a leukocytosis to 31.5.  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  Given this abnormal finding, I investigated further.  Urinalysis is without evidence of UTI although chest x-ray is concerning for a left-sided pneumonia.  He tells me he was treated with antibiotics a few months ago for pneumonia and did have improvement so I will treat him again with doxycycline for presumed community-acquired pneumonia.  COVID test was obtained and resulted during his stay which was negative.  Aside from the aforementioned IV fluids, he required no further interventions.  On each recheck he was feeling quite well and felt he was appropriate for discharge as he would like " to avoid hospitalization.  I think this is a reasonable course of action as he does not appear severely ill and agrees to follow-up closely with his primary care provider and take his antibiotics.  We discussed return precautions and all of Rose's questions were answered.  He verbalized understanding and prefers discharge with low threshold for return.    Diagnosis:    ICD-10-CM    1. Pneumonia of left lung due to infectious organism, unspecified part of lung  J18.9    2. Leukocytosis, unspecified type  D72.829    3. Elevated serum creatinine  R79.89        Disposition:  Discharged home.    Discharge Medications:  Discharge Medication List as of 9/23/2020  3:35 PM      START taking these medications    Details   doxycycline hyclate (VIBRAMYCIN) 100 MG capsule Take 1 capsule (100 mg) by mouth 2 times daily for 7 days, Disp-14 capsule,R-0, Local Print             Scribe Disclosure:  I, Se Dawn, am serving as a scribe at 10:47 AM on 9/23/2020 to document services personally performed by Mihaela Lazo MD based on my observations and the provider's statements to me.      Mihaela Lazo MD  09/24/20 [id number]

## 2020-09-23 NOTE — DISCHARGE INSTRUCTIONS
Antibiotics as instructed. COVID test is negative.  Follow-up with your hematologist as scheduled.  Follow up with your regular doctor in 2-5 days.  They may want to repeat labs to make sure your white blood cells are going down and your kidney function still looks okay.  You need to drink lots of fluids to make sure your kidneys stay healthy.  Return immediately with worsening symptoms of any kind including, but not limited to, persistent fever greater than 101  F despite Tylenol, worsening shortness of breath or cough, uncontrolled vomiting or diarrhea, or chest or abdominal pain.

## 2020-09-23 NOTE — ED TRIAGE NOTES
Patient in with complaints of low hemoglobin. Complains of cough and SOB. States has had diarrhea. Seeing [facility] and states hemoglobin has been trending downward in the past year. Wife states this is the lowest it has been. Hemoglobin on Monday was 7.5. States diarrhea started last night.

## 2020-09-23 NOTE — ED AVS SNAPSHOT
Emergency Department  [address] [place] [id number]  Phone:  [phone]  Fax:  [phone]                                    Rose Duong   MRN: [id number]    Department:   Emergency Department   Date of Visit:  9/23/2020           After Visit Summary Signature Page    I have received my discharge instructions, and my questions have been answered. I have discussed any challenges I see with this plan with the nurse or doctor.    ..........................................................................................................................................  Patient/Patient Representative Signature      ..........................................................................................................................................  Patient Representative Print Name and Relationship to Patient    ..................................................               ................................................  Date                                   Time    ..........................................................................................................................................  Reviewed by Signature/Title    ...................................................              ..............................................  Date                                               Time          22EPIC Rev 08/18

## 2020-09-24 ASSESSMENT — ENCOUNTER SYMPTOMS
DIARRHEA: 1
WEAKNESS: 1
FEVER: 1
COUGH: 1

## 2021-03-15 ENCOUNTER — HOSPITAL ENCOUNTER (OUTPATIENT)
Dept: ULTRASOUND IMAGING | Facility: CLINIC | Age: 81
Discharge: HOME | End: 2021-03-15
Attending: INTERNAL MEDICINE | Admitting: INTERNAL MEDICINE
Payer: COMMERCIAL

## 2021-03-15 DIAGNOSIS — N18.30 CHRONIC KIDNEY DISEASE, STAGE 3 (H): ICD-10-CM

## 2021-03-15 PROCEDURE — 76770 US EXAM ABDO BACK WALL COMP: CPT

## 2021-03-24 ENCOUNTER — OFFICE VISIT (OUTPATIENT)
Dept: CARDIOLOGY | Facility: CLINIC | Age: 81
End: 2021-03-24
Attending: INTERNAL MEDICINE
Payer: COMMERCIAL

## 2021-03-24 VITALS
DIASTOLIC BLOOD PRESSURE: 53 MMHG | HEIGHT: 67 IN | OXYGEN SATURATION: 98 % | BODY MASS INDEX: 22.9 KG/M2 | HEART RATE: 90 BPM | SYSTOLIC BLOOD PRESSURE: 135 MMHG | WEIGHT: 145.9 LBS

## 2021-03-24 DIAGNOSIS — R94.39 POSITIVE CARDIAC STRESS TEST: ICD-10-CM

## 2021-03-24 DIAGNOSIS — I35.0 NONRHEUMATIC AORTIC VALVE STENOSIS: ICD-10-CM

## 2021-03-24 DIAGNOSIS — I25.10 CORONARY ARTERY DISEASE INVOLVING NATIVE CORONARY ARTERY, ANGINA PRESENCE UNSPECIFIED, UNSPECIFIED WHETHER NATIVE OR TRANSPLANTED HEART: Primary | ICD-10-CM

## 2021-03-24 PROCEDURE — 99214 OFFICE O/P EST MOD 30 MIN: CPT | Performed by: INTERNAL MEDICINE

## 2021-03-24 ASSESSMENT — MIFFLIN-ST. JEOR: SCORE: 1325.43

## 2021-03-24 NOTE — PROGRESS NOTES
HPI and Plan:   See dictation    Orders Placed This Encounter   Procedures     Follow-Up with Cardiologist     Echocardiogram Complete       Orders Placed This Encounter   Medications     aspirin (ASPIRIN) 81 MG EC tablet     Sig: Take 81 mg by mouth daily       Encounter Diagnoses   Name Primary?     Nonrheumatic aortic valve stenosis      Positive cardiac stress test        CURRENT MEDICATIONS:  Current Outpatient Medications   Medication Sig Dispense Refill     Acetaminophen (TYLENOL PO) Take 500 mg by mouth 2 times daily as needed for mild pain or fever        allopurinol (ZYLOPRIM) 300 MG tablet Take 300 mg by mouth daily.       amLODIPine (NORVASC) 5 MG tablet Take 10 mg by mouth daily        aspirin (ASPIRIN) 81 MG EC tablet Take 81 mg by mouth daily       FINASTERIDE PO Take 5 mg by mouth every morning        latanoprost (XALATAN) 0.005 % ophthalmic solution Place 1 drop into both eyes At Bedtime        predniSONE (DELTASONE) 5 MG tablet Take 5 mg by mouth every other day        rosuvastatin (CRESTOR) 20 MG tablet Take 20 mg by mouth daily       tamsulosin (FLOMAX) 0.4 MG 24 hr capsule Take 0.4 mg by mouth every evening          ALLERGIES   No Known Allergies    PAST MEDICAL HISTORY:  Past Medical History:   Diagnosis Date     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      Anemia      Bacteremia      BPH (benign prostatic hyperplasia)      Colon polyps      DJD (degenerative joint disease)      Elevated BP without diagnosis of hypertension      Gastroesophageal reflux disease      Gout      Gout      Hyperlipidemia      Hypertension      Leukocytosis      Rotator cuff tear arthropathy, left      Sinusitis        PAST SURGICAL HISTORY:  Past Surgical History:   Procedure Laterality Date     APPENDECTOMY       ASPIRATION NEEDLE HIP Left 9/25/2017    Procedure: ASPIRATION NEEDLE HIP;;  Surgeon: Moises Elias MD;  Location:  OR     BACK SURGERY  11/07/2017     cervical spine surgery      COLONOSCOPY       CV CORONARY ANGIOGRAM N/A 5/6/2020    Procedure: Coronary Angiogram;  Surgeon: David Woods MD;  Location:  HEART CARDIAC CATH LAB     CV LEFT HEART CATH N/A 5/6/2020    Procedure: Left Heart Cath;  Surgeon: David Woods MD;  Location:  HEART CARDIAC CATH LAB     CV RIGHT HEART CATH MEASUREMENTS RECORDED N/A 5/6/2020    Procedure: Right Heart Cath;  Surgeon: David Woods MD;  Location:  HEART CARDIAC CATH LAB     DECOMPRESSION LUMBAR TWO LEVELS Bilateral 6/11/2018    Procedure: DECOMPRESSION LUMBAR TWO LEVELS;  BILATERAL LUMBAR 3-4 AND LUMBAR 4-5 DECOMPRESSION ;  Surgeon: Sd Vallejo MD;  Location: [facility]     FUSION CERVICAL ANTERIOR ONE LEVEL WITH BONE ALLOGRAFT N/A 9/25/2017    Procedure: FUSION CERVICAL ANTERIOR ONE LEVEL WITH BONE ALLOGRAFT;  ANTERIOR CERVICAL DECOMPRESSION AND FUSIONC3-4 WITH INSTRUMENTATION, ALLOGRAFT AND BONE MARROW ASPIRATE OF THE LEFT ILIAC CREST ;  Surgeon: Moises Elias MD;  Location:  OR     PHACOEMULSIFICATION CLEAR CORNEA W/ STANDARD IOL IMPLANT, ENDOSCOPIC CYCLOPHOTOCOAGULATION, COMBINED Right 12/12/2017    Procedure: COMBINED PHACOEMULSIFICATION CLEAR CORNEA WITH STANDARD INTRAOCULAR LENS IMPLANT, ENDOSCOPIC CYCLOPHOTOCOAGULATION;  COMPLEX RIGHT EYE COMBINED PHACOEMULSIFICATION CLEAR CORNEA WITH STANDARD INTRAOCULAR LENS IMPLANT, ENDOSCOPIC CYCLOPHOTOCOAGULATION ;  Surgeon: Alli Mott MD;  Location:  [facility]     PHACOEMULSIFICATION CLEAR CORNEA W/ STANDARD IOL IMPLANT, ENDOSCOPIC CYCLOPHOTOCOAGULATION, COMBINED Left 5/1/2018    Procedure: COMBINED PHACOEMULSIFICATION CLEAR CORNEA WITH STANDARD INTRAOCULAR LENS IMPLANT, ENDOSCOPIC CYCLOPHOTOCOAGULATION;  COMPLEX LEFT EYE PHACOEMULSIFICATION CLEAR CORNEA WITH STANDARD INTRAOCULAR LENS IMPLANT, ENDOSCOPIC CYCLOPHOTOCOAGULATION ;  Surgeon: Alli Mott MD;  Location:  [facility]     ROTATOR CUFF REPAIR RT/LT  2004       FAMILY  HISTORY:  Family History   Problem Relation Age of Onset     Heart Disease Father        SOCIAL HISTORY:  Social History     Socioeconomic History     Marital status:      Spouse name: None     Number of children: None     Years of education: None     Highest education level: None   Occupational History     None   Social Needs     Financial resource strain: None     Food insecurity     Worry: None     Inability: None     Transportation needs     Medical: None     Non-medical: None   Tobacco Use     Smoking status: Never Smoker     Smokeless tobacco: Never Used   Substance and Sexual Activity     Alcohol use: Yes     Comment: beer or two a day     Drug use: No     Sexual activity: None   Lifestyle     Physical activity     Days per week: None     Minutes per session: None     Stress: None   Relationships     Social connections     Talks on phone: None     Gets together: None     Attends Jew service: None     Active member of club or organization: None     Attends meetings of clubs or organizations: None     Relationship status: None     Intimate partner violence     Fear of current or ex partner: None     Emotionally abused: None     Physically abused: None     Forced sexual activity: None   Other Topics Concern     Parent/sibling w/ CABG, MI or angioplasty before 65F 55M? Not Asked   Social History Narrative     None       Review of Systems:  Skin:  Positive for bruising   Eyes:  Positive for glaucoma  ENT:  Negative    Respiratory:  Positive for dyspnea on exertion  Cardiovascular:  Negative;palpitations;chest pain;dizziness;lightheadedness;syncope or near-syncope;cyanosis;fatigue exercise intolerance;Positive for;edema  Gastroenterology: Negative    Genitourinary:  Positive for prostate problem;urinary frequency;nocturia  Musculoskeletal:  Positive for arthritis;gout  Neurologic:  Negative    Psychiatric:  Negative    Heme/Lymph/Imm:  Negative    Endocrine:  Negative      Physical Exam:  Vitals: BP  "135/53 (BP Location: Left arm, Cuff Size: Adult Regular)   Pulse 90   Ht 1.702 m (5' 7\")   Wt 66.2 kg (145 lb 14.4 oz)   SpO2 98%   BMI 22.85 kg/m      Constitutional:  cooperative, alert and oriented, well developed, well nourished, in no acute distress        Skin:  warm and dry to the touch, no apparent skin lesions or masses noted          Head:  normocephalic, no masses or lesions        Eyes:  pupils equal and round, conjunctivae and lids unremarkable, sclera white, no xanthalasma, EOMS intact, no nystagmus        Lymph:No Cervical lymphadenopathy present     ENT:  no pallor or cyanosis, dentition good        Neck:  carotid pulses are full and equal bilaterally, JVP normal, no carotid bruit        Respiratory:  normal breath sounds, clear to auscultation, normal A-P diameter, normal symmetry, normal respiratory excursion, no use of accessory muscles         Cardiac: regular rhythm;apical impulse not displaced   soft or inaudible A2   systolic murmur;grade 2        pulses full and equal, no bruits auscultated                                        GI:  abdomen soft, non-tender, BS normoactive, no mass, no HSM, no bruits        Extremities and Muscular Skeletal:  no deformities, clubbing, cyanosis, erythema observed              Neurological:  no gross motor deficits        Psych:  Alert and Oriented x 3        Recent Lab Results:  LIPID RESULTS:  No results found for: CHOL, HDL, LDL, TRIG, CHOLHDLRATIO    LIVER ENZYME RESULTS:  Lab Results   Component Value Date    AST 26 09/23/2020    ALT 22 09/23/2020       CBC RESULTS:  Lab Results   Component Value Date    WBC 31.5 (H) 09/23/2020    RBC 3.44 (L) 09/23/2020    HGB 8.7 (L) 09/23/2020    HCT 28.2 (L) 09/23/2020    MCV 82 09/23/2020    MCH 25.3 (L) 09/23/2020    MCHC 30.9 (L) 09/23/2020    RDW 16.1 (H) 09/23/2020     09/23/2020       BMP RESULTS:  Lab Results   Component Value Date     09/23/2020    POTASSIUM 4.2 09/23/2020    CHLORIDE 115 (H) " 09/23/2020    CO2 17 (L) 09/23/2020    ANIONGAP 6 09/23/2020     (H) 09/23/2020    BUN 34 (H) 09/23/2020    CR 1.69 (H) 09/23/2020    GFRESTIMATED 37 (L) 09/23/2020    GFRESTBLACK 43 (L) 09/23/2020    CONNIE 9.5 09/23/2020        A1C RESULTS:  No results found for: A1C    INR RESULTS:  Lab Results   Component Value Date    INR 1.06 05/06/2020           CC  Casimiro Chun MD  [id number]  [place] [id number]

## 2021-03-24 NOTE — LETTER
3/24/2021    Kian Johns MD  [facility] [address] [facility] [address]    RE: Rose Duong       Dear Colleague,    I had the pleasure of seeing Rose Duong in the [facility].    HPI and Plan:   See dictation    Orders Placed This Encounter   Procedures     Follow-Up with Cardiologist     Echocardiogram Complete       Orders Placed This Encounter   Medications     aspirin (ASPIRIN) 81 MG EC tablet     Sig: Take 81 mg by mouth daily       Encounter Diagnoses   Name Primary?     Nonrheumatic aortic valve stenosis      Positive cardiac stress test        CURRENT MEDICATIONS:  Current Outpatient Medications   Medication Sig Dispense Refill     Acetaminophen (TYLENOL PO) Take 500 mg by mouth 2 times daily as needed for mild pain or fever        allopurinol (ZYLOPRIM) 300 MG tablet Take 300 mg by mouth daily.       amLODIPine (NORVASC) 5 MG tablet Take 10 mg by mouth daily        aspirin (ASPIRIN) 81 MG EC tablet Take 81 mg by mouth daily       FINASTERIDE PO Take 5 mg by mouth every morning        latanoprost (XALATAN) 0.005 % ophthalmic solution Place 1 drop into both eyes At Bedtime        predniSONE (DELTASONE) 5 MG tablet Take 5 mg by mouth every other day        rosuvastatin (CRESTOR) 20 MG tablet Take 20 mg by mouth daily       tamsulosin (FLOMAX) 0.4 MG 24 hr capsule Take 0.4 mg by mouth every evening          ALLERGIES   No Known Allergies    PAST MEDICAL HISTORY:  Past Medical History:   Diagnosis Date     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      Anemia      Bacteremia      BPH (benign prostatic hyperplasia)      Colon polyps      DJD (degenerative joint disease)      Elevated BP without diagnosis of hypertension      Gastroesophageal reflux disease      Gout      Gout      Hyperlipidemia      Hypertension      Leukocytosis      Rotator cuff tear arthropathy, left      Sinusitis        PAST  SURGICAL HISTORY:  Past Surgical History:   Procedure Laterality Date     APPENDECTOMY       ASPIRATION NEEDLE HIP Left 9/25/2017    Procedure: ASPIRATION NEEDLE HIP;;  Surgeon: Moises Elias MD;  Location:  OR     BACK SURGERY  11/07/2017    cervical spine surgery      COLONOSCOPY       CV CORONARY ANGIOGRAM N/A 5/6/2020    Procedure: Coronary Angiogram;  Surgeon: David Woods MD;  Location:  HEART CARDIAC CATH LAB     CV LEFT HEART CATH N/A 5/6/2020    Procedure: Left Heart Cath;  Surgeon: David Woods MD;  Location:  HEART CARDIAC CATH LAB     CV RIGHT HEART CATH MEASUREMENTS RECORDED N/A 5/6/2020    Procedure: Right Heart Cath;  Surgeon: David Woods MD;  Location:  HEART CARDIAC CATH LAB     DECOMPRESSION LUMBAR TWO LEVELS Bilateral 6/11/2018    Procedure: DECOMPRESSION LUMBAR TWO LEVELS;  BILATERAL LUMBAR 3-4 AND LUMBAR 4-5 DECOMPRESSION ;  Surgeon: Sd Vallejo MD;  Location:  OR     FUSION CERVICAL ANTERIOR ONE LEVEL WITH BONE ALLOGRAFT N/A 9/25/2017    Procedure: FUSION CERVICAL ANTERIOR ONE LEVEL WITH BONE ALLOGRAFT;  ANTERIOR CERVICAL DECOMPRESSION AND FUSIONC3-4 WITH INSTRUMENTATION, ALLOGRAFT AND BONE MARROW ASPIRATE OF THE LEFT ILIAC CREST ;  Surgeon: Moises Elias MD;  Location:  OR     PHACOEMULSIFICATION CLEAR CORNEA W/ STANDARD IOL IMPLANT, ENDOSCOPIC CYCLOPHOTOCOAGULATION, COMBINED Right 12/12/2017    Procedure: COMBINED PHACOEMULSIFICATION CLEAR CORNEA WITH STANDARD INTRAOCULAR LENS IMPLANT, ENDOSCOPIC CYCLOPHOTOCOAGULATION;  COMPLEX RIGHT EYE COMBINED PHACOEMULSIFICATION CLEAR CORNEA WITH STANDARD INTRAOCULAR LENS IMPLANT, ENDOSCOPIC CYCLOPHOTOCOAGULATION ;  Surgeon: Alli Mott MD;  Location:  [facility]     PHACOEMULSIFICATION CLEAR CORNEA W/ STANDARD IOL IMPLANT, ENDOSCOPIC CYCLOPHOTOCOAGULATION, COMBINED Left 5/1/2018    Procedure: COMBINED PHACOEMULSIFICATION CLEAR CORNEA WITH STANDARD INTRAOCULAR LENS IMPLANT, ENDOSCOPIC  CYCLOPHOTOCOAGULATION;  COMPLEX LEFT EYE PHACOEMULSIFICATION CLEAR CORNEA WITH STANDARD INTRAOCULAR LENS IMPLANT, ENDOSCOPIC CYCLOPHOTOCOAGULATION ;  Surgeon: Alli Mott MD;  Location: [facility]     ROTATOR CUFF REPAIR RT/LT  2004       FAMILY HISTORY:  Family History   Problem Relation Age of Onset     Heart Disease Father        SOCIAL HISTORY:  Social History     Socioeconomic History     Marital status:      Spouse name: None     Number of children: None     Years of education: None     Highest education level: None   Occupational History     None   Social Needs     Financial resource strain: None     Food insecurity     Worry: None     Inability: None     Transportation needs     Medical: None     Non-medical: None   Tobacco Use     Smoking status: Never Smoker     Smokeless tobacco: Never Used   Substance and Sexual Activity     Alcohol use: Yes     Comment: beer or two a day     Drug use: No     Sexual activity: None   Lifestyle     Physical activity     Days per week: None     Minutes per session: None     Stress: None   Relationships     Social connections     Talks on phone: None     Gets together: None     Attends Sikhism service: None     Active member of club or organization: None     Attends meetings of clubs or organizations: None     Relationship status: None     Intimate partner violence     Fear of current or ex partner: None     Emotionally abused: None     Physically abused: None     Forced sexual activity: None   Other Topics Concern     Parent/sibling w/ CABG, MI or angioplasty before 65F 55M? Not Asked   Social History Narrative     None       Review of Systems:  Skin:  Positive for bruising   Eyes:  Positive for glaucoma  ENT:  Negative    Respiratory:  Positive for dyspnea on exertion  Cardiovascular:  Negative;palpitations;chest pain;dizziness;lightheadedness;syncope or near-syncope;cyanosis;fatigue exercise intolerance;Positive for;edema  Gastroenterology: Negative   "  Genitourinary:  Positive for prostate problem;urinary frequency;nocturia  Musculoskeletal:  Positive for arthritis;gout  Neurologic:  Negative    Psychiatric:  Negative    Heme/Lymph/Imm:  Negative    Endocrine:  Negative      Physical Exam:  Vitals: /53 (BP Location: Left arm, Cuff Size: Adult Regular)   Pulse 90   Ht 1.702 m (5' 7\")   Wt 66.2 kg (145 lb 14.4 oz)   SpO2 98%   BMI 22.85 kg/m      Constitutional:  cooperative, alert and oriented, well developed, well nourished, in no acute distress        Skin:  warm and dry to the touch, no apparent skin lesions or masses noted          Head:  normocephalic, no masses or lesions        Eyes:  pupils equal and round, conjunctivae and lids unremarkable, sclera white, no xanthalasma, EOMS intact, no nystagmus        Lymph:No Cervical lymphadenopathy present     ENT:  no pallor or cyanosis, dentition good        Neck:  carotid pulses are full and equal bilaterally, JVP normal, no carotid bruit        Respiratory:  normal breath sounds, clear to auscultation, normal A-P diameter, normal symmetry, normal respiratory excursion, no use of accessory muscles         Cardiac: regular rhythm;apical impulse not displaced   soft or inaudible A2   systolic murmur;grade 2        pulses full and equal, no bruits auscultated                                        GI:  abdomen soft, non-tender, BS normoactive, no mass, no HSM, no bruits        Extremities and Muscular Skeletal:  no deformities, clubbing, cyanosis, erythema observed              Neurological:  no gross motor deficits        Psych:  Alert and Oriented x 3        Recent Lab Results:  LIPID RESULTS:  No results found for: CHOL, HDL, LDL, TRIG, CHOLHDLRATIO    LIVER ENZYME RESULTS:  Lab Results   Component Value Date    AST 26 09/23/2020    ALT 22 09/23/2020       CBC RESULTS:  Lab Results   Component Value Date    WBC 31.5 (H) 09/23/2020    RBC 3.44 (L) 09/23/2020    HGB 8.7 (L) 09/23/2020    HCT 28.2 (L) " 09/23/2020    MCV 82 09/23/2020    MCH 25.3 (L) 09/23/2020    MCHC 30.9 (L) 09/23/2020    RDW 16.1 (H) 09/23/2020     09/23/2020       BMP RESULTS:  Lab Results   Component Value Date     09/23/2020    POTASSIUM 4.2 09/23/2020    CHLORIDE 115 (H) 09/23/2020    CO2 17 (L) 09/23/2020    ANIONGAP 6 09/23/2020     (H) 09/23/2020    BUN 34 (H) 09/23/2020    CR 1.69 (H) 09/23/2020    GFRESTIMATED 37 (L) 09/23/2020    GFRESTBLACK 43 (L) 09/23/2020    CONNIE 9.5 09/23/2020        A1C RESULTS:  No results found for: A1C    INR RESULTS:  Lab Results   Component Value Date    INR 1.06 05/06/2020     Thank you for allowing me to participate in the care of your patient.      Sincerely,     DR SHILPA CHUN MD     [facility]    cc:   Shilpa Chun MD  [id number]  [place] [id number]

## 2021-03-24 NOTE — PROGRESS NOTES
Service Date: 03/24/2021      It is, as always, a pleasure for me to see Mr. Duong.  He is here for followup of valvular heart disease and coronary artery disease.      I had the pleasure of meeting this very delightful gentleman in 12/2018 for the first time.  He had a stress test done in the [facility] system as he was having shortness of breath on exertion.  This showed an area of inferoapical ischemia.  As the amount of myocardium in jeopardy was small, I elected to manage him medically.  However, he had worsening shortness of breath, and in 05/2020 he underwent coronary angiography, which revealed mild to moderate nonobstructive coronary artery disease.      He does have moderate aortic stenosis with an aortic valve area of 1.2 cm2, mean gradient of 31 mmHg.  This has not progressed significantly in recent years.  Other major contributing factor to shortness of breath is chronic anemia.  This was initially labeled as anemia of chronic disease, but there may be something more serious as I do see recent blood counts showing mild increase in white cell count and platelet count.  He is scheduled for bone marrow biopsy.      Currently, his renal function has also been worsening.  Latest creatinine is around 2 and he is under the expert followup of his nephrologist.  Symptomatically, he still has shortness of breath on exertion.  He can only walk 10-20 yards before he has his symptoms, but he has no accompanying PND, orthopnea, ankle swelling or chest discomfort.  Physical examination continues to reveal a 3/6 ejection systolic murmur with a mild decrease in his second heart sound.      His blood work done recently also shows a hemoglobin of around 8.  Previously it was between 9-10.  He is scheduled to start what I believe to be an EPO injection soon.      IMPRESSION:   1.  Moderate aortic stenosis.   2.  Stable coronary artery disease.   3.  Dyslipidemia, on 20 mg of rosuvastatin.  I do not see any recent lipid numbers  and I will check it again in 6 months' time.   4.  Anemia, question myeloproliferative disorder.   5.  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      I reassured this gentleman that his cardiac status is stable, and I think his worsening shortness of breath could well be due to the anemia.  I will hold off on an echocardiogram right now as anemia may be falsely increase the gradient across his aortic valve.  I do plan to do this in 6 months' time when he returns to clinic, and I will check his lipid profile then as well.         SHILPA ALFRED MD, [facility]             D: 2021   T: 2021   MT: vincent      Name:     VALERIE BALLARD   MRN:      [id number]        Account:      [id number]   :      1940           Service Date: 2021      Document: [id number]

## 2021-04-12 ENCOUNTER — MEDICAL CORRESPONDENCE (OUTPATIENT)
Dept: HEALTH INFORMATION MANAGEMENT | Facility: CLINIC | Age: 81
End: 2021-04-12

## 2021-04-27 ENCOUNTER — MEDICAL CORRESPONDENCE (OUTPATIENT)
Dept: HEALTH INFORMATION MANAGEMENT | Facility: CLINIC | Age: 81
End: 2021-04-27

## 2021-04-28 ENCOUNTER — HOSPITAL ENCOUNTER (OUTPATIENT)
Dept: CARDIOLOGY | Facility: CLINIC | Age: 81
Discharge: HOME | End: 2021-04-28
Attending: INTERNAL MEDICINE | Admitting: INTERNAL MEDICINE
Payer: COMMERCIAL

## 2021-04-28 DIAGNOSIS — D63.1 ANEMIA OF CHRONIC RENAL FAILURE: ICD-10-CM

## 2021-04-28 DIAGNOSIS — Z51.11 CHEMOTHERAPY MANAGEMENT, ENCOUNTER FOR: ICD-10-CM

## 2021-04-28 DIAGNOSIS — N18.9 ANEMIA OF CHRONIC RENAL FAILURE: ICD-10-CM

## 2021-04-28 PROCEDURE — 93306 TTE W/DOPPLER COMPLETE: CPT

## 2021-04-28 PROCEDURE — 93306 TTE W/DOPPLER COMPLETE: CPT | Mod: 26 | Performed by: INTERNAL MEDICINE

## 2021-06-24 ENCOUNTER — APPOINTMENT (OUTPATIENT)
Dept: ULTRASOUND IMAGING | Facility: CLINIC | Age: 81
DRG: 872 | End: 2021-06-24
Attending: EMERGENCY MEDICINE
Payer: COMMERCIAL

## 2021-06-24 ENCOUNTER — HOSPITAL ENCOUNTER (INPATIENT)
Facility: CLINIC | Age: 81
LOS: 2 days | Discharge: HOME | DRG: 872 | End: 2021-06-26
Attending: EMERGENCY MEDICINE | Admitting: HOSPITALIST
Payer: COMMERCIAL

## 2021-06-24 ENCOUNTER — APPOINTMENT (OUTPATIENT)
Dept: GENERAL RADIOLOGY | Facility: CLINIC | Age: 81
DRG: 872 | End: 2021-06-24
Attending: EMERGENCY MEDICINE
Payer: COMMERCIAL

## 2021-06-24 ENCOUNTER — APPOINTMENT (OUTPATIENT)
Dept: CT IMAGING | Facility: CLINIC | Age: 81
DRG: 872 | End: 2021-06-24
Attending: EMERGENCY MEDICINE
Payer: COMMERCIAL

## 2021-06-24 DIAGNOSIS — J18.9 COMMUNITY ACQUIRED PNEUMONIA, UNSPECIFIED LATERALITY: ICD-10-CM

## 2021-06-24 DIAGNOSIS — D84.9 IMMUNOCOMPROMISED (H): ICD-10-CM

## 2021-06-24 DIAGNOSIS — A04.72 C. DIFFICILE COLITIS: Primary | ICD-10-CM

## 2021-06-24 LAB
ALBUMIN SERPL-MCNC: 2.9 G/DL (ref 3.4–5)
ALBUMIN UR-MCNC: 10 MG/DL
ALP SERPL-CCNC: 134 U/L (ref 40–150)
ALT SERPL W P-5'-P-CCNC: 42 U/L (ref 0–70)
ANION GAP SERPL CALCULATED.3IONS-SCNC: 7 MMOL/L (ref 3–14)
APPEARANCE UR: CLEAR
AST SERPL W P-5'-P-CCNC: 27 U/L (ref 0–45)
BASOPHILS # BLD AUTO: 0 10E9/L (ref 0–0.2)
BASOPHILS NFR BLD AUTO: 0.1 %
BILIRUB SERPL-MCNC: 0.3 MG/DL (ref 0.2–1.3)
BILIRUB UR QL STRIP: NEGATIVE
BUN SERPL-MCNC: 40 MG/DL (ref 7–30)
CALCIUM SERPL-MCNC: 8.5 MG/DL (ref 8.5–10.1)
CHLORIDE SERPL-SCNC: 109 MMOL/L (ref 94–109)
CO2 SERPL-SCNC: 20 MMOL/L (ref 20–32)
COLOR UR AUTO: ABNORMAL
CREAT BLD-MCNC: 1.8 MG/DL (ref 0.66–1.25)
CREAT SERPL-MCNC: 1.68 MG/DL (ref 0.66–1.25)
DIFFERENTIAL METHOD BLD: ABNORMAL
EOSINOPHIL # BLD AUTO: 0 10E9/L (ref 0–0.7)
EOSINOPHIL NFR BLD AUTO: 0 %
ERYTHROCYTE [DISTWIDTH] IN BLOOD BY AUTOMATED COUNT: 18.6 % (ref 10–15)
GFR SERPL CREATININE-BSD FRML MDRD: 36 ML/MIN/{1.73_M2}
GFR SERPL CREATININE-BSD FRML MDRD: 37 ML/MIN/{1.73_M2}
GLUCOSE SERPL-MCNC: 96 MG/DL (ref 70–99)
GLUCOSE UR STRIP-MCNC: NEGATIVE MG/DL
HCT VFR BLD AUTO: 32.1 % (ref 40–53)
HGB BLD-MCNC: 9.7 G/DL (ref 13.3–17.7)
HGB UR QL STRIP: NEGATIVE
IMM GRANULOCYTES # BLD: 0.1 10E9/L (ref 0–0.4)
IMM GRANULOCYTES NFR BLD: 1.2 %
KETONES UR STRIP-MCNC: NEGATIVE MG/DL
LABORATORY COMMENT REPORT: NORMAL
LACTATE BLD-SCNC: 0.7 MMOL/L (ref 0.7–2)
LEUKOCYTE ESTERASE UR QL STRIP: NEGATIVE
LYMPHOCYTES # BLD AUTO: 0.4 10E9/L (ref 0.8–5.3)
LYMPHOCYTES NFR BLD AUTO: 4.9 %
MCH RBC QN AUTO: 22.5 PG (ref 26.5–33)
MCHC RBC AUTO-ENTMCNC: 30.2 G/DL (ref 31.5–36.5)
MCV RBC AUTO: 75 FL (ref 78–100)
MONOCYTES # BLD AUTO: 0.9 10E9/L (ref 0–1.3)
MONOCYTES NFR BLD AUTO: 12.1 %
NEUTROPHILS # BLD AUTO: 6.2 10E9/L (ref 1.6–8.3)
NEUTROPHILS NFR BLD AUTO: 81.7 %
NITRATE UR QL: NEGATIVE
NRBC # BLD AUTO: 0 10*3/UL
NRBC BLD AUTO-RTO: 0 /100
PH UR STRIP: 5.5 PH (ref 5–7)
PLATELET # BLD AUTO: 175 10E9/L (ref 150–450)
POTASSIUM SERPL-SCNC: 4.2 MMOL/L (ref 3.4–5.3)
PROCALCITONIN SERPL-MCNC: 3.11 NG/ML
PROT SERPL-MCNC: 5.8 G/DL (ref 6.8–8.8)
RBC # BLD AUTO: 4.31 10E12/L (ref 4.4–5.9)
RBC #/AREA URNS AUTO: <1 /HPF (ref 0–2)
SARS-COV-2 RNA RESP QL NAA+PROBE: NEGATIVE
SODIUM SERPL-SCNC: 136 MMOL/L (ref 133–144)
SOURCE: ABNORMAL
SP GR UR STRIP: 1.01 (ref 1–1.03)
SPECIMEN SOURCE: NORMAL
SQUAMOUS #/AREA URNS AUTO: 0 /HPF (ref 0–1)
UROBILINOGEN UR STRIP-MCNC: 0 MG/DL (ref 0–2)
WBC # BLD AUTO: 7.6 10E9/L (ref 4–11)
WBC #/AREA URNS AUTO: <1 /HPF (ref 0–5)

## 2021-06-24 PROCEDURE — 250N000011 HC RX IP 250 OP 636: Performed by: HOSPITALIST

## 2021-06-24 PROCEDURE — 84145 PROCALCITONIN (PCT): CPT | Performed by: HOSPITALIST

## 2021-06-24 PROCEDURE — 120N000001 HC R&B MED SURG/OB

## 2021-06-24 PROCEDURE — 81001 URINALYSIS AUTO W/SCOPE: CPT | Performed by: EMERGENCY MEDICINE

## 2021-06-24 PROCEDURE — 99285 EMERGENCY DEPT VISIT HI MDM: CPT | Mod: 25 | Performed by: HOSPITALIST

## 2021-06-24 PROCEDURE — 250N000011 HC RX IP 250 OP 636: Performed by: EMERGENCY MEDICINE

## 2021-06-24 PROCEDURE — 74177 CT ABD & PELVIS W/CONTRAST: CPT

## 2021-06-24 PROCEDURE — 96361 HYDRATE IV INFUSION ADD-ON: CPT | Performed by: HOSPITALIST

## 2021-06-24 PROCEDURE — 80053 COMPREHEN METABOLIC PANEL: CPT | Performed by: EMERGENCY MEDICINE

## 2021-06-24 PROCEDURE — 96365 THER/PROPH/DIAG IV INF INIT: CPT | Mod: 59 | Performed by: HOSPITALIST

## 2021-06-24 PROCEDURE — 85025 COMPLETE CBC W/AUTO DIFF WBC: CPT | Performed by: EMERGENCY MEDICINE

## 2021-06-24 PROCEDURE — 99223 1ST HOSP IP/OBS HIGH 75: CPT | Mod: AI | Performed by: HOSPITALIST

## 2021-06-24 PROCEDURE — 87086 URINE CULTURE/COLONY COUNT: CPT | Performed by: EMERGENCY MEDICINE

## 2021-06-24 PROCEDURE — 250N000013 HC RX MED GY IP 250 OP 250 PS 637: Performed by: HOSPITALIST

## 2021-06-24 PROCEDURE — C9803 HOPD COVID-19 SPEC COLLECT: HCPCS

## 2021-06-24 PROCEDURE — 76705 ECHO EXAM OF ABDOMEN: CPT

## 2021-06-24 PROCEDURE — 71046 X-RAY EXAM CHEST 2 VIEWS: CPT

## 2021-06-24 PROCEDURE — 87493 C DIFF AMPLIFIED PROBE: CPT | Performed by: HOSPITALIST

## 2021-06-24 PROCEDURE — 87177 OVA AND PARASITES SMEARS: CPT | Performed by: EMERGENCY MEDICINE

## 2021-06-24 PROCEDURE — 87635 SARS-COV-2 COVID-19 AMP PRB: CPT | Performed by: EMERGENCY MEDICINE

## 2021-06-24 PROCEDURE — 87040 BLOOD CULTURE FOR BACTERIA: CPT | Performed by: EMERGENCY MEDICINE

## 2021-06-24 PROCEDURE — 83605 ASSAY OF LACTIC ACID: CPT | Performed by: EMERGENCY MEDICINE

## 2021-06-24 PROCEDURE — 258N000003 HC RX IP 258 OP 636: Performed by: HOSPITALIST

## 2021-06-24 PROCEDURE — 87209 SMEAR COMPLEX STAIN: CPT | Performed by: EMERGENCY MEDICINE

## 2021-06-24 PROCEDURE — 250N000009 HC RX 250: Performed by: EMERGENCY MEDICINE

## 2021-06-24 PROCEDURE — 96375 TX/PRO/DX INJ NEW DRUG ADDON: CPT | Performed by: HOSPITALIST

## 2021-06-24 PROCEDURE — 258N000003 HC RX IP 258 OP 636: Performed by: EMERGENCY MEDICINE

## 2021-06-24 PROCEDURE — 87506 IADNA-DNA/RNA PROBE TQ 6-11: CPT | Performed by: EMERGENCY MEDICINE

## 2021-06-24 PROCEDURE — 82565 ASSAY OF CREATININE: CPT

## 2021-06-24 RX ORDER — LIDOCAINE 40 MG/G
CREAM TOPICAL
Status: DISCONTINUED | OUTPATIENT
Start: 2021-06-24 | End: 2021-06-26 | Stop reason: HOSPADM

## 2021-06-24 RX ORDER — PROCHLORPERAZINE 25 MG
12.5 SUPPOSITORY, RECTAL RECTAL EVERY 12 HOURS PRN
Status: DISCONTINUED | OUTPATIENT
Start: 2021-06-24 | End: 2021-06-26 | Stop reason: HOSPADM

## 2021-06-24 RX ORDER — ACYCLOVIR 400 MG/1
400 TABLET ORAL 2 TIMES DAILY
Status: DISCONTINUED | OUTPATIENT
Start: 2021-06-24 | End: 2021-06-26 | Stop reason: HOSPADM

## 2021-06-24 RX ORDER — TAMSULOSIN HYDROCHLORIDE 0.4 MG/1
0.4 CAPSULE ORAL EVERY EVENING
Status: DISCONTINUED | OUTPATIENT
Start: 2021-06-24 | End: 2021-06-26 | Stop reason: HOSPADM

## 2021-06-24 RX ORDER — AMLODIPINE BESYLATE 10 MG/1
10 TABLET ORAL DAILY
Status: DISCONTINUED | OUTPATIENT
Start: 2021-06-24 | End: 2021-06-26 | Stop reason: HOSPADM

## 2021-06-24 RX ORDER — ROSUVASTATIN CALCIUM 20 MG/1
20 TABLET, COATED ORAL DAILY
Status: DISCONTINUED | OUTPATIENT
Start: 2021-06-24 | End: 2021-06-26 | Stop reason: HOSPADM

## 2021-06-24 RX ORDER — SODIUM BICARBONATE 650 MG/1
650 TABLET ORAL 2 TIMES DAILY
COMMUNITY
End: 2024-01-25

## 2021-06-24 RX ORDER — ACYCLOVIR 400 MG/1
400 TABLET ORAL 2 TIMES DAILY
COMMUNITY

## 2021-06-24 RX ORDER — ALLOPURINOL 300 MG/1
300 TABLET ORAL DAILY
Status: DISCONTINUED | OUTPATIENT
Start: 2021-06-24 | End: 2021-06-26 | Stop reason: HOSPADM

## 2021-06-24 RX ORDER — IOPAMIDOL 755 MG/ML
70 INJECTION, SOLUTION INTRAVASCULAR ONCE
Status: COMPLETED | OUTPATIENT
Start: 2021-06-24 | End: 2021-06-24

## 2021-06-24 RX ORDER — PROCHLORPERAZINE MALEATE 10 MG
10 TABLET ORAL EVERY 6 HOURS PRN
COMMUNITY
End: 2021-08-30

## 2021-06-24 RX ORDER — CEFTRIAXONE 2 G/1
2 INJECTION, POWDER, FOR SOLUTION INTRAMUSCULAR; INTRAVENOUS EVERY 24 HOURS
Status: DISCONTINUED | OUTPATIENT
Start: 2021-06-25 | End: 2021-06-25

## 2021-06-24 RX ORDER — AMLODIPINE BESYLATE 10 MG/1
10 TABLET ORAL DAILY
Status: DISCONTINUED | OUTPATIENT
Start: 2021-06-24 | End: 2021-06-24

## 2021-06-24 RX ORDER — ACETAMINOPHEN 325 MG/1
650 TABLET ORAL EVERY 4 HOURS PRN
Status: DISCONTINUED | OUTPATIENT
Start: 2021-06-24 | End: 2021-06-26 | Stop reason: HOSPADM

## 2021-06-24 RX ORDER — FINASTERIDE 5 MG/1
5 TABLET, FILM COATED ORAL EVERY MORNING
Status: DISCONTINUED | OUTPATIENT
Start: 2021-06-25 | End: 2021-06-26 | Stop reason: HOSPADM

## 2021-06-24 RX ORDER — PREDNISONE 5 MG/1
5 TABLET ORAL EVERY OTHER DAY
Status: DISCONTINUED | OUTPATIENT
Start: 2021-06-25 | End: 2021-06-26 | Stop reason: HOSPADM

## 2021-06-24 RX ORDER — HEPARIN SODIUM 5000 [USP'U]/.5ML
5000 INJECTION, SOLUTION INTRAVENOUS; SUBCUTANEOUS EVERY 12 HOURS
Status: DISCONTINUED | OUTPATIENT
Start: 2021-06-24 | End: 2021-06-26 | Stop reason: HOSPADM

## 2021-06-24 RX ORDER — ONDANSETRON 4 MG/1
4 TABLET, ORALLY DISINTEGRATING ORAL EVERY 6 HOURS PRN
Status: DISCONTINUED | OUTPATIENT
Start: 2021-06-24 | End: 2021-06-26 | Stop reason: HOSPADM

## 2021-06-24 RX ORDER — LATANOPROST 50 UG/ML
1 SOLUTION/ DROPS OPHTHALMIC AT BEDTIME
Status: DISCONTINUED | OUTPATIENT
Start: 2021-06-24 | End: 2021-06-26 | Stop reason: HOSPADM

## 2021-06-24 RX ORDER — PROCHLORPERAZINE MALEATE 5 MG
5 TABLET ORAL EVERY 6 HOURS PRN
Status: DISCONTINUED | OUTPATIENT
Start: 2021-06-24 | End: 2021-06-26 | Stop reason: HOSPADM

## 2021-06-24 RX ORDER — PANTOPRAZOLE SODIUM 20 MG/1
20 TABLET, DELAYED RELEASE ORAL DAILY
COMMUNITY

## 2021-06-24 RX ORDER — AZITHROMYCIN 250 MG/1
250 TABLET, FILM COATED ORAL DAILY
Status: DISCONTINUED | OUTPATIENT
Start: 2021-06-25 | End: 2021-06-25

## 2021-06-24 RX ORDER — DEXAMETHASONE 4 MG/1
20 TABLET ORAL
COMMUNITY
End: 2023-12-28

## 2021-06-24 RX ORDER — FUROSEMIDE 20 MG
20 TABLET ORAL DAILY
Status: ON HOLD | COMMUNITY
End: 2021-08-10

## 2021-06-24 RX ORDER — ASPIRIN 81 MG/1
81 TABLET ORAL DAILY
Status: DISCONTINUED | OUTPATIENT
Start: 2021-06-24 | End: 2021-06-26 | Stop reason: HOSPADM

## 2021-06-24 RX ORDER — AMLODIPINE BESYLATE 10 MG/1
10 TABLET ORAL DAILY
Status: ON HOLD | COMMUNITY
End: 2021-08-10

## 2021-06-24 RX ORDER — ONDANSETRON 2 MG/ML
4 INJECTION INTRAMUSCULAR; INTRAVENOUS EVERY 6 HOURS PRN
Status: DISCONTINUED | OUTPATIENT
Start: 2021-06-24 | End: 2021-06-26 | Stop reason: HOSPADM

## 2021-06-24 RX ORDER — AZITHROMYCIN 500 MG/1
500 INJECTION, POWDER, LYOPHILIZED, FOR SOLUTION INTRAVENOUS ONCE
Status: COMPLETED | OUTPATIENT
Start: 2021-06-24 | End: 2021-06-24

## 2021-06-24 RX ORDER — FINASTERIDE 5 MG/1
5 TABLET, FILM COATED ORAL DAILY
Status: DISCONTINUED | OUTPATIENT
Start: 2021-06-24 | End: 2021-06-24

## 2021-06-24 RX ORDER — CYCLOPHOSPHAMIDE 50 MG/1
500 CAPSULE ORAL WEEKLY
COMMUNITY
End: 2023-12-28

## 2021-06-24 RX ORDER — PANTOPRAZOLE SODIUM 20 MG/1
20 TABLET, DELAYED RELEASE ORAL DAILY
Status: DISCONTINUED | OUTPATIENT
Start: 2021-06-24 | End: 2021-06-26 | Stop reason: HOSPADM

## 2021-06-24 RX ORDER — CEFTRIAXONE 1 G/1
1 INJECTION, POWDER, FOR SOLUTION INTRAMUSCULAR; INTRAVENOUS ONCE
Status: COMPLETED | OUTPATIENT
Start: 2021-06-24 | End: 2021-06-24

## 2021-06-24 RX ORDER — TRIAMCINOLONE ACETONIDE 1 MG/G
CREAM TOPICAL DAILY PRN
COMMUNITY
End: 2023-12-28

## 2021-06-24 RX ORDER — ACETAMINOPHEN 500 MG
500 TABLET ORAL EVERY 8 HOURS PRN
COMMUNITY

## 2021-06-24 RX ORDER — SODIUM BICARBONATE 650 MG/1
650 TABLET ORAL 2 TIMES DAILY
Status: DISCONTINUED | OUTPATIENT
Start: 2021-06-24 | End: 2021-06-26 | Stop reason: HOSPADM

## 2021-06-24 RX ORDER — FINASTERIDE 5 MG/1
5 TABLET, FILM COATED ORAL DAILY
COMMUNITY

## 2021-06-24 RX ADMIN — ROSUVASTATIN CALCIUM 20 MG: 20 TABLET, FILM COATED ORAL at 19:32

## 2021-06-24 RX ADMIN — IOPAMIDOL 70 ML: 755 INJECTION, SOLUTION INTRAVENOUS at 12:12

## 2021-06-24 RX ADMIN — ALLOPURINOL 300 MG: 300 TABLET ORAL at 19:32

## 2021-06-24 RX ADMIN — SODIUM CHLORIDE 60 ML: 9 INJECTION, SOLUTION INTRAVENOUS at 12:13

## 2021-06-24 RX ADMIN — DEXTROSE AND SODIUM CHLORIDE: 5; 900 INJECTION, SOLUTION INTRAVENOUS at 18:59

## 2021-06-24 RX ADMIN — PANTOPRAZOLE SODIUM 20 MG: 20 TABLET, DELAYED RELEASE ORAL at 22:26

## 2021-06-24 RX ADMIN — AZITHROMYCIN MONOHYDRATE 500 MG: 500 INJECTION, POWDER, LYOPHILIZED, FOR SOLUTION INTRAVENOUS at 16:34

## 2021-06-24 RX ADMIN — SODIUM BICARBONATE 650 MG TABLET 650 MG: at 22:25

## 2021-06-24 RX ADMIN — HEPARIN SODIUM 5000 UNITS: 5000 INJECTION, SOLUTION INTRAVENOUS; SUBCUTANEOUS at 19:33

## 2021-06-24 RX ADMIN — TAMSULOSIN HYDROCHLORIDE 0.4 MG: 0.4 CAPSULE ORAL at 19:33

## 2021-06-24 RX ADMIN — ACYCLOVIR 400 MG: 400 TABLET ORAL at 22:25

## 2021-06-24 RX ADMIN — ASPIRIN 81 MG: 81 TABLET, COATED ORAL at 19:32

## 2021-06-24 RX ADMIN — LATANOPROST 1 DROP: 50 SOLUTION/ DROPS OPHTHALMIC at 22:26

## 2021-06-24 RX ADMIN — CEFTRIAXONE SODIUM 1 G: 1 INJECTION, POWDER, FOR SOLUTION INTRAMUSCULAR; INTRAVENOUS at 15:49

## 2021-06-24 RX ADMIN — AMLODIPINE BESYLATE 10 MG: 10 TABLET ORAL at 19:32

## 2021-06-24 RX ADMIN — SODIUM CHLORIDE 1000 ML: 9 INJECTION, SOLUTION INTRAVENOUS at 11:21

## 2021-06-24 ASSESSMENT — MIFFLIN-ST. JEOR: SCORE: 1284.59

## 2021-06-24 ASSESSMENT — ENCOUNTER SYMPTOMS
DIARRHEA: 1
ABDOMINAL DISTENTION: 0
NAUSEA: 0
FEVER: 1
ABDOMINAL PAIN: 1
VOMITING: 0
BLOOD IN STOOL: 0

## 2021-06-24 ASSESSMENT — ACTIVITIES OF DAILY LIVING (ADL): ADLS_ACUITY_SCORE: 10

## 2021-06-24 NOTE — ED TRIAGE NOTES
Pt has diarrhea that started last night. Pt reports that he is currently receiving chemotherapy for the past 6 weeks. No blood in stool, but reports abdominal pain in LLQ. Pt also had a fever last night and this morning. Pt also reports that he is experiencing right hip and groin pain that started yesterday.

## 2021-06-24 NOTE — ED PROVIDER NOTES
"  History   Chief Complaint:  Diarrhea       HPI   Rose Duong is a 81 year old male with history of chemotherapy for the past six weeks for \"kidney cancer\" who presents with diarrhea and 99.9 degree fever since last night. He also complained of left lower abdominal pain last night. Denied blood in stool, abdominal tenderness, nausea, or vomiting, or urinary sxs. Denied history of diarrhea. He noted he feels okay. Of note, he sees Dr. Phillip, oncology.     Review of Systems   Constitutional: Positive for fever.   Gastrointestinal: Positive for abdominal pain and diarrhea. Negative for abdominal distention, blood in stool, nausea and vomiting.   All other systems reviewed and are negative.      Allergies:  The patient has no known allergies.     Medications:  Zovirax  Compazine  Cyclophosphamide  Decadron  Lasix  Norvasc    Past Medical History:    Anemia  Bacteremia  HDL cholesterol with hypertriglyceridemia  BPH  Colon polyps  DJD  Elevated BP  GERD  Gout  Hypertension  Leukocytosis  Left rotator cuff tear arthropathy  Sinusitis  Status post coronary angiogram  Nonrheumatic aortic valve stenosis  Positive cardiac stress test  CAD  Spinal stenosis  Sepsis  BPH   Multiple myeloma  Amyloid nephropathy  Metabolic acidosis  Osteoarthritis  CKD  Aortic valve stenosis  Coronary arteriosclerosis  Nodule of lung  Colon polyps  Lumbar radiculopathy  Systemic infection    Past Surgical History:    Appendectomy  Aspiration needle hip  Cervical spine surgery  Colonoscopy  CV coronary angiogram  CV heart cath x2  Decompression lumbar  Fusion cervical anterior  Phacoemulsification clear cornea with standard IOL implant x2  Rotator cuff repair  Carpal tunnel release    Family History:    Father: heart disease, cancer  Mother: cancer  Brother: cancer    Social History:  Presents to ED alone.    Physical Exam     Patient Vitals for the past 24 hrs:   BP Temp Temp src Pulse Resp SpO2 Height Weight   06/24/21 1250 123/64 98.4  F " "(36.9  C) Oral 90 20 96 % -- --   06/24/21 1103 -- 100.8  F (38.2  C) -- -- -- -- -- --   06/24/21 1101 130/71 -- -- 101 20 97 % 1.702 m (5' 7\") 62.6 kg (138 lb)       Physical Exam  General/Appearance: appears stated age, well-groomed, appears comfortable, warm to the touch  Eyes: EOMI, no scleral injection, no icterus  ENT: MMM  Neck: supple, nl ROM, no stiffness  Cardiovascular: RRR, nl S1S2, systolic murmur, 2+ pulses in all 4 extremities, cap refill <2sec  Respiratory: CTAB, good air movement throughout, no wheezes/rhonchi/rales, no increased WOB, no retractions  GI: abd soft, non-distended, nttp,  no HSM, no rebound, no guarding, nl BS  MSK: FRANK, good tone, no bony abnormality  Skin: warm and well-perfused, no rash, no edema, no ecchymosis, nl turgor  Neuro: GCS 15, alert and oriented, no gross focal neuro deficits  Psych: interacts appropriately  Heme: no petechia, no purpura, no active bleeding        Emergency Department Course     Imaging:  XR Chest 2 Views  IMPRESSION: PA and lateral views of the chest were obtained. Mild  enlargement of the cardiac silhouette. Left basilar patchy pulmonary  opacity, worrisome for infection. No significant pleural effusion or  pneumothorax.  Reading per radiology    VIKY ADAMS MD    US Abdomen Limited  IMPRESSION:    1. Cholelithiasis, borderline gallbladder wall thickening and trace  pericholecystic fluid. These findings are nonspecific, could be  related to underlying liver dysfunction versus less likely acute  cholecystitis. If clinical suspicion of acute cholecystitis, remains  high, HIDA scan can help further characterization.  2. Coarsened hepatic echotexture, likely due to underlying parenchymal  liver disease including hepatic steatosis with or without fibrosis.  Reading per radiology    VIKY ADAMS MD    CT Abdomen Pelvis w Contrast  IMPRESSION:   1. Multiple minimally dilated fluid-filled small bowel loops with no  clear transition point, " nonspecific, can be seen with gastroenteritis.  2. Small right inguinal hernia containing small amount of fluid and  nondilated small bowel loop.  3. Cholelithiasis without CT evidence of acute cholecystitis.  4. Splenomegaly.  5. Bibasilar small nodular and patchy pulmonary opacities most  prominent in the lingula, worrisome for infection.  Reading per radiology    VIKY ADAMS MD    Laboratory:  CBC: WBC 7.6, HGB 9.7 (L),     Creatinine POCT: Creatinine 1.8 (H), GFR estimate 36 (L)    CMP: BUN: 40 (H), GFR: 37 (L) o/w WNL (Creatinine 1.68 (H))      Lactic acid (result time 1318) 0.7     Procalcitonin: 3.11    Blood culture: pending    Urine culture aerobic bacteral: pending    UA with microscopic: protein albumin: 10 (A) o/w WNL    Asymptomatic COVID-19 Virus (Coronavirus) by PCR Nasopharyngeal swab: neg    Emergency Department Course:    Reviewed:  I reviewed nursing notes, vitals, past medical history and care everywhere    Assessments:  1100 I obtained history and examined the patient as noted above.   1312 I rechecked the patient and explained findings.   1515 I rechecked the patient and explained findings, and the patient noted hip pain improvement.     Consults:   1512 I consulted with Dr. Bean, oncology, regarding the patient.   1523 I consulted with Dr. Green, gastroenterology, regarding the patient.     Interventions:  1121 NS 1L IV    Disposition:  The patient was admitted to the hospital under the care of Dr. Green, gastroenterology.       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  He also complained of some lower abdominal pain.  CT does show some gastroenteritis as well as some abnormalities of the gallbladder, that were followed up by ultrasound, but no diverticulitis, appendicitis, other concerning intra-abdominal infection.   Right upper quadrant ultrasound shows maybe early cholecystitis however the patient has absolutely 0 tenderness over his gallbladder, complaints of upper GI problems.  I think this is something that can be watched and not aggressively treated at this point.  CT did show that it appears that he has some pneumonia in lower lobes.  X-ray confirmed this.  He is febrile and tachycardic, making him septic.  I did speak with the on-call oncologist who agrees, with his immunocompromise state as he is on 3 different medications, he would probably benefit from coming in for IV antibiotics.  The patient is in agreement with this plan and will be started on ceftriaxone and azithromycin and admitted to the hospitalist service.    Diagnosis:    ICD-10-CM    1. Community acquired pneumonia, unspecified laterality  J18.9 Blood culture     Blood culture     Asymptomatic SARS-CoV-2 COVID-19 Virus (Coronavirus) by PCR     Procalcitonin   2. Immunocompromised (H)  D84.9        Scribe Disclosure:  Maryjo BLAIR, am serving as a scribe at 11:00 AM on 6/24/2021 to document services personally performed by Maria L Cedeno based on my observations and the provider's statements to me.              Maria L Cedeno MD  06/24/21 [id number]

## 2021-06-24 NOTE — PHARMACY-ADMISSION MEDICATION HISTORY
Pharmacy Medication History  Admission medication history interview status for the 6/24/2021  admission is complete. See EPIC admission navigator for prior to admission medications     Location of Interview: Patient room & called wife at home  Medication history sources: Patient, Patient's family/friend (wife) and [employer]    Significant changes made to the medication list:  -Added acyclovir, cyclophosphamide, dexamethasone, furosemide, sodium bicarb, protonix, prochlorperazine, vit D, TMC cream  -Cyclophosphamide is part of chemo regimen - 50mg x 10 capsules once weekly and Dexamethasone is 20mg day of chemo and 20mg day after chemo weekly.    In the past week, patient estimated taking medication this percent of the time: greater than 90%    Additional medication history information:   -He and wife both believe he is taking amlodipine, last fill [employer] 5/6/21 #90 for 90 day supply, wife could not locate Rx bottle at home.  -Wife did locate tamsulosin bottle at home with couple capsules remaining but patient reported not currently taking, last fill [employer] 3/11/21 #90 for 90 day supply.    Medication reconciliation completed by provider prior to medication history? Yes    Time spent in this activity: 20 min    Prior to Admission medications    Medication Sig Last Dose Taking? Auth Provider   acetaminophen (TYLENOL) 500 MG tablet Take 500 mg by mouth every 8 hours as needed for mild pain prn Yes Unknown, Entered By History   acyclovir (ZOVIRAX) 400 MG tablet Take 400 mg by mouth 2 times daily 6/23/2021 Yes Unknown, Entered By History   allopurinol (ZYLOPRIM) 300 MG tablet Take 300 mg by mouth daily. 6/23/2021 Yes Reported, Patient   amLODIPine (NORVASC) 10 MG tablet Take 10 mg by mouth daily 6/23/2021 Yes Unknown, Entered By History   aspirin (ASPIRIN) 81 MG EC tablet Take 81 mg by mouth daily 6/23/2021 Yes Reported, Patient   CHOLECALCIFEROL PO Take by mouth daily 6/23/2021 Yes Unknown, Entered By  History   cyclophosphamide (CYTOXAN) 50 MG capsule Take 500 mg by mouth once a week 6/21/2021 Yes Unknown, Entered By History   dexamethasone (DECADRON) 4 MG tablet Take 20 mg by mouth Weekly Day of and day after chemo 6/22/2021 Yes Unknown, Entered By History   finasteride (PROSCAR) 5 MG tablet Take 5 mg by mouth daily 6/23/2021 Yes Unknown, Entered By History   furosemide (LASIX) 20 MG tablet Take 20 mg by mouth daily 6/23/2021 Yes Unknown, Entered By History   latanoprost (XALATAN) 0.005 % ophthalmic solution Place 1 drop into both eyes At Bedtime  6/23/2021 Yes Unknown, Entered By History   pantoprazole (PROTONIX) 20 MG EC tablet Take 20 mg by mouth daily 6/23/2021 Yes Unknown, Entered By History   predniSONE (DELTASONE) 5 MG tablet Take 5 mg by mouth every other day  6/23/2021 Yes Reported, Patient   prochlorperazine (COMPAZINE) 10 MG tablet Take 10 mg by mouth every 6 hours as needed for nausea or vomiting prn at not needed Yes Unknown, Entered By History   rosuvastatin (CRESTOR) 20 MG tablet Take 20 mg by mouth daily 6/23/2021 Yes Reported, Patient   sodium bicarbonate 650 MG tablet Take 650 mg by mouth 2 times daily 6/23/2021 Yes Unknown, Entered By History   triamcinolone (KENALOG) 0.1 % external cream Apply topically daily as needed for irritation prn Yes Unknown, Entered By History   tamsulosin (FLOMAX) 0.4 MG 24 hr capsule Take 0.4 mg by mouth every evening  Unknown at Unknown time  Unknown, Entered By History       The information provided in this note is only as accurate as the sources available at the time of update(s)

## 2021-06-24 NOTE — ED NOTES
[facility]  ED Nurse Handoff Report    ED Chief complaint: Diarrhea, Hip Pain, and Groin Pain      ED Diagnosis:   Final diagnoses:   Community acquired pneumonia, unspecified laterality   Immunocompromised (H)       Code Status: not discussed by ED RN     Allergies: No Known Allergies    Patient Story: 81M diarrhea  Focused Assessment:  Pt coming in from home. States he developed diarrhea last night. Some abd pain, improving. On chemo. Also having hip and groin pain that has improved some. See imaging results, pt has pneumonia. Given abx. Pt unable to give stool sample yet.   Labs Ordered and Resulted from Time of ED Arrival Up to the Time of Departure from the ED   CBC WITH PLATELETS DIFFERENTIAL - Abnormal; Notable for the following components:       Result Value    RBC Count 4.31 (*)     Hemoglobin 9.7 (*)     Hematocrit 32.1 (*)     MCV 75 (*)     MCH 22.5 (*)     MCHC 30.2 (*)     RDW 18.6 (*)     Absolute Lymphocytes 0.4 (*)     All other components within normal limits   COMPREHENSIVE METABOLIC PANEL - Abnormal; Notable for the following components:    Urea Nitrogen 40 (*)     Creatinine 1.68 (*)     GFR Estimate 37 (*)     GFR Estimate If Black 43 (*)     Albumin 2.9 (*)     Protein Total 5.8 (*)     All other components within normal limits   CREATININE POCT - Abnormal; Notable for the following components:    Creatinine 1.8 (*)     GFR Estimate 36 (*)     GFR Estimate If Black 44 (*)     All other components within normal limits   ROUTINE UA WITH MICROSCOPIC - Abnormal; Notable for the following components:    Protein Albumin Urine 10 (*)     All other components within normal limits   LACTIC ACID WHOLE BLOOD   SARS-COV-2 (COVID-19) VIRUS RT-PCR   PROCALCITONIN   PERIPHERAL IV CATHETER   ISTAT CREATININE NURSING POCT   FREE WATER   URINE CULTURE AEROBIC BACTERIAL   BLOOD CULTURE   BLOOD CULTURE   ENTERIC BACTERIA AND VIRUS PANEL BY JOSE STOOL   OVA AND PARASITE EXAM ROUTINE  "        Treatments and/or interventions provided: abx, fluids  Patient's response to treatments and/or interventions: pt feeling better    To be done/followed up on inpatient unit:  needs stool sample    Does this patient have any cognitive concerns?: none    Activity level - Baseline/Home:  Independent  Activity Level - Current:   Independent    Patient's Preferred language: English   Needed?: No    Isolation: enteric  Infection: Not Applicable  Patient tested for COVID 19 prior to admission: YES  Bariatric?: No    Vital Signs:   Vitals:    06/24/21 1101 06/24/21 1103 06/24/21 1250   BP: 130/71  123/64   Pulse: 101  90   Resp: 20  20   Temp:  100.8  F (38.2  C) 98.4  F (36.9  C)   TempSrc:   Oral   SpO2: 97%  96%   Weight: 62.6 kg (138 lb)     Height: 1.702 m (5' 7\")         Cardiac Rhythm:     Was the PSS-3 completed:   Yes  What interventions are required if any?               Family Comments:   OBS brochure/video discussed/provided to patient/family: N/A              Name of person given brochure if not patient:               Relationship to patient:     For the majority of the shift this patient's behavior was Green.   Behavioral interventions performed were .    ED NURSE PHONE NUMBER: [phone]         "

## 2021-06-24 NOTE — H&P
[facility]    History and Physical  Hospitalist       Date of Admission:  6/24/2021    Assessment & Plan   Rose Duong is a 81 year old male who is on chemotherapy for light chain amyloidosis.  He has multiple myeloma and an early myelodysplastic syndrome.  He has stage IV chronic kidney disease amyloidosis.  He also has coronary artery disease, aortic stenosis and hypertension.  He is being treated for amyloidosis with dexamethasone, cyclophosphamide and Velcade.  His last treatment was this past Monday.  On Wednesday he developed frequent nonbloody stools and abdominal cramps.  This persisted throughout the evening and continued on today.  He had 100  F temperature yesterday and 100.8  F temperature today in the emergency room.  He has no respiratory symptoms but surprisingly chest x-ray and CT of the abdomen showed bilateral lower lobe infiltrates and his procalcitonin is 3.  He is being admitted to the hospital for possible pneumonia and further evaluation of his diarrhea.  He was given ceftriaxone and azithromycin in the emergency room.    Non-bloody diarrhea  The diarrhea is most likely from Velcade but could also be from infectious diarrhea or associated with the patient's possible pneumonia.    Continue IV fluid    Stool is being sent for enteric pathogens and C. difficile PCR    If the stool studies are negative, we can start an antidiarrheal medicine    Bilateral lower lobe infiltrates suspected to be bacterial pneumonia  He has no respiratory symptoms but his diarrhea could be from pneumonia.  His procalcitonin is 3 and he is immunocompromised.      Continue IV ceftriaxone and azithromycin    Follow-up blood cultures    Repeat procalcitonin tomorrow    Multiple myeloma   Early myelodysplastic syndrome  Anemia, thought mostly due to chronic kidney disease  Light chain amyloidosis   Currently being treated with dexamethasone, cyclophosphamide and Velcade.  He is also on  prednisone 5 mg every other day.    Continue prednisone    Stage 4 chronic kidney disease   Metabolic acidosis due to chronic kidney disease  Hyperuricemia    Stable, monitor    Continue allopurinol and sodium bicarbonate    Coronary artery disease   Aortic stenosis   Hypertension   Stable and asymptomatic.    Continue amlodipine, aspirin, statin    Hold furosemide due to diarrhea    Benign prostatic hypertrophy   Lower urinary tract symptoms   Currently asymptomatic.    Continue tamsulosin and finasteride    Gastroesophageal reflux disease     Continue PPI    DVT Prophylaxis: Heparin SQ  Code Status: Full Code    Disposition: Expected discharge in 2-3 days once diarrhea improves.    Indra Morales MD    Primary Care Physician    Kian Johns    Chief Complaint   Diarrhea.    History is obtained from the patient, electronic health record and emergency department physician    History of Present Illness   Rose Duong is a 81 year old male who has multiple myeloma, and early myelodysplastic syndrome and stage IV chronic kidney disease from light chain amyloidosis.  He is being treated with dexamethasone, cyclophosphamide and Velcade.  His last treatment was this past Monday.  He has been tolerating treatment well.  Yesterday the patient developed abdominal cramping and frequent loose watery stools.  The pain is pretty diffuse and not localized in one area.  He has no right upper quadrant pain or right lower quadrant pain.  His stools have not contained blood.  He recorded his temperature last night at 99.9  F.  He has not had any nausea or vomiting.  His oral intake has been poor over the last day because if he eats anything he needs to have a bowel movement.  He denies any cough, cold, chest pain or shortness of breath.  He has no dysuria.  He has no body aches.   Today he decided to come to the emergency room because of the diarrhea.  His blood pressure was 130/71, temperature 100.8  F, pulse 101,  respiratory 20 and ox saturation 97% on room air.  He was nontoxic appearing.  His lungs were clear his heart was regular with a loud holosystolic murmur his abdomen was benign.  Chest x-ray showed left basilar patchy opacity.  Abdominal ultrasound showed gallstones with a borderline amount of gallbladder wall thickening and trace pericholecystic fluid.  Findings felt to be nonspecific.  CT of the abdomen and pelvis with contrast showed dilated fluid-filled small bowel loops without any transition point which was nonspecific and possibly related to a gastroenteritis.  Splenomegaly was noted as well as bibasilar small nodular and patchy pulmonary opacities most prominent in the lingula.  White blood cell count 7.6 thousand hemoglobin 9.7 g platelets 175,000 creatinine 1.8 BUN 40 lactic acid 0.7 and procalcitonin 3.11.  The patient was given a liter of normal saline, ceftriaxone and azithromycin for pneumonia.    Past Medical History    I have reviewed this patient's medical history and updated it with pertinent information if needed.   Past Medical History:   Diagnosis Date     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      Amyloidosis (H)      Anemia      Bacteremia      BPH (benign prostatic hyperplasia)      CKD (chronic kidney disease) stage 4, 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     Colon polyps      Coronary artery disease involving native coronary artery, angina presence unspecified, unspecified whether native or transplanted heart 05/01/2020    Added automatically from request for surgery [id number]     DJD (degenerative joint disease)      Elevated BP without diagnosis of hypertension      Gastroesophageal reflux disease      Gout      Gout      Hyperlipidemia      Hypertension      Leukocytosis      MDS (myelodysplastic syndrome) (H)      Metabolic acidosis     due to CKD     Mixed hyperlipidemia 05/01/2020    Added automatically from request for surgery  [id number]     Multiple myeloma (H)      Nonrheumatic aortic valve stenosis 05/01/2020    Added automatically from request for surgery [id number]     Rotator cuff tear arthropathy, left      Sinusitis      Spinal stenosis in cervical region 09/25/2017       Past Surgical History   I have reviewed this patient's surgical history and updated it with pertinent information if needed.  Past Surgical History:   Procedure Laterality Date     APPENDECTOMY       ASPIRATION NEEDLE HIP Left 9/25/2017    Procedure: ASPIRATION NEEDLE HIP;;  Surgeon: Moises Elias MD;  Location:  OR     BACK SURGERY  11/07/2017    cervical spine surgery      COLONOSCOPY       CV CORONARY ANGIOGRAM N/A 5/6/2020    Procedure: Coronary Angiogram;  Surgeon: David Woods MD;  Location:  HEART CARDIAC CATH LAB     CV LEFT HEART CATH N/A 5/6/2020    Procedure: Left Heart Cath;  Surgeon: David Woods MD;  Location:  HEART CARDIAC CATH LAB     CV RIGHT HEART CATH MEASUREMENTS RECORDED N/A 5/6/2020    Procedure: Right Heart Cath;  Surgeon: David Woods MD;  Location:  HEART CARDIAC CATH LAB     DECOMPRESSION LUMBAR TWO LEVELS Bilateral 6/11/2018    Procedure: DECOMPRESSION LUMBAR TWO LEVELS;  BILATERAL LUMBAR 3-4 AND LUMBAR 4-5 DECOMPRESSION ;  Surgeon: Sd Vallejo MD;  Location: [facility] OR     FUSION CERVICAL ANTERIOR ONE LEVEL WITH BONE ALLOGRAFT N/A 9/25/2017    Procedure: FUSION CERVICAL ANTERIOR ONE LEVEL WITH BONE ALLOGRAFT;  ANTERIOR CERVICAL DECOMPRESSION AND FUSIONC3-4 WITH INSTRUMENTATION, ALLOGRAFT AND BONE MARROW ASPIRATE OF THE LEFT ILIAC CREST ;  Surgeon: Moises Elias MD;  Location:  OR     PHACOEMULSIFICATION CLEAR CORNEA W/ STANDARD IOL IMPLANT, ENDOSCOPIC CYCLOPHOTOCOAGULATION, COMBINED Right 12/12/2017    Procedure: COMBINED PHACOEMULSIFICATION CLEAR CORNEA WITH STANDARD INTRAOCULAR LENS IMPLANT, ENDOSCOPIC CYCLOPHOTOCOAGULATION;  COMPLEX RIGHT EYE COMBINED PHACOEMULSIFICATION CLEAR  CORNEA WITH STANDARD INTRAOCULAR LENS IMPLANT, ENDOSCOPIC CYCLOPHOTOCOAGULATION ;  Surgeon: Alli Mott MD;  Location: [facility]     PHACOEMULSIFICATION CLEAR CORNEA W/ STANDARD IOL IMPLANT, ENDOSCOPIC CYCLOPHOTOCOAGULATION, COMBINED Left 5/1/2018    Procedure: COMBINED PHACOEMULSIFICATION CLEAR CORNEA WITH STANDARD INTRAOCULAR LENS IMPLANT, ENDOSCOPIC CYCLOPHOTOCOAGULATION;  COMPLEX LEFT EYE PHACOEMULSIFICATION CLEAR CORNEA WITH STANDARD INTRAOCULAR LENS IMPLANT, ENDOSCOPIC CYCLOPHOTOCOAGULATION ;  Surgeon: Alli oMtt MD;  Location: [facility]     ROTATOR CUFF REPAIR RT/LT  2004       Prior to Admission Medications   Prior to Admission Medications   Prescriptions Last Dose Informant Patient Reported? Taking?   Acetaminophen (TYLENOL PO)  Self Yes No   Sig: Take 500 mg by mouth 2 times daily as needed for mild pain or fever    FINASTERIDE PO  Self Yes No   Sig: Take 5 mg by mouth every morning    allopurinol (ZYLOPRIM) 300 MG tablet  Self Yes No   Sig: Take 300 mg by mouth daily.   amLODIPine (NORVASC) 5 MG tablet   Yes No   Sig: Take 10 mg by mouth daily    aspirin (ASPIRIN) 81 MG EC tablet   Yes No   Sig: Take 81 mg by mouth daily   latanoprost (XALATAN) 0.005 % ophthalmic solution  Self Yes No   Sig: Place 1 drop into both eyes At Bedtime    predniSONE (DELTASONE) 5 MG tablet  Self Yes No   Sig: Take 5 mg by mouth every other day    rosuvastatin (CRESTOR) 20 MG tablet   Yes No   Sig: Take 20 mg by mouth daily   tamsulosin (FLOMAX) 0.4 MG 24 hr capsule  Self Yes No   Sig: Take 0.4 mg by mouth every evening       Facility-Administered Medications: None     Allergies   No Known Allergies    Social History   I have reviewed this patient's social history and updated it with pertinent information if needed. Rose Duong  reports that he has never smoked. He has never used smokeless tobacco. He reports current alcohol use. He reports that he does not use drugs.    Family History   I have reviewed this patient's  family history and updated it with pertinent information if needed.   Family History   Problem Relation Age of Onset     Heart Disease Father        Review of Systems   The 10 point Review of Systems is negative other than noted in the HPI or here.     Physical Exam   Temp: 98.4  F (36.9  C) Temp src: Oral BP: 123/64 Pulse: 90   Resp: 20 SpO2: 96 % O2 Device: None (Room air)    Vital Signs with Ranges  Temp:  [98.4  F (36.9  C)-100.8  F (38.2  C)] 98.4  F (36.9  C)  Pulse:  [] 90  Resp:  [20] 20  BP: (123-130)/(64-71) 123/64  SpO2:  [96 %-97 %] 96 %  138 lbs 0 oz    Constitutional: Awake, alert, cooperative, no apparent distress.  Eyes: Conjunctiva and pupils examined and normal.  HEENT: Moist mucous membranes, normal dentition.  Respiratory: Clear to auscultation bilaterally, no crackles or wheezing.  Cardiovascular: Regular rate and rhythm, holosystolic 3/6 murmur  GI: Soft, non-distended, non-tender, normal bowel sounds.  Lymph/Hematologic: No anterior cervical or supraclavicular adenopathy.  Skin: No rashes, no cyanosis, no edema.  Musculoskeletal: No joint swelling, erythema or tenderness.  Neurologic: Cranial nerves 2-12 intact, normal strength and sensation.  Psychiatric: Alert, oriented to person, place and time, no obvious anxiety or depression.    Data   Data reviewed today:  I personally reviewed the chest x-ray image(s) showing LLL infiltrate.  Recent Labs   Lab 06/24/21  1124   WBC 7.6   HGB 9.7*   MCV 75*         POTASSIUM 4.2   CHLORIDE 109   CO2 20   BUN 40*   CR 1.68*   ANIONGAP 7   CONNIE 8.5   GLC 96   ALBUMIN 2.9*   PROTTOTAL 5.8*   BILITOTAL 0.3   ALKPHOS 134   ALT 42   AST 27       Recent Results (from the past 24 hour(s))   CT Abdomen Pelvis w Contrast    Narrative    CT ABDOMEN PELVIS W CONTRAST   6/24/2021 12:17 PM     HISTORY: Abdominal pain (left lower quadrant), diarrhea and low-grade  fever. On chemotherapy.    TECHNIQUE:  CT abdomen and pelvis with 70mL Isovue-370 IV.  Radiation  dose for this scan was reduced using automated exposure control,  adjustment of the mA and/or kV according to patient size, or iterative  reconstruction technique.    COMPARISON: Renal ultrasound on 3/15/2021    FINDINGS:  Lower chest: Lingular and to lesser extent left lower and medial right  middle lobes pulmonary opacities, likely infectious. Small hiatal  hernia.    Abdomen/pelvis:    Hepatobiliary: Cholelithiasis and mild gallbladder wall thickening. No  suspicious focal hepatic lesion.    Pancreas: No main pancreatic ductal dilatation or definite solid  pancreatic mass.    Spleen: The spleen is enlarged measuring 14 cm in craniocaudal  dimension.    Adrenal glands: Nodular thickening of discrete nodularity of the left  adrenal gland. No right adrenal nodule.    Kidneys: No radiodense kidney/ureteral stones or hydronephrosis.    Bowel: Multiple minimally dilated fluid-filled small bowel loops, with  no clear transition point.    Peritoneum: Trace amount of free fluid in the pelvis (series 4 image  175). No free peritoneal portal venous gas.    Pelvic organs: Unremarkable    Vascular: Moderate atherosclerotic vascular calcification of the  abdominal aorta and iliac vessels.    Lymph nodes: No significant abdominal or pelvic lymphadenopathy.    Bones and soft tissue: There is bilateral inguinal hernias with the  right containing a nondilated small bowel loop and small amount of  fluid (series 4 image 206) and the left is fat containing. Mild  degenerative changes of the spine most prominent at L4-5 with disc  height loss and endplate changes.      Impression    IMPRESSION:   1. Multiple minimally dilated fluid-filled small bowel loops with no  clear transition point, nonspecific, can be seen with gastroenteritis.  2. Small right inguinal hernia containing small amount of fluid and  nondilated small bowel loop.  3. Cholelithiasis without CT evidence of acute cholecystitis.  4. Splenomegaly.  5.  Bibasilar small nodular and patchy pulmonary opacities most  prominent in the lingula, worrisome for infection.    VIKY ADAMS MD   US Abdomen Limited    Narrative    US ABDOMEN LIMITED   6/24/2021 2:00 PM     HISTORY:  Pain.  Evaluate gallstones, cholecystitis, common bile duct  dilatation, cholelithiasis and gallbladder wall thickening, low-grade  fever, on chemotherapy.    COMPARISON: CT abdomen pelvis on 6/24/2021    FINDINGS:    Gallbladder: Cholelithiasis and borderline gallbladder wall thickening  measuring 3 mm and trace pericholecystic fluid. Sonographic Mathur's  sign is negative. 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      Bile ducts:  CHD is normal diameter.  No intrahepatic biliary  dilatation. The distal aspect of the common bile duct is obscured by  overlying bowel gas.    Liver: It demonstrates coarsened hepatic echotexture. No focal hepatic  mass is visualized.    Pancreas:  Partially obscured by overlying bowel gas,  but grossly  unremarkable.     Right kidney:  No hydronephrosis or shadowing calculi.    Aorta and IVC:  Not specifically assessed.       Impression    IMPRESSION:    1. Cholelithiasis, borderline gallbladder wall thickening and trace  pericholecystic fluid. These findings are nonspecific, could be  related to underlying liver dysfunction versus less likely acute  cholecystitis. If clinical suspicion of acute cholecystitis, remains  high, HIDA scan can help further characterization.  2. Coarsened hepatic echotexture, likely due to underlying parenchymal  liver disease including hepatic steatosis with or without fibrosis.    VIKY ADAMS MD   XR Chest 2 Views    Narrative    CHEST TWO VIEWS June 24, 2021 2:17 PM     HISTORY: Fever, chemo.    COMPARISON: Chest x-ray on 9/23/2020.      Impression    IMPRESSION: PA and lateral views of the chest were obtained. Mild  enlargement of the cardiac silhouette. Left  basilar patchy pulmonary  opacity, worrisome for infection. No significant pleural effusion or  pneumothorax.    VIKY ADAMS MD

## 2021-06-24 NOTE — PROGRESS NOTES
RECEIVING UNIT ED HANDOFF REVIEW    ED Nurse Handoff Report was reviewed by: Una Zimmerman RN on June 24, 2021 at 5:19 PM

## 2021-06-25 LAB
ANION GAP SERPL CALCULATED.3IONS-SCNC: 6 MMOL/L (ref 3–14)
BACTERIA SPEC CULT: NO GROWTH
BUN SERPL-MCNC: 31 MG/DL (ref 7–30)
C COLI+JEJUNI+LARI FUSA STL QL NAA+PROBE: NOT DETECTED
C DIFF TOX B STL QL: POSITIVE
CALCIUM SERPL-MCNC: 8.6 MG/DL (ref 8.5–10.1)
CHLORIDE SERPL-SCNC: 119 MMOL/L (ref 94–109)
CO2 SERPL-SCNC: 19 MMOL/L (ref 20–32)
CREAT SERPL-MCNC: 1.54 MG/DL (ref 0.66–1.25)
EC STX1 GENE STL QL NAA+PROBE: NOT DETECTED
EC STX2 GENE STL QL NAA+PROBE: NOT DETECTED
ENTERIC PATHOGEN COMMENT: NORMAL
ERYTHROCYTE [DISTWIDTH] IN BLOOD BY AUTOMATED COUNT: 18.6 % (ref 10–15)
GFR SERPL CREATININE-BSD FRML MDRD: 41 ML/MIN/{1.73_M2}
GLUCOSE SERPL-MCNC: 107 MG/DL (ref 70–99)
HCT VFR BLD AUTO: 29 % (ref 40–53)
HGB BLD-MCNC: 8.5 G/DL (ref 13.3–17.7)
Lab: NORMAL
MCH RBC QN AUTO: 22 PG (ref 26.5–33)
MCHC RBC AUTO-ENTMCNC: 29.3 G/DL (ref 31.5–36.5)
MCV RBC AUTO: 75 FL (ref 78–100)
NOROV GI+II ORF1-ORF2 JNC STL QL NAA+PR: NOT DETECTED
O+P STL MICRO: NORMAL
O+P STL MICRO: NORMAL
PLATELET # BLD AUTO: 165 10E9/L (ref 150–450)
POTASSIUM SERPL-SCNC: 3.6 MMOL/L (ref 3.4–5.3)
RBC # BLD AUTO: 3.87 10E12/L (ref 4.4–5.9)
RVA NSP5 STL QL NAA+PROBE: NOT DETECTED
SALMONELLA SP RPOD STL QL NAA+PROBE: NOT DETECTED
SHIGELLA SP+EIEC IPAH STL QL NAA+PROBE: NOT DETECTED
SODIUM SERPL-SCNC: 144 MMOL/L (ref 133–144)
SPECIMEN SOURCE: ABNORMAL
SPECIMEN SOURCE: NORMAL
SPECIMEN SOURCE: NORMAL
V CHOL+PARA RFBL+TRKH+TNAA STL QL NAA+PR: NOT DETECTED
WBC # BLD AUTO: 12.1 10E9/L (ref 4–11)
Y ENTERO RECN STL QL NAA+PROBE: NOT DETECTED

## 2021-06-25 PROCEDURE — 85027 COMPLETE CBC AUTOMATED: CPT | Performed by: HOSPITALIST

## 2021-06-25 PROCEDURE — 250N000011 HC RX IP 250 OP 636: Performed by: HOSPITALIST

## 2021-06-25 PROCEDURE — 99233 SBSQ HOSP IP/OBS HIGH 50: CPT | Performed by: INTERNAL MEDICINE

## 2021-06-25 PROCEDURE — 250N000013 HC RX MED GY IP 250 OP 250 PS 637: Performed by: INTERNAL MEDICINE

## 2021-06-25 PROCEDURE — 80048 BASIC METABOLIC PNL TOTAL CA: CPT | Performed by: HOSPITALIST

## 2021-06-25 PROCEDURE — 120N000001 HC R&B MED SURG/OB

## 2021-06-25 PROCEDURE — 36415 COLL VENOUS BLD VENIPUNCTURE: CPT | Performed by: HOSPITALIST

## 2021-06-25 PROCEDURE — 250N000013 HC RX MED GY IP 250 OP 250 PS 637: Performed by: HOSPITALIST

## 2021-06-25 PROCEDURE — 258N000003 HC RX IP 258 OP 636: Performed by: HOSPITALIST

## 2021-06-25 PROCEDURE — 250N000012 HC RX MED GY IP 250 OP 636 PS 637: Performed by: HOSPITALIST

## 2021-06-25 PROCEDURE — 258N000002 HC RX IP 258 OP 250: Performed by: INTERNAL MEDICINE

## 2021-06-25 RX ORDER — VANCOMYCIN HYDROCHLORIDE 125 MG/1
125 CAPSULE ORAL 4 TIMES DAILY
Status: DISCONTINUED | OUTPATIENT
Start: 2021-06-25 | End: 2021-06-26 | Stop reason: HOSPADM

## 2021-06-25 RX ORDER — SODIUM CHLORIDE 450 MG/100ML
INJECTION, SOLUTION INTRAVENOUS CONTINUOUS
Status: ACTIVE | OUTPATIENT
Start: 2021-06-25 | End: 2021-06-25

## 2021-06-25 RX ADMIN — ACYCLOVIR 400 MG: 400 TABLET ORAL at 20:56

## 2021-06-25 RX ADMIN — ACYCLOVIR 400 MG: 400 TABLET ORAL at 08:35

## 2021-06-25 RX ADMIN — SODIUM CHLORIDE: 4.5 INJECTION, SOLUTION INTRAVENOUS at 08:33

## 2021-06-25 RX ADMIN — ASPIRIN 81 MG: 81 TABLET, COATED ORAL at 08:35

## 2021-06-25 RX ADMIN — HEPARIN SODIUM 5000 UNITS: 5000 INJECTION, SOLUTION INTRAVENOUS; SUBCUTANEOUS at 08:33

## 2021-06-25 RX ADMIN — PREDNISONE 5 MG: 5 TABLET ORAL at 08:35

## 2021-06-25 RX ADMIN — FINASTERIDE 5 MG: 5 TABLET, FILM COATED ORAL at 08:36

## 2021-06-25 RX ADMIN — VANCOMYCIN HYDROCHLORIDE 125 MG: 125 CAPSULE ORAL at 17:53

## 2021-06-25 RX ADMIN — TAMSULOSIN HYDROCHLORIDE 0.4 MG: 0.4 CAPSULE ORAL at 20:56

## 2021-06-25 RX ADMIN — LATANOPROST 1 DROP: 50 SOLUTION/ DROPS OPHTHALMIC at 22:14

## 2021-06-25 RX ADMIN — VANCOMYCIN HYDROCHLORIDE 125 MG: 125 CAPSULE ORAL at 20:56

## 2021-06-25 RX ADMIN — ALLOPURINOL 300 MG: 300 TABLET ORAL at 08:36

## 2021-06-25 RX ADMIN — ROSUVASTATIN CALCIUM 20 MG: 20 TABLET, FILM COATED ORAL at 08:35

## 2021-06-25 RX ADMIN — SODIUM BICARBONATE 650 MG TABLET 650 MG: at 20:56

## 2021-06-25 RX ADMIN — DEXTROSE AND SODIUM CHLORIDE: 5; 900 INJECTION, SOLUTION INTRAVENOUS at 04:43

## 2021-06-25 RX ADMIN — HEPARIN SODIUM 5000 UNITS: 5000 INJECTION, SOLUTION INTRAVENOUS; SUBCUTANEOUS at 20:56

## 2021-06-25 RX ADMIN — VANCOMYCIN HYDROCHLORIDE 125 MG: 125 CAPSULE ORAL at 08:36

## 2021-06-25 RX ADMIN — AMLODIPINE BESYLATE 10 MG: 10 TABLET ORAL at 08:35

## 2021-06-25 RX ADMIN — PANTOPRAZOLE SODIUM 20 MG: 20 TABLET, DELAYED RELEASE ORAL at 08:35

## 2021-06-25 RX ADMIN — VANCOMYCIN HYDROCHLORIDE 125 MG: 125 CAPSULE ORAL at 12:35

## 2021-06-25 RX ADMIN — SODIUM BICARBONATE 650 MG TABLET 650 MG: at 08:36

## 2021-06-25 ASSESSMENT — ACTIVITIES OF DAILY LIVING (ADL)
ADLS_ACUITY_SCORE: 12
ADLS_ACUITY_SCORE: 12
ADLS_ACUITY_SCORE: 10
DIFFICULTY_COMMUNICATING: NO
ADLS_ACUITY_SCORE: 12
DIFFICULTY_EATING/SWALLOWING: NO
ADLS_ACUITY_SCORE: 12
ADLS_ACUITY_SCORE: 12

## 2021-06-25 ASSESSMENT — MIFFLIN-ST. JEOR: SCORE: 1269.63

## 2021-06-25 NOTE — PROGRESS NOTES
Hospitalist service cross-cover note:     Paged by RN regarding positive C difficile. Admitted for diarrheal illness. Start vancomycin PO. Note patient being treated for pneumonia with ceftriaxone and azithromycin, hold order placed on azithromycin for a.m., not due for ceftriaxone until the afternoon.  Per H&P, no respiratory symptoms, treating due to elevated procalcitonin, diarrhea and immunocompromised status.  Given alternate source of infection, would give strong consideration to discontinuation of antibiotics if suspicion for pneumonia is low, though will defer to rounding provider after assessments.     Lionel Boyd MD   Hospitalist  [phone]

## 2021-06-25 NOTE — PLAN OF CARE
Primary Diagnosis: Abd pain, diarrhea  Orientation: AOx4   Aggression Stop Light: Green  Mobility: SBA  Pain Management: denies  Diet: Clear liquid (nurse to advance)  Bowel/Bladder: Continent, voiding adequately, loose watery stools x2 this shift.  Abnormal Lab/Assessments: stool sample for r/o C. Diff pending  Drain/Device/Wound: PIV infusing D5NS @ 100/hr  Consults:   D/C Day/Goals/Place:     Shift Note:  *enteric precautions*, VSS on RA, afebrile, denied n/v or abd pain, started on fluids and int abx in ED, on heparin subq inj for DVT prophylaxis, stool cultures pending.

## 2021-06-25 NOTE — PLAN OF CARE
A&Ox4. VSS on RA, afebrile. Denies pain and nausea overnight. Up to BR multiple times for diarrhea. Up SBA with IV pole. Slept in between cares. Entertic precautions maintained, + c diff results, MD notified. Pt to start PO Vanco today. PIV infusing IVF. Discharge pending.

## 2021-06-25 NOTE — PROVIDER NOTIFICATION
MD Notification    Notified Person: MD    Notified Person Name: Deb    Notification Date/Time: 6/25/2020, 0035    Notification Interaction: page    Purpose of Notification: FYI pt positive for c diff     Orders Received: [place]     Comments:

## 2021-06-25 NOTE — PROGRESS NOTES
[facility]    Medicine Progress Note - Hospitalist Service       Date of Admission:  6/24/2021    Assessment & Plan           Rose Duong is a 81 year old male who is on chemotherapy for light chain amyloidosis.  He has multiple myeloma and an early myelodysplastic syndrome.  He has stage IV chronic kidney disease amyloidosis.  He also has coronary artery disease, aortic stenosis and hypertension.  He is being treated for amyloidosis with dexamethasone, cyclophosphamide and Velcade.  His last treatment was this past Monday.  On Wednesday he developed frequent nonbloody stools and abdominal cramps.  This persisted throughout the evening and continued on today.  He had 100  F temperature yesterday and 100.8  F temperature today in the emergency room.  He has no respiratory symptoms but surprisingly chest x-ray and CT of the abdomen showed bilateral lower lobe infiltrates and his procalcitonin is 3.  He is being admitted for further management.     C.Diff infection  Sepsis due to above  Present with abdominal cramp and multiple loose stools. Temp after admission 100.8 and WBC 12.  Procalcitonin 3.11.  CT abdomen and pelvis showed multiple minimally dilated fluid-filled small bowel loops with no clear transition point, nonspecific, can be seen with gastroenteritis.  Cholelithiasis without CT evidence of acute cholecystitis.  C diff PCR positive on 6/24  - enteric panel pending. Most likely cause of diarrhea as above is C.diff  - Continue with Vancomycin 125mg QID  - change IVF to 0.45NS at 75cc/hr x 12 hrs  - advance to regular diet     Bilateral lower lobe infiltrates on imaging  CT imaging showing bibasilar small nodule and patchy pulmonary opacities most prominent in the lingula.  He has no respiratory symptoms. Presented with diarrhea which is secondary to C.dif.  Given lack of respiratory symptoms, will hold off additional antibiotics and treat only C.diff at this time. Azithromycin and  Ceftriaxone are discontinued. Will monitor for any development of respiratory symptoms and if does then would be reasonable to treat for pneumonia.   - blood culture, no growth to date     Multiple myeloma   Early myelodysplastic syndrome  Anemia, thought mostly due to chronic kidney disease  Light chain amyloidosis   Currently being treated with dexamethasone, cyclophosphamide and Velcade.  He is also on prednisone 5 mg every other day. Baseline hemoglobin ranges from 8-9s  - Continue prednisone  - hemoglobin 8.5     Stage 4 chronic kidney disease   Metabolic acidosis due to chronic kidney disease  Hyperuricemia  - baseline creatinine appears to be 1.8-2s  - Continue allopurinol and sodium bicarbonate  - creatinine is stable     Coronary artery disease   Aortic stenosis   Hypertension   Stable and asymptomatic. Follows at [facility].   - Continue amlodipine, aspirin, statin  - Hold furosemide due to diarrhea     Benign prostatic hypertrophy   Lower urinary tract symptoms   Currently asymptomatic.  - Continue tamsulosin and finasteride     Gastroesophageal reflux disease   - Continue PPI       Diet: Advance Diet as Tolerated: Clear Liquid Diet    DVT Prophylaxis: Heparin SQ  Burns Catheter: Not present  Central Lines: None  Code Status: Full Code      Disposition Plan   Expected discharge: 2 - 3 days, recommended to prior living arrangement once improved diarrhea, C.diff treatment..     The patient's care was discussed with the Bedside Nurse and Patient.    Ziggy Shin MD  Hospitalist Service  [facility]  Securely message with the [email or web address] Web Console (learn more here)  Text page via [employer] Paging/Directory      Risk Factors Present on Admission              # Platelet Defect: home medication list includes an antiplatelet medication      ______________________________________________________________________    Interval History   Reports multiple loose stools overnight and does not feel  like it is a bit better.  He denies nausea or vomiting.  He denies abdominal pain.  Denies cough or shortness of breath.  T-max overnight 100.8    Data reviewed today: I reviewed all medications, new labs and imaging results over the last 24 hours. I personally reviewed no images or EKG's today.    Physical Exam   Vital Signs: Temp: 98.7  F (37.1  C) Temp src: Oral BP: 126/60 Pulse: 82   Resp: 16 SpO2: 95 % O2 Device: None (Room air)    Weight: 134 lbs 11.22 oz  General Appearance: Alert, awake, no apparent distress  Respiratory: Clear to auscultation bilaterally, no wheezing  Cardiovascular: Regular rate and rhythm, positive systolic murmur, no lower extremity edema  GI: Soft, nontender, nondistended  Skin: Warm and dry      Data   Recent Labs   Lab 06/25/21  0723 06/24/21  1124   WBC 12.1* 7.6   HGB 8.5* 9.7*   MCV 75* 75*    175    136   POTASSIUM 3.6 4.2   CHLORIDE 119* 109   CO2 19* 20   BUN 31* 40*   CR 1.54* 1.68*   ANIONGAP 6 7   CONNIE 8.6 8.5   * 96   ALBUMIN  --  2.9*   PROTTOTAL  --  5.8*   BILITOTAL  --  0.3   ALKPHOS  --  134   ALT  --  42   AST  --  27     Recent Results (from the past 24 hour(s))   CT Abdomen Pelvis w Contrast    Narrative    CT ABDOMEN PELVIS W CONTRAST   6/24/2021 12:17 PM     HISTORY: Abdominal pain (left lower quadrant), diarrhea and low-grade  fever. On chemotherapy.    TECHNIQUE:  CT abdomen and pelvis with 70mL Isovue-370 IV. Radiation  dose for this scan was reduced using automated exposure control,  adjustment of the mA and/or kV according to patient size, or iterative  reconstruction technique.    COMPARISON: Renal ultrasound on 3/15/2021    FINDINGS:  Lower chest: Lingular and to lesser extent left lower and medial right  middle lobes pulmonary opacities, likely infectious. Small hiatal  hernia.    Abdomen/pelvis:    Hepatobiliary: Cholelithiasis and mild gallbladder wall thickening. No  suspicious focal hepatic lesion.    Pancreas: No main pancreatic  ductal dilatation or definite solid  pancreatic mass.    Spleen: The spleen is enlarged measuring 14 cm in craniocaudal  dimension.    Adrenal glands: Nodular thickening of discrete nodularity of the left  adrenal gland. No right adrenal nodule.    Kidneys: No radiodense kidney/ureteral stones or hydronephrosis.    Bowel: Multiple minimally dilated fluid-filled small bowel loops, with  no clear transition point.    Peritoneum: Trace amount of free fluid in the pelvis (series 4 image  175). No free peritoneal portal venous gas.    Pelvic organs: Unremarkable    Vascular: Moderate atherosclerotic vascular calcification of the  abdominal aorta and iliac vessels.    Lymph nodes: No significant abdominal or pelvic lymphadenopathy.    Bones and soft tissue: There is bilateral inguinal hernias with the  right containing a nondilated small bowel loop and small amount of  fluid (series 4 image 206) and the left is fat containing. Mild  degenerative changes of the spine most prominent at L4-5 with disc  height loss and endplate changes.      Impression    IMPRESSION:   1. Multiple minimally dilated fluid-filled small bowel loops with no  clear transition point, nonspecific, can be seen with gastroenteritis.  2. Small right inguinal hernia containing small amount of fluid and  nondilated small bowel loop.  3. Cholelithiasis without CT evidence of acute cholecystitis.  4. Splenomegaly.  5. Bibasilar small nodular and patchy pulmonary opacities most  prominent in the lingula, worrisome for infection.    VIKY ADAMS MD   US Abdomen Limited    Narrative    US ABDOMEN LIMITED   6/24/2021 2:00 PM     HISTORY:  Pain.  Evaluate gallstones, cholecystitis, common bile duct  dilatation, cholelithiasis and gallbladder wall thickening, low-grade  fever, on chemotherapy.    COMPARISON: CT abdomen pelvis on 6/24/2021    FINDINGS:    Gallbladder: Cholelithiasis and borderline gallbladder wall thickening  measuring 3 mm and trace  pericholecystic fluid. Sonographic Mathur's  sign is negative. 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      Bile ducts:  CHD is normal diameter.  No intrahepatic biliary  dilatation. The distal aspect of the common bile duct is obscured by  overlying bowel gas.    Liver: It demonstrates coarsened hepatic echotexture. No focal hepatic  mass is visualized.    Pancreas:  Partially obscured by overlying bowel gas,  but grossly  unremarkable.     Right kidney:  No hydronephrosis or shadowing calculi.    Aorta and IVC:  Not specifically assessed.       Impression    IMPRESSION:    1. Cholelithiasis, borderline gallbladder wall thickening and trace  pericholecystic fluid. These findings are nonspecific, could be  related to underlying liver dysfunction versus less likely acute  cholecystitis. If clinical suspicion of acute cholecystitis, remains  high, HIDA scan can help further characterization.  2. Coarsened hepatic echotexture, likely due to underlying parenchymal  liver disease including hepatic steatosis with or without fibrosis.    VIKY ADAMS MD   XR Chest 2 Views    Narrative    CHEST TWO VIEWS June 24, 2021 2:17 PM     HISTORY: Fever, chemo.    COMPARISON: Chest x-ray on 9/23/2020.      Impression    IMPRESSION: PA and lateral views of the chest were obtained. Mild  enlargement of the cardiac silhouette. Left basilar patchy pulmonary  opacity, worrisome for infection. No significant pleural effusion or  pneumothorax.    VIKY ADAMS MD     Medications     dextrose 5% and 0.9% NaCl 100 mL/hr at 06/25/21 0443       acyclovir  400 mg Oral BID     allopurinol  300 mg Oral Daily     amLODIPine  10 mg Oral Daily     aspirin  81 mg Oral Daily     [Held by provider] azithromycin  250 mg Oral Daily     cefTRIAXone  2 g Intravenous Q24H     finasteride  5 mg Oral QAM     heparin ANTICOAGULANT  5,000 Units Subcutaneous Q12H     latanoprost  1 drop  Both Eyes At Bedtime     pantoprazole  20 mg Oral Daily     predniSONE  5 mg Oral Every Other Day     rosuvastatin  20 mg Oral Daily     sodium bicarbonate  650 mg Oral BID     sodium chloride (PF)  3 mL Intracatheter Q8H     tamsulosin  0.4 mg Oral QPM     vancomycin  125 mg Oral 4x Daily

## 2021-06-25 NOTE — PLAN OF CARE
A&Ox4. Denies pain. Doing well up independently. Advanced to regular diet, tolerating well. C-diff and oral vanco education given. Watery diarrhea, states amount starting to lessen with each occurrence, skin CDI, tamie lotion given for self cares. Plan discharge home when diarrhea managed.

## 2021-06-26 VITALS
RESPIRATION RATE: 18 BRPM | HEIGHT: 67 IN | TEMPERATURE: 97.1 F | DIASTOLIC BLOOD PRESSURE: 63 MMHG | WEIGHT: 136 LBS | HEART RATE: 82 BPM | OXYGEN SATURATION: 96 % | BODY MASS INDEX: 21.35 KG/M2 | SYSTOLIC BLOOD PRESSURE: 143 MMHG

## 2021-06-26 LAB
ANION GAP SERPL CALCULATED.3IONS-SCNC: 6 MMOL/L (ref 3–14)
BUN SERPL-MCNC: 22 MG/DL (ref 7–30)
CALCIUM SERPL-MCNC: 8.7 MG/DL (ref 8.5–10.1)
CHLORIDE SERPL-SCNC: 116 MMOL/L (ref 94–109)
CO2 SERPL-SCNC: 19 MMOL/L (ref 20–32)
CREAT SERPL-MCNC: 1.44 MG/DL (ref 0.66–1.25)
ERYTHROCYTE [DISTWIDTH] IN BLOOD BY AUTOMATED COUNT: 18.8 % (ref 10–15)
GFR SERPL CREATININE-BSD FRML MDRD: 45 ML/MIN/{1.73_M2}
GLUCOSE SERPL-MCNC: 157 MG/DL (ref 70–99)
HCT VFR BLD AUTO: 28.4 % (ref 40–53)
HGB BLD-MCNC: 8.4 G/DL (ref 13.3–17.7)
MCH RBC QN AUTO: 22.4 PG (ref 26.5–33)
MCHC RBC AUTO-ENTMCNC: 29.6 G/DL (ref 31.5–36.5)
MCV RBC AUTO: 76 FL (ref 78–100)
PLATELET # BLD AUTO: 158 10E9/L (ref 150–450)
POTASSIUM SERPL-SCNC: 3.3 MMOL/L (ref 3.4–5.3)
POTASSIUM SERPL-SCNC: 3.7 MMOL/L (ref 3.4–5.3)
RBC # BLD AUTO: 3.75 10E12/L (ref 4.4–5.9)
SODIUM SERPL-SCNC: 141 MMOL/L (ref 133–144)
WBC # BLD AUTO: 10.1 10E9/L (ref 4–11)

## 2021-06-26 PROCEDURE — 36415 COLL VENOUS BLD VENIPUNCTURE: CPT | Performed by: INTERNAL MEDICINE

## 2021-06-26 PROCEDURE — 99239 HOSP IP/OBS DSCHRG MGMT >30: CPT | Performed by: INTERNAL MEDICINE

## 2021-06-26 PROCEDURE — 85027 COMPLETE CBC AUTOMATED: CPT | Performed by: INTERNAL MEDICINE

## 2021-06-26 PROCEDURE — 80048 BASIC METABOLIC PNL TOTAL CA: CPT | Performed by: INTERNAL MEDICINE

## 2021-06-26 PROCEDURE — 250N000013 HC RX MED GY IP 250 OP 250 PS 637: Performed by: INTERNAL MEDICINE

## 2021-06-26 PROCEDURE — 250N000013 HC RX MED GY IP 250 OP 250 PS 637: Performed by: HOSPITALIST

## 2021-06-26 PROCEDURE — 84132 ASSAY OF SERUM POTASSIUM: CPT | Performed by: INTERNAL MEDICINE

## 2021-06-26 PROCEDURE — 250N000011 HC RX IP 250 OP 636: Performed by: HOSPITALIST

## 2021-06-26 RX ORDER — POTASSIUM CHLORIDE 1500 MG/1
20 TABLET, EXTENDED RELEASE ORAL ONCE
Status: COMPLETED | OUTPATIENT
Start: 2021-06-26 | End: 2021-06-26

## 2021-06-26 RX ORDER — VANCOMYCIN HYDROCHLORIDE 125 MG/1
125 CAPSULE ORAL 4 TIMES DAILY
Qty: 36 CAPSULE | Refills: 0 | Status: SHIPPED | OUTPATIENT
Start: 2021-06-26 | End: 2021-07-05

## 2021-06-26 RX ADMIN — HEPARIN SODIUM 5000 UNITS: 5000 INJECTION, SOLUTION INTRAVENOUS; SUBCUTANEOUS at 08:18

## 2021-06-26 RX ADMIN — ALLOPURINOL 300 MG: 300 TABLET ORAL at 08:18

## 2021-06-26 RX ADMIN — VANCOMYCIN HYDROCHLORIDE 125 MG: 125 CAPSULE ORAL at 13:41

## 2021-06-26 RX ADMIN — PANTOPRAZOLE SODIUM 20 MG: 20 TABLET, DELAYED RELEASE ORAL at 08:18

## 2021-06-26 RX ADMIN — SODIUM BICARBONATE 650 MG TABLET 650 MG: at 08:19

## 2021-06-26 RX ADMIN — POTASSIUM CHLORIDE 20 MEQ: 1500 TABLET, EXTENDED RELEASE ORAL at 10:48

## 2021-06-26 RX ADMIN — AMLODIPINE BESYLATE 10 MG: 10 TABLET ORAL at 08:18

## 2021-06-26 RX ADMIN — ASPIRIN 81 MG: 81 TABLET, COATED ORAL at 08:18

## 2021-06-26 RX ADMIN — VANCOMYCIN HYDROCHLORIDE 125 MG: 125 CAPSULE ORAL at 08:18

## 2021-06-26 RX ADMIN — FINASTERIDE 5 MG: 5 TABLET, FILM COATED ORAL at 08:18

## 2021-06-26 RX ADMIN — ACYCLOVIR 400 MG: 400 TABLET ORAL at 08:19

## 2021-06-26 RX ADMIN — ROSUVASTATIN CALCIUM 20 MG: 20 TABLET, FILM COATED ORAL at 08:18

## 2021-06-26 ASSESSMENT — MIFFLIN-ST. JEOR: SCORE: 1275.52

## 2021-06-26 ASSESSMENT — ACTIVITIES OF DAILY LIVING (ADL)
ADLS_ACUITY_SCORE: 10
ADLS_ACUITY_SCORE: 10
ADLS_ACUITY_SCORE: 12
ADLS_ACUITY_SCORE: 10

## 2021-06-26 NOTE — PLAN OF CARE
A&Ox4. Up IND in room. regular diet. VSS on RA. Denies pain. PIV SL. Patient had no diarrhea since before 1500 on 6/25. Discharge possible in AM since diarrhea has improved.

## 2021-06-26 NOTE — PLAN OF CARE
Positive for c-diff. Diarrhea improving.  No BMs on evening shift. Tolerating diet. Bottom is getting sore offered wet wipes and continue to turn in bed. Up independently in room. Plan to discharge home if diarrhea continues to improve.    Pt doesn't feel hungry often related from current illness and before admission and with chemo. Educated on small frequent meals.

## 2021-06-26 NOTE — DISCHARGE INSTRUCTIONS
Follow-up and recommended labs and tests       Follow up with primary care provider, Kian Johns, within 7 days for   hospital follow- up.  The following labs/tests are recommended: Basic   metabolic panel.

## 2021-06-26 NOTE — PLAN OF CARE
A&Ox4. Ambulating independently within room.  Enteric isolation for c. Dif, taking vancomycin PO.  Last BM yesterday.  Passing flatus.  Denied pain.  Voiding in bathroom.  Good appetite on regular diet.  Potassium 3.3, replaced and recheck 3.7.  Text page sent to MD regarding results.  Probable discharge to home this afternoon.  Wife contacted and informed on prevention of spread of bacteria (not sharing bathroom, washing his clothing separately from hers, etc).  Nursing will continue to monitor.

## 2021-06-26 NOTE — PLAN OF CARE
Discharge instructions reviewed with pt.  IV removed.  Medications given to Pt.  Wife called and she is coming to pick him up.

## 2021-06-26 NOTE — PROGRESS NOTES
Discharge Note    Patient discharged to home via private vehicle  accompanied by significant other .  IV: Discontinued  Prescriptions filled and given to patient/family.   Belongings reviewed and sent with patient.   Home medications returned to patient: NA  Equipment sent with: patient, N/A.   patient verbalizes understanding of discharge instructions. AVS given to patient.

## 2021-06-30 LAB
BACTERIA SPEC CULT: NO GROWTH
BACTERIA SPEC CULT: NO GROWTH
SPECIMEN SOURCE: NORMAL
SPECIMEN SOURCE: NORMAL

## 2021-08-09 ENCOUNTER — HOSPITAL ENCOUNTER (OUTPATIENT)
Facility: CLINIC | Age: 81
Setting detail: OBSERVATION
Discharge: HOME | End: 2021-08-10
Attending: PHYSICIAN ASSISTANT | Admitting: HOSPITALIST
Payer: COMMERCIAL

## 2021-08-09 DIAGNOSIS — D69.6 THROMBOCYTOPENIA (H): ICD-10-CM

## 2021-08-09 DIAGNOSIS — I35.0 SEVERE AORTIC STENOSIS: ICD-10-CM

## 2021-08-09 DIAGNOSIS — C64.9 RENAL CANCER (H): ICD-10-CM

## 2021-08-09 DIAGNOSIS — R55 SYNCOPE: ICD-10-CM

## 2021-08-09 DIAGNOSIS — D64.9 ANEMIA: ICD-10-CM

## 2021-08-09 LAB
ABO/RH(D): ABNORMAL
ALBUMIN SERPL-MCNC: 2.8 G/DL (ref 3.4–5)
ALP SERPL-CCNC: 114 U/L (ref 40–150)
ALT SERPL W P-5'-P-CCNC: 31 U/L (ref 0–70)
ANION GAP SERPL CALCULATED.3IONS-SCNC: 9 MMOL/L (ref 3–14)
ANTIBODY SCREEN: POSITIVE
ANTIBODY UNIDENTIFIED: NORMAL
AST SERPL W P-5'-P-CCNC: 21 U/L (ref 0–45)
ATRIAL RATE - MUSE: 74 BPM
BASOPHILS # BLD AUTO: 0 10E3/UL (ref 0–0.2)
BASOPHILS NFR BLD AUTO: 0 %
BILIRUB DIRECT SERPL-MCNC: 0.2 MG/DL (ref 0–0.2)
BILIRUB SERPL-MCNC: 0.6 MG/DL (ref 0.2–1.3)
BUN SERPL-MCNC: 29 MG/DL (ref 7–30)
CALCIUM SERPL-MCNC: 8.1 MG/DL (ref 8.5–10.1)
CHLORIDE BLD-SCNC: 110 MMOL/L (ref 94–109)
CO2 SERPL-SCNC: 17 MMOL/L (ref 20–32)
CREAT SERPL-MCNC: 1.69 MG/DL (ref 0.66–1.25)
DIASTOLIC BLOOD PRESSURE - MUSE: NORMAL MMHG
EOSINOPHIL # BLD AUTO: 0.2 10E3/UL (ref 0–0.7)
EOSINOPHIL NFR BLD AUTO: 3 %
ERYTHROCYTE [DISTWIDTH] IN BLOOD BY AUTOMATED COUNT: 28.4 % (ref 10–15)
FERRITIN SERPL-MCNC: 453 NG/ML (ref 26–388)
FOLATE SERPL-MCNC: 12.7 NG/ML
GFR SERPL CREATININE-BSD FRML MDRD: 37 ML/MIN/1.73M2
GLUCOSE BLD-MCNC: 125 MG/DL (ref 70–99)
HCT VFR BLD AUTO: 22.5 % (ref 40–53)
HGB BLD-MCNC: 6.7 G/DL (ref 13.3–17.7)
HGB BLD-MCNC: 7 G/DL (ref 13.3–17.7)
HOLD SPECIMEN: NORMAL
IMM GRANULOCYTES # BLD: 0.1 10E3/UL
IMM GRANULOCYTES NFR BLD: 2 %
INTERPRETATION ECG - MUSE: NORMAL
IRON SATN MFR SERPL: 4 % (ref 15–46)
IRON SERPL-MCNC: 11 UG/DL (ref 35–180)
LDH SERPL L TO P-CCNC: 163 U/L (ref 85–227)
LDH SERPL L TO P-CCNC: 235 U/L (ref 85–227)
LYMPHOCYTES # BLD AUTO: 0 10E3/UL (ref 0.8–5.3)
LYMPHOCYTES NFR BLD AUTO: 0 %
MCH RBC QN AUTO: 25.3 PG (ref 26.5–33)
MCHC RBC AUTO-ENTMCNC: 31.1 G/DL (ref 31.5–36.5)
MCV RBC AUTO: 81 FL (ref 78–100)
MONOCYTES # BLD AUTO: 0.1 10E3/UL (ref 0–1.3)
MONOCYTES NFR BLD AUTO: 2 %
NEUTROPHILS # BLD AUTO: 5 10E3/UL (ref 1.6–8.3)
NEUTROPHILS NFR BLD AUTO: 93 %
NRBC # BLD AUTO: 0.1 10E3/UL
NRBC BLD AUTO-RTO: 2 /100
P AXIS - MUSE: 50 DEGREES
PLATELET # BLD AUTO: 86 10E3/UL (ref 150–450)
POTASSIUM BLD-SCNC: 3.6 MMOL/L (ref 3.4–5.3)
PR INTERVAL - MUSE: 188 MS
PROT SERPL-MCNC: 4.8 G/DL (ref 6.8–8.8)
QRS DURATION - MUSE: 90 MS
QT - MUSE: 416 MS
QTC - MUSE: 461 MS
R AXIS - MUSE: 38 DEGREES
RBC # BLD AUTO: 2.77 10E6/UL (ref 4.4–5.9)
SARS-COV-2 RNA RESP QL NAA+PROBE: NEGATIVE
SODIUM SERPL-SCNC: 136 MMOL/L (ref 133–144)
SPECIMEN EXPIRATION DATE: ABNORMAL
SPECIMEN EXPIRATION DATE: NORMAL
SYSTOLIC BLOOD PRESSURE - MUSE: NORMAL MMHG
T AXIS - MUSE: 52 DEGREES
TIBC SERPL-MCNC: 258 UG/DL (ref 240–430)
TROPONIN I SERPL-MCNC: <0.015 UG/L (ref 0–0.04)
VENTRICULAR RATE- MUSE: 74 BPM
WBC # BLD AUTO: 5.3 10E3/UL (ref 4–11)

## 2021-08-09 PROCEDURE — 86922 COMPATIBILITY TEST ANTIGLOB: CPT | Performed by: PATHOLOGY

## 2021-08-09 PROCEDURE — 87635 SARS-COV-2 COVID-19 AMP PRB: CPT | Performed by: PHYSICIAN ASSISTANT

## 2021-08-09 PROCEDURE — 36415 COLL VENOUS BLD VENIPUNCTURE: CPT | Performed by: PATHOLOGY

## 2021-08-09 PROCEDURE — 86902 BLOOD TYPE ANTIGEN DONOR EA: CPT | Performed by: PATHOLOGY

## 2021-08-09 PROCEDURE — 99220 PR INITIAL OBSERVATION CARE,LEVEL III: CPT | Performed by: HOSPITALIST

## 2021-08-09 PROCEDURE — 86870 RBC ANTIBODY IDENTIFICATION: CPT | Performed by: PHYSICIAN ASSISTANT

## 2021-08-09 PROCEDURE — 82728 ASSAY OF FERRITIN: CPT | Performed by: HOSPITALIST

## 2021-08-09 PROCEDURE — 86901 BLOOD TYPING SEROLOGIC RH(D): CPT | Mod: 91 | Performed by: PHYSICIAN ASSISTANT

## 2021-08-09 PROCEDURE — 83615 LACTATE (LD) (LDH) ENZYME: CPT | Mod: 91 | Performed by: PHYSICIAN ASSISTANT

## 2021-08-09 PROCEDURE — 86970 RBC PRETX INCUBATJ W/CHEMICL: CPT | Performed by: PATHOLOGY

## 2021-08-09 PROCEDURE — 84999 UNLISTED CHEMISTRY PROCEDURE: CPT | Performed by: PATHOLOGY

## 2021-08-09 PROCEDURE — 93005 ELECTROCARDIOGRAM TRACING: CPT

## 2021-08-09 PROCEDURE — 36415 COLL VENOUS BLD VENIPUNCTURE: CPT | Performed by: PHYSICIAN ASSISTANT

## 2021-08-09 PROCEDURE — 258N000003 HC RX IP 258 OP 636: Performed by: PHYSICIAN ASSISTANT

## 2021-08-09 PROCEDURE — 258N000003 HC RX IP 258 OP 636: Performed by: HOSPITALIST

## 2021-08-09 PROCEDURE — 82746 ASSAY OF FOLIC ACID SERUM: CPT | Performed by: HOSPITALIST

## 2021-08-09 PROCEDURE — 86900 BLOOD TYPING SEROLOGIC ABO: CPT | Performed by: PATHOLOGY

## 2021-08-09 PROCEDURE — 36415 COLL VENOUS BLD VENIPUNCTURE: CPT | Performed by: HOSPITALIST

## 2021-08-09 PROCEDURE — 86901 BLOOD TYPING SEROLOGIC RH(D): CPT | Performed by: PATHOLOGY

## 2021-08-09 PROCEDURE — 99285 EMERGENCY DEPT VISIT HI MDM: CPT | Mod: 25

## 2021-08-09 PROCEDURE — 96374 THER/PROPH/DIAG INJ IV PUSH: CPT

## 2021-08-09 PROCEDURE — 86870 RBC ANTIBODY IDENTIFICATION: CPT | Performed by: PATHOLOGY

## 2021-08-09 PROCEDURE — 250N000013 HC RX MED GY IP 250 OP 250 PS 637: Performed by: HOSPITALIST

## 2021-08-09 PROCEDURE — 80048 BASIC METABOLIC PNL TOTAL CA: CPT | Performed by: PHYSICIAN ASSISTANT

## 2021-08-09 PROCEDURE — 85018 HEMOGLOBIN: CPT | Mod: 91 | Performed by: HOSPITALIST

## 2021-08-09 PROCEDURE — C9803 HOPD COVID-19 SPEC COLLECT: HCPCS

## 2021-08-09 PROCEDURE — 83010 ASSAY OF HAPTOGLOBIN QUANT: CPT | Performed by: HOSPITALIST

## 2021-08-09 PROCEDURE — 83550 IRON BINDING TEST: CPT | Performed by: HOSPITALIST

## 2021-08-09 PROCEDURE — 82607 VITAMIN B-12: CPT | Performed by: HOSPITALIST

## 2021-08-09 PROCEDURE — G0378 HOSPITAL OBSERVATION PER HR: HCPCS

## 2021-08-09 PROCEDURE — 85025 COMPLETE CBC W/AUTO DIFF WBC: CPT | Performed by: PHYSICIAN ASSISTANT

## 2021-08-09 PROCEDURE — 83615 LACTATE (LD) (LDH) ENZYME: CPT | Performed by: HOSPITALIST

## 2021-08-09 PROCEDURE — 82040 ASSAY OF SERUM ALBUMIN: CPT | Performed by: PHYSICIAN ASSISTANT

## 2021-08-09 PROCEDURE — 84484 ASSAY OF TROPONIN QUANT: CPT | Performed by: PHYSICIAN ASSISTANT

## 2021-08-09 PROCEDURE — 86870 RBC ANTIBODY IDENTIFICATION: CPT | Performed by: INTERNAL MEDICINE

## 2021-08-09 PROCEDURE — 250N000011 HC RX IP 250 OP 636: Performed by: HOSPITALIST

## 2021-08-09 RX ORDER — ACETAMINOPHEN 650 MG/1
650 SUPPOSITORY RECTAL EVERY 6 HOURS PRN
Status: DISCONTINUED | OUTPATIENT
Start: 2021-08-09 | End: 2021-08-10 | Stop reason: HOSPADM

## 2021-08-09 RX ORDER — ONDANSETRON 4 MG/1
4 TABLET, ORALLY DISINTEGRATING ORAL EVERY 6 HOURS PRN
Status: DISCONTINUED | OUTPATIENT
Start: 2021-08-09 | End: 2021-08-10 | Stop reason: HOSPADM

## 2021-08-09 RX ORDER — ACETAMINOPHEN 325 MG/1
650 TABLET ORAL EVERY 6 HOURS PRN
Status: DISCONTINUED | OUTPATIENT
Start: 2021-08-09 | End: 2021-08-10 | Stop reason: HOSPADM

## 2021-08-09 RX ORDER — PREDNISONE 5 MG/1
5 TABLET ORAL EVERY OTHER DAY
Status: DISCONTINUED | OUTPATIENT
Start: 2021-08-10 | End: 2021-08-10 | Stop reason: HOSPADM

## 2021-08-09 RX ORDER — ACYCLOVIR 400 MG/1
400 TABLET ORAL 2 TIMES DAILY
Status: DISCONTINUED | OUTPATIENT
Start: 2021-08-09 | End: 2021-08-10 | Stop reason: HOSPADM

## 2021-08-09 RX ORDER — PROCHLORPERAZINE MALEATE 10 MG
10 TABLET ORAL EVERY 6 HOURS PRN
Status: DISCONTINUED | OUTPATIENT
Start: 2021-08-09 | End: 2021-08-10 | Stop reason: HOSPADM

## 2021-08-09 RX ORDER — SODIUM BICARBONATE 650 MG/1
650 TABLET ORAL 2 TIMES DAILY
Status: DISCONTINUED | OUTPATIENT
Start: 2021-08-09 | End: 2021-08-10 | Stop reason: HOSPADM

## 2021-08-09 RX ORDER — PANTOPRAZOLE SODIUM 20 MG/1
20 TABLET, DELAYED RELEASE ORAL DAILY
Status: DISCONTINUED | OUTPATIENT
Start: 2021-08-09 | End: 2021-08-10 | Stop reason: HOSPADM

## 2021-08-09 RX ORDER — SODIUM CHLORIDE 9 MG/ML
INJECTION, SOLUTION INTRAVENOUS CONTINUOUS
Status: ACTIVE | OUTPATIENT
Start: 2021-08-09 | End: 2021-08-10

## 2021-08-09 RX ORDER — ALLOPURINOL 300 MG/1
300 TABLET ORAL DAILY
Status: DISCONTINUED | OUTPATIENT
Start: 2021-08-09 | End: 2021-08-10 | Stop reason: HOSPADM

## 2021-08-09 RX ORDER — FINASTERIDE 5 MG/1
5 TABLET, FILM COATED ORAL DAILY
Status: DISCONTINUED | OUTPATIENT
Start: 2021-08-10 | End: 2021-08-10 | Stop reason: HOSPADM

## 2021-08-09 RX ORDER — ROSUVASTATIN CALCIUM 20 MG/1
20 TABLET, COATED ORAL DAILY
Status: DISCONTINUED | OUTPATIENT
Start: 2021-08-09 | End: 2021-08-10 | Stop reason: HOSPADM

## 2021-08-09 RX ORDER — ONDANSETRON 2 MG/ML
4 INJECTION INTRAMUSCULAR; INTRAVENOUS EVERY 6 HOURS PRN
Status: DISCONTINUED | OUTPATIENT
Start: 2021-08-09 | End: 2021-08-10 | Stop reason: HOSPADM

## 2021-08-09 RX ORDER — ACETAMINOPHEN 500 MG
500 TABLET ORAL EVERY 8 HOURS PRN
Status: DISCONTINUED | OUTPATIENT
Start: 2021-08-09 | End: 2021-08-10 | Stop reason: HOSPADM

## 2021-08-09 RX ORDER — LATANOPROST 50 UG/ML
1 SOLUTION/ DROPS OPHTHALMIC AT BEDTIME
Status: DISCONTINUED | OUTPATIENT
Start: 2021-08-09 | End: 2021-08-10 | Stop reason: HOSPADM

## 2021-08-09 RX ADMIN — ROSUVASTATIN CALCIUM 20 MG: 20 TABLET, FILM COATED ORAL at 20:42

## 2021-08-09 RX ADMIN — SODIUM CHLORIDE: 9 INJECTION, SOLUTION INTRAVENOUS at 20:45

## 2021-08-09 RX ADMIN — SODIUM BICARBONATE 650 MG TABLET 650 MG: at 20:43

## 2021-08-09 RX ADMIN — LATANOPROST 1 DROP: 50 SOLUTION/ DROPS OPHTHALMIC at 22:18

## 2021-08-09 RX ADMIN — Medication 1 MG: at 22:20

## 2021-08-09 RX ADMIN — SODIUM CHLORIDE 1000 ML: 9 INJECTION, SOLUTION INTRAVENOUS at 12:50

## 2021-08-09 RX ADMIN — PANTOPRAZOLE SODIUM 20 MG: 20 TABLET, DELAYED RELEASE ORAL at 20:42

## 2021-08-09 RX ADMIN — ACYCLOVIR 400 MG: 400 TABLET ORAL at 20:42

## 2021-08-09 RX ADMIN — ALLOPURINOL 300 MG: 300 TABLET ORAL at 20:43

## 2021-08-09 RX ADMIN — IRON SUCROSE 300 MG: 20 INJECTION, SOLUTION INTRAVENOUS at 23:41

## 2021-08-09 ASSESSMENT — ENCOUNTER SYMPTOMS
NECK PAIN: 1
FEVER: 0
VOMITING: 0
ABDOMINAL PAIN: 0
LIGHT-HEADEDNESS: 1
HEADACHES: 0
SHORTNESS OF BREATH: 0
BLOOD IN STOOL: 0

## 2021-08-09 ASSESSMENT — MIFFLIN-ST. JEOR: SCORE: 1275.52

## 2021-08-09 NOTE — ED NOTES
Bed: ED05  Expected date:   Expected time:   Means of arrival:   Comments:  [place] 1 Orthostatic hypotension 81 m

## 2021-08-09 NOTE — ED NOTES
Blood bank called and stated that patient has a positive antibody screen and they will need to do additional testing before they can transfuse. This means that there will be a delay while tests are being processed.

## 2021-08-09 NOTE — ED TRIAGE NOTES
Pt woke up feeling normal today. Eating/drinking normally. Had chemo. Developed upset stomach. Passed out at home. Did not hit head. LOC lasted approx 1min. Was found to be orthostatic positive by EMS. Denies blood thinners.

## 2021-08-09 NOTE — PHARMACY-ADMISSION MEDICATION HISTORY
"Pharmacy Medication History  Admission medication history interview status for the 8/9/2021  admission is complete. See EPIC admission navigator for prior to admission medications     Location of Interview: Patient room  Medication history sources: Patient and Surescripts    Significant changes made to the medication list:  Removed daily aspirin 81 mg - patient reported self-discontinuing one week ago due to bleeding.  Changed dexamethasone from day before and day after chemo --> day of chemo (per patient).     In the past week, patient estimated taking medication this percent of the time: greater than 90%    Additional medication history information:   Patient picked up an anti-diarrheal medication 8/9/21 that he had not taken any doses of prior to admission, and could not remember the name of it (stated started with an \"M\").     Medication reconciliation completed by provider prior to medication history? No    Time spent in this activity: 15    Prior to Admission medications    Medication Sig Last Dose Taking? Auth Provider   acetaminophen (TYLENOL) 500 MG tablet Take 500 mg by mouth every 8 hours as needed for mild pain 8/9/2021 at AM Yes Unknown, Entered By History   acyclovir (ZOVIRAX) 400 MG tablet Take 400 mg by mouth 2 times daily 8/9/2021 at AM Yes Unknown, Entered By History   allopurinol (ZYLOPRIM) 300 MG tablet Take 300 mg by mouth daily. 8/8/2021 at 1200 Yes Reported, Patient   amLODIPine (NORVASC) 10 MG tablet Take 10 mg by mouth daily 8/8/2021 at PM Yes Unknown, Entered By History   CHOLECALCIFEROL PO Take by mouth daily 8/9/2021 at AM Yes Unknown, Entered By History   cyclophosphamide (CYTOXAN) 50 MG capsule Take 500 mg by mouth once a week 8/9/2021 at AM Yes Unknown, Entered By History   dexamethasone (DECADRON) 4 MG tablet Take 20 mg by mouth Weekly Day of chemo 8/9/2021 at AM Yes Unknown, Entered By History   finasteride (PROSCAR) 5 MG tablet Take 5 mg by mouth daily 8/8/2021 at PM Yes Unknown, " Entered By History   furosemide (LASIX) 20 MG tablet Take 20 mg by mouth daily 8/9/2021 at AM Yes Unknown, Entered By History   latanoprost (XALATAN) 0.005 % ophthalmic solution Place 1 drop into both eyes At Bedtime  8/8/2021 at PM Yes Unknown, Entered By History   pantoprazole (PROTONIX) 20 MG EC tablet Take 20 mg by mouth daily 8/8/2021 at PM Yes Unknown, Entered By History   predniSONE (DELTASONE) 5 MG tablet Take 5 mg by mouth every other day  8/8/2021 at AM Yes Reported, Patient   prochlorperazine (COMPAZINE) 10 MG tablet Take 10 mg by mouth every 6 hours as needed for nausea or vomiting Past Week at Unknown time Yes Unknown, Entered By History   rosuvastatin (CRESTOR) 20 MG tablet Take 20 mg by mouth daily 8/8/2021 at 1200 Yes Reported, Patient   sodium bicarbonate 650 MG tablet Take 650 mg by mouth 2 times daily 8/9/2021 at AM Yes Unknown, Entered By History   tamsulosin (FLOMAX) 0.4 MG 24 hr capsule Take 0.4 mg by mouth every evening  8/8/2021 at PM Yes Unknown, Entered By History   triamcinolone (KENALOG) 0.1 % external cream Apply topically daily as needed for irritation 8/8/2021 at Unknown time Yes Unknown, Entered By History       The information provided in this note is only as accurate as the sources available at the time of update(s)     Amelia Gan, PharmD

## 2021-08-09 NOTE — PROGRESS NOTES
RECEIVING UNIT ED HANDOFF REVIEW    ED Nurse Handoff Report was reviewed by: Nica Sanchez RN on August 9, 2021 at 4:20 PM

## 2021-08-09 NOTE — PLAN OF CARE
Arrived to floor from ED 1645. A&Ox4. VSS on RA. Orthos negative. IV SL. Denies pain, SOB, dizziness, lightheadedness, nausea. Up SBA. Hgb 7.0, recheck at 2200, plan to transfuse if < 7.0. Blood consent signed in paper chart --- Note positive antibody screen, so blood bank running additional tests which will delay blood availability. [place] [facility] diet. Skin intact, harsha, scattered bruising to all extremities. Plan for cardiology and PT consults.

## 2021-08-09 NOTE — ED PROVIDER NOTES
History   Chief Complaint:  Dizziness       HPI   Rose Duong is a 81 year old male with history of aortic stenosis, BPH, CKD, hypertension, hyperlipidemia, immunocompromised, multiple myeloma, anemia and a systolic murmur, who presents with syncopal episode. Per EMS the patient was feeling normal this morning before his chemotherapy and felt normal after as well. He then ate lunch, and after lunch developed some lightheadedness and went to lie down when he had a syncopal episode. He had about 1 minute loss of consciousness and his wife called EMS. He did not hit his head. He was found to be orthostatic by EMS and given 1 L of fluids. In the ED the patient reports he is feeling much better than before. He states he has chemo every Monday and this was his 8th session. He notes waking up with some neck pain this morning that is now resolved, no headache. He denies vomiting, fever, chest pain, shortness of breath, headache, black or blood stool, or abdominal pain. No vision loss or 1 sided numbness or weakness.  He denies a history of heart failure. He denies a history of syncopal episodes.       Review of Systems   Constitutional: Negative for fever.   Respiratory: Negative for shortness of breath.    Cardiovascular: Negative for chest pain.   Gastrointestinal: Negative for abdominal pain, blood in stool and vomiting.   Musculoskeletal: Positive for neck pain.   Neurological: Positive for syncope and light-headedness. Negative for headaches.   All other systems reviewed and are negative.      Allergies:  The patient has no known allergies.     Medications:  Zovirax  Zyloprim  Norvasc  Aspirin 81 mg  Cytoxan  Decadron  Proscar  Lasix  Protonix  Deltasone  Compazine  Crestor  Flomax  Ditropan-XL    Past Medical History:    Amyloidosis   Anemia   Bacteremia  BPH  CKD  Colon polyps   CAD  DJD  GERD  Gout  Hyperlipidemia  Hypertension  MDS  Metabolic acidosis  Multiple myeloma  Non rheumatic aortic valve stenosis    Sinusitis   Spinal stenosis in cervical region   Immunocompromised   Pneumonia  Sepsis    OA  Lung nodule   Systolic murmur     Past Surgical History:    Appendectomy   Aspiration needle hip  Cervical spine surgery  Colonoscopy  CV coronary angiogram  CV left heart cath  CV right heart cath measurements recorded  Decompression lumbar two levels    Fusion cervical anterior one level with bone allograft  Phacoemulsification clear cornea w standard IOL implant, endoscopic cyclophotoagalation, combined x 2  Rotator cuff repair   Carpal tunnel release     Family History:    Heart disease   Cancer - mouth, uterus     Social History:  The patient presents alone.  He is .     Physical Exam     Patient Vitals for the past 24 hrs:   BP Temp Temp src Pulse Resp SpO2   08/09/21 1247 101/50 97.8  F (36.6  C) Temporal 76 18 97 %       Physical Exam  General: Awake, alert, pleasant, non-toxic.  Head:  Scalp is NC/AT  Eyes:  Conjunctiva normal, PERRL  ENT:  The external nose and ears are normal.   Neck:  Normal range of motion without rigidity.  CV:  Regular rate and rhythm    4/6 systolic murmur at 2nd intercostal space that radiates to carotids.   Resp:  Breath sounds are clear bilaterally.  No crackles, wheezes, rhonchi, stridor.    Non-labored, no retractions or accessory muscle use  Abdomen: Abdomen is soft, no distension, no tenderness, no masses. No CVA tenderness.  MS:  No lower extremity edema or asymmetric calf swelling. Normal ROM in all joints without effusions.    No midline cervical, thoracic, or lumbar tenderness  Skin:  Warm and dry, No rash or lesions noted. 2+ peripheral pulses in all extremities  Neuro: Alert and oriented x3.  5/5 strength BL in UE and LE, normal sensation to touch.  Cranial nerves 2-12 intact.    Psych: Awake. Alert. Normal affect. Appropriate interactions.    Emergency Department Course   ECG  ECG taken at 1255,  Normal sinus rhythm  Possible Left atrial enlargement  Borderline ECG    Rate 74 bpm. OR interval 188 ms. QRS duration 90 ms. QT/QTc 416/461 ms. P-R-T axes 50 38 52.     Laboratory:  CBC: WBC: 5.3, HGB: 7.0 (L), PLT: 86 (L)  BMP: Glucose 125 (H), Chloride: 110 (H), Carbon Dioxide: 17 (L), GFR: 37 (L), Calcium: 8.1 (L), o/w WNL (Creatinine: 1.69 (H))    Hepatic Panel: Albumin: 2.8 (L), Protein Total: 4.8 (L), o/w Negative  Troponin (Collected 1251): <0.015  ABO/Rh Type and Screen: O Neg, Antibody screen: Positive  Lactase Dehydrogenase: 163    Asymptomatic Covid PCR: negative    Emergency Department Course:    Reviewed:  I reviewed nursing notes, vitals, past medical history and care everywhere    Assessments:  1247 I obtained history and examined the patient as noted above.   1408 I rechecked the patient and explained findings.       Consults:   1427 I spoke with Dr. Ferraro of the Hospitalist service, regarding patient's presentation, findings, and plan of care.       Interventions:  1250: NS, 1 L, IV      Disposition:  The patient was admitted to the hospital under the care of Dr. Ferraro.       Impression & Plan     Medical Decision Makin-year-old male with complex past medical history including multiple myeloma, severe aortic stenosis, chronic anemia, who presents after syncopal episode.  Broad differential considered.  Orthostatics were positive, vitally stable and well-appearing here with stable pressures and feels better after 1 L.  EKG shows normal sinus rhythm with possible left atrial enlargement no evidence of ischemia or arrhythmia, no signs of prolonged QT, WPW, Brugada syndrome.  No chest pain or shortness of breath and troponin is negative.  Neurologic exam normal no evidence of stroke, subarachnoid hemorrhage etc.  He does have some acute on chronic anemia with hemoglobin of 7 down from baseline of approximately 8.  Denies any symptoms of GI bleeding and not anticoagulated.  He does have severe aortic stenosis however bilirubin and LDH are normal arguing  against hemolysis is the cause of his anemia and more likely due to malignancy and suppression.  No fever, leukocytosis, or evidence of sepsis.    Patient does feel better at this time but given new syncopal episode in the setting of known severe aortic stenosis, acute on chronic anemia, age and malignancy history I think he would benefit from admission for observation, repeat echo to evaluate for worsening aortic stenosis, and monitoring of his hemoglobin trend.  Discussed with hospitalist who agrees to accept the patient.  The patient is amenable to admission.  Covid-19  Rose Duong was evaluated during a global COVID-19 pandemic, which necessitated consideration that the patient might be at risk for infection with the SARS-CoV-2 virus that causes COVID-19.   Applicable protocols for evaluation were followed during the patient's care.   COVID-19 was considered as part of the patient's evaluation. The plan for testing is:  a test was obtained during this visit.      Diagnosis:    ICD-10-CM    1. Syncope  R55    2. Severe aortic stenosis  I35.0    3. Anemia  D64.9    4. Renal cancer (H)  C64.9    5. Thrombocytopenia (H)  D69.6        Scribe Disclosure:  I, Bradford Kwong, am serving as a scribe at 12:43 PM on 8/9/2021 to document services personally performed by Tank Mathew PA-C based on my observations and the provider's statements to me.        Tank Mathew PA-C  08/09/21 1744

## 2021-08-09 NOTE — H&P
[facility]    History and Physical - Hospitalist Service       Date of Admission:  8/9/2021    Assessment & Plan      Rose Duong is a 81 year old male admitted on 8/9/2021.     Syncope, likely orthostatic in the setting of worsening anemia and chemotherapy  Had syncopal episode after chemotherapy likely secondary to orthostasis due to worsening anemia and oral intake in the setting of systemic chemotherapy  Plan  - register to observation  - serial orthostatic measurements guiding IV fluids  - do not see role of repeating echocardiogram at this point, severe AORTIC STENOSIS based on echo in April  - address anemia  - hold any antihypertensives for now  - PT consult    Severe aortic stenosis  -Consult cardiology for consideration of expedited evaluation    Normocytic anemia likely due to chemotherapy and anemia of chronic disease  He endorses getting an IV medication probably Aranesp as an outpatient  Plan  - type and cross, transfuse if less than 7  - send hemolysis labs given the AORTIC STENOSIS  -Routine anemia labs      Chronic medical conditions  CKD IV  Hyperuricemia  CAD  HTN  BPH  GERD  - hold home norvasc, lasix, and tamsulosin for now given orthostatic hypotension, resume as blood pressure improves         Diet:   regular  DVT Prophylaxis: Pneumatic Compression Devices  Burns Catheter: Not present  Central Lines: None  Code Status:   full code    Clinically Significant Risk Factors Present on Admission              # Platelet Defect: home medication list includes an antiplatelet medication      Disposition Plan   Expected discharge:  recommended to prior living arrangement once treated.     The patient's care was discussed with the Patient.    DO NARCISA [facility]rano [facility]  Securely message with the Vocera Web Console (learn more here)  Text page via [employer]  Will/Directory      ______________________________________________________________________    Chief Complaint   Passing out    History is obtained from the patient    History of Present Illness   Rose Duong is a 81 year old male with past medical history of severe aortic stenosis, multiple myeloma undergoing chemotherapy, CKD 4, coronary artery disease, hypertension, and hyperlipidemia who presents from home after a fainting episode.    The patient had his eighth session of chemotherapy this morning and was feeling well prior.  After lunch she felt lightheaded and went to lie down and at that point he lost consciousness.  He actually fell on top of his wife but he reports that she is not injured.  He did not fall and hit his head.  No loss of continence or tongue lacerations.  He was out for about 1 minute.  His wife called ambulance and the EMS responders provided 1 L of fluids after they assessed him for orthostasis, but his.  He denies any previous episodes    Review of Systems    The 10 point Review of Systems is negative other than noted in the HPI or here.      Past Medical History    I have reviewed this patient's medical history and updated it with pertinent information if needed.   Past Medical History:   Diagnosis Date     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      Amyloidosis (H)      Anemia      Bacteremia      BPH (benign prostatic hyperplasia)      CKD (chronic kidney disease) stage 4, 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     Colon polyps      Coronary artery disease involving native coronary artery, angina presence unspecified, unspecified whether native or transplanted heart 05/01/2020    Added automatically from request for surgery [id number]     DJD (degenerative joint disease)      Elevated BP without diagnosis of hypertension      Gastroesophageal reflux disease      Gout      Gout      Hyperlipidemia      Hypertension      Leukocytosis       MDS (myelodysplastic syndrome) (H)      Metabolic acidosis     due to CKD     Mixed hyperlipidemia 05/01/2020    Added automatically from request for surgery [id number]     Multiple myeloma (H)      Nonrheumatic aortic valve stenosis 05/01/2020    Added automatically from request for surgery [id number]     Rotator cuff tear arthropathy, left      Sinusitis      Spinal stenosis in cervical region 09/25/2017       Past Surgical History   I have reviewed this patient's surgical history and updated it with pertinent information if needed.  Past Surgical History:   Procedure Laterality Date     APPENDECTOMY       ASPIRATION NEEDLE HIP Left 9/25/2017    Procedure: ASPIRATION NEEDLE HIP;;  Surgeon: Moises Elias MD;  Location:  OR     BACK SURGERY  11/07/2017    cervical spine surgery      COLONOSCOPY       CV CORONARY ANGIOGRAM N/A 5/6/2020    Procedure: Coronary Angiogram;  Surgeon: David Woods MD;  Location:  HEART CARDIAC CATH LAB     CV LEFT HEART CATH N/A 5/6/2020    Procedure: Left Heart Cath;  Surgeon: David Woods MD;  Location:  HEART CARDIAC CATH LAB     CV RIGHT HEART CATH MEASUREMENTS RECORDED N/A 5/6/2020    Procedure: Right Heart Cath;  Surgeon: David Woods MD;  Location:  HEART CARDIAC CATH LAB     DECOMPRESSION LUMBAR TWO LEVELS Bilateral 6/11/2018    Procedure: DECOMPRESSION LUMBAR TWO LEVELS;  BILATERAL LUMBAR 3-4 AND LUMBAR 4-5 DECOMPRESSION ;  Surgeon: Sd Vallejo MD;  Location:  OR     FUSION CERVICAL ANTERIOR ONE LEVEL WITH BONE ALLOGRAFT N/A 9/25/2017    Procedure: FUSION CERVICAL ANTERIOR ONE LEVEL WITH BONE ALLOGRAFT;  ANTERIOR CERVICAL DECOMPRESSION AND FUSIONC3-4 WITH INSTRUMENTATION, ALLOGRAFT AND BONE MARROW ASPIRATE OF THE LEFT ILIAC CREST ;  Surgeon: Moises Elias MD;  Location:  OR     PHACOEMULSIFICATION CLEAR CORNEA W/ STANDARD IOL IMPLANT, ENDOSCOPIC CYCLOPHOTOCOAGULATION, COMBINED Right 12/12/2017    Procedure: COMBINED  PHACOEMULSIFICATION CLEAR CORNEA WITH STANDARD INTRAOCULAR LENS IMPLANT, ENDOSCOPIC CYCLOPHOTOCOAGULATION;  COMPLEX RIGHT EYE COMBINED PHACOEMULSIFICATION CLEAR CORNEA WITH STANDARD INTRAOCULAR LENS IMPLANT, ENDOSCOPIC CYCLOPHOTOCOAGULATION ;  Surgeon: Alli Mott MD;  Location: [facility]     PHACOEMULSIFICATION CLEAR CORNEA W/ STANDARD IOL IMPLANT, ENDOSCOPIC CYCLOPHOTOCOAGULATION, COMBINED Left 5/1/2018    Procedure: COMBINED PHACOEMULSIFICATION CLEAR CORNEA WITH STANDARD INTRAOCULAR LENS IMPLANT, ENDOSCOPIC CYCLOPHOTOCOAGULATION;  COMPLEX LEFT EYE PHACOEMULSIFICATION CLEAR CORNEA WITH STANDARD INTRAOCULAR LENS IMPLANT, ENDOSCOPIC CYCLOPHOTOCOAGULATION ;  Surgeon: Alli Mott MD;  Location: [facility]     ROTATOR CUFF REPAIR RT/LT  2004       Social History   I have reviewed this patient's social history and updated it with pertinent information if needed.  Social History     Tobacco Use     Smoking status: Never Smoker     Smokeless tobacco: Never Used   Substance Use Topics     Alcohol use: Yes     Comment: beer or two a day     Drug use: No       Family History   I have reviewed this patient's family history and updated it with pertinent information if needed.  Family History   Problem Relation Age of Onset     Heart Disease Father        Prior to Admission Medications   Prior to Admission Medications   Prescriptions Last Dose Informant Patient Reported? Taking?   CHOLECALCIFEROL PO   Yes No   Sig: Take by mouth daily   acetaminophen (TYLENOL) 500 MG tablet   Yes No   Sig: Take 500 mg by mouth every 8 hours as needed for mild pain   acyclovir (ZOVIRAX) 400 MG tablet   Yes No   Sig: Take 400 mg by mouth 2 times daily   allopurinol (ZYLOPRIM) 300 MG tablet  Self Yes No   Sig: Take 300 mg by mouth daily.   amLODIPine (NORVASC) 10 MG tablet   Yes No   Sig: Take 10 mg by mouth daily   aspirin (ASPIRIN) 81 MG EC tablet   Yes No   Sig: Take 81 mg by mouth daily   cyclophosphamide (CYTOXAN) 50 MG capsule   Yes No    Sig: Take 500 mg by mouth once a week   dexamethasone (DECADRON) 4 MG tablet   Yes No   Sig: Take 20 mg by mouth Weekly Day of and day after chemo   finasteride (PROSCAR) 5 MG tablet   Yes No   Sig: Take 5 mg by mouth daily   furosemide (LASIX) 20 MG tablet   Yes No   Sig: Take 20 mg by mouth daily   latanoprost (XALATAN) 0.005 % ophthalmic solution  Self Yes No   Sig: Place 1 drop into both eyes At Bedtime    pantoprazole (PROTONIX) 20 MG EC tablet   Yes No   Sig: Take 20 mg by mouth daily   predniSONE (DELTASONE) 5 MG tablet  Self Yes No   Sig: Take 5 mg by mouth every other day    prochlorperazine (COMPAZINE) 10 MG tablet   Yes No   Sig: Take 10 mg by mouth every 6 hours as needed for nausea or vomiting   rosuvastatin (CRESTOR) 20 MG tablet   Yes No   Sig: Take 20 mg by mouth daily   sodium bicarbonate 650 MG tablet   Yes No   Sig: Take 650 mg by mouth 2 times daily   tamsulosin (FLOMAX) 0.4 MG 24 hr capsule  Self Yes No   Sig: Take 0.4 mg by mouth every evening    triamcinolone (KENALOG) 0.1 % external cream   Yes No   Sig: Apply topically daily as needed for irritation      Facility-Administered Medications: None     Allergies   No Known Allergies    Physical Exam   Vital Signs: Temp: 97.8  F (36.6  C) Temp src: Temporal BP: 101/50 Pulse: 76   Resp: 18 SpO2: 97 % O2 Device: None (Room air)    Weight: 0 lbs 0 oz    Physical Exam  Vitals and nursing note reviewed.   Constitutional:       Appearance: Normal appearance.   HENT:      Head: Normocephalic and atraumatic.      Right Ear: Ear canal and external ear normal.      Left Ear: Ear canal and external ear normal.      Nose: Nose normal. No congestion or rhinorrhea.      Mouth/Throat:      Mouth: Mucous membranes are moist.      Pharynx: Oropharynx is clear.   Eyes:      Extraocular Movements: Extraocular movements intact.      Conjunctiva/sclera: Conjunctivae normal.      Pupils: Pupils are equal, round, and reactive to light.   Cardiovascular:      Rate and  Rhythm: Normal rate and regular rhythm.      Pulses: Normal pulses.      Heart sounds: Normal heart sounds.   Pulmonary:      Effort: Pulmonary effort is normal.      Breath sounds: Normal breath sounds.   Abdominal:      General: Abdomen is flat. Bowel sounds are normal. There is no distension.      Palpations: Abdomen is soft.   Musculoskeletal:         General: No swelling or tenderness. Normal range of motion.      Cervical back: Normal range of motion and neck supple.   Skin:     General: Skin is warm and dry.      Capillary Refill: Capillary refill takes less than 2 seconds.      Coloration: Skin is not jaundiced.   Neurological:      General: No focal deficit present.      Mental Status: He is alert and oriented to person, place, and time. Mental status is at baseline.   Psychiatric:         Mood and Affect: Mood normal.         Behavior: Behavior normal.         Thought Content: Thought content normal.         Judgment: Judgment normal.           Data   Data reviewed today: I reviewed all medications, new labs and imaging results over the last 24 hours. I personally reviewed the EKG tracing showing NSR.    Recent Labs   Lab 08/09/21  1251   WBC 5.3   HGB 7.0*   MCV 81   PLT 86*      POTASSIUM 3.6   CHLORIDE 110*   CO2 17*   BUN 29   CR 1.69*   ANIONGAP 9   CONNIE 8.1*   *   ALBUMIN 2.8*   PROTTOTAL 4.8*   BILITOTAL 0.6   ALKPHOS 114   ALT 31   AST 21   TROPONIN <0.015     Most Recent 3 CBC's:Recent Labs   Lab Test 08/09/21  1251 06/26/21  0851 06/25/21  0723   WBC 5.3 10.1 12.1*   HGB 7.0* 8.4* 8.5*   MCV 81 76* 75*   PLT 86* 158 165     Most Recent 3 BMP's:Recent Labs   Lab Test 08/09/21  1251 06/26/21  1411 06/26/21  0851 06/25/21  0723     --  141 144   POTASSIUM 3.6 3.7 3.3* 3.6   CHLORIDE 110*  --  116* 119*   CO2 17*  --  19* 19*   BUN 29  --  22 31*   CR 1.69*  --  1.44* 1.54*   ANIONGAP 9  --  6 6   CONNIE 8.1*  --  8.7 8.6   *  --  157* 107*     Most Recent 2 LFT's:Recent Labs    Lab Test 08/09/21  1251 06/24/21  1124   AST 21 27   ALT 31 42   ALKPHOS 114 134   BILITOTAL 0.6 0.3     No results found for this or any previous visit (from the past 24 hour(s)).

## 2021-08-10 VITALS
BODY MASS INDEX: 21.35 KG/M2 | SYSTOLIC BLOOD PRESSURE: 114 MMHG | WEIGHT: 136 LBS | TEMPERATURE: 96.8 F | HEART RATE: 92 BPM | RESPIRATION RATE: 15 BRPM | OXYGEN SATURATION: 96 % | HEIGHT: 67 IN | DIASTOLIC BLOOD PRESSURE: 52 MMHG

## 2021-08-10 LAB
ANION GAP SERPL CALCULATED.3IONS-SCNC: 9 MMOL/L (ref 3–14)
ANTIBODY ID: NORMAL
BLD PROD TYP BPU: NORMAL
BLOOD COMPONENT TYPE: NORMAL
BUN SERPL-MCNC: 30 MG/DL (ref 7–30)
CALCIUM SERPL-MCNC: 7.7 MG/DL (ref 8.5–10.1)
CHLORIDE BLD-SCNC: 112 MMOL/L (ref 94–109)
CO2 SERPL-SCNC: 15 MMOL/L (ref 20–32)
CODING SYSTEM: NORMAL
CREAT SERPL-MCNC: 1.71 MG/DL (ref 0.66–1.25)
CROSSMATCH: NORMAL
ERYTHROCYTE [DISTWIDTH] IN BLOOD BY AUTOMATED COUNT: 28.4 % (ref 10–15)
GFR SERPL CREATININE-BSD FRML MDRD: 36 ML/MIN/1.73M2
GLUCOSE BLD-MCNC: 106 MG/DL (ref 70–99)
HAPTOGLOB SERPL-MCNC: 133 MG/DL (ref 32–197)
HCT VFR BLD AUTO: 21.9 % (ref 40–53)
HGB BLD-MCNC: 6.9 G/DL (ref 13.3–17.7)
HGB BLD-MCNC: 8.5 G/DL (ref 13.3–17.7)
ISSUE DATE AND TIME: NORMAL
MCH RBC QN AUTO: 25.2 PG (ref 26.5–33)
MCHC RBC AUTO-ENTMCNC: 31.5 G/DL (ref 31.5–36.5)
MCV RBC AUTO: 80 FL (ref 78–100)
PLATELET # BLD AUTO: 87 10E3/UL (ref 150–450)
POTASSIUM BLD-SCNC: 3.7 MMOL/L (ref 3.4–5.3)
RBC # BLD AUTO: 2.74 10E6/UL (ref 4.4–5.9)
SODIUM SERPL-SCNC: 136 MMOL/L (ref 133–144)
UNIT ABO/RH: NORMAL
UNIT NUMBER: NORMAL
UNIT STATUS: NORMAL
UNIT TYPE ISBT: 9500
VIT B12 SERPL-MCNC: 267 PG/ML (ref 193–986)
WBC # BLD AUTO: 7 10E3/UL (ref 4–11)

## 2021-08-10 PROCEDURE — G0378 HOSPITAL OBSERVATION PER HR: HCPCS

## 2021-08-10 PROCEDURE — 36415 COLL VENOUS BLD VENIPUNCTURE: CPT | Performed by: HOSPITALIST

## 2021-08-10 PROCEDURE — 99207 PR NO CHARGE LOS: CPT | Performed by: INTERNAL MEDICINE

## 2021-08-10 PROCEDURE — 999N000147 HC STATISTIC PT IP EVAL DEFER

## 2021-08-10 PROCEDURE — 250N000012 HC RX MED GY IP 250 OP 636 PS 637: Performed by: HOSPITALIST

## 2021-08-10 PROCEDURE — 36430 TRANSFUSION BLD/BLD COMPNT: CPT

## 2021-08-10 PROCEDURE — 85018 HEMOGLOBIN: CPT | Mod: 91 | Performed by: INTERNAL MEDICINE

## 2021-08-10 PROCEDURE — 85027 COMPLETE CBC AUTOMATED: CPT | Performed by: HOSPITALIST

## 2021-08-10 PROCEDURE — 99214 OFFICE O/P EST MOD 30 MIN: CPT | Performed by: INTERNAL MEDICINE

## 2021-08-10 PROCEDURE — 36415 COLL VENOUS BLD VENIPUNCTURE: CPT | Performed by: INTERNAL MEDICINE

## 2021-08-10 PROCEDURE — 250N000013 HC RX MED GY IP 250 OP 250 PS 637: Mod: GY | Performed by: HOSPITALIST

## 2021-08-10 PROCEDURE — 258N000003 HC RX IP 258 OP 636: Performed by: HOSPITALIST

## 2021-08-10 PROCEDURE — 86922 COMPATIBILITY TEST ANTIGLOB: CPT | Performed by: INTERNAL MEDICINE

## 2021-08-10 PROCEDURE — 250N000013 HC RX MED GY IP 250 OP 250 PS 637: Mod: GY | Performed by: INTERNAL MEDICINE

## 2021-08-10 PROCEDURE — 80048 BASIC METABOLIC PNL TOTAL CA: CPT | Performed by: HOSPITALIST

## 2021-08-10 PROCEDURE — P9016 RBC LEUKOCYTES REDUCED: HCPCS | Performed by: INTERNAL MEDICINE

## 2021-08-10 RX ORDER — DIPHENHYDRAMINE HCL 25 MG
25 CAPSULE ORAL EVERY 6 HOURS PRN
Status: DISCONTINUED | OUTPATIENT
Start: 2021-08-10 | End: 2021-08-10 | Stop reason: HOSPADM

## 2021-08-10 RX ORDER — DIPHENHYDRAMINE HYDROCHLORIDE 50 MG/ML
25 INJECTION INTRAMUSCULAR; INTRAVENOUS EVERY 6 HOURS PRN
Status: DISCONTINUED | OUTPATIENT
Start: 2021-08-10 | End: 2021-08-10 | Stop reason: HOSPADM

## 2021-08-10 RX ADMIN — PREDNISONE 5 MG: 5 TABLET ORAL at 08:29

## 2021-08-10 RX ADMIN — ACYCLOVIR 400 MG: 400 TABLET ORAL at 08:29

## 2021-08-10 RX ADMIN — FINASTERIDE 5 MG: 5 TABLET, FILM COATED ORAL at 08:29

## 2021-08-10 RX ADMIN — SODIUM BICARBONATE 650 MG TABLET 650 MG: at 08:29

## 2021-08-10 RX ADMIN — DIPHENHYDRAMINE HYDROCHLORIDE 25 MG: 25 CAPSULE ORAL at 00:41

## 2021-08-10 RX ADMIN — SODIUM CHLORIDE: 9 INJECTION, SOLUTION INTRAVENOUS at 05:09

## 2021-08-10 RX ADMIN — PANTOPRAZOLE SODIUM 20 MG: 20 TABLET, DELAYED RELEASE ORAL at 08:29

## 2021-08-10 RX ADMIN — ROSUVASTATIN CALCIUM 20 MG: 20 TABLET, FILM COATED ORAL at 08:29

## 2021-08-10 RX ADMIN — ALLOPURINOL 300 MG: 300 TABLET ORAL at 08:29

## 2021-08-10 NOTE — PROVIDER NOTIFICATION
"MD Notification    Notified Person: MD    Notified Person Name: Tiffanie Ackerman MD    Notification Date/Time: 8/10/21 at 1718    Notification Interaction: Web-based paging     Purpose of Notification: \"Hgb 8/5. Discharge?\"    Orders Received: Per MD, okay to discharge.     Comments:      "

## 2021-08-10 NOTE — PROVIDER NOTIFICATION
MD Notification    Notified Person: MD    Notified Person Name: Dr. Bradshaw     Notification Date/Time:08/10/21 @0000    Notification Interaction:    Purpose of Notification:Pt requesting sleeping meds, melatonin did not work. Please order if ok.    Orders Received:    Comments:

## 2021-08-10 NOTE — PROGRESS NOTES
Observation goals PRIOR TO DISCHARGE     Comments:     -diagnostic tests and consults completed and resulted: partially met     -vital signs normal or at patient baseline: partially met      Nurse to notify provider when observation goals have been met and patient is ready for discharge      A/O x4. VSS on RA. William reg diet. SBA. -ve ortho BP. Hemoglobin 6.7 @ 2311, blood transfusing pending due to +ve Antibody screen. Denies pain, dizziness and lightheadedness. IV NS. Tele: SR

## 2021-08-10 NOTE — PLAN OF CARE
A&Ox4. VSS on RA. Orthostatics negative. Denies pain. CMS intact. SBA to BR. Voiding indepenently. Diarrhea because of chemo. Skin harsha and L forearm skin tear covered with dressing CDI. Will recheck hemoglobin after blood transfusion and likely discharge back to home this evening.

## 2021-08-10 NOTE — PROGRESS NOTES
"Care Management Follow Up    \"Medicare Outpatient Observation Notice\" brochure was given & explained to the patient. Patient verbalized understanding and denies any questions at this time.  The pt is very upset that he is here in observation status.  He does not agree with this status and does not believe his services here at the hospital could be provided as an OP.    LEBLANC was faxed to [phone].    Care Coordination will follow this case and we are anticipating the pt will discharge to home self care when he is medically cleared for discharge.        Shantelle Brewer RN, BSN Care Coordinator  [facility]  Mobile: [phone]    "

## 2021-08-10 NOTE — PLAN OF CARE
After transfusion, hemoglobing 8.5. Pt not symptomatic. Orthostatics negative and VSS on RA. Denies dizziness and lightheaded-ness. Per alexandr Caldera to discharge back to home.

## 2021-08-10 NOTE — PROGRESS NOTES
"Hospitalist Cross Cover  8/10/2021    Notified of hemoglobin 6.7. Chart reviewed. Here for syncope, orthostatic symptoms, worsening anemia.    BP 94/44 (BP Location: Right arm)   Pulse 91   Temp 98.8  F (37.1  C) (Oral)   Resp 16   Ht 1.702 m (5' 7\")   Wt 61.7 kg (136 lb)   SpO2 97%   BMI 21.30 kg/m      Plan: Placed order to transfuse 1 unit PRBC. Give at slow rate (over 3-4 hours) given underlying aortic stenosis.     Louise Bradshaw MD        "

## 2021-08-10 NOTE — PROGRESS NOTES
Observation goals PRIOR TO DISCHARGE     Comments:     -diagnostic tests and consults completed and resulted: partially met     -vital signs normal or at patient baseline: partially met      Nurse to notify provider when observation goals have been met and patient is ready for discharge

## 2021-08-10 NOTE — PLAN OF CARE
PT: Discussed in rounds, pt is up SBA and will be getting a blood transfusion which will help his symptoms. No skilled PT needs at this time.

## 2021-08-10 NOTE — PROGRESS NOTES
Observation Goals:     -diagnostic tests and consults completed and resulted. Not Met. Waiting for blood transfusion.   -vital signs normal or at patient baseline. Met.    Nurse to notify provider when observation goals have been met and patient is ready for discharge.

## 2021-08-10 NOTE — PROVIDER NOTIFICATION
DATE:  8/9/2021   TIME OF RECEIPT FROM LAB:  6335  LAB TEST:  Hemoglobin  LAB VALUE:  6.7  RESULTS GIVEN WITH READ-BACK TO (PROVIDER):  Dr. Bradshaw paged  TIME LAB VALUE REPORTED TO PROVIDER:   3907    Notified bedside RNMitch as well

## 2021-08-10 NOTE — DISCHARGE SUMMARY
"[facility]  Hospitalist Discharge Summary      Date of Admission:  8/9/2021  Date of Discharge:  8/10/2021  Discharging Provider: Tiffanie Ackerman MD      Discharge Diagnoses   Syncope, likely orthostatic in the setting of worsening anemia and chemotherapy  Severe aortic stenosis  Normocytic anemia likely due to chemotherapy and anemia of chronic disease  CKD IV  Hyperuricemia  CAD  HTN  BPH  GERD    Follow-ups Needed After Discharge   Follow-up Appointments     Follow-up and recommended labs and tests       Follow up with Dr. Bean or primary MD in 2 days to recheck   hemoglobin.         Please advise patient timing for resuming Lasix and Amlodipine.    Unresulted Labs Ordered in the Past 30 Days of this Admission     Date and Time Order Name Status Description    8/10/2021 12:08 AM Prepare red blood cells (unit) Preliminary         Discharge Disposition   Discharged to home  Condition at discharge: Stable    Hospital Course   Rose Duong is a 81 year old male admitted on 8/9/2021.      Syncope, likely orthostatic in the setting of worsening anemia and chemotherapy  Had syncopal episode after chemotherapy likely secondary to orthostasis due to worsening anemia and oral intake in the setting of systemic chemotherapy  Plan    serial orthostatic measurements demonstrate that orthostasis has resolved    do not see role of repeating echocardiogram at this point, severe AORTIC STENOSIS based on echo in April    Type and cross match, transfuse 1 unit PRBCs    Held antihypertensives; okay to resume Flomax at discharge, keep Amlodipine and Lasix on hold.  Primary MD, please advise patient re: timing for resuming these medications     Severe aortic stenosis    Cardiology consult requested, I appreciate Dr. Pino's evaluation and recommendations    He concludes, \"Patient has been followed by my colleague Dr. Chun for his severe aortic stenosis.  He has a scheduled appointment with him next " "month.  At this time would recommend that he keep his appointment.  Given the context of his presentation and resolution of his symptoms no immediate therapies need to be directed toward his aortic stenosis.  Follow-up echocardiogram can be performed when he sees my colleague in clinic.\"     Normocytic anemia likely due to chemotherapy and anemia of chronic disease  He endorses getting an IV medication probably Aranesp as an outpatient  Plan     type and cross, transfuse if less than 7    send hemolysis labs given the AORTIC STENOSIS.  Haptoglobin is wnl.  LDH is just above the upper limit of normal    Routine anemia labs    Patient received 1 unit of PRBCs, hemoglobin 8.5 g/dl        Chronic medical conditions  CKD IV  Hyperuricemia  CAD  HTN  BPH  GERD      Consultations This Hospital Stay   CARDIOLOGY IP CONSULT  PHYSICAL THERAPY ADULT IP CONSULT    Code Status   Full Code    Time Spent on this Encounter   I, Tiffanie Ackerman MD, personally saw the patient today and spent greater than 30 minutes discharging this patient.       Tiffanie Ackerman MD  [facility]  [address] [place] [id number]  Phone: [phone]  ______________________________________________________________________    Physical Exam   Vital Signs: Temp: 98  F (36.7  C) Temp src: Rectal BP: 107/51 Pulse: 86   Resp: 14 SpO2: 99 % O2 Device: None (Room air)    Weight: 136 lbs 0 oz  I saw and examined the patient on the date of discharge.         Primary Care Physician   Kian Johns    Discharge Orders      Reason for your hospital stay    You fainted, probably because of low blood counts.     Follow-up and recommended labs and tests     Follow up with Dr. Bean or primary MD in 2 days to recheck hemoglobin.     Activity    Your activity upon discharge: activity as tolerated     Diet    Follow this diet upon discharge: Orders Placed This Encounter      Regular Diet Adult       Significant Results and " Procedures   Most Recent 3 CBC's:Recent Labs   Lab Test 08/10/21  0558 08/09/21  2311 08/09/21  1251 06/26/21  0851   WBC 7.0  --  5.3 10.1   HGB 6.9* 6.7* 7.0* 8.4*   MCV 80  --  81 76*   PLT 87*  --  86* 158     Most Recent 3 BMP's:Recent Labs   Lab Test 08/10/21  0558 08/09/21  1251 06/26/21  1411 06/26/21  0851    136  --  141   POTASSIUM 3.7 3.6 3.7 3.3*   CHLORIDE 112* 110*  --  116*   CO2 15* 17*  --  19*   BUN 30 29  --  22   CR 1.71* 1.69*  --  1.44*   ANIONGAP 9 9  --  6   CONNIE 7.7* 8.1*  --  8.7   * 125*  --  157*     Most Recent Anemia Panel:Recent Labs   Lab Test 08/10/21  0558 08/09/21  1804   WBC 7.0  --    HGB 6.9*  --    HCT 21.9*  --    MCV 80  --    PLT 87*  --    IRON  --  11*   IRONSAT  --  4*   FEB  --  258   GEO  --  453*   B12  --  267   FOLIC  --  12.7   ,   Results for orders placed or performed during the hospital encounter of 06/24/21   CT Abdomen Pelvis w Contrast    Narrative    CT ABDOMEN PELVIS W CONTRAST   6/24/2021 12:17 PM     HISTORY: Abdominal pain (left lower quadrant), diarrhea and low-grade  fever. On chemotherapy.    TECHNIQUE:  CT abdomen and pelvis with 70mL Isovue-370 IV. Radiation  dose for this scan was reduced using automated exposure control,  adjustment of the mA and/or kV according to patient size, or iterative  reconstruction technique.    COMPARISON: Renal ultrasound on 3/15/2021    FINDINGS:  Lower chest: Lingular and to lesser extent left lower and medial right  middle lobes pulmonary opacities, likely infectious. Small hiatal  hernia.    Abdomen/pelvis:    Hepatobiliary: Cholelithiasis and mild gallbladder wall thickening. No  suspicious focal hepatic lesion.    Pancreas: No main pancreatic ductal dilatation or definite solid  pancreatic mass.    Spleen: The spleen is enlarged measuring 14 cm in craniocaudal  dimension.    Adrenal glands: Nodular thickening of discrete nodularity of the left  adrenal gland. No right adrenal nodule.    Kidneys: No  radiodense kidney/ureteral stones or hydronephrosis.    Bowel: Multiple minimally dilated fluid-filled small bowel loops, with  no clear transition point.    Peritoneum: Trace amount of free fluid in the pelvis (series 4 image  175). No free peritoneal portal venous gas.    Pelvic organs: Unremarkable    Vascular: Moderate atherosclerotic vascular calcification of the  abdominal aorta and iliac vessels.    Lymph nodes: No significant abdominal or pelvic lymphadenopathy.    Bones and soft tissue: There is bilateral inguinal hernias with the  right containing a nondilated small bowel loop and small amount of  fluid (series 4 image 206) and the left is fat containing. Mild  degenerative changes of the spine most prominent at L4-5 with disc  height loss and endplate changes.      Impression    IMPRESSION:   1. Multiple minimally dilated fluid-filled small bowel loops with no  clear transition point, nonspecific, can be seen with gastroenteritis.  2. Small right inguinal hernia containing small amount of fluid and  nondilated small bowel loop.  3. Cholelithiasis without CT evidence of acute cholecystitis.  4. Splenomegaly.  5. Bibasilar small nodular and patchy pulmonary opacities most  prominent in the lingula, worrisome for infection.    VIKY ADAMS MD   US Abdomen Limited    Narrative    US ABDOMEN LIMITED   6/24/2021 2:00 PM     HISTORY:  Pain.  Evaluate gallstones, cholecystitis, common bile duct  dilatation, cholelithiasis and gallbladder wall thickening, low-grade  fever, on chemotherapy.    COMPARISON: CT abdomen pelvis on 6/24/2021    FINDINGS:    Gallbladder: Cholelithiasis and borderline gallbladder wall thickening  measuring 3 mm and trace pericholecystic fluid. Sonographic Mathur's  sign is negative. 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      Bile ducts:  CHD is normal diameter.  No intrahepatic biliary  dilatation. The distal aspect of  the common bile duct is obscured by  overlying bowel gas.    Liver: It demonstrates coarsened hepatic echotexture. No focal hepatic  mass is visualized.    Pancreas:  Partially obscured by overlying bowel gas,  but grossly  unremarkable.     Right kidney:  No hydronephrosis or shadowing calculi.    Aorta and IVC:  Not specifically assessed.       Impression    IMPRESSION:    1. Cholelithiasis, borderline gallbladder wall thickening and trace  pericholecystic fluid. These findings are nonspecific, could be  related to underlying liver dysfunction versus less likely acute  cholecystitis. If clinical suspicion of acute cholecystitis, remains  high, HIDA scan can help further characterization.  2. Coarsened hepatic echotexture, likely due to underlying parenchymal  liver disease including hepatic steatosis with or without fibrosis.    VIKY ADAMS MD   XR Chest 2 Views    Narrative    CHEST TWO VIEWS June 24, 2021 2:17 PM     HISTORY: Fever, chemo.    COMPARISON: Chest x-ray on 9/23/2020.      Impression    IMPRESSION: PA and lateral views of the chest were obtained. Mild  enlargement of the cardiac silhouette. Left basilar patchy pulmonary  opacity, worrisome for infection. No significant pleural effusion or  pneumothorax.    VIKY ADAMS MD       Discharge Medications   Current Discharge Medication List      CONTINUE these medications which have NOT CHANGED    Details   acetaminophen (TYLENOL) 500 MG tablet Take 500 mg by mouth every 8 hours as needed for mild pain      acyclovir (ZOVIRAX) 400 MG tablet Take 400 mg by mouth 2 times daily      allopurinol (ZYLOPRIM) 300 MG tablet Take 300 mg by mouth daily.      CHOLECALCIFEROL PO Take by mouth daily      cyclophosphamide (CYTOXAN) 50 MG capsule Take 500 mg by mouth once a week      dexamethasone (DECADRON) 4 MG tablet Take 20 mg by mouth Weekly Day of chemo      finasteride (PROSCAR) 5 MG tablet Take 5 mg by mouth daily      latanoprost (XALATAN) 0.005 %  ophthalmic solution Place 1 drop into both eyes At Bedtime       pantoprazole (PROTONIX) 20 MG EC tablet Take 20 mg by mouth daily      predniSONE (DELTASONE) 5 MG tablet Take 5 mg by mouth every other day       prochlorperazine (COMPAZINE) 10 MG tablet Take 10 mg by mouth every 6 hours as needed for nausea or vomiting      rosuvastatin (CRESTOR) 20 MG tablet Take 20 mg by mouth daily      sodium bicarbonate 650 MG tablet Take 650 mg by mouth 2 times daily      tamsulosin (FLOMAX) 0.4 MG 24 hr capsule Take 0.4 mg by mouth every evening       triamcinolone (KENALOG) 0.1 % external cream Apply topically daily as needed for irritation         STOP taking these medications       amLODIPine (NORVASC) 10 MG tablet Comments:   Reason for Stopping:         furosemide (LASIX) 20 MG tablet Comments:   Reason for Stopping:             Allergies   No Known Allergies

## 2021-08-10 NOTE — CONSULTS
[facility]    Cardiology Consultation     Date of Admission:  8/9/2021    Assessment & Plan     1.  Syncope in the setting of anemia (above typical baseline)  2.  Severe aortic stenosis  3.  Chronic anemia with history of multiple myeloma, on chemotherapy  4.  Chronic kidney disease  5.  Nonocclusive coronary disease by angiogram  6.  Hypertension  7.  Gastroesophageal reflux disease    Recommendations    1.  Syncope with orthostatic hypotension with anemia: Agree with medical stabilization.  Patient recently underwent his eighth session of chemotherapy and was feeling well prior to his initial presentation.  He felt dizzy and lightheadedness as a result and ultimately sought medical attention.  Medical stabilization was performed with good resolution of his orthostatic static symptoms.  Presently is waiting for his blood transfusion.  Would continue with supportive care.    2.  Patient has been followed by my colleague Dr. Chun for his severe aortic stenosis.  He has a scheduled appointment with him next month.  At this time would recommend that he keep his appointment.  Given the context of his presentation and resolution of his symptoms no immediate therapies need to be directed toward his aortic stenosis.  Follow-up echocardiogram can be performed when he sees my colleague in clinic.    3.  Cardiology will be available for questions moving forward.  Continue with supportive care.  Outpatient work-up for his aortic stenosis can be continued.  Consideration for planning of any procedure should take into account his multiple myeloma and prognosis.    Thank you kindly for consult      Hillary Pino MD        HPI:    Patient is a pleasant 81-year-old gentleman who is currently admitted for orthostatic hypotension.  He is a established patient of our cardiology clinic and follows my colleague Dr. Chun.  He has nonocclusive coronary artery disease, aortic stenosis which is severe by his  most recent testing in April 2021.  He has been followed closely for this.  He also has multiple myeloma for which she is undergoing chemotherapy.  Recently received his chemotherapy however he felt unwell following this and felt dizzy.  He had an episode of syncope and was found to have significant anemia above his typical baseline.  He ultimately was brought in by EMS and was given intravenous fluids with significant improvement in his symptoms which have since resolved upon my examination this morning.  He is in the process of continuing on with his therapy for his anemia and multiple myeloma.  He also is going to receive 1 unit of packed red blood cells.  Currently is asymptomatic.  Reports of no syncope, chest pain PND orthopnea or other cardiac related concerns.  Given his prior cardiac history cardiology service is consulted to make recommendations.        Primary Care Physician   Kian Johns      Patient Active Problem List   Diagnosis     Gout     BPH (benign prostatic hyperplasia)     Sepsis (H)     Rotator cuff tear arthropathy, left     Bacteremia     Anemia     Leukocytosis     Sinusitis     DJD (degenerative joint disease)     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     Spinal stenosis in cervical region     Spinal stenosis     Nonrheumatic aortic valve stenosis     Mixed hyperlipidemia     Positive cardiac stress test     Coronary artery disease involving native coronary artery, angina presence unspecified, unspecified whether native or transplanted heart     Status post coronary angiogram     Immunocompromised (H)     Community acquired pneumonia, unspecified laterality     Syncope     Thrombocytopenia (H)     Renal cancer (H)     Severe aortic stenosis       Past Medical History   I have reviewed this patient's medical history and updated it with pertinent information if needed.   Past Medical History:   Diagnosis Date     Abnormally low high density  lipoprotein (HDL) cholesterol with hypertriglyceridemia      Amyloidosis (H)      Anemia      Bacteremia      BPH (benign prostatic hyperplasia)      CKD (chronic kidney disease) stage 4, 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     Colon polyps      Coronary artery disease involving native coronary artery, angina presence unspecified, unspecified whether native or transplanted heart 05/01/2020    Added automatically from request for surgery [id number]     DJD (degenerative joint disease)      Elevated BP without diagnosis of hypertension      Gastroesophageal reflux disease      Gout      Gout      Hyperlipidemia      Hypertension      Leukocytosis      MDS (myelodysplastic syndrome) (H)      Metabolic acidosis     due to CKD     Mixed hyperlipidemia 05/01/2020    Added automatically from request for surgery [id number]     Multiple myeloma (H)      Nonrheumatic aortic valve stenosis 05/01/2020    Added automatically from request for surgery [id number]     Rotator cuff tear arthropathy, left      Sinusitis      Spinal stenosis in cervical region 09/25/2017       Past Surgical History   I have reviewed this patient's surgical history and updated it with pertinent information if needed.  Past Surgical History:   Procedure Laterality Date     APPENDECTOMY       ASPIRATION NEEDLE HIP Left 9/25/2017    Procedure: ASPIRATION NEEDLE HIP;;  Surgeon: Moises Elias MD;  Location:  OR     BACK SURGERY  11/07/2017    cervical spine surgery      COLONOSCOPY       CV CORONARY ANGIOGRAM N/A 5/6/2020    Procedure: Coronary Angiogram;  Surgeon: Daivd Woods MD;  Location:  HEART CARDIAC CATH LAB     CV LEFT HEART CATH N/A 5/6/2020    Procedure: Left Heart Cath;  Surgeon: David Woods MD;  Location:  HEART CARDIAC CATH LAB     CV RIGHT HEART CATH MEASUREMENTS RECORDED N/A 5/6/2020    Procedure: Right Heart Cath;  Surgeon: David Woods MD;  Location:  HEART CARDIAC CATH LAB     DECOMPRESSION  LUMBAR TWO LEVELS Bilateral 6/11/2018    Procedure: DECOMPRESSION LUMBAR TWO LEVELS;  BILATERAL LUMBAR 3-4 AND LUMBAR 4-5 DECOMPRESSION ;  Surgeon: Sd Vallejo MD;  Location: [facility] OR     FUSION CERVICAL ANTERIOR ONE LEVEL WITH BONE ALLOGRAFT N/A 9/25/2017    Procedure: FUSION CERVICAL ANTERIOR ONE LEVEL WITH BONE ALLOGRAFT;  ANTERIOR CERVICAL DECOMPRESSION AND FUSIONC3-4 WITH INSTRUMENTATION, ALLOGRAFT AND BONE MARROW ASPIRATE OF THE LEFT ILIAC CREST ;  Surgeon: Moises Elias MD;  Location: [facility] OR     PHACOEMULSIFICATION CLEAR CORNEA W/ STANDARD IOL IMPLANT, ENDOSCOPIC CYCLOPHOTOCOAGULATION, COMBINED Right 12/12/2017    Procedure: COMBINED PHACOEMULSIFICATION CLEAR CORNEA WITH STANDARD INTRAOCULAR LENS IMPLANT, ENDOSCOPIC CYCLOPHOTOCOAGULATION;  COMPLEX RIGHT EYE COMBINED PHACOEMULSIFICATION CLEAR CORNEA WITH STANDARD INTRAOCULAR LENS IMPLANT, ENDOSCOPIC CYCLOPHOTOCOAGULATION ;  Surgeon: Alli Mott MD;  Location:  [facility]     PHACOEMULSIFICATION CLEAR CORNEA W/ STANDARD IOL IMPLANT, ENDOSCOPIC CYCLOPHOTOCOAGULATION, COMBINED Left 5/1/2018    Procedure: COMBINED PHACOEMULSIFICATION CLEAR CORNEA WITH STANDARD INTRAOCULAR LENS IMPLANT, ENDOSCOPIC CYCLOPHOTOCOAGULATION;  COMPLEX LEFT EYE PHACOEMULSIFICATION CLEAR CORNEA WITH STANDARD INTRAOCULAR LENS IMPLANT, ENDOSCOPIC CYCLOPHOTOCOAGULATION ;  Surgeon: Alli Mott MD;  Location:  [facility]     ROTATOR CUFF REPAIR RT/LT  2004       Prior to Admission Medications   Prior to Admission Medications   Prescriptions Last Dose Informant Patient Reported? Taking?   CHOLECALCIFEROL PO 8/9/2021 at AM  Yes Yes   Sig: Take by mouth daily   acetaminophen (TYLENOL) 500 MG tablet 8/9/2021 at AM  Yes Yes   Sig: Take 500 mg by mouth every 8 hours as needed for mild pain   acyclovir (ZOVIRAX) 400 MG tablet 8/9/2021 at AM  Yes Yes   Sig: Take 400 mg by mouth 2 times daily   allopurinol (ZYLOPRIM) 300 MG tablet 8/8/2021 at 1200 Self Yes Yes   Sig: Take 300 mg by mouth  daily.   amLODIPine (NORVASC) 10 MG tablet 8/8/2021 at PM  Yes Yes   Sig: Take 10 mg by mouth daily   cyclophosphamide (CYTOXAN) 50 MG capsule 8/9/2021 at AM  Yes Yes   Sig: Take 500 mg by mouth once a week   dexamethasone (DECADRON) 4 MG tablet 8/9/2021 at AM  Yes Yes   Sig: Take 20 mg by mouth Weekly Day of chemo   finasteride (PROSCAR) 5 MG tablet 8/8/2021 at PM  Yes Yes   Sig: Take 5 mg by mouth daily   furosemide (LASIX) 20 MG tablet 8/9/2021 at AM  Yes Yes   Sig: Take 20 mg by mouth daily   latanoprost (XALATAN) 0.005 % ophthalmic solution 8/8/2021 at PM Self Yes Yes   Sig: Place 1 drop into both eyes At Bedtime    pantoprazole (PROTONIX) 20 MG EC tablet 8/8/2021 at PM  Yes Yes   Sig: Take 20 mg by mouth daily   predniSONE (DELTASONE) 5 MG tablet 8/8/2021 at AM Self Yes Yes   Sig: Take 5 mg by mouth every other day    prochlorperazine (COMPAZINE) 10 MG tablet Past Week at Unknown time  Yes Yes   Sig: Take 10 mg by mouth every 6 hours as needed for nausea or vomiting   rosuvastatin (CRESTOR) 20 MG tablet 8/8/2021 at 1200  Yes Yes   Sig: Take 20 mg by mouth daily   sodium bicarbonate 650 MG tablet 8/9/2021 at AM  Yes Yes   Sig: Take 650 mg by mouth 2 times daily   tamsulosin (FLOMAX) 0.4 MG 24 hr capsule 8/8/2021 at PM Self Yes Yes   Sig: Take 0.4 mg by mouth every evening    triamcinolone (KENALOG) 0.1 % external cream 8/8/2021 at Unknown time  Yes Yes   Sig: Apply topically daily as needed for irritation      Facility-Administered Medications: None     Current Facility-Administered Medications   Medication Dose Route Frequency     acyclovir  400 mg Oral BID     allopurinol  300 mg Oral Daily     finasteride  5 mg Oral Daily     latanoprost  1 drop Both Eyes At Bedtime     pantoprazole  20 mg Oral Daily     predniSONE  5 mg Oral Every Other Day     rosuvastatin  20 mg Oral Daily     sodium bicarbonate  650 mg Oral BID     Current Facility-Administered Medications   Medication Last Rate     Allergies   No Known  "Allergies    Social History    reports that he has never smoked. He has never used smokeless tobacco. He reports current alcohol use. He reports that he does not use drugs.    Family History   Family History   Problem Relation Age of Onset     Heart Disease Father        Review of Systems   The comprehensive 10 point Review of Systems is negative other than noted in the HPI or here.     Physical Exam   Vital Signs with Ranges  Temp:  [96  F (35.6  C)-98.8  F (37.1  C)] 97.6  F (36.4  C)  Pulse:  [72-91] 86  Resp:  [11-28] 16  BP: ()/(44-50) 103/50  SpO2:  [93 %-99 %] 96 %  Wt Readings from Last 4 Encounters:   08/09/21 61.7 kg (136 lb)   06/26/21 61.7 kg (136 lb)   03/24/21 66.2 kg (145 lb 14.4 oz)   09/23/20 65.8 kg (145 lb)     No intake/output data recorded.      Vitals: /50 (BP Location: Right arm)   Pulse 86   Temp 97.6  F (36.4  C) (Oral)   Resp 16   Ht 1.702 m (5' 7\")   Wt 61.7 kg (136 lb)   SpO2 96%   BMI 21.30 kg/m        No lab results found in last 7 days.    Invalid input(s): TROPONINIES    Recent Labs   Lab 08/10/21  0558 08/09/21  2311 08/09/21  1251   WBC 7.0  --  5.3   HGB 6.9* 6.7* 7.0*   MCV 80  --  81   PLT 87*  --  86*     --  136   POTASSIUM 3.7  --  3.6   CHLORIDE 112*  --  110*   CO2 15*  --  17*   BUN 30  --  29   CR 1.71*  --  1.69*   GFRESTIMATED 36*  --  37*   ANIONGAP 9  --  9   CONNIE 7.7*  --  8.1*   *  --  125*   ALBUMIN  --   --  2.8*   PROTTOTAL  --   --  4.8*   BILITOTAL  --   --  0.6   ALKPHOS  --   --  114   ALT  --   --  31   AST  --   --  21     No results for input(s): CHOL, HDL, LDL, TRIG, CHOLHDLRATIO in the last 39392 hours.  Recent Labs   Lab 08/10/21  0558 08/09/21  2311 08/09/21  1804 08/09/21  1251   WBC 7.0  --   --  5.3   HGB 6.9* 6.7*  --  7.0*   HCT 21.9*  --   --  22.5*   MCV 80  --   --  81   PLT 87*  --   --  86*   IRON  --   --  11*  --    IRONSAT  --   --  4*  --    FEB  --   --  258  --    GEO  --   --  453*  --    B12  --   --  267 "  --    FOLIC  --   --  12.7  --      No results for input(s): PH, PHV, PO2, PO2V, SAT, PCO2, PCO2V, HCO3, HCO3V in the last 168 hours.  No results for input(s): NTBNPI, NTBNP in the last 168 hours.  No results for input(s): DD in the last 168 hours.  No results for input(s): SED, CRP in the last 168 hours.  Recent Labs   Lab 08/10/21  0558 08/09/21  1251   PLT 87* 86*     No results for input(s): TSH in the last 168 hours.  No results for input(s): COLOR, APPEARANCE, URINEGLC, URINEBILI, URINEKETONE, SG, UBLD, URINEPH, PROTEIN, UROBILINOGEN, NITRITE, LEUKEST, RBCU, WBCU in the last 168 hours.    Imaging:  No results found for this or any previous visit (from the past 48 hour(s)).    Echo:  No results found for this or any previous visit (from the past 4320 hour(s)).

## 2021-08-11 ENCOUNTER — PATIENT OUTREACH (OUTPATIENT)
Dept: CARE COORDINATION | Facility: CLINIC | Age: 81
End: 2021-08-11

## 2021-08-11 DIAGNOSIS — Z71.89 OTHER SPECIFIED COUNSELING: ICD-10-CM

## 2021-08-11 NOTE — PROGRESS NOTES
"Clinic Care Coordination Contact  [facility]: Post-Discharge Note  SITUATION                                                      Admission:    Admission Date: 08/09/21   Reason for Admission: Syncope, likely orthostatic in the setting of worsening anemia and chemotherapy  Discharge:   Discharge Date: 08/10/21  Discharge Diagnosis: Syncope, likely orthostatic in the setting of worsening anemia and chemotherapy    BACKGROUND                                                      81 year old male admitted on 8/9/2021.      Syncope, likely orthostatic in the setting of worsening anemia and chemotherapy  Had syncopal episode after chemotherapy likely secondary to orthostasis due to worsening anemia and oral intake in the setting of systemic chemotherapy  Plan  ? serial orthostatic measurements demonstrate that orthostasis has resolved  ? do not see role of repeating echocardiogram at this point, severe AORTIC STENOSIS based on echo in April  ? Type and cross match, transfuse 1 unit PRBCs  ? Held antihypertensives; okay to resume Flomax at discharge, keep Amlodipine and Lasix on hold.  Primary MD, please advise patient re: timing for resuming these medications     Severe aortic stenosis  ? Cardiology consult requested, I appreciate Dr. Pino's evaluation and recommendations  ? He concludes, \"Patient has been followed by my colleague Dr. Chun for his severe aortic stenosis.  He has a scheduled appointment with him next month.  At this time would recommend that he keep his appointment.  Given the context of his presentation and resolution of his symptoms no immediate therapies need to be directed toward his aortic stenosis.  Follow-up echocardiogram can be performed when he sees my colleague in clinic.\"     Normocytic anemia likely due to chemotherapy and anemia of chronic disease  He endorses getting an IV medication probably Aranesp as an outpatient  Plan  ?  type and cross, transfuse if less than 7  ? send hemolysis " labs given the AORTIC STENOSIS.  Haptoglobin is wnl.  LDH is just above the upper limit of normal  ? Routine anemia labs  ? Patient received 1 unit of PRBCs, hemoglobin 8.5 g/dl        Chronic medical conditions  CKD IV  Hyperuricemia  CAD  HTN  BPH  GERD    ASSESSMENT      Discharge Assessment  How are you doing now that you are home?: feelin good  How are your symptoms? (Red Flag symptoms escalate to triage hotline per guidelines): Improved  Do you feel your condition is stable enough to be safe at home until your provider visit?: Yes  Does the patient have their discharge instructions? : Yes  Does the patient have questions regarding their discharge instructions? : No  Were you started on any new medications or were there changes to any of your previous medications? : No  Does the patient have all of their medications?: Yes  Do you have questions regarding any of your medications? : No  Discharge follow-up appointment scheduled within 14 calendar days? : No  Is patient agreeable to assistance with scheduling? : No (Going for a treatment on monday and they will check his labs)    Post-op  Did the patient have surgery or a procedure: No  Fever: No  Chills: No  Eating & Drinking: eating and drinking without complaints/concerns  PO Intake: regular diet  Bowel Function: normal  Date of last BM: 08/10/21  Urinary Status: voiding without complaint/concerns        PLAN                                                      Outpatient Plan:  Follow up with Dr. Bean or primary MD in 2 days to recheck   hemoglobin.         Please advise patient timing for resuming Lasix and Amlodipine.       Future Appointments   Date Time Provider Department Center   9/1/2021  8:15 AM [facility]   9/1/2021  8:45 AM [id number]   9/2/2021  8:15 AM Casimiro Chun MD [facility]         For any urgent concerns, please contact our 24 hour nurse triage line: [phone] ([phone])          Brigid Carcamo MA

## 2021-08-12 LAB — ANTIBODY ID: NORMAL

## 2021-08-30 ENCOUNTER — APPOINTMENT (OUTPATIENT)
Dept: GENERAL RADIOLOGY | Facility: CLINIC | Age: 81
End: 2021-08-30
Attending: EMERGENCY MEDICINE
Payer: COMMERCIAL

## 2021-08-30 ENCOUNTER — HOSPITAL ENCOUNTER (EMERGENCY)
Facility: CLINIC | Age: 81
Discharge: HOME | End: 2021-08-30
Attending: EMERGENCY MEDICINE | Admitting: EMERGENCY MEDICINE
Payer: COMMERCIAL

## 2021-08-30 VITALS
SYSTOLIC BLOOD PRESSURE: 86 MMHG | HEART RATE: 98 BPM | DIASTOLIC BLOOD PRESSURE: 37 MMHG | TEMPERATURE: 97.2 F | OXYGEN SATURATION: 98 % | RESPIRATION RATE: 12 BRPM

## 2021-08-30 DIAGNOSIS — I95.1 SYNCOPE DUE TO ORTHOSTATIC HYPOTENSION: ICD-10-CM

## 2021-08-30 DIAGNOSIS — N18.9 CHRONIC KIDNEY DISEASE, UNSPECIFIED CKD STAGE: ICD-10-CM

## 2021-08-30 DIAGNOSIS — I95.1 ORTHOSTATIC HYPOTENSION: ICD-10-CM

## 2021-08-30 DIAGNOSIS — D63.8 ANEMIA IN OTHER CHRONIC DISEASES CLASSIFIED ELSEWHERE: ICD-10-CM

## 2021-08-30 LAB
ABO/RH(D): ABNORMAL
ANION GAP SERPL CALCULATED.3IONS-SCNC: 7 MMOL/L (ref 3–14)
ANTIBODY SCREEN, TUBE: ABNORMAL
ANTIBODY SCREEN: POSITIVE
BASOPHILS # BLD AUTO: 0 10E3/UL (ref 0–0.2)
BASOPHILS NFR BLD AUTO: 0 %
BUN SERPL-MCNC: 26 MG/DL (ref 7–30)
CALCIUM SERPL-MCNC: 7.6 MG/DL (ref 8.5–10.1)
CHLORIDE BLD-SCNC: 113 MMOL/L (ref 94–109)
CO2 SERPL-SCNC: 18 MMOL/L (ref 20–32)
CREAT SERPL-MCNC: 1.61 MG/DL (ref 0.66–1.25)
EOSINOPHIL # BLD AUTO: 0.1 10E3/UL (ref 0–0.7)
EOSINOPHIL NFR BLD AUTO: 1 %
ERYTHROCYTE [DISTWIDTH] IN BLOOD BY AUTOMATED COUNT: 26.7 % (ref 10–15)
GFR SERPL CREATININE-BSD FRML MDRD: 39 ML/MIN/1.73M2
GLUCOSE BLD-MCNC: 81 MG/DL (ref 70–99)
HCT VFR BLD AUTO: 26.5 % (ref 40–53)
HGB BLD-MCNC: 8.2 G/DL (ref 13.3–17.7)
IMM GRANULOCYTES # BLD: 0.1 10E3/UL
IMM GRANULOCYTES NFR BLD: 1 %
LYMPHOCYTES # BLD AUTO: 0 10E3/UL (ref 0.8–5.3)
LYMPHOCYTES NFR BLD AUTO: 0 %
MCH RBC QN AUTO: 28.3 PG (ref 26.5–33)
MCHC RBC AUTO-ENTMCNC: 30.9 G/DL (ref 31.5–36.5)
MCV RBC AUTO: 91 FL (ref 78–100)
MONOCYTES # BLD AUTO: 0.2 10E3/UL (ref 0–1.3)
MONOCYTES NFR BLD AUTO: 2 %
NEUTROPHILS # BLD AUTO: 7.3 10E3/UL (ref 1.6–8.3)
NEUTROPHILS NFR BLD AUTO: 96 %
NRBC # BLD AUTO: 0.1 10E3/UL
NRBC BLD AUTO-RTO: 1 /100
PLATELET # BLD AUTO: 105 10E3/UL (ref 150–450)
POTASSIUM BLD-SCNC: 3.6 MMOL/L (ref 3.4–5.3)
RBC # BLD AUTO: 2.9 10E6/UL (ref 4.4–5.9)
SODIUM SERPL-SCNC: 138 MMOL/L (ref 133–144)
SPECIMEN EXPIRATION DATE: ABNORMAL
SPECIMEN EXPIRATION DATE: ABNORMAL
TROPONIN I SERPL-MCNC: <0.015 UG/L (ref 0–0.04)
WBC # BLD AUTO: 7.6 10E3/UL (ref 4–11)

## 2021-08-30 PROCEDURE — 99285 EMERGENCY DEPT VISIT HI MDM: CPT | Mod: 25

## 2021-08-30 PROCEDURE — 93005 ELECTROCARDIOGRAM TRACING: CPT

## 2021-08-30 PROCEDURE — 71046 X-RAY EXAM CHEST 2 VIEWS: CPT

## 2021-08-30 PROCEDURE — 258N000003 HC RX IP 258 OP 636: Performed by: EMERGENCY MEDICINE

## 2021-08-30 PROCEDURE — 85025 COMPLETE CBC W/AUTO DIFF WBC: CPT | Performed by: EMERGENCY MEDICINE

## 2021-08-30 PROCEDURE — 96360 HYDRATION IV INFUSION INIT: CPT

## 2021-08-30 PROCEDURE — 84484 ASSAY OF TROPONIN QUANT: CPT | Performed by: EMERGENCY MEDICINE

## 2021-08-30 PROCEDURE — 86900 BLOOD TYPING SEROLOGIC ABO: CPT | Performed by: EMERGENCY MEDICINE

## 2021-08-30 PROCEDURE — 82374 ASSAY BLOOD CARBON DIOXIDE: CPT | Performed by: EMERGENCY MEDICINE

## 2021-08-30 PROCEDURE — 36415 COLL VENOUS BLD VENIPUNCTURE: CPT | Performed by: EMERGENCY MEDICINE

## 2021-08-30 PROCEDURE — 86850 RBC ANTIBODY SCREEN: CPT | Performed by: EMERGENCY MEDICINE

## 2021-08-30 RX ORDER — SODIUM CHLORIDE 9 MG/ML
INJECTION, SOLUTION INTRAVENOUS CONTINUOUS
Status: DISCONTINUED | OUTPATIENT
Start: 2021-08-30 | End: 2021-08-30 | Stop reason: HOSPADM

## 2021-08-30 RX ADMIN — SODIUM CHLORIDE 1000 ML: 9 INJECTION, SOLUTION INTRAVENOUS at 12:33

## 2021-08-30 ASSESSMENT — ENCOUNTER SYMPTOMS
BLOOD IN STOOL: 0
HEMATURIA: 0
VOMITING: 0
LIGHT-HEADEDNESS: 1
DYSURIA: 0
WEAKNESS: 1
DIFFICULTY URINATING: 0
ABDOMINAL PAIN: 1
HEADACHES: 0
DIARRHEA: 1

## 2021-08-30 NOTE — PHARMACY-ADMISSION MEDICATION HISTORY
Pharmacy Medication History  Admission medication history interview status for the 8/30/2021  admission is complete. See EPIC admission navigator for prior to admission medications     Location of Interview: Patient room  Medication history sources: Patient, Surescripts and Care Everywhere    Significant changes made to the medication list:  Removed: Compazine PRN    In the past week, patient estimated taking medication this percent of the time: greater than 90%    Additional medication history information:   none    Medication reconciliation completed by provider prior to medication history? No    Time spent in this activity: 10 mins    Prior to Admission medications    Medication Sig Last Dose Taking? Auth Provider   acetaminophen (TYLENOL) 500 MG tablet Take 500 mg by mouth every 8 hours as needed for mild pain  Yes Unknown, Entered By History   acyclovir (ZOVIRAX) 400 MG tablet Take 400 mg by mouth 2 times daily 8/29/2021 at Unknown time Yes Unknown, Entered By History   allopurinol (ZYLOPRIM) 300 MG tablet Take 300 mg by mouth daily. 8/29/2021 at Unknown time Yes Reported, Patient   cholecalciferol 25 MCG (1000 UT) TABS Take 1,000 Units by mouth daily  8/29/2021 at Unknown time Yes Unknown, Entered By History   cyclophosphamide (CYTOXAN) 50 MG capsule Take 500 mg by mouth once a week 8/30/2021 at Unknown time Yes Unknown, Entered By History   dexamethasone (DECADRON) 4 MG tablet Take 20 mg by mouth Weekly Day of chemo Past Week at Unknown time Yes Unknown, Entered By History   finasteride (PROSCAR) 5 MG tablet Take 5 mg by mouth daily 8/29/2021 at Unknown time Yes Unknown, Entered By History   latanoprost (XALATAN) 0.005 % ophthalmic solution Place 1 drop into both eyes At Bedtime  8/29/2021 at Unknown time Yes Unknown, Entered By History   pantoprazole (PROTONIX) 20 MG EC tablet Take 20 mg by mouth daily 8/29/2021 at Unknown time Yes Unknown, Entered By History   predniSONE (DELTASONE) 5 MG tablet Take 5 mg by  mouth every other day  8/29/2021 at Unknown time Yes Reported, Patient   rosuvastatin (CRESTOR) 20 MG tablet Take 20 mg by mouth daily 8/29/2021 at Unknown time Yes Reported, Patient   sodium bicarbonate 650 MG tablet Take 650 mg by mouth 2 times daily 8/29/2021 at Unknown time Yes Unknown, Entered By History   tamsulosin (FLOMAX) 0.4 MG 24 hr capsule Take 0.4 mg by mouth every evening  8/29/2021 at Unknown time Yes Unknown, Entered By History   triamcinolone (KENALOG) 0.1 % external cream Apply topically daily as needed for irritation  Yes Unknown, Entered By History       The information provided in this note is only as accurate as the sources available at the time of update(s)

## 2021-08-30 NOTE — ED NOTES
Ambulated patient to bathroom. Patient walked well and at what may have been his baseline. Patient did not report any dizziness nor showed any signs of imbalance. Patient was able to stand and use restroom by self.

## 2021-08-30 NOTE — ED PROVIDER NOTES
History   Chief Complaint:  Syncope       The history is provided by the patient, the EMS personnel and the spouse.      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 Rose was discharged from chemotherapy at 1030AM today and had a syncopal episode in the car ride home where family notes he became weak and unresponsive.  His wife then pulled over and called 911.  When EMS arrived the patient did not want to be evaluated to come to the emergency room.  EMS attempted to take orthostatic blood pressures and noted a sitting blood pressure of 61 but he had a short syncopal episode when he stood up lasting a few seconds. They gave 500 ml normal saline and his last blood pressure was 89/34. Heart rate was 82. He felt light-headed prior to losing consciousness. He had a similar episode 3 weeks ago after chemotherapy and was admitted to the hospital overnight for orthostatic syncope in the setting of worsening anemia and chemotherapy; he was given 1 unit packed red blood cells on that visit. He reports a few episodes of diarrhea per day and mild abdominal pain after chemotherapy. No blood in the stool or urinary symptoms. No pain including chest pain or headache. No vomiting. No injury. No new medications. He has not resumed amlodipine and Lasix since his last admission.     Review of Systems   Cardiovascular: Negative for chest pain.   Gastrointestinal: Positive for abdominal pain and diarrhea. Negative for blood in stool and vomiting.   Genitourinary: Negative for difficulty urinating, dysuria and hematuria.   Neurological: Positive for syncope, weakness and light-headedness. Negative for headaches.   All other systems reviewed and are negative.        Allergies:  No Known Allergies    Medications:  Acyclovir   Allopurinol   Cyclophosphamide   Dexamethasone    Finasteride   Pantoprazole    Prednisone    Prochlorperazine   Rosuvastatin   Sodium bicarbonate   Tamsulosin     Past Medical History:     Abnormally low HDL cholesterol with hypertriglyceridemia   Amyloidosis   Amyloid neuropathy   Anemia  BPH  CKD stage 4  Colon polyps  Coronary artery disease  Cervical spinal stenosis  DJD  GERD  Gout  Hyperlipidemia  Hypertension  Leukocytosis  Myelodysplastic syndrome  Multiple myeloma  Nonrheumatic aortic valve stenosis  Osteoarthritis   Renal cancer   Thrombocytopenia     Past Surgical History:    Appendectomy   Aspiration needle hip, left  Cervical spine surgery  Colonoscopy  Coronary angiogram  Left heart cath  Right hear cath  Decompression lumbar two levels  Fusion cervical anterior one level with bone allograft  Phacoemulsification clear cornea with standard intraocular lens implant  bilateral  Rotator cuff repair    Family History:    Cancer   Heart disease     Social History:  Presents unaccompanied via EMS  PCP: Kian Johns     Physical Exam     Patient Vitals for the past 24 hrs:   BP Temp Temp src Pulse Resp SpO2   08/30/21 1330 (!) 86/37 -- -- 98 -- --   08/30/21 1310 101/46 -- -- 90 12 98 %   08/30/21 1230 103/42 -- -- 74 -- 99 %   08/30/21 1225 92/42 97.2  F (36.2  C) Temporal 71 14 100 %       Physical Exam  Constitutional:  Patient is oriented to person, place, and time. They appear well-developed and well-nourished. Mild distress secondary to weakness.   HENT:   Mouth/Throat:   Oropharynx is clear and moist.   Eyes:    Conjunctivae normal and EOM are normal. Pupils are equal, round, and reactive to light.   Neck:    Normal range of motion.   Cardiovascular: Normal rate, regular rhythm and normal heart sounds.  Exam reveals no gallop and no friction rub.  No murmur heard.  Pulmonary/Chest:  Effort normal and breath sounds normal. Patient has no wheezes. Patient has no rales.   Abdominal:   Soft. Bowel sounds are normal. Patient exhibits no mass. There is no tenderness. There is no  rebound and no guarding.   Musculoskeletal:  Normal range of motion. Patient exhibits no edema.   Neurological:   Patient is alert and oriented to person, place, and time. Patient generally weak. No cranial nerve deficit or sensory deficit. GCS 15  Skin:   Skin is warm and dry. No rash noted. No erythema.   Psychiatric:   Patient has a normal mood and affect. Patient's behavior is normal. Judgment and thought content normal.        Emergency Department Course   ECG  ECG taken at 1227, ECG read at 1229  Normal sinus rhythm. Normal ECG.    No significant change as compared to prior, dated 8/9/21.  Rate 72 bpm. WV interval 176 ms. QRS duration 86 ms. QT/QTc 410/448 ms. P-R-T axes 49 27 56.     Imaging:  XR Chest 2 views:  No acute cardiopulmonary disease.  Per radiology.     Laboratory:  CBC: WBC 7.6, HGB 8.2(L), (L)  ABO/Rh type and screen: pending    BMP: Chloride 113(H), Carbon Dioxide 18(L), Calcium 7.6(L), GFR 39(L), o/w WNL (Creatinine 1.61(H))     Troponin (Collected 1231): <0.015     Emergency Department Course:    Reviewed:  I reviewed nursing notes, vitals, past medical history and care everywhere    Assessments:  1220 I obtained history and examined the patient as noted above.   1336 I spoke with Kassidy, the patiens wife, over the phone.   1405 I rechecked the patient and explained findings.     Interventions:  1233 0.9% sodium chloride bolus, 1,000 ml, IV     Disposition:  The patient was discharged to home.       Impression & Plan     Medical Decision Making:  Rose Duong is a 81 year old male presenting to the ED after he had orthostatic hypotension which resulted in syncope which was witnessed by medics. I did speak with the wife and it sounds like he was very out of it in the car but maybe not fully unconsciousness but she wasn't able to get him out of the car. Here he complains of no discomfort. He had a similar episode a couple weeks ago after his chemotherapy.  He was brought in overnight  for observation.  They did hold his blood pressure medications ever since then.     Blood work was obtained here. He is chronically anemic but not to the point where it requires transfusion. This is stable for him. He has CKD again this is stable. BUN is also noted to be normal. His bicarb is a little low but this is his baseline. Cardiac enzyme is within normal limits. Chest x-ray shows no acute disease.     At this time I suspect that he has orthostatic hypotension and it may be that he is fluid down as we did give him 1.5 liters of normal saline, repeated the orthostatics, and he was not symptomatic, although his blood pressure did go down a bit. Patient is very adamant about not being brought in for observation. I then spoke with his wife and discussed the findings and his condition with her. She feels safe with him coming home. He did ambulate here under his own power without any unsteadiness or lightheadedness so at this point I feel that he is safe for discharge. He has a doctors appointment tomorrow. He will continue to hold his blood pressure medications and will speak with is oncologist regarding the ongoing chemotherapy treatments.       Diagnosis:    ICD-10-CM    1. Orthostatic hypotension  I95.1    2. Syncope due to orthostatic hypotension  I95.1    3. Anemia in other chronic diseases classified elsewhere  D63.8    4. Chronic kidney disease, unspecified CKD stage  N18.9        Scribe Disclosure:  I, Mecca Wrayelise, am serving as a scribe at 12:25 PM on 8/30/2021 to document services personally performed by Tamie Rangel MD based on my observations and the provider's statements to me.              Tamie Rangel MD  08/30/21 [id number]

## 2021-08-30 NOTE — ED TRIAGE NOTES
Pt had chemo today at 10:30 and had 2 syncopal episodes, was orthostatic with ems with systolic of 61, bg 113

## 2021-08-30 NOTE — ED NOTES
Bed: ED11  Expected date:   Expected time:   Means of arrival:   Comments:  [place] - 81 M hypotension eta 1223

## 2021-08-30 NOTE — ED NOTES
[facility]  ED Nurse Handoff Report    ED Chief complaint: Syncope      ED Diagnosis:   Final diagnoses:   None       Code Status: Full Code    Allergies: No Known Allergies    Patient Story: Rose comes to the ER after 2 syncopal episodes today after chemo. This has happened before after he's had his chemo. His was orthostatic for ems. He received one liter of NS.      Treatments and/or interventions provided: bolus  Patient's response to treatments and/or interventions: good    To be done/followed up on inpatient unit:  cont to monitor    Does this patient have any cognitive concerns?: none    Activity level - Baseline/Home:  Independent  Activity Level - Current:   Independent    Patient's Preferred language: English   Needed?: No    Isolation: None  Infection: Not Applicable  Patient tested for COVID 19 prior to admission: YES  Bariatric?: No    Vital Signs:   Vitals:    08/30/21 1225   BP: 92/42   Pulse: 71   Resp: 14   Temp: 97.2  F (36.2  C)   TempSrc: Temporal   SpO2: 100%       Cardiac Rhythm:     Was the PSS-3 completed:   Yes  What interventions are required if any?               Family Comments: wife at home  OBS brochure/video discussed/provided to patient/family: N/A              Name of person given brochure if not patient: na              Relationship to patient: na    For the majority of the shift this patient's behavior was Green.   Behavioral interventions performed were na.    ED NURSE PHONE NUMBER: [phone]

## 2021-08-31 LAB
ATRIAL RATE - MUSE: 72 BPM
DIASTOLIC BLOOD PRESSURE - MUSE: NORMAL MMHG
INTERPRETATION ECG - MUSE: NORMAL
P AXIS - MUSE: 49 DEGREES
PR INTERVAL - MUSE: 176 MS
QRS DURATION - MUSE: 86 MS
QT - MUSE: 410 MS
QTC - MUSE: 448 MS
R AXIS - MUSE: 27 DEGREES
SYSTOLIC BLOOD PRESSURE - MUSE: NORMAL MMHG
T AXIS - MUSE: 56 DEGREES
VENTRICULAR RATE- MUSE: 72 BPM

## 2021-09-01 ENCOUNTER — HOSPITAL ENCOUNTER (OUTPATIENT)
Dept: CARDIOLOGY | Facility: CLINIC | Age: 81
Discharge: HOME | End: 2021-09-01
Attending: INTERNAL MEDICINE | Admitting: INTERNAL MEDICINE
Payer: COMMERCIAL

## 2021-09-01 ENCOUNTER — LAB (OUTPATIENT)
Dept: LAB | Facility: CLINIC | Age: 81
End: 2021-09-01
Payer: COMMERCIAL

## 2021-09-01 DIAGNOSIS — I25.10 CORONARY ARTERY DISEASE INVOLVING NATIVE CORONARY ARTERY, ANGINA PRESENCE UNSPECIFIED, UNSPECIFIED WHETHER NATIVE OR TRANSPLANTED HEART: ICD-10-CM

## 2021-09-01 DIAGNOSIS — I35.0 NONRHEUMATIC AORTIC VALVE STENOSIS: ICD-10-CM

## 2021-09-01 DIAGNOSIS — R94.39 POSITIVE CARDIAC STRESS TEST: ICD-10-CM

## 2021-09-01 LAB
CHOLEST SERPL-MCNC: 122 MG/DL
FASTING STATUS PATIENT QL REPORTED: YES
HDLC SERPL-MCNC: 54 MG/DL
LDLC SERPL CALC-MCNC: 45 MG/DL
LVEF ECHO: NORMAL
NONHDLC SERPL-MCNC: 68 MG/DL
TRIGL SERPL-MCNC: 114 MG/DL

## 2021-09-01 PROCEDURE — 80061 LIPID PANEL: CPT

## 2021-09-01 PROCEDURE — 93306 TTE W/DOPPLER COMPLETE: CPT

## 2021-09-01 PROCEDURE — 36415 COLL VENOUS BLD VENIPUNCTURE: CPT

## 2021-09-01 PROCEDURE — 93306 TTE W/DOPPLER COMPLETE: CPT | Mod: 26 | Performed by: INTERNAL MEDICINE

## 2021-09-02 ENCOUNTER — OFFICE VISIT (OUTPATIENT)
Dept: CARDIOLOGY | Facility: CLINIC | Age: 81
End: 2021-09-02
Payer: COMMERCIAL

## 2021-09-02 VITALS
HEIGHT: 67 IN | SYSTOLIC BLOOD PRESSURE: 111 MMHG | OXYGEN SATURATION: 98 % | HEART RATE: 64 BPM | WEIGHT: 131 LBS | DIASTOLIC BLOOD PRESSURE: 47 MMHG | BODY MASS INDEX: 20.56 KG/M2

## 2021-09-02 DIAGNOSIS — R94.39 POSITIVE CARDIAC STRESS TEST: ICD-10-CM

## 2021-09-02 DIAGNOSIS — I35.0 NONRHEUMATIC AORTIC VALVE STENOSIS: ICD-10-CM

## 2021-09-02 PROCEDURE — 99214 OFFICE O/P EST MOD 30 MIN: CPT | Performed by: INTERNAL MEDICINE

## 2021-09-02 ASSESSMENT — MIFFLIN-ST. JEOR: SCORE: 1252.84

## 2021-09-02 NOTE — LETTER
9/2/2021    Kian Johns MD  [facility] [address] [facility] [address]    RE: Rose Duong       Dear Colleague,    I had the pleasure of seeing Rose Duong in the [facility].    HPI and Plan:   It is, as always, a pleasure for me to see Mr. Duong.  He is here for followup of valvular heart disease and coronary artery disease.      I had the pleasure of meeting this very delightful gentleman in 12/2018 for the first time.  He had a stress test done in the [facility] system as he was having shortness of breath on exertion.  This showed an area of inferoapical ischemia.  As the amount of myocardium in jeopardy was small, I elected to manage him medically.  However, he had worsening shortness of breath, and in 05/2020 he underwent coronary angiography, which revealed mild to moderate nonobstructive coronary artery disease.      He does have moderate to severe aortic stenosis with an aortic valve area of 1.0 cm2, mean gradient of 32 mmHg. This has not progressed significantly in recent years.    His gradients have varied which I think is due to his fluctuating hemoglobin levels.    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  I see recent hemoglobin is low 6.9 and unsurprisingly he had syncopal episode.  The anemia would certainly exaggerate the hemodynamic effects of his moderate to severe aortic stenosis.     Accompanied by his wife today, Mr. Duong is cheerful as always and denies cardiac symptoms.    He appears to have lost weight.  The murmur is prominent undoubtedly because of the anemia.  However there is no signs of congestion.    IMPRESSION:   1.  Moderate to severe aortic stenosis, stable.   2.  Stable coronary artery disease.   3.  Dyslipidemia, on 20 mg of rosuvastatin.    HDL 61 LDL 45 which are  excellent numbers   4.    Hematologic malignancy, on dexamethasone prednisone and cyclophosphamide.    5.  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      His prognosis is guarded.  I emphasized to him and his wife that his major health issue currently is his hematologic malignancy.  I will see him again in 6 months time for further follow-up      No orders of the defined types were placed in this encounter.      No orders of the defined types were placed in this encounter.      Encounter Diagnoses   Name Primary?     Nonrheumatic aortic valve stenosis      Positive cardiac stress test        CURRENT MEDICATIONS:  Current Outpatient Medications   Medication Sig Dispense Refill     acetaminophen (TYLENOL) 500 MG tablet Take 500 mg by mouth every 8 hours as needed for mild pain       acyclovir (ZOVIRAX) 400 MG tablet Take 400 mg by mouth 2 times daily       allopurinol (ZYLOPRIM) 300 MG tablet Take 300 mg by mouth daily.       cholecalciferol 25 MCG (1000 UT) TABS Take 1,000 Units by mouth daily        cyclophosphamide (CYTOXAN) 50 MG capsule Take 500 mg by mouth once a week       dexamethasone (DECADRON) 4 MG tablet Take 20 mg by mouth Weekly Day of chemo       finasteride (PROSCAR) 5 MG tablet Take 5 mg by mouth daily       latanoprost (XALATAN) 0.005 % ophthalmic solution Place 1 drop into both eyes At Bedtime        pantoprazole (PROTONIX) 20 MG EC tablet Take 20 mg by mouth daily       predniSONE (DELTASONE) 5 MG tablet Take 5 mg by mouth every other day        rosuvastatin (CRESTOR) 20 MG tablet Take 20 mg by mouth daily       sodium bicarbonate 650 MG tablet Take 650 mg by mouth 2 times daily       tamsulosin (FLOMAX) 0.4 MG 24 hr capsule Take 0.4 mg by mouth every evening        triamcinolone (KENALOG) 0.1 % external cream Apply topically daily as needed for irritation         ALLERGIES   No Known Allergies    PAST MEDICAL HISTORY:  Past Medical History:   Diagnosis Date     Abnormally low high density  lipoprotein (HDL) cholesterol with hypertriglyceridemia      Amyloidosis (H)      Anemia      Bacteremia      BPH (benign prostatic hyperplasia)      CKD (chronic kidney disease) stage 4, 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     Colon polyps      Coronary artery disease involving native coronary artery, angina presence unspecified, unspecified whether native or transplanted heart 05/01/2020    Added automatically from request for surgery [id number]     DJD (degenerative joint disease)      Elevated BP without diagnosis of hypertension      Gastroesophageal reflux disease      Gout      Gout      Hyperlipidemia      Hypertension      Leukocytosis      MDS (myelodysplastic syndrome) (H)      Metabolic acidosis     due to CKD     Mixed hyperlipidemia 05/01/2020    Added automatically from request for surgery [id number]     Multiple myeloma (H)      Nonrheumatic aortic valve stenosis 05/01/2020    Added automatically from request for surgery [id number]     Rotator cuff tear arthropathy, left      Sinusitis      Spinal stenosis in cervical region 09/25/2017       PAST SURGICAL HISTORY:  Past Surgical History:   Procedure Laterality Date     APPENDECTOMY       ASPIRATION NEEDLE HIP Left 9/25/2017    Procedure: ASPIRATION NEEDLE HIP;;  Surgeon: Moises Elias MD;  Location:  OR     BACK SURGERY  11/07/2017    cervical spine surgery      COLONOSCOPY       CV CORONARY ANGIOGRAM N/A 5/6/2020    Procedure: Coronary Angiogram;  Surgeon: David Woods MD;  Location:  HEART CARDIAC CATH LAB     CV LEFT HEART CATH N/A 5/6/2020    Procedure: Left Heart Cath;  Surgeon: David Woods MD;  Location:  HEART CARDIAC CATH LAB     CV RIGHT HEART CATH MEASUREMENTS RECORDED N/A 5/6/2020    Procedure: Right Heart Cath;  Surgeon: David Woods MD;  Location:  HEART CARDIAC CATH LAB     DECOMPRESSION LUMBAR TWO LEVELS Bilateral 6/11/2018    Procedure: DECOMPRESSION LUMBAR TWO LEVELS;  BILATERAL LUMBAR  3-4 AND LUMBAR 4-5 DECOMPRESSION ;  Surgeon: Sd Vallejo MD;  Location: [facility] OR     FUSION CERVICAL ANTERIOR ONE LEVEL WITH BONE ALLOGRAFT N/A 9/25/2017    Procedure: FUSION CERVICAL ANTERIOR ONE LEVEL WITH BONE ALLOGRAFT;  ANTERIOR CERVICAL DECOMPRESSION AND FUSIONC3-4 WITH INSTRUMENTATION, ALLOGRAFT AND BONE MARROW ASPIRATE OF THE LEFT ILIAC CREST ;  Surgeon: Moises Elias MD;  Location: [facility] OR     PHACOEMULSIFICATION CLEAR CORNEA W/ STANDARD IOL IMPLANT, ENDOSCOPIC CYCLOPHOTOCOAGULATION, COMBINED Right 12/12/2017    Procedure: COMBINED PHACOEMULSIFICATION CLEAR CORNEA WITH STANDARD INTRAOCULAR LENS IMPLANT, ENDOSCOPIC CYCLOPHOTOCOAGULATION;  COMPLEX RIGHT EYE COMBINED PHACOEMULSIFICATION CLEAR CORNEA WITH STANDARD INTRAOCULAR LENS IMPLANT, ENDOSCOPIC CYCLOPHOTOCOAGULATION ;  Surgeon: Alli Mott MD;  Location:  [facility]     PHACOEMULSIFICATION CLEAR CORNEA W/ STANDARD IOL IMPLANT, ENDOSCOPIC CYCLOPHOTOCOAGULATION, COMBINED Left 5/1/2018    Procedure: COMBINED PHACOEMULSIFICATION CLEAR CORNEA WITH STANDARD INTRAOCULAR LENS IMPLANT, ENDOSCOPIC CYCLOPHOTOCOAGULATION;  COMPLEX LEFT EYE PHACOEMULSIFICATION CLEAR CORNEA WITH STANDARD INTRAOCULAR LENS IMPLANT, ENDOSCOPIC CYCLOPHOTOCOAGULATION ;  Surgeon: Alli Mott MD;  Location:  [facility]     ROTATOR CUFF REPAIR RT/LT  2004       FAMILY HISTORY:  Family History   Problem Relation Age of Onset     Heart Disease Father        SOCIAL HISTORY:  Social History     Socioeconomic History     Marital status:      Spouse name: None     Number of children: None     Years of education: None     Highest education level: None   Occupational History     None   Tobacco Use     Smoking status: Never Smoker     Smokeless tobacco: Never Used   Substance and Sexual Activity     Alcohol use: Yes     Comment: beer or two a day     Drug use: No     Sexual activity: None   Other Topics Concern     Parent/sibling w/ CABG, MI or angioplasty before 65F 55M? Not Asked  "  Social History Narrative     None     Social Determinants of Health     Financial Resource Strain:      Difficulty of Paying Living Expenses:    Food Insecurity:      Worried About Running Out of Food in the Last Year:      Ran Out of Food in the Last Year:    Transportation Needs:      Lack of Transportation (Medical):      Lack of Transportation (Non-Medical):    Physical Activity:      Days of Exercise per Week:      Minutes of Exercise per Session:    Stress:      Feeling of Stress :    Social Connections:      Frequency of Communication with Friends and Family:      Frequency of Social Gatherings with Friends and Family:      Attends Catholic Services:      Active Member of Clubs or Organizations:      Attends Club or Organization Meetings:      Marital Status:    Intimate Partner Violence:      Fear of Current or Ex-Partner:      Emotionally Abused:      Physically Abused:      Sexually Abused:        Review of Systems:  Skin:  Positive for bruising   Eyes:  Positive for glaucoma  ENT:  Negative    Respiratory:  Negative    Cardiovascular:  Negative    Gastroenterology: Positive for reflux  Genitourinary:  Negative    Musculoskeletal:  Positive for back pain;arthritis  Neurologic:  Negative    Psychiatric:  Negative    Heme/Lymph/Imm:  Negative    Endocrine:  Negative      Physical Exam:  Vitals: /47 (BP Location: Left arm, Cuff Size: Adult Regular)   Pulse 64   Ht 1.702 m (5' 7\")   Wt 59.4 kg (131 lb)   SpO2 98%   BMI 20.52 kg/m      Constitutional:  cooperative, alert and oriented, well developed, well nourished, in no acute distress        Skin:  warm and dry to the touch, no apparent skin lesions or masses noted          Head:  normocephalic, no masses or lesions        Eyes:  pupils equal and round, conjunctivae and lids unremarkable, sclera white, no xanthalasma, EOMS intact, no nystagmus        Lymph:No Cervical lymphadenopathy present     ENT:  no pallor or cyanosis, dentition good    "     Neck:  carotid pulses are full and equal bilaterally, JVP normal, no carotid bruit        Respiratory:  normal breath sounds, clear to auscultation, normal A-P diameter, normal symmetry, normal respiratory excursion, no use of accessory muscles         Cardiac: regular rhythm;apical impulse not displaced   soft or inaudible A2   systolic murmur;grade 3        pulses full and equal, no bruits auscultated                                        GI:  abdomen soft, non-tender, BS normoactive, no mass, no HSM, no bruits        Extremities and Muscular Skeletal:  no deformities, clubbing, cyanosis, erythema observed              Neurological:  no gross motor deficits        Psych:  Alert and Oriented x 3        Recent Lab Results:  LIPID RESULTS:  Lab Results   Component Value Date    CHOL 122 09/01/2021    HDL 54 09/01/2021    LDL 45 09/01/2021    TRIG 114 09/01/2021       LIVER ENZYME RESULTS:  Lab Results   Component Value Date    AST 21 08/09/2021    AST 27 06/24/2021    ALT 31 08/09/2021    ALT 42 06/24/2021       CBC RESULTS:  Lab Results   Component Value Date    WBC 7.6 08/30/2021    WBC 10.1 06/26/2021    RBC 2.90 (L) 08/30/2021    RBC 3.75 (L) 06/26/2021    HGB 8.2 (L) 08/30/2021    HGB 8.4 (L) 06/26/2021    HCT 26.5 (L) 08/30/2021    HCT 28.4 (L) 06/26/2021    MCV 91 08/30/2021    MCV 76 (L) 06/26/2021    MCH 28.3 08/30/2021    MCH 22.4 (L) 06/26/2021    MCHC 30.9 (L) 08/30/2021    MCHC 29.6 (L) 06/26/2021    RDW 26.7 (H) 08/30/2021    RDW 18.8 (H) 06/26/2021     (L) 08/30/2021     06/26/2021       BMP RESULTS:  Lab Results   Component Value Date     08/30/2021     06/26/2021    POTASSIUM 3.6 08/30/2021    POTASSIUM 3.7 06/26/2021    CHLORIDE 113 (H) 08/30/2021    CHLORIDE 116 (H) 06/26/2021    CO2 18 (L) 08/30/2021    CO2 19 (L) 06/26/2021    ANIONGAP 7 08/30/2021    ANIONGAP 6 06/26/2021    GLC 81 08/30/2021     (H) 06/26/2021    BUN 26 08/30/2021    BUN 22 06/26/2021    CR  1.61 (H) 08/30/2021    CR 1.44 (H) 06/26/2021    GFRESTIMATED 39 (L) 08/30/2021    GFRESTIMATED 45 (L) 06/26/2021    GFRESTBLACK 52 (L) 06/26/2021    CONNIE 7.6 (L) 08/30/2021    CONNIE 8.7 06/26/2021        A1C RESULTS:  No results found for: A1C    INR RESULTS:  Lab Results   Component Value Date    INR 1.06 05/06/2020           CC  Shilpa Chun MD  [address]  [place]  [place] [address]                    Thank you for allowing me to participate in the care of your patient.      Sincerely,     DR SHILPA CHUN MD     [facility]  cc:   Shilpa Chun MD  [address]  [place] [id number]

## 2021-09-02 NOTE — PROGRESS NOTES
HPI and Plan:   It is, as always, a pleasure for me to see Mr. Duong.  He is here for followup of valvular heart disease and coronary artery disease.      I had the pleasure of meeting this very delightful gentleman in 12/2018 for the first time.  He had a stress test done in the [facility] system as he was having shortness of breath on exertion.  This showed an area of inferoapical ischemia.  As the amount of myocardium in jeopardy was small, I elected to manage him medically.  However, he had worsening shortness of breath, and in 05/2020 he underwent coronary angiography, which revealed mild to moderate nonobstructive coronary artery disease.      He does have moderate to severe aortic stenosis with an aortic valve area of 1.0 cm2, mean gradient of 32 mmHg. This has not progressed significantly in recent years.    His gradients have varied which I think is due to his fluctuating hemoglobin levels.    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  I see recent hemoglobin is low 6.9 and unsurprisingly he had syncopal episode.  The anemia would certainly exaggerate the hemodynamic effects of his moderate to severe aortic stenosis.     Accompanied by his wife today, Mr. Duong is cheerful as always and denies cardiac symptoms.    He appears to have lost weight.  The murmur is prominent undoubtedly because of the anemia.  However there is no signs of congestion.    IMPRESSION:   1.  Moderate to severe aortic stenosis, stable.   2.  Stable coronary artery disease.   3.  Dyslipidemia, on 20 mg of rosuvastatin.    HDL 61 LDL 45 which are excellent numbers   4.    Hematologic malignancy, on dexamethasone prednisone and cyclophosphamide.    5.  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      His prognosis is guarded.  I emphasized to him and his wife that his major health issue  currently is his hematologic malignancy.  I will see him again in 6 months time for further follow-up      No orders of the defined types were placed in this encounter.      No orders of the defined types were placed in this encounter.      Encounter Diagnoses   Name Primary?     Nonrheumatic aortic valve stenosis      Positive cardiac stress test        CURRENT MEDICATIONS:  Current Outpatient Medications   Medication Sig Dispense Refill     acetaminophen (TYLENOL) 500 MG tablet Take 500 mg by mouth every 8 hours as needed for mild pain       acyclovir (ZOVIRAX) 400 MG tablet Take 400 mg by mouth 2 times daily       allopurinol (ZYLOPRIM) 300 MG tablet Take 300 mg by mouth daily.       cholecalciferol 25 MCG (1000 UT) TABS Take 1,000 Units by mouth daily        cyclophosphamide (CYTOXAN) 50 MG capsule Take 500 mg by mouth once a week       dexamethasone (DECADRON) 4 MG tablet Take 20 mg by mouth Weekly Day of chemo       finasteride (PROSCAR) 5 MG tablet Take 5 mg by mouth daily       latanoprost (XALATAN) 0.005 % ophthalmic solution Place 1 drop into both eyes At Bedtime        pantoprazole (PROTONIX) 20 MG EC tablet Take 20 mg by mouth daily       predniSONE (DELTASONE) 5 MG tablet Take 5 mg by mouth every other day        rosuvastatin (CRESTOR) 20 MG tablet Take 20 mg by mouth daily       sodium bicarbonate 650 MG tablet Take 650 mg by mouth 2 times daily       tamsulosin (FLOMAX) 0.4 MG 24 hr capsule Take 0.4 mg by mouth every evening        triamcinolone (KENALOG) 0.1 % external cream Apply topically daily as needed for irritation         ALLERGIES   No Known Allergies    PAST MEDICAL HISTORY:  Past Medical History:   Diagnosis Date     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      Amyloidosis (H)      Anemia      Bacteremia      BPH (benign prostatic hyperplasia)      CKD (chronic kidney disease) stage 4, 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     Colon polyps       Coronary artery disease involving native coronary artery, angina presence unspecified, unspecified whether native or transplanted heart 05/01/2020    Added automatically from request for surgery [id number]     DJD (degenerative joint disease)      Elevated BP without diagnosis of hypertension      Gastroesophageal reflux disease      Gout      Gout      Hyperlipidemia      Hypertension      Leukocytosis      MDS (myelodysplastic syndrome) (H)      Metabolic acidosis     due to CKD     Mixed hyperlipidemia 05/01/2020    Added automatically from request for surgery [id number]     Multiple myeloma (H)      Nonrheumatic aortic valve stenosis 05/01/2020    Added automatically from request for surgery [id number]     Rotator cuff tear arthropathy, left      Sinusitis      Spinal stenosis in cervical region 09/25/2017       PAST SURGICAL HISTORY:  Past Surgical History:   Procedure Laterality Date     APPENDECTOMY       ASPIRATION NEEDLE HIP Left 9/25/2017    Procedure: ASPIRATION NEEDLE HIP;;  Surgeon: Moises Elias MD;  Location:  OR     BACK SURGERY  11/07/2017    cervical spine surgery      COLONOSCOPY       CV CORONARY ANGIOGRAM N/A 5/6/2020    Procedure: Coronary Angiogram;  Surgeon: David Woods MD;  Location:  HEART CARDIAC CATH LAB     CV LEFT HEART CATH N/A 5/6/2020    Procedure: Left Heart Cath;  Surgeon: David Woods MD;  Location:  HEART CARDIAC CATH LAB     CV RIGHT HEART CATH MEASUREMENTS RECORDED N/A 5/6/2020    Procedure: Right Heart Cath;  Surgeon: David Woods MD;  Location:  HEART CARDIAC CATH LAB     DECOMPRESSION LUMBAR TWO LEVELS Bilateral 6/11/2018    Procedure: DECOMPRESSION LUMBAR TWO LEVELS;  BILATERAL LUMBAR 3-4 AND LUMBAR 4-5 DECOMPRESSION ;  Surgeon: Sd Vallejo MD;  Location: [facility]     FUSION CERVICAL ANTERIOR ONE LEVEL WITH BONE ALLOGRAFT N/A 9/25/2017    Procedure: FUSION CERVICAL ANTERIOR ONE LEVEL WITH BONE ALLOGRAFT;  ANTERIOR CERVICAL  DECOMPRESSION AND FUSIONC3-4 WITH INSTRUMENTATION, ALLOGRAFT AND BONE MARROW ASPIRATE OF THE LEFT ILIAC CREST ;  Surgeon: Moises Elias MD;  Location:  OR     PHACOEMULSIFICATION CLEAR CORNEA W/ STANDARD IOL IMPLANT, ENDOSCOPIC CYCLOPHOTOCOAGULATION, COMBINED Right 12/12/2017    Procedure: COMBINED PHACOEMULSIFICATION CLEAR CORNEA WITH STANDARD INTRAOCULAR LENS IMPLANT, ENDOSCOPIC CYCLOPHOTOCOAGULATION;  COMPLEX RIGHT EYE COMBINED PHACOEMULSIFICATION CLEAR CORNEA WITH STANDARD INTRAOCULAR LENS IMPLANT, ENDOSCOPIC CYCLOPHOTOCOAGULATION ;  Surgeon: Alli Mott MD;  Location:  [facility]     PHACOEMULSIFICATION CLEAR CORNEA W/ STANDARD IOL IMPLANT, ENDOSCOPIC CYCLOPHOTOCOAGULATION, COMBINED Left 5/1/2018    Procedure: COMBINED PHACOEMULSIFICATION CLEAR CORNEA WITH STANDARD INTRAOCULAR LENS IMPLANT, ENDOSCOPIC CYCLOPHOTOCOAGULATION;  COMPLEX LEFT EYE PHACOEMULSIFICATION CLEAR CORNEA WITH STANDARD INTRAOCULAR LENS IMPLANT, ENDOSCOPIC CYCLOPHOTOCOAGULATION ;  Surgeon: Alli Mott MD;  Location:  [facility]     ROTATOR CUFF REPAIR RT/LT  2004       FAMILY HISTORY:  Family History   Problem Relation Age of Onset     Heart Disease Father        SOCIAL HISTORY:  Social History     Socioeconomic History     Marital status:      Spouse name: None     Number of children: None     Years of education: None     Highest education level: None   Occupational History     None   Tobacco Use     Smoking status: Never Smoker     Smokeless tobacco: Never Used   Substance and Sexual Activity     Alcohol use: Yes     Comment: beer or two a day     Drug use: No     Sexual activity: None   Other Topics Concern     Parent/sibling w/ CABG, MI or angioplasty before 65F 55M? Not Asked   Social History Narrative     None     Social Determinants of Health     Financial Resource Strain:      Difficulty of Paying Living Expenses:    Food Insecurity:      Worried About Running Out of Food in the Last Year:      Ran Out of Food in the  "Last Year:    Transportation Needs:      Lack of Transportation (Medical):      Lack of Transportation (Non-Medical):    Physical Activity:      Days of Exercise per Week:      Minutes of Exercise per Session:    Stress:      Feeling of Stress :    Social Connections:      Frequency of Communication with Friends and Family:      Frequency of Social Gatherings with Friends and Family:      Attends Hinduism Services:      Active Member of Clubs or Organizations:      Attends Club or Organization Meetings:      Marital Status:    Intimate Partner Violence:      Fear of Current or Ex-Partner:      Emotionally Abused:      Physically Abused:      Sexually Abused:        Review of Systems:  Skin:  Positive for bruising   Eyes:  Positive for glaucoma  ENT:  Negative    Respiratory:  Negative    Cardiovascular:  Negative    Gastroenterology: Positive for reflux  Genitourinary:  Negative    Musculoskeletal:  Positive for back pain;arthritis  Neurologic:  Negative    Psychiatric:  Negative    Heme/Lymph/Imm:  Negative    Endocrine:  Negative      Physical Exam:  Vitals: /47 (BP Location: Left arm, Cuff Size: Adult Regular)   Pulse 64   Ht 1.702 m (5' 7\")   Wt 59.4 kg (131 lb)   SpO2 98%   BMI 20.52 kg/m      Constitutional:  cooperative, alert and oriented, well developed, well nourished, in no acute distress        Skin:  warm and dry to the touch, no apparent skin lesions or masses noted          Head:  normocephalic, no masses or lesions        Eyes:  pupils equal and round, conjunctivae and lids unremarkable, sclera white, no xanthalasma, EOMS intact, no nystagmus        Lymph:No Cervical lymphadenopathy present     ENT:  no pallor or cyanosis, dentition good        Neck:  carotid pulses are full and equal bilaterally, JVP normal, no carotid bruit        Respiratory:  normal breath sounds, clear to auscultation, normal A-P diameter, normal symmetry, normal respiratory excursion, no use of accessory muscles     "     Cardiac: regular rhythm;apical impulse not displaced   soft or inaudible A2   systolic murmur;grade 3        pulses full and equal, no bruits auscultated                                        GI:  abdomen soft, non-tender, BS normoactive, no mass, no HSM, no bruits        Extremities and Muscular Skeletal:  no deformities, clubbing, cyanosis, erythema observed              Neurological:  no gross motor deficits        Psych:  Alert and Oriented x 3        Recent Lab Results:  LIPID RESULTS:  Lab Results   Component Value Date    CHOL 122 09/01/2021    HDL 54 09/01/2021    LDL 45 09/01/2021    TRIG 114 09/01/2021       LIVER ENZYME RESULTS:  Lab Results   Component Value Date    AST 21 08/09/2021    AST 27 06/24/2021    ALT 31 08/09/2021    ALT 42 06/24/2021       CBC RESULTS:  Lab Results   Component Value Date    WBC 7.6 08/30/2021    WBC 10.1 06/26/2021    RBC 2.90 (L) 08/30/2021    RBC 3.75 (L) 06/26/2021    HGB 8.2 (L) 08/30/2021    HGB 8.4 (L) 06/26/2021    HCT 26.5 (L) 08/30/2021    HCT 28.4 (L) 06/26/2021    MCV 91 08/30/2021    MCV 76 (L) 06/26/2021    MCH 28.3 08/30/2021    MCH 22.4 (L) 06/26/2021    MCHC 30.9 (L) 08/30/2021    MCHC 29.6 (L) 06/26/2021    RDW 26.7 (H) 08/30/2021    RDW 18.8 (H) 06/26/2021     (L) 08/30/2021     06/26/2021       BMP RESULTS:  Lab Results   Component Value Date     08/30/2021     06/26/2021    POTASSIUM 3.6 08/30/2021    POTASSIUM 3.7 06/26/2021    CHLORIDE 113 (H) 08/30/2021    CHLORIDE 116 (H) 06/26/2021    CO2 18 (L) 08/30/2021    CO2 19 (L) 06/26/2021    ANIONGAP 7 08/30/2021    ANIONGAP 6 06/26/2021    GLC 81 08/30/2021     (H) 06/26/2021    BUN 26 08/30/2021    BUN 22 06/26/2021    CR 1.61 (H) 08/30/2021    CR 1.44 (H) 06/26/2021    GFRESTIMATED 39 (L) 08/30/2021    GFRESTIMATED 45 (L) 06/26/2021    GFRESTBLACK 52 (L) 06/26/2021    CONNIE 7.6 (L) 08/30/2021    CONNIE 8.7 06/26/2021        A1C RESULTS:  No results found for: A1C    INR  RESULTS:  Lab Results   Component Value Date    INR 1.06 05/06/2020           CC  Casimiro Chun MD  [address]  [place] [id number]

## 2021-09-07 LAB — SCANNED LAB RESULT: NORMAL

## 2021-10-26 ENCOUNTER — HOSPITAL ENCOUNTER (EMERGENCY)
Facility: CLINIC | Age: 81
Discharge: HOME | End: 2021-10-26
Attending: EMERGENCY MEDICINE | Admitting: EMERGENCY MEDICINE
Payer: COMMERCIAL

## 2021-10-26 ENCOUNTER — APPOINTMENT (OUTPATIENT)
Dept: GENERAL RADIOLOGY | Facility: CLINIC | Age: 81
End: 2021-10-26
Attending: EMERGENCY MEDICINE
Payer: COMMERCIAL

## 2021-10-26 VITALS
HEIGHT: 67 IN | WEIGHT: 115 LBS | RESPIRATION RATE: 18 BRPM | SYSTOLIC BLOOD PRESSURE: 104 MMHG | HEART RATE: 112 BPM | OXYGEN SATURATION: 94 % | BODY MASS INDEX: 18.05 KG/M2 | TEMPERATURE: 99.3 F | DIASTOLIC BLOOD PRESSURE: 55 MMHG

## 2021-10-26 DIAGNOSIS — R19.7 DIARRHEA, UNSPECIFIED TYPE: ICD-10-CM

## 2021-10-26 DIAGNOSIS — R00.0 SINUS TACHYCARDIA: ICD-10-CM

## 2021-10-26 LAB
ALBUMIN SERPL-MCNC: 3.2 G/DL (ref 3.4–5)
ALBUMIN UR-MCNC: 50 MG/DL
ALP SERPL-CCNC: 125 U/L (ref 40–150)
ALT SERPL W P-5'-P-CCNC: 25 U/L (ref 0–70)
ANION GAP SERPL CALCULATED.3IONS-SCNC: 8 MMOL/L (ref 3–14)
APPEARANCE UR: CLEAR
AST SERPL W P-5'-P-CCNC: 16 U/L (ref 0–45)
BASOPHILS # BLD MANUAL: 0.1 10E3/UL (ref 0–0.2)
BASOPHILS NFR BLD MANUAL: 3 %
BILIRUB SERPL-MCNC: 0.6 MG/DL (ref 0.2–1.3)
BILIRUB UR QL STRIP: NEGATIVE
BUN SERPL-MCNC: 44 MG/DL (ref 7–30)
C DIFF TOX B STL QL: POSITIVE
CALCIUM SERPL-MCNC: 8.7 MG/DL (ref 8.5–10.1)
CHLORIDE BLD-SCNC: 107 MMOL/L (ref 94–109)
CO2 SERPL-SCNC: 21 MMOL/L (ref 20–32)
COLOR UR AUTO: YELLOW
CREAT SERPL-MCNC: 2.36 MG/DL (ref 0.66–1.25)
EOSINOPHIL # BLD MANUAL: 0.4 10E3/UL (ref 0–0.7)
EOSINOPHIL NFR BLD MANUAL: 12 %
ERYTHROCYTE [DISTWIDTH] IN BLOOD BY AUTOMATED COUNT: 16.3 % (ref 10–15)
GFR SERPL CREATININE-BSD FRML MDRD: 25 ML/MIN/1.73M2
GLUCOSE BLD-MCNC: 101 MG/DL (ref 70–99)
GLUCOSE UR STRIP-MCNC: NEGATIVE MG/DL
HCT VFR BLD AUTO: 33 % (ref 40–53)
HGB BLD-MCNC: 10.2 G/DL (ref 13.3–17.7)
HGB UR QL STRIP: NEGATIVE
HYALINE CASTS: 6 /LPF
KETONES UR STRIP-MCNC: NEGATIVE MG/DL
LACTATE SERPL-SCNC: 1.2 MMOL/L (ref 0.7–2)
LEUKOCYTE ESTERASE UR QL STRIP: NEGATIVE
LYMPHOCYTES # BLD MANUAL: 0.3 10E3/UL (ref 0.8–5.3)
LYMPHOCYTES NFR BLD MANUAL: 10 %
MCH RBC QN AUTO: 28.2 PG (ref 26.5–33)
MCHC RBC AUTO-ENTMCNC: 30.9 G/DL (ref 31.5–36.5)
MCV RBC AUTO: 91 FL (ref 78–100)
METAMYELOCYTES # BLD MANUAL: 0.1 10E3/UL
METAMYELOCYTES NFR BLD MANUAL: 2 %
MONOCYTES # BLD MANUAL: 0.7 10E3/UL (ref 0–1.3)
MONOCYTES NFR BLD MANUAL: 23 %
MUCOUS THREADS #/AREA URNS LPF: PRESENT /LPF
MYELOCYTES # BLD MANUAL: 0 10E3/UL
MYELOCYTES NFR BLD MANUAL: 1 %
NEUTROPHILS # BLD MANUAL: 1.6 10E3/UL (ref 1.6–8.3)
NEUTROPHILS NFR BLD MANUAL: 49 %
NITRATE UR QL: NEGATIVE
PH UR STRIP: 5.5 [PH] (ref 5–7)
PLAT MORPH BLD: ABNORMAL
PLATELET # BLD AUTO: 138 10E3/UL (ref 150–450)
POTASSIUM BLD-SCNC: 4.3 MMOL/L (ref 3.4–5.3)
PROT SERPL-MCNC: 6.4 G/DL (ref 6.8–8.8)
RBC # BLD AUTO: 3.62 10E6/UL (ref 4.4–5.9)
RBC MORPH BLD: ABNORMAL
RBC URINE: 1 /HPF
SODIUM SERPL-SCNC: 136 MMOL/L (ref 133–144)
SP GR UR STRIP: 1.02 (ref 1–1.03)
UROBILINOGEN UR STRIP-MCNC: NORMAL MG/DL
WBC # BLD AUTO: 3.2 10E3/UL (ref 4–11)
WBC URINE: 1 /HPF

## 2021-10-26 PROCEDURE — 36415 COLL VENOUS BLD VENIPUNCTURE: CPT | Performed by: EMERGENCY MEDICINE

## 2021-10-26 PROCEDURE — 87040 BLOOD CULTURE FOR BACTERIA: CPT | Performed by: EMERGENCY MEDICINE

## 2021-10-26 PROCEDURE — 96361 HYDRATE IV INFUSION ADD-ON: CPT

## 2021-10-26 PROCEDURE — 85027 COMPLETE CBC AUTOMATED: CPT | Performed by: EMERGENCY MEDICINE

## 2021-10-26 PROCEDURE — 80053 COMPREHEN METABOLIC PANEL: CPT | Performed by: EMERGENCY MEDICINE

## 2021-10-26 PROCEDURE — 71046 X-RAY EXAM CHEST 2 VIEWS: CPT

## 2021-10-26 PROCEDURE — 99284 EMERGENCY DEPT VISIT MOD MDM: CPT | Mod: 25

## 2021-10-26 PROCEDURE — 81001 URINALYSIS AUTO W/SCOPE: CPT | Performed by: EMERGENCY MEDICINE

## 2021-10-26 PROCEDURE — 87493 C DIFF AMPLIFIED PROBE: CPT | Performed by: EMERGENCY MEDICINE

## 2021-10-26 PROCEDURE — 83605 ASSAY OF LACTIC ACID: CPT | Performed by: EMERGENCY MEDICINE

## 2021-10-26 PROCEDURE — 258N000003 HC RX IP 258 OP 636: Performed by: EMERGENCY MEDICINE

## 2021-10-26 PROCEDURE — 96360 HYDRATION IV INFUSION INIT: CPT

## 2021-10-26 RX ADMIN — SODIUM CHLORIDE 1000 ML: 9 INJECTION, SOLUTION INTRAVENOUS at 15:16

## 2021-10-26 RX ADMIN — SODIUM CHLORIDE 1000 ML: 9 INJECTION, SOLUTION INTRAVENOUS at 19:44

## 2021-10-26 RX ADMIN — SODIUM CHLORIDE 1000 ML: 9 INJECTION, SOLUTION INTRAVENOUS at 18:20

## 2021-10-26 ASSESSMENT — ENCOUNTER SYMPTOMS
APPETITE CHANGE: 1
COUGH: 0
SHORTNESS OF BREATH: 0
ABDOMINAL PAIN: 0
FEVER: 1
DIARRHEA: 1

## 2021-10-26 ASSESSMENT — MIFFLIN-ST. JEOR: SCORE: 1180.27

## 2021-10-26 NOTE — ED TRIAGE NOTES
For the past couple of days pt has been having no appetite, lots of loose stool ( today 3 0 yesterday 4. No vomiting . Pt was refused chemo yesterday as his WBC was too low.

## 2021-10-26 NOTE — ED PROVIDER NOTES
History     Chief Complaint:  Generalized Weakness, Diarrhea, and Anorexia      HPI   Rose Duong is a 81 year old male who has a history of multiple myeloma, amyloidosis, and C. Difficile and presents with generalized weakness diarrhea, and anorexia. The patient has had no appetite for the past several months and, for the past several days, has also developed diarrhea. However, the diarrhea has worsened today. He also had a fever of 100.3 degrees upon presenting to the ED. The patient last had a C. Difficile infection in June, and the daughter states that his symptoms and behavior are similar to then. He is on chemotherapy for amyloidosis, but was refused it yesterday for having too low of a white blood cell count. His chemotherapy consists of both oral medication and shots. He has no cough, and has no new shortness of breath. He has no abdominal pain as well. His primary oncologist is Dr. Bean.    Review of Systems   Constitutional: Positive for appetite change and fever.   Respiratory: Negative for cough and shortness of breath.    Gastrointestinal: Positive for diarrhea. Negative for abdominal pain.   All other systems reviewed and are negative.        Allergies:  No Known Allergies      Medications:    acyclovir   allopurinol   cyclophosphamide   dexamethasone  finasteride  latanoprost   pantoprazole   prednisone   rosuvastatin  tamsulosin   triamcinolone   Doxycycline  Norco        Past Medical History:    Abnormally low HDL with hypertriglyceridemia  amyloidosis  Anemia  Bacteremia  BPH  CKD  Colon polyps  CAD  DJD  Gout  Hypertension  MDS  Multiple myeloma  Rotator cuff tear arthropathy  Sinusitis  Spinal stenosis in cervical region  Thrombocytopenia  Severe aortic stenosis  Pneumonia  sepsis  Coronary arteriosclerosis  GERD    Past Surgical History:    Appendectomy  Aspiration needle hip  Back surgery  Colonoscopy  CV coronary angiogram  CV left heart cath  Phacoemulsification clear cornea with  "standard IOL implant      Family History:    Father: heart disease    Social History:  Patient presents to the ED with family  Patient lives with spouse    Physical Exam     Patient Vitals for the past 24 hrs:   BP Temp Temp src Pulse Resp SpO2 Height Weight   10/26/21 2000 104/55 99.3  F (37.4  C) Oral 112 18 94 % -- --   10/26/21 1942 120/56 -- -- 116 -- 96 % -- --   10/26/21 1900 97/49 -- -- 116 -- 94 % -- --   10/26/21 1838 102/59 -- -- 113 -- 98 % -- --   10/26/21 1800 96/49 -- -- 116 -- -- -- --   10/26/21 1228 104/46 100.3  F (37.9  C) Temporal 111 16 100 % 1.702 m (5' 7\") 52.2 kg (115 lb)       Physical Exam  General/Appearance: appears stated age, well-groomed, appears comfortable  Eyes: EOMI, no scleral injection, no icterus  ENT: MMM  Neck: supple, nl ROM, no stiffness  Cardiovascular: tachy but regular, nl S1S2, no m/r/g, 2+ pulses in all 4 extremities, cap refill <2sec  Respiratory: CTAB, good air movement throughout, no wheezes/rhonchi/rales, no increased WOB, no retractions  GI: abd soft, non-distended, nttp,  no HSM, no rebound, no guarding, nl BS  MSK: FRANK, good tone, no bony abnormality  Skin: warm and well-perfused, no rash, no edema, no ecchymosis, nl turgor  Neuro: GCS 15, alert and oriented, no gross focal neuro deficits  Psych: interacts appropriately  Heme: no petechia, no purpura, no active bleeding        Emergency Department Course     Imaging:  XR Chest 2 Views   Final Result   IMPRESSION: Heart size and pulmonary vascularity normal. The lungs are clear. Gas distended bowel loops left upper quadrant.          Laboratory:  Labs Ordered and Resulted from Time of ED Arrival Up to the Time of Departure from the ED   COMPREHENSIVE METABOLIC PANEL - Abnormal; Notable for the following components:       Result Value    Urea Nitrogen 44 (*)     Creatinine 2.36 (*)     Glucose 101 (*)     Protein Total 6.4 (*)     Albumin 3.2 (*)     GFR Estimate 25 (*)     All other components within normal " limits   ROUTINE UA WITH MICROSCOPIC REFLEX TO CULTURE - Abnormal; Notable for the following components:    Protein Albumin Urine 50  (*)     Mucus Urine Present (*)     Hyaline Casts Urine 6 (*)     All other components within normal limits    Narrative:     Urine Culture not indicated   CBC WITH PLATELETS AND DIFFERENTIAL - Abnormal; Notable for the following components:    WBC Count 3.2 (*)     RBC Count 3.62 (*)     Hemoglobin 10.2 (*)     Hematocrit 33.0 (*)     MCHC 30.9 (*)     RDW 16.3 (*)     Platelet Count 138 (*)     All other components within normal limits   DIFFERENTIAL - Abnormal; Notable for the following components:    Absolute Lymphocytes 0.3 (*)     Absolute Metamyelocytes 0.1 (*)     All other components within normal limits   LACTIC ACID WHOLE BLOOD - Normal   PERIPHERAL IV CATHETER   BLOOD CULTURE   BLOOD CULTURE   CLOSTRIDIUM DIFFICILE TOXIN B   CBC WITH PLATELETS & DIFFERENTIAL    Narrative:     The following orders were created for panel order CBC with platelets differential.  Procedure                               Abnormality         Status                     ---------                               -----------         ------                     CBC with platelets and d...[[id number]]  Abnormal            Final result               Manual Differential[[id number]]          Abnormal            Final result                 Please view results for these tests on the individual orders.       Emergency Department Course:    Reviewed:  I reviewed nursing notes, vitals, past history and care everywhere    Assessments:  1455 I obtained history and examined the patient as noted above.   1809 I rechecked the patient and explained findings.   2007 I rechecked the patient.    Consults:   1813 I spoke with the patient's wife and spoke to her with regards to the patient's findings, presentation, and plan of care.  2040 Spoke with Dr Bean    Interventions:  1546 NS 1000 mL IV  1820 NS 1000 mL  IV      Disposition:  The patient was discharged to home.    Impression & Plan      Medical Decision Making:  This patient is an 81-year-old male with multiple myeloma and amyloidosis, on medications for these, who presents feeling some generalized weakness as well as several days of diarrhea.  He otherwise has no symptoms including known fever, shortness of breath, chest pain, abdominal pain, nausea or vomiting, urinary symptoms.  Here showed a temperature initially of 100.3 which came down on its own.  It also showed sinus tachycardia which, despite 2 L of normal saline, has not changed.  The remainder of his work-up showed a normal white count with normal neutrophil count, normal lactate, unremarkable electrolytes.  He is kidney function is a little bit elevated, I suspect secondary to dehydration.  It is low enough in a bump that I think it is reasonable for him to follow-up with his primary care doctor regarding this on an urgent matter to have it retested as again I suspect it is dehydration related.  He was cultured.  Urine culture was sent, blood culture was sent.  C. difficile PCR was sent.  I was able to speak with his oncologist who felt that as long as he looks well, which he does, and the cultures are pending he does not need to come in or be started on antibiotics as we do not know what would be treating right now.  Because of this and the patient's desire to go home he will be discharged home with instructions again to follow-up with his PCP urgently regarding his elevated creatinine as well as to follow-up with his oncologist.  Covid-19  Rose Duong was evaluated during a global COVID-19 pandemic, which necessitated consideration that the patient might be at risk for infection with the SARS-CoV-2 virus that causes COVID-19.   Applicable protocols for evaluation were followed during the patient's care.   COVID-19 was considered as part of the patient's evaluation.     Diagnosis:    ICD-10-CM    1.  Diarrhea, unspecified type  R19.7    2. Sinus tachycardia  R00.0        Discharge Medications:  New Prescriptions    No medications on file         Scribe Disclosure:  ISeymour Hired, am serving as a scribe at 2:53 PM on 10/26/2021 to document services personally performed by Maria L Cedeno MD based on my observations and the provider's statements to me.      Maria L Cedeno MD  10/26/21 2046

## 2021-10-27 ENCOUNTER — TELEPHONE (OUTPATIENT)
Dept: EMERGENCY MEDICINE | Facility: CLINIC | Age: 81
End: 2021-10-27

## 2021-10-27 RX ORDER — VANCOMYCIN HYDROCHLORIDE 125 MG/1
CAPSULE ORAL
Qty: 86 CAPSULE | Refills: 0 | Status: SHIPPED | OUTPATIENT
Start: 2021-10-27 | End: 2021-12-22

## 2021-10-27 NOTE — TELEPHONE ENCOUNTER
[facility] Emergency Department Lab result notification [Adult-Male]    [place] ED lab result protocol used  C difficile    Reason for call  Notify of lab results, assess symptoms,  review ED providers recommendations/discharge instructions (if necessary) and advise per ED lab result f/u protocol    Lab Result (including Rx patient on, if applicable)  Final Clostridium difficile toxin B PCR is POSITIVE.    [facility] Emergency Dept discharge antibiotic: None  Recommendations in treatment per [facility] ED Lab result Clostridium difficile protocol.     Information table from Emergency Dept Provider visit on 10/26/21  Symptoms reported at ED visit (Chief complaint, HPI) Chief Complaint:  Generalized Weakness, Diarrhea, and Anorexia   HPI   Rose Duong is a 81 year old male who has a history of multiple myeloma, amyloidosis, and C. Difficile and presents with generalized weakness diarrhea, and anorexia. The patient has had no appetite for the past several months and, for the past several days, has also developed diarrhea. However, the diarrhea has worsened today. He also had a fever of 100.3 degrees upon presenting to the ED. The patient last had a C. Difficile infection in June, and the daughter states that his symptoms and behavior are similar to then. He is on chemotherapy for amyloidosis, but was refused it yesterday for having too low of a white blood cell count. His chemotherapy consists of both oral medication and shots. He has no cough, and has no new shortness of breath. He has no abdominal pain as well. His primary oncologist is Dr. Bean.   Significant Medical hx, if applicable (i.e. CKD, diabetes) CKD  Hx of C diff in June 2020   Allergies No Known Allergies   Weight, if applicable Wt Readings from Last 2 Encounters:   10/26/21 52.2 kg (115 lb)   09/02/21 59.4 kg (131 lb)      Coumadin/Warfarin [Yes /No] Coumdin   Creatinine Level (mg/dl) Creatinine   Date Value Ref Range  Status   10/26/2021 2.36 (H) 0.66 - 1.25 mg/dL Final   06/26/2021 1.44 (H) 0.66 - 1.25 mg/dL Final      Creatinine clearance (ml/min), if applicable Serum creatinine: 2.36 mg/dL (H) 10/26/21 1512  Estimated creatinine clearance: 17.8 mL/min (A)   ED providers Impression and Plan (applicable information) This patient is an 81-year-old male with multiple myeloma and amyloidosis, on medications for these, who presents feeling some generalized weakness as well as several days of diarrhea.  He otherwise has no symptoms including known fever, shortness of breath, chest pain, abdominal pain, nausea or vomiting, urinary symptoms.  Here showed a temperature initially of 100.3 which came down on its own.  It also showed sinus tachycardia which, despite 2 L of normal saline, has not changed.  The remainder of his work-up showed a normal white count with normal neutrophil count, normal lactate, unremarkable electrolytes.  He is kidney function is a little bit elevated, I suspect secondary to dehydration.  It is low enough in a bump that I think it is reasonable for him to follow-up with his primary care doctor regarding this on an urgent matter to have it retested as again I suspect it is dehydration related.  He was cultured.  Urine culture was sent, blood culture was sent.  C. difficile PCR was sent.  I was able to speak with his oncologist who felt that as long as he looks well, which he does, and the cultures are pending he does not need to come in or be started on antibiotics as we do not know what would be treating right now.  Because of this and the patient's desire to go home he will be discharged home with instructions again to follow-up with his PCP urgently regarding his elevated creatinine as well as to follow-up with his    ED diagnosis Diarrhea, unspecified type  Sinus tachycardia   ED provider Maria L Cedeno MD      RN Assessment (Patient s current Symptoms), include time called.  [Insert Left  message here if message left]  At 1P, per Rose, I have had 2 diarrhea stools today.  Has drinking water and pop.  Denies nausea.  No cramping.     RN Recommendations/Instructions per [place] ED lab result protocol  Patient notified of lab result.  Will consult the Emergency Dept Provider and call you back with recommendations.    He denies that he is taking any antibiotics currently.    [place] Emergency Department Provider Name & Recommendations (included time consulted)  At 1:05P, Consulted with [facility] Emergency Department Provider, Dr Casimiro Aleman notified of positive C diff.  He is aware of previous C diff in June (treated with Vancomycin), and recommendations were treat with the Vancomycin taper [Take 1 capsule (125 mg) by mouth 4 times daily for 14 days, THEN 1 capsule (125 mg) 2 times daily for 7 days, THEN 1 capsule (125 mg) daily for 7 days, THEN 1 capsule (125 mg) every 3 days for 28 days. - Oral]    At 1:15P, Rose notified of Rx and RN relayed information about C diff.  Stressed proper hand washing and following up with PCP     Please Contact your PCP clinic or return to the Emergency department if your:    Symptoms do not improve after 3 days on antibiotic.    Symptoms do not resolve after completing antibiotic.    Symptoms worsen or other concerning symptom's.    PCP follow-up Questions asked: YES       Jose Grimaldo RN  [facility]  Emergency Dept Lab Result RN  Ph# [phone]     Copy of Lab result  Component      Latest Ref Rng & Units 10/26/2021   C Difficile Toxin B by PCR      Negative Positive (A)

## 2021-10-27 NOTE — RESULT ENCOUNTER NOTE
At 1:05P, Consulted with [facility] Emergency Department Provider, Dr Casimiro Aleman notified of positive C diff.  He is aware of previous C diff in June (treated with Vancomycin), and recommendations were treat with the Vancomycin taper [Take 1 capsule (125 mg) by mouth 4 times daily for 14 days, THEN 1 capsule (125 mg) 2 times daily for 7 days, THEN 1 capsule (125 mg) daily for 7 days, THEN 1 capsule (125 mg) every 3 days for 28 days. - Oral]    At 1:15P, Rose notified of Rx and RN relayed information about C diff.  Stressed proper hand washing and following up with PCP

## 2021-10-27 NOTE — RESULT ENCOUNTER NOTE
Final Clostridium difficile toxin B PCR is POSITIVE.    [facility] Emergency Dept discharge antibiotic: None  Recommendations in treatment per [facility] ED Lab result Clostridium difficile protocol.

## 2021-10-28 ENCOUNTER — NURSE TRIAGE (OUTPATIENT)
Dept: NURSING | Facility: CLINIC | Age: 81
End: 2021-10-28

## 2021-10-28 NOTE — TELEPHONE ENCOUNTER
Has c-diff and had the prescription.    Had the one dose and went to bed and had BM in the bed.  Woke up with BM all over.  Not eating.    Very weak.  On chemo.    Has urinated today. No bowel control.    PCP is not Mini.    Needs to return to ER.    Barbi BREWSTER [facility] Nurse Advisor        Reason for Disposition    Drinking very little and has signs of dehydration (e.g., no urine > 12 hours, very dry mouth, very lightheaded)    SEVERE diarrhea (e.g., 7 or more times / day more than normal) and age > 60 years    Additional Information    Negative: Shock suspected (e.g., cold/pale/clammy skin, too weak to stand, low BP, rapid pulse)    Negative: Difficult to awaken or acting confused (e.g., disoriented, slurred speech)    Negative: Sounds like a life-threatening emergency to the triager    Negative: Vomiting also present and worse than the diarrhea    Negative: Blood in stool and without diarrhea    Negative: SEVERE abdominal pain (e.g., excruciating) and present > 1 hour    Negative: SEVERE abdominal pain and age > 60    Negative: Bloody, black, or tarry bowel movements (Exception: chronic-unchanged black-grey bowel movements and is taking iron pills or Pepto-bismol)    Protocols used: DIARRHEA-A-OH

## 2021-10-31 LAB
BACTERIA BLD CULT: NO GROWTH
BACTERIA BLD CULT: NO GROWTH

## 2021-11-10 LAB
Lab: NORMAL
PERFORMING LABORATORY: NORMAL
TEST NAME: NORMAL

## 2023-01-03 ENCOUNTER — TRANSFERRED RECORDS (OUTPATIENT)
Dept: HEALTH INFORMATION MANAGEMENT | Facility: CLINIC | Age: 83
End: 2023-01-03

## 2023-01-03 ENCOUNTER — MEDICAL CORRESPONDENCE (OUTPATIENT)
Dept: HEALTH INFORMATION MANAGEMENT | Facility: CLINIC | Age: 83
End: 2023-01-03

## 2023-01-05 ENCOUNTER — MEDICAL CORRESPONDENCE (OUTPATIENT)
Dept: HEALTH INFORMATION MANAGEMENT | Facility: CLINIC | Age: 83
End: 2023-01-05

## 2023-01-05 LAB
ABO/RH(D): ABNORMAL
ANTIBODY SCREEN: POSITIVE
SPECIMEN EXPIRATION DATE: ABNORMAL

## 2023-01-06 ENCOUNTER — LAB (OUTPATIENT)
Dept: LAB | Facility: CLINIC | Age: 83
End: 2023-01-06
Payer: COMMERCIAL

## 2023-01-06 DIAGNOSIS — D50.8 OTHER IRON DEFICIENCY ANEMIA: Primary | ICD-10-CM

## 2023-01-06 DIAGNOSIS — E85.81 PRIMARY AMYLOIDOSIS (H): ICD-10-CM

## 2023-01-06 LAB
ANTIBODY SCREEN, TUBE: NORMAL
ANTIBODY, PREVIOUSLY IDENTIFIED: NORMAL
BLD PROD TYP BPU: NORMAL
BLOOD COMPONENT TYPE: NORMAL
CODING SYSTEM: NORMAL
CROSSMATCH: NORMAL
ISSUE DATE AND TIME: NORMAL
SPECIMEN EXPIRATION DATE: NORMAL
SPECIMEN EXPIRATION DATE: NORMAL
UNIT ABO/RH: NORMAL
UNIT NUMBER: NORMAL
UNIT STATUS: NORMAL
UNIT TYPE ISBT: 9500

## 2023-01-06 PROCEDURE — 86901 BLOOD TYPING SEROLOGIC RH(D): CPT

## 2023-01-06 PROCEDURE — 36415 COLL VENOUS BLD VENIPUNCTURE: CPT

## 2023-01-06 RX ORDER — HEPARIN SODIUM,PORCINE 10 UNIT/ML
5 VIAL (ML) INTRAVENOUS
Status: CANCELLED | OUTPATIENT
Start: 2023-01-06

## 2023-01-06 RX ORDER — HEPARIN SODIUM (PORCINE) LOCK FLUSH IV SOLN 100 UNIT/ML 100 UNIT/ML
5 SOLUTION INTRAVENOUS
Status: CANCELLED | OUTPATIENT
Start: 2023-01-06

## 2023-01-08 ENCOUNTER — INFUSION THERAPY VISIT (OUTPATIENT)
Dept: INFUSION THERAPY | Facility: CLINIC | Age: 83
End: 2023-01-08
Attending: INTERNAL MEDICINE
Payer: COMMERCIAL

## 2023-01-08 VITALS
OXYGEN SATURATION: 98 % | HEART RATE: 83 BPM | TEMPERATURE: 97.8 F | RESPIRATION RATE: 16 BRPM | SYSTOLIC BLOOD PRESSURE: 152 MMHG | DIASTOLIC BLOOD PRESSURE: 82 MMHG

## 2023-01-08 DIAGNOSIS — D50.8 OTHER IRON DEFICIENCY ANEMIA: Primary | ICD-10-CM

## 2023-01-08 PROCEDURE — P9016 RBC LEUKOCYTES REDUCED: HCPCS | Performed by: INTERNAL MEDICINE

## 2023-01-08 PROCEDURE — 36430 TRANSFUSION BLD/BLD COMPNT: CPT

## 2023-01-08 PROCEDURE — 86922 COMPATIBILITY TEST ANTIGLOB: CPT | Performed by: INTERNAL MEDICINE

## 2023-01-08 NOTE — PROGRESS NOTES
Infusion Nursing Note:  Rose Duong presents today for 1 unit PRBC.    Patient seen by provider today: No   present during visit today: Not Applicable.    Note: N/A.    Intravenous Access:  Peripheral IV placed.    Treatment Conditions:  HGB 7.2 @ [facility] on 1/6  Results reviewed, labs MET treatment parameters, ok to proceed with treatment.    Post Infusion Assessment:  Patient tolerated infusion without incident.  Blood return noted pre and post infusion.  Site patent and intact, free from redness, edema or discomfort.  No evidence of extravasations.  Access discontinued per protocol.     Discharge Plan:   Discharge instructions reviewed with: Patient.  Patient and/or family verbalized understanding of discharge instructions and all questions answered.  Patient discharged in stable condition accompanied by: self.  Departure Mode: Ambulatory.      Larisa Ralph RN

## 2023-06-19 ENCOUNTER — MEDICAL CORRESPONDENCE (OUTPATIENT)
Dept: HEALTH INFORMATION MANAGEMENT | Facility: CLINIC | Age: 83
End: 2023-06-19
Payer: COMMERCIAL

## 2023-06-19 DIAGNOSIS — E85.9 AMYLOIDOSIS (H): Primary | ICD-10-CM

## 2023-06-19 DIAGNOSIS — R06.09 DOE (DYSPNEA ON EXERTION): ICD-10-CM

## 2023-06-19 DIAGNOSIS — R01.1 SYSTOLIC MURMUR: ICD-10-CM

## 2023-07-05 ENCOUNTER — HOSPITAL ENCOUNTER (OUTPATIENT)
Dept: CARDIOLOGY | Facility: CLINIC | Age: 83
Discharge: HOME | End: 2023-07-05
Attending: INTERNAL MEDICINE | Admitting: INTERNAL MEDICINE
Payer: COMMERCIAL

## 2023-07-05 DIAGNOSIS — R01.1 SYSTOLIC MURMUR: ICD-10-CM

## 2023-07-05 DIAGNOSIS — R06.09 DOE (DYSPNEA ON EXERTION): ICD-10-CM

## 2023-07-05 DIAGNOSIS — E85.9 AMYLOIDOSIS (H): ICD-10-CM

## 2023-07-05 LAB — LVEF ECHO: NORMAL

## 2023-07-05 PROCEDURE — 93321 DOPPLER ECHO F-UP/LMTD STD: CPT | Mod: 26 | Performed by: INTERNAL MEDICINE

## 2023-07-05 PROCEDURE — 93325 DOPPLER ECHO COLOR FLOW MAPG: CPT

## 2023-07-05 PROCEDURE — 93325 DOPPLER ECHO COLOR FLOW MAPG: CPT | Mod: 26 | Performed by: INTERNAL MEDICINE

## 2023-07-05 PROCEDURE — 93308 TTE F-UP OR LMTD: CPT

## 2023-07-05 PROCEDURE — 93308 TTE F-UP OR LMTD: CPT | Mod: 26 | Performed by: INTERNAL MEDICINE

## 2023-07-17 ENCOUNTER — TELEPHONE (OUTPATIENT)
Dept: CARDIOLOGY | Facility: CLINIC | Age: 83
End: 2023-07-17
Payer: COMMERCIAL

## 2023-07-17 ENCOUNTER — CARE COORDINATION (OUTPATIENT)
Dept: CARDIOLOGY | Facility: CLINIC | Age: 83
End: 2023-07-17
Payer: COMMERCIAL

## 2023-07-17 DIAGNOSIS — R06.09 DOE (DYSPNEA ON EXERTION): ICD-10-CM

## 2023-07-17 DIAGNOSIS — E85.9 AMYLOIDOSIS, UNSPECIFIED TYPE (H): Primary | ICD-10-CM

## 2023-07-17 DIAGNOSIS — I35.0 NONRHEUMATIC AORTIC VALVE STENOSIS: ICD-10-CM

## 2023-07-17 DIAGNOSIS — R94.39 POSITIVE CARDIAC STRESS TEST: ICD-10-CM

## 2023-07-17 NOTE — TELEPHONE ENCOUNTER
Received information from Dr. Chun that patient should be seen in clinic for an assessment regarding change in dyspnea.  Isrrael recently had an Echocardiogram on 7\5\23, and the Hematologist\Oncologist nurse practitioner, Subha, has reached out to Dr. Chun.    Writer has called to patient to set up an appointment.   He is not available on Thursday or Fridays.   He has apmnt HOLD for 7\31\23 at 2:45pm  He is aware of arrival time of 240.    Writer will mail him a confirmation, since he does not use MyChart.    Prisca Bhatia RN on 7/17/2023 at 11:12 AM

## 2023-07-31 ENCOUNTER — OFFICE VISIT (OUTPATIENT)
Dept: CARDIOLOGY | Facility: CLINIC | Age: 83
End: 2023-07-31
Payer: COMMERCIAL

## 2023-07-31 ENCOUNTER — LAB (OUTPATIENT)
Dept: LAB | Facility: CLINIC | Age: 83
End: 2023-07-31
Payer: COMMERCIAL

## 2023-07-31 VITALS
HEIGHT: 67 IN | DIASTOLIC BLOOD PRESSURE: 61 MMHG | WEIGHT: 133.2 LBS | SYSTOLIC BLOOD PRESSURE: 115 MMHG | HEART RATE: 84 BPM | BODY MASS INDEX: 20.91 KG/M2

## 2023-07-31 DIAGNOSIS — E85.9 AMYLOIDOSIS, UNSPECIFIED TYPE (H): ICD-10-CM

## 2023-07-31 DIAGNOSIS — R94.39 POSITIVE CARDIAC STRESS TEST: ICD-10-CM

## 2023-07-31 DIAGNOSIS — I35.0 NONRHEUMATIC AORTIC VALVE STENOSIS: ICD-10-CM

## 2023-07-31 DIAGNOSIS — R06.09 DOE (DYSPNEA ON EXERTION): ICD-10-CM

## 2023-07-31 LAB
ANION GAP SERPL CALCULATED.3IONS-SCNC: 10 MMOL/L (ref 7–15)
BUN SERPL-MCNC: 30.4 MG/DL (ref 8–23)
CALCIUM SERPL-MCNC: 9 MG/DL (ref 8.8–10.2)
CHLORIDE SERPL-SCNC: 110 MMOL/L (ref 98–107)
CREAT SERPL-MCNC: 1.65 MG/DL (ref 0.67–1.17)
DEPRECATED HCO3 PLAS-SCNC: 19 MMOL/L (ref 22–29)
ERYTHROCYTE [DISTWIDTH] IN BLOOD BY AUTOMATED COUNT: 15.7 % (ref 10–15)
GFR SERPL CREATININE-BSD FRML MDRD: 41 ML/MIN/1.73M2
GLUCOSE SERPL-MCNC: 100 MG/DL (ref 70–99)
HCT VFR BLD AUTO: 31.9 % (ref 40–53)
HGB BLD-MCNC: 9.9 G/DL (ref 13.3–17.7)
MCH RBC QN AUTO: 29.6 PG (ref 26.5–33)
MCHC RBC AUTO-ENTMCNC: 31 G/DL (ref 31.5–36.5)
MCV RBC AUTO: 96 FL (ref 78–100)
PLATELET # BLD AUTO: 160 10E3/UL (ref 150–450)
POTASSIUM SERPL-SCNC: 4.1 MMOL/L (ref 3.4–5.3)
RBC # BLD AUTO: 3.34 10E6/UL (ref 4.4–5.9)
SODIUM SERPL-SCNC: 139 MMOL/L (ref 136–145)
WBC # BLD AUTO: 10.7 10E3/UL (ref 4–11)

## 2023-07-31 PROCEDURE — 99214 OFFICE O/P EST MOD 30 MIN: CPT | Performed by: INTERNAL MEDICINE

## 2023-07-31 PROCEDURE — 85027 COMPLETE CBC AUTOMATED: CPT | Performed by: INTERNAL MEDICINE

## 2023-07-31 PROCEDURE — 36415 COLL VENOUS BLD VENIPUNCTURE: CPT | Performed by: INTERNAL MEDICINE

## 2023-07-31 PROCEDURE — 80048 BASIC METABOLIC PNL TOTAL CA: CPT | Performed by: INTERNAL MEDICINE

## 2023-07-31 NOTE — LETTER
7/31/2023    Kian Johns MD  [facility] [address] [facility] [address]    RE: Rose SANCHEZ Sanketsaira       Dear Colleague,     I had the pleasure of seeing Rose Duong in the [facility].  HPI and Plan:   Very pleasant 83-year-old gentleman returning for follow-up of aortic valve disease    He has a history of mild to moderate nonobstructive coronary artery disease.  When I last saw him almost 2 years ago he had moderate to severe aortic stenosis with an aortic valve area 1.0 cm  mean gradient 32 mmHg.    Unfortunately he also has stage III-IV chronic renal failure and he has multiple myeloma and amyloidosis.    He has been actively followed by hematooncologist and due to shortness of breath an echocardiogram was repeated recently.  Aortic stenosis is now severe with a mean gradient of 42 mmHg and valve area of 0.7 cm .  There is moderate mitral stenosis as well with a mean gradient of 8 mmHg.    He tells me that his breathing really started becoming worse since the fall of last year.  His shortness of breath does vary with his hemoglobin levels.  His Hb level is do not usually exceed 10.  It is less than 8 he is symptomatic.  He gets what I think is erythropoietin or similar medication once a month.    At this point time he can only walk for about a block before he gets out of breath.  However he has no PND orthopnea or ankle swelling.  He denies exertional dizzy spells and he has no chest pains.    By physical examination his second heart sound is now softer and the murmur may be late peaking.  He has no signs of CHF.    Did not have any notes from his nephrologist or oncologist I can access at this time and do not have any recent lab work.  I have taken liberty of drawing his CBC and his electrolytes today.    I will also liaise with Dr Bean.  He can be a candidate for a TAVR if his life expectancy is at least 1 year.        No orders of the defined types were placed in this  encounter.      No orders of the defined types were placed in this encounter.      Encounter Diagnoses   Name Primary?    Amyloidosis, unspecified type (H)     SANFORD (dyspnea on exertion)     Nonrheumatic aortic valve stenosis     Positive cardiac stress test        CURRENT MEDICATIONS:  Current Outpatient Medications   Medication Sig Dispense Refill    acetaminophen (TYLENOL) 500 MG tablet Take 500 mg by mouth every 8 hours as needed for mild pain      acyclovir (ZOVIRAX) 400 MG tablet Take 400 mg by mouth 2 times daily      allopurinol (ZYLOPRIM) 300 MG tablet Take 300 mg by mouth daily.      cholecalciferol 25 MCG (1000 UT) TABS Take 1,000 Units by mouth daily       cyclophosphamide (CYTOXAN) 50 MG capsule Take 500 mg by mouth once a week      dexamethasone (DECADRON) 4 MG tablet Take 20 mg by mouth Weekly Day of chemo      finasteride (PROSCAR) 5 MG tablet Take 5 mg by mouth daily      pantoprazole (PROTONIX) 20 MG EC tablet Take 20 mg by mouth daily      predniSONE (DELTASONE) 5 MG tablet Take 5 mg by mouth every other day       rosuvastatin (CRESTOR) 20 MG tablet Take 20 mg by mouth daily      tamsulosin (FLOMAX) 0.4 MG 24 hr capsule Take 0.4 mg by mouth every evening       latanoprost (XALATAN) 0.005 % ophthalmic solution Place 1 drop into both eyes At Bedtime  (Patient not taking: Reported on 1/8/2023)      sodium bicarbonate 650 MG tablet Take 650 mg by mouth 2 times daily (Patient not taking: Reported on 7/31/2023)      triamcinolone (KENALOG) 0.1 % external cream Apply topically daily as needed for irritation (Patient not taking: Reported on 7/31/2023)         ALLERGIES   No Known Allergies    PAST MEDICAL HISTORY:  Past Medical History:   Diagnosis Date    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     Amyloidosis (H)     Anemia     Bacteremia     BPH (benign prostatic hyperplasia)     CKD (chronic kidney disease) stage 4, 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     Colon polyps     Coronary artery disease involving native coronary artery, angina presence unspecified, unspecified whether native or transplanted heart 05/01/2020    Added automatically from request for surgery [id number]    DJD (degenerative joint disease)     Elevated BP without diagnosis of hypertension     Gastroesophageal reflux disease     Gout     Gout     Hyperlipidemia     Hypertension     Leukocytosis     MDS (myelodysplastic syndrome) (H)     Metabolic acidosis     due to CKD    Mixed hyperlipidemia 05/01/2020    Added automatically from request for surgery [id number]    Multiple myeloma (H)     Nonrheumatic aortic valve stenosis 05/01/2020    Added automatically from request for surgery [id number]    Rotator cuff tear arthropathy, left     Sinusitis     Spinal stenosis in cervical region 09/25/2017       PAST SURGICAL HISTORY:  Past Surgical History:   Procedure Laterality Date    APPENDECTOMY      ASPIRATION NEEDLE HIP Left 9/25/2017    Procedure: ASPIRATION NEEDLE HIP;;  Surgeon: Moises Elias MD;  Location:  OR    BACK SURGERY  11/07/2017    cervical spine surgery     COLONOSCOPY      CV CORONARY ANGIOGRAM N/A 5/6/2020    Procedure: Coronary Angiogram;  Surgeon: David Woods MD;  Location:  HEART CARDIAC CATH LAB    CV LEFT HEART CATH N/A 5/6/2020    Procedure: Left Heart Cath;  Surgeon: David Woods MD;  Location:  HEART CARDIAC CATH LAB    CV RIGHT HEART CATH MEASUREMENTS RECORDED N/A 5/6/2020    Procedure: Right Heart Cath;  Surgeon: David Woods MD;  Location:  HEART CARDIAC CATH LAB    DECOMPRESSION LUMBAR TWO LEVELS Bilateral 6/11/2018    Procedure: DECOMPRESSION LUMBAR TWO LEVELS;  BILATERAL LUMBAR 3-4 AND LUMBAR 4-5 DECOMPRESSION ;  Surgeon: Sd Vallejo MD;  Location:  OR    FUSION CERVICAL ANTERIOR ONE LEVEL WITH BONE ALLOGRAFT N/A 9/25/2017    Procedure: FUSION CERVICAL ANTERIOR ONE LEVEL WITH BONE ALLOGRAFT;  ANTERIOR CERVICAL DECOMPRESSION  AND FUSIONC3-4 WITH INSTRUMENTATION, ALLOGRAFT AND BONE MARROW ASPIRATE OF THE LEFT ILIAC CREST ;  Surgeon: Moises Elias MD;  Location:  OR    PHACOEMULSIFICATION CLEAR CORNEA W/ STANDARD IOL IMPLANT, ENDOSCOPIC CYCLOPHOTOCOAGULATION, COMBINED Right 12/12/2017    Procedure: COMBINED PHACOEMULSIFICATION CLEAR CORNEA WITH STANDARD INTRAOCULAR LENS IMPLANT, ENDOSCOPIC CYCLOPHOTOCOAGULATION;  COMPLEX RIGHT EYE COMBINED PHACOEMULSIFICATION CLEAR CORNEA WITH STANDARD INTRAOCULAR LENS IMPLANT, ENDOSCOPIC CYCLOPHOTOCOAGULATION ;  Surgeon: Alli Mott MD;  Location:  EC    PHACOEMULSIFICATION CLEAR CORNEA W/ STANDARD IOL IMPLANT, ENDOSCOPIC CYCLOPHOTOCOAGULATION, COMBINED Left 5/1/2018    Procedure: COMBINED PHACOEMULSIFICATION CLEAR CORNEA WITH STANDARD INTRAOCULAR LENS IMPLANT, ENDOSCOPIC CYCLOPHOTOCOAGULATION;  COMPLEX LEFT EYE PHACOEMULSIFICATION CLEAR CORNEA WITH STANDARD INTRAOCULAR LENS IMPLANT, ENDOSCOPIC CYCLOPHOTOCOAGULATION ;  Surgeon: Alli Mott MD;  Location:  EC    ROTATOR CUFF REPAIR RT/LT  2004       FAMILY HISTORY:  Family History   Problem Relation Age of Onset    Heart Disease Father        SOCIAL HISTORY:  Social History     Socioeconomic History    Marital status:      Spouse name: None    Number of children: None    Years of education: None    Highest education level: None   Tobacco Use    Smoking status: Never    Smokeless tobacco: Never   Substance and Sexual Activity    Alcohol use: Yes     Comment: beer or two a day    Drug use: No       Review of Systems:  Skin:  Positive for bruising   Eyes:  Positive for glaucoma;glasses  ENT:  Negative    Respiratory:  Negative dyspnea on exertion  Cardiovascular:  dizziness;lightheadedness;cyanosis;syncope or near-syncope;chest pain;palpitations;Negative for exercise intolerance;Positive for;edema;fatigue  Gastroenterology: Positive for reflux  Genitourinary:  Positive for prostate problem;urinary  "frequency;nocturia  Musculoskeletal:  Positive for back pain;arthritis  Neurologic:  Positive for numbness or tingling of hands  Psychiatric:  Negative    Heme/Lymph/Imm:  Negative    Endocrine:  Negative      Physical Exam:  Vitals: /61   Pulse 84   Ht 1.702 m (5' 7\")   Wt 60.4 kg (133 lb 3.2 oz)   BMI 20.86 kg/m      Constitutional:  cooperative, alert and oriented, well developed, well nourished, in no acute distress        Skin:  warm and dry to the touch, no apparent skin lesions or masses noted          Head:  normocephalic, no masses or lesions        Eyes:  pupils equal and round, conjunctivae and lids unremarkable, sclera white, no xanthalasma, EOMS intact, no nystagmus        Lymph:No Cervical lymphadenopathy present     ENT:  no pallor or cyanosis, dentition good        Neck:  carotid pulses are full and equal bilaterally, JVP normal, no carotid bruit        Respiratory:  normal breath sounds, clear to auscultation, normal A-P diameter, normal symmetry, normal respiratory excursion, no use of accessory muscles         Cardiac: regular rhythm;apical impulse not displaced   soft or inaudible A2   systolic murmur;grade 3        pulses full and equal, no bruits auscultated                                        GI:  abdomen soft, non-tender, BS normoactive, no mass, no HSM, no bruits        Extremities and Muscular Skeletal:  no deformities, clubbing, cyanosis, erythema observed              Neurological:  no gross motor deficits        Psych:  Alert and Oriented x 3        Recent Lab Results:  LIPID RESULTS:  Lab Results   Component Value Date    CHOL 122 09/01/2021    HDL 54 09/01/2021    LDL 45 09/01/2021    TRIG 114 09/01/2021       LIVER ENZYME RESULTS:  Lab Results   Component Value Date    AST 16 10/26/2021    AST 27 06/24/2021    ALT 25 10/26/2021    ALT 42 06/24/2021       CBC RESULTS:  Lab Results   Component Value Date    WBC 3.2 (L) 10/26/2021    WBC 10.1 06/26/2021    RBC 3.62 (L) " 10/26/2021    RBC 3.75 (L) 06/26/2021    HGB 10.2 (L) 10/26/2021    HGB 8.4 (L) 06/26/2021    HCT 33.0 (L) 10/26/2021    HCT 28.4 (L) 06/26/2021    MCV 91 10/26/2021    MCV 76 (L) 06/26/2021    MCH 28.2 10/26/2021    MCH 22.4 (L) 06/26/2021    MCHC 30.9 (L) 10/26/2021    MCHC 29.6 (L) 06/26/2021    RDW 16.3 (H) 10/26/2021    RDW 18.8 (H) 06/26/2021     (L) 10/26/2021     06/26/2021       BMP RESULTS:  Lab Results   Component Value Date     10/26/2021     06/26/2021    POTASSIUM 4.3 10/26/2021    POTASSIUM 3.7 06/26/2021    CHLORIDE 107 10/26/2021    CHLORIDE 116 (H) 06/26/2021    CO2 21 10/26/2021    CO2 19 (L) 06/26/2021    ANIONGAP 8 10/26/2021    ANIONGAP 6 06/26/2021     (H) 10/26/2021     (H) 06/26/2021    BUN 44 (H) 10/26/2021    BUN 22 06/26/2021    CR 2.36 (H) 10/26/2021    CR 1.44 (H) 06/26/2021    GFRESTIMATED 25 (L) 10/26/2021    GFRESTIMATED 45 (L) 06/26/2021    GFRESTBLACK 52 (L) 06/26/2021    CONNIE 8.7 10/26/2021    CONNIE 8.7 06/26/2021        A1C RESULTS:  No results found for: A1C    INR RESULTS:  Lab Results   Component Value Date    INR 1.06 05/06/2020           CC  Shilpa Chun MD  [id number]  [place] [id number]      Thank you for allowing me to participate in the care of your patient.      Sincerely,     DR SHILPA CHUN MD     [facility]

## 2023-07-31 NOTE — PROGRESS NOTES
HPI and Plan:   Very pleasant 83-year-old gentleman returning for follow-up of aortic valve disease    He has a history of mild to moderate nonobstructive coronary artery disease.  When I last saw him almost 2 years ago he had moderate to severe aortic stenosis with an aortic valve area 1.0 cm  mean gradient 32 mmHg.    Unfortunately he also has stage III-IV chronic renal failure and he has multiple myeloma and amyloidosis.    He has been actively followed by hematooncologist and due to shortness of breath an echocardiogram was repeated recently.  Aortic stenosis is now severe with a mean gradient of 42 mmHg and valve area of 0.7 cm .  There is moderate mitral stenosis as well with a mean gradient of 8 mmHg.    He tells me that his breathing really started becoming worse since the fall of last year.  His shortness of breath does vary with his hemoglobin levels.  His Hb level is do not usually exceed 10.  It is less than 8 he is symptomatic.  He gets what I think is erythropoietin or similar medication once a month.    At this point time he can only walk for about a block before he gets out of breath.  However he has no PND orthopnea or ankle swelling.  He denies exertional dizzy spells and he has no chest pains.    By physical examination his second heart sound is now softer and the murmur may be late peaking.  He has no signs of CHF.    Did not have any notes from his nephrologist or oncologist I can access at this time and do not have any recent lab work.  I have taken liberty of drawing his CBC and his electrolytes today.    I will also liaise with Dr Bean.  He can be a candidate for a TAVR if his life expectancy is at least 1 year.        No orders of the defined types were placed in this encounter.      No orders of the defined types were placed in this encounter.      Encounter Diagnoses   Name Primary?    Amyloidosis, unspecified type (H)     SANFORD (dyspnea on exertion)     Nonrheumatic aortic valve  stenosis     Positive cardiac stress test        CURRENT MEDICATIONS:  Current Outpatient Medications   Medication Sig Dispense Refill    acetaminophen (TYLENOL) 500 MG tablet Take 500 mg by mouth every 8 hours as needed for mild pain      acyclovir (ZOVIRAX) 400 MG tablet Take 400 mg by mouth 2 times daily      allopurinol (ZYLOPRIM) 300 MG tablet Take 300 mg by mouth daily.      cholecalciferol 25 MCG (1000 UT) TABS Take 1,000 Units by mouth daily       cyclophosphamide (CYTOXAN) 50 MG capsule Take 500 mg by mouth once a week      dexamethasone (DECADRON) 4 MG tablet Take 20 mg by mouth Weekly Day of chemo      finasteride (PROSCAR) 5 MG tablet Take 5 mg by mouth daily      pantoprazole (PROTONIX) 20 MG EC tablet Take 20 mg by mouth daily      predniSONE (DELTASONE) 5 MG tablet Take 5 mg by mouth every other day       rosuvastatin (CRESTOR) 20 MG tablet Take 20 mg by mouth daily      tamsulosin (FLOMAX) 0.4 MG 24 hr capsule Take 0.4 mg by mouth every evening       latanoprost (XALATAN) 0.005 % ophthalmic solution Place 1 drop into both eyes At Bedtime  (Patient not taking: Reported on 1/8/2023)      sodium bicarbonate 650 MG tablet Take 650 mg by mouth 2 times daily (Patient not taking: Reported on 7/31/2023)      triamcinolone (KENALOG) 0.1 % external cream Apply topically daily as needed for irritation (Patient not taking: Reported on 7/31/2023)         ALLERGIES   No Known Allergies    PAST MEDICAL HISTORY:  Past Medical History:   Diagnosis Date    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     Amyloidosis (H)     Anemia     Bacteremia     BPH (benign prostatic hyperplasia)     CKD (chronic kidney disease) stage 4, 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    Colon polyps     Coronary artery disease involving native coronary artery, angina presence unspecified, unspecified whether native or transplanted heart 05/01/2020    Added automatically from request for surgery  [id number]    DJD (degenerative joint disease)     Elevated BP without diagnosis of hypertension     Gastroesophageal reflux disease     Gout     Gout     Hyperlipidemia     Hypertension     Leukocytosis     MDS (myelodysplastic syndrome) (H)     Metabolic acidosis     due to CKD    Mixed hyperlipidemia 05/01/2020    Added automatically from request for surgery [id number]    Multiple myeloma (H)     Nonrheumatic aortic valve stenosis 05/01/2020    Added automatically from request for surgery [id number]    Rotator cuff tear arthropathy, left     Sinusitis     Spinal stenosis in cervical region 09/25/2017       PAST SURGICAL HISTORY:  Past Surgical History:   Procedure Laterality Date    APPENDECTOMY      ASPIRATION NEEDLE HIP Left 9/25/2017    Procedure: ASPIRATION NEEDLE HIP;;  Surgeon: Moises Elias MD;  Location:  OR    BACK SURGERY  11/07/2017    cervical spine surgery     COLONOSCOPY      CV CORONARY ANGIOGRAM N/A 5/6/2020    Procedure: Coronary Angiogram;  Surgeon: Davdi Woods MD;  Location:  HEART CARDIAC CATH LAB    CV LEFT HEART CATH N/A 5/6/2020    Procedure: Left Heart Cath;  Surgeon: David Woods MD;  Location:  HEART CARDIAC CATH LAB    CV RIGHT HEART CATH MEASUREMENTS RECORDED N/A 5/6/2020    Procedure: Right Heart Cath;  Surgeon: David Woods MD;  Location:  HEART CARDIAC CATH LAB    DECOMPRESSION LUMBAR TWO LEVELS Bilateral 6/11/2018    Procedure: DECOMPRESSION LUMBAR TWO LEVELS;  BILATERAL LUMBAR 3-4 AND LUMBAR 4-5 DECOMPRESSION ;  Surgeon: Sd Vallejo MD;  Location:  OR    FUSION CERVICAL ANTERIOR ONE LEVEL WITH BONE ALLOGRAFT N/A 9/25/2017    Procedure: FUSION CERVICAL ANTERIOR ONE LEVEL WITH BONE ALLOGRAFT;  ANTERIOR CERVICAL DECOMPRESSION AND FUSIONC3-4 WITH INSTRUMENTATION, ALLOGRAFT AND BONE MARROW ASPIRATE OF THE LEFT ILIAC CREST ;  Surgeon: Moises Elias MD;  Location:  OR    PHACOEMULSIFICATION CLEAR CORNEA W/ STANDARD IOL IMPLANT,  ENDOSCOPIC CYCLOPHOTOCOAGULATION, COMBINED Right 12/12/2017    Procedure: COMBINED PHACOEMULSIFICATION CLEAR CORNEA WITH STANDARD INTRAOCULAR LENS IMPLANT, ENDOSCOPIC CYCLOPHOTOCOAGULATION;  COMPLEX RIGHT EYE COMBINED PHACOEMULSIFICATION CLEAR CORNEA WITH STANDARD INTRAOCULAR LENS IMPLANT, ENDOSCOPIC CYCLOPHOTOCOAGULATION ;  Surgeon: Alli Mott MD;  Location:  [facility]    PHACOEMULSIFICATION CLEAR CORNEA W/ STANDARD IOL IMPLANT, ENDOSCOPIC CYCLOPHOTOCOAGULATION, COMBINED Left 5/1/2018    Procedure: COMBINED PHACOEMULSIFICATION CLEAR CORNEA WITH STANDARD INTRAOCULAR LENS IMPLANT, ENDOSCOPIC CYCLOPHOTOCOAGULATION;  COMPLEX LEFT EYE PHACOEMULSIFICATION CLEAR CORNEA WITH STANDARD INTRAOCULAR LENS IMPLANT, ENDOSCOPIC CYCLOPHOTOCOAGULATION ;  Surgeon: Alli Mott MD;  Location: [facility]    ROTATOR CUFF REPAIR RT/LT  2004       FAMILY HISTORY:  Family History   Problem Relation Age of Onset    Heart Disease Father        SOCIAL HISTORY:  Social History     Socioeconomic History    Marital status:      Spouse name: None    Number of children: None    Years of education: None    Highest education level: None   Tobacco Use    Smoking status: Never    Smokeless tobacco: Never   Substance and Sexual Activity    Alcohol use: Yes     Comment: beer or two a day    Drug use: No       Review of Systems:  Skin:  Positive for bruising   Eyes:  Positive for glaucoma;glasses  ENT:  Negative    Respiratory:  Negative dyspnea on exertion  Cardiovascular:  dizziness;lightheadedness;cyanosis;syncope or near-syncope;chest pain;palpitations;Negative for exercise intolerance;Positive for;edema;fatigue  Gastroenterology: Positive for reflux  Genitourinary:  Positive for prostate problem;urinary frequency;nocturia  Musculoskeletal:  Positive for back pain;arthritis  Neurologic:  Positive for numbness or tingling of hands  Psychiatric:  Negative    Heme/Lymph/Imm:  Negative    Endocrine:  Negative      Physical Exam:  Vitals: BP  "115/61   Pulse 84   Ht 1.702 m (5' 7\")   Wt 60.4 kg (133 lb 3.2 oz)   BMI 20.86 kg/m      Constitutional:  cooperative, alert and oriented, well developed, well nourished, in no acute distress        Skin:  warm and dry to the touch, no apparent skin lesions or masses noted          Head:  normocephalic, no masses or lesions        Eyes:  pupils equal and round, conjunctivae and lids unremarkable, sclera white, no xanthalasma, EOMS intact, no nystagmus        Lymph:No Cervical lymphadenopathy present     ENT:  no pallor or cyanosis, dentition good        Neck:  carotid pulses are full and equal bilaterally, JVP normal, no carotid bruit        Respiratory:  normal breath sounds, clear to auscultation, normal A-P diameter, normal symmetry, normal respiratory excursion, no use of accessory muscles         Cardiac: regular rhythm;apical impulse not displaced   soft or inaudible A2   systolic murmur;grade 3        pulses full and equal, no bruits auscultated                                        GI:  abdomen soft, non-tender, BS normoactive, no mass, no HSM, no bruits        Extremities and Muscular Skeletal:  no deformities, clubbing, cyanosis, erythema observed              Neurological:  no gross motor deficits        Psych:  Alert and Oriented x 3        Recent Lab Results:  LIPID RESULTS:  Lab Results   Component Value Date    CHOL 122 09/01/2021    HDL 54 09/01/2021    LDL 45 09/01/2021    TRIG 114 09/01/2021       LIVER ENZYME RESULTS:  Lab Results   Component Value Date    AST 16 10/26/2021    AST 27 06/24/2021    ALT 25 10/26/2021    ALT 42 06/24/2021       CBC RESULTS:  Lab Results   Component Value Date    WBC 3.2 (L) 10/26/2021    WBC 10.1 06/26/2021    RBC 3.62 (L) 10/26/2021    RBC 3.75 (L) 06/26/2021    HGB 10.2 (L) 10/26/2021    HGB 8.4 (L) 06/26/2021    HCT 33.0 (L) 10/26/2021    HCT 28.4 (L) 06/26/2021    MCV 91 10/26/2021    MCV 76 (L) 06/26/2021    MCH 28.2 10/26/2021    MCH 22.4 (L) 06/26/2021 "    MCHC 30.9 (L) 10/26/2021    MCHC 29.6 (L) 06/26/2021    RDW 16.3 (H) 10/26/2021    RDW 18.8 (H) 06/26/2021     (L) 10/26/2021     06/26/2021       BMP RESULTS:  Lab Results   Component Value Date     10/26/2021     06/26/2021    POTASSIUM 4.3 10/26/2021    POTASSIUM 3.7 06/26/2021    CHLORIDE 107 10/26/2021    CHLORIDE 116 (H) 06/26/2021    CO2 21 10/26/2021    CO2 19 (L) 06/26/2021    ANIONGAP 8 10/26/2021    ANIONGAP 6 06/26/2021     (H) 10/26/2021     (H) 06/26/2021    BUN 44 (H) 10/26/2021    BUN 22 06/26/2021    CR 2.36 (H) 10/26/2021    CR 1.44 (H) 06/26/2021    GFRESTIMATED 25 (L) 10/26/2021    GFRESTIMATED 45 (L) 06/26/2021    GFRESTBLACK 52 (L) 06/26/2021    CONNIE 8.7 10/26/2021    CONNIE 8.7 06/26/2021        A1C RESULTS:  No results found for: A1C    INR RESULTS:  Lab Results   Component Value Date    INR 1.06 05/06/2020           CC  Casimiro Chun MD  [id number]  [place] [id number]

## 2023-08-02 ENCOUNTER — TELEPHONE (OUTPATIENT)
Dept: CARDIOLOGY | Facility: CLINIC | Age: 83
End: 2023-08-02
Payer: COMMERCIAL

## 2023-08-02 NOTE — TELEPHONE ENCOUNTER
"Team received notification from Dr. Chun:  \"Pls let him know labs are good.  If he is willing, we can refer him to valve clinic for consideration of TAVR.  Pls ask him.  Thanks. \"    Writer has placed a call to Isrrael, I had to [facility].   I have requested him to call us back at the nurses desk line.   Informed him that Dr. Chun would like to know if Isrrael is interested in having a conversation with the TAVR team about this option to help relieve his symptoms.   Writer notes that when Dr. Chun last saw Isrrael on 7\31\23, he mentioned talking with Dr. Hays about prognosis, as related to supporting the procedure of going through a TAVR and life expectancy.    Awaiting call back from patient.    Prisca Bhatia RN, BSN    "

## 2023-08-04 NOTE — TELEPHONE ENCOUNTER
Writer made a second attempt to reach patient, I had to leave another .    Prisca Bhatia RN on 8/4/2023 at 2:58 PM

## 2023-09-11 ENCOUNTER — TRANSFERRED RECORDS (OUTPATIENT)
Dept: HEALTH INFORMATION MANAGEMENT | Facility: CLINIC | Age: 83
End: 2023-09-11
Payer: COMMERCIAL

## 2023-12-07 ENCOUNTER — TELEPHONE (OUTPATIENT)
Dept: CARDIOLOGY | Facility: CLINIC | Age: 83
End: 2023-12-07
Payer: COMMERCIAL

## 2023-12-07 NOTE — TELEPHONE ENCOUNTER
Patient left a message on team 3 phone at 3:30  w/o a message.    I called patient this morning and got his voicemail and I stated that I was retuning his call from yesterday and gave him our nurse line number/

## 2023-12-11 ENCOUNTER — TELEPHONE (OUTPATIENT)
Dept: CARDIOLOGY | Facility: CLINIC | Age: 83
End: 2023-12-11
Payer: COMMERCIAL

## 2023-12-11 NOTE — TELEPHONE ENCOUNTER
Patient called in and LVM on nurse line.  He would like to know what time his appointment for the scan is tomorrow.    Dr. Chun   0935   12\12  in [place]  CT scan of chest at [facility] on 12\13 at  0830     Writer called back to patient, but had to LVM.  I gave him the above information and our call back number.    Prisca Bhatia RN on 12/11/2023 at 10:48 AM

## 2023-12-12 ENCOUNTER — OFFICE VISIT (OUTPATIENT)
Dept: CARDIOLOGY | Facility: CLINIC | Age: 83
End: 2023-12-12
Payer: COMMERCIAL

## 2023-12-12 VITALS
WEIGHT: 129 LBS | HEART RATE: 78 BPM | DIASTOLIC BLOOD PRESSURE: 68 MMHG | BODY MASS INDEX: 20.25 KG/M2 | SYSTOLIC BLOOD PRESSURE: 118 MMHG | OXYGEN SATURATION: 100 % | HEIGHT: 67 IN

## 2023-12-12 DIAGNOSIS — G62.0 DRUG-INDUCED POLYNEUROPATHY (H): ICD-10-CM

## 2023-12-12 DIAGNOSIS — D46.9 MDS (MYELODYSPLASTIC SYNDROME) (H): ICD-10-CM

## 2023-12-12 DIAGNOSIS — E43 UNSPECIFIED SEVERE PROTEIN-CALORIE MALNUTRITION (H): ICD-10-CM

## 2023-12-12 DIAGNOSIS — I35.0 NONRHEUMATIC AORTIC VALVE STENOSIS: Primary | ICD-10-CM

## 2023-12-12 DIAGNOSIS — D84.9 IMMUNOCOMPROMISED (H): ICD-10-CM

## 2023-12-12 DIAGNOSIS — K76.6 PORTAL HYPERTENSION (H): ICD-10-CM

## 2023-12-12 DIAGNOSIS — N18.4 CHRONIC KIDNEY DISEASE, STAGE 4 (SEVERE) (H): ICD-10-CM

## 2023-12-12 PROCEDURE — 99214 OFFICE O/P EST MOD 30 MIN: CPT | Performed by: INTERNAL MEDICINE

## 2023-12-12 NOTE — LETTER
12/12/2023    Kian Johns MD  [facility] [address] [address]    RE: Rose SANCHEZ Ad       Dear Colleague,     I had the pleasure of seeing Rose Duong in the [facility].  HPI and Plan:   My pleasure to see this very pleasant 83-year-old gentleman again for follow-up of aortic stenosis.    As well as severe aortic stenosis he does have severe illness, namely amyloidosis and myelo proliferative disorder..  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    He has carried the above diagnosis for more than a year.  He has not had a transfusion for a while but he is on erythropoietin.    When his hemoglobin is around 10 his shortness of breath is less but he can only walk about 50 yards before he is out of breath.  He has no PND orthopnea or ankle swelling and indeed by physical examination he has no evidence of CHF.  Otherwise the findings are consistent with severe aortic stenosis.    He would like something done with the valve.  We had a lengthy discussion about this.  I will talk to Dr. Raj Cordero regarding his prognosis.  In the meantime I have taken liberty of referring him to the valve clinic as well as requesting a CT TAVR.  His last GFR was 42 about a month and a half ago and I think we should be able to do this test.        Orders Placed This Encounter   Procedures    CT Angiogram TAVR    Follow-Up with Cardiology Structural/Valve       No orders of the defined types were placed in this encounter.      Encounter Diagnoses   Name Primary?    Nonrheumatic aortic valve stenosis Yes    MDS (myelodysplastic syndrome) (H)     Portal hypertension (H)     Drug-induced polyneuropathy (H24)     Unspecified severe protein-calorie malnutrition (H24)     Chronic kidney disease, stage 4 (severe) (H)     Immunocompromised (H24)        CURRENT MEDICATIONS:  Current Outpatient Medications   Medication Sig Dispense Refill    acetaminophen  (TYLENOL) 500 MG tablet Take 500 mg by mouth every 8 hours as needed for mild pain      acyclovir (ZOVIRAX) 400 MG tablet Take 400 mg by mouth 2 times daily      allopurinol (ZYLOPRIM) 300 MG tablet Take 300 mg by mouth daily.      cholecalciferol 25 MCG (1000 UT) TABS Take 1,000 Units by mouth daily       finasteride (PROSCAR) 5 MG tablet Take 5 mg by mouth daily      pantoprazole (PROTONIX) 20 MG EC tablet Take 20 mg by mouth daily      predniSONE (DELTASONE) 5 MG tablet Take 5 mg by mouth every other day       rosuvastatin (CRESTOR) 20 MG tablet Take 20 mg by mouth daily      tamsulosin (FLOMAX) 0.4 MG 24 hr capsule Take 0.4 mg by mouth every evening       cyclophosphamide (CYTOXAN) 50 MG capsule Take 500 mg by mouth once a week (Patient not taking: Reported on 12/12/2023)      dexamethasone (DECADRON) 4 MG tablet Take 20 mg by mouth Weekly Day of chemo (Patient not taking: Reported on 12/12/2023)      latanoprost (XALATAN) 0.005 % ophthalmic solution Place 1 drop into both eyes At Bedtime  (Patient not taking: Reported on 1/8/2023)      sodium bicarbonate 650 MG tablet Take 650 mg by mouth 2 times daily (Patient not taking: Reported on 7/31/2023)      triamcinolone (KENALOG) 0.1 % external cream Apply topically daily as needed for irritation (Patient not taking: Reported on 12/12/2023)         ALLERGIES   No Known Allergies    PAST MEDICAL HISTORY:  Past Medical History:   Diagnosis Date    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     Amyloidosis (H)     Anemia     Bacteremia     BPH (benign prostatic hyperplasia)     CKD (chronic kidney disease) stage 4, 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    Colon polyps     Coronary artery disease involving native coronary artery, angina presence unspecified, unspecified whether native or transplanted heart 05/01/2020    Added automatically from request for surgery [id number]    DJD (degenerative joint disease)     Elevated BP  without diagnosis of hypertension     Gastroesophageal reflux disease     Gout     Gout     Hyperlipidemia     Hypertension     Leukocytosis     MDS (myelodysplastic syndrome) (H)     Metabolic acidosis     due to CKD    Mixed hyperlipidemia 05/01/2020    Added automatically from request for surgery [id number]    Multiple myeloma (H)     Nonrheumatic aortic valve stenosis 05/01/2020    Added automatically from request for surgery [id number]    Rotator cuff tear arthropathy, left     Sinusitis     Spinal stenosis in cervical region 09/25/2017       PAST SURGICAL HISTORY:  Past Surgical History:   Procedure Laterality Date    APPENDECTOMY      ASPIRATION NEEDLE HIP Left 9/25/2017    Procedure: ASPIRATION NEEDLE HIP;;  Surgeon: Moises Elias MD;  Location:  OR    BACK SURGERY  11/07/2017    cervical spine surgery     COLONOSCOPY      CV CORONARY ANGIOGRAM N/A 5/6/2020    Procedure: Coronary Angiogram;  Surgeon: David Woods MD;  Location:  HEART CARDIAC CATH LAB    CV LEFT HEART CATH N/A 5/6/2020    Procedure: Left Heart Cath;  Surgeon: David Woods MD;  Location:  HEART CARDIAC CATH LAB    CV RIGHT HEART CATH MEASUREMENTS RECORDED N/A 5/6/2020    Procedure: Right Heart Cath;  Surgeon: David Woods MD;  Location:  HEART CARDIAC CATH LAB    DECOMPRESSION LUMBAR TWO LEVELS Bilateral 6/11/2018    Procedure: DECOMPRESSION LUMBAR TWO LEVELS;  BILATERAL LUMBAR 3-4 AND LUMBAR 4-5 DECOMPRESSION ;  Surgeon: Sd Vallejo MD;  Location:  OR    FUSION CERVICAL ANTERIOR ONE LEVEL WITH BONE ALLOGRAFT N/A 9/25/2017    Procedure: FUSION CERVICAL ANTERIOR ONE LEVEL WITH BONE ALLOGRAFT;  ANTERIOR CERVICAL DECOMPRESSION AND FUSIONC3-4 WITH INSTRUMENTATION, ALLOGRAFT AND BONE MARROW ASPIRATE OF THE LEFT ILIAC CREST ;  Surgeon: Moises Elias MD;  Location:  OR    PHACOEMULSIFICATION CLEAR CORNEA W/ STANDARD IOL IMPLANT, ENDOSCOPIC CYCLOPHOTOCOAGULATION, COMBINED Right 12/12/2017  "   Procedure: COMBINED PHACOEMULSIFICATION CLEAR CORNEA WITH STANDARD INTRAOCULAR LENS IMPLANT, ENDOSCOPIC CYCLOPHOTOCOAGULATION;  COMPLEX RIGHT EYE COMBINED PHACOEMULSIFICATION CLEAR CORNEA WITH STANDARD INTRAOCULAR LENS IMPLANT, ENDOSCOPIC CYCLOPHOTOCOAGULATION ;  Surgeon: Alli Mott MD;  Location: [facility]    PHACOEMULSIFICATION CLEAR CORNEA W/ STANDARD IOL IMPLANT, ENDOSCOPIC CYCLOPHOTOCOAGULATION, COMBINED Left 5/1/2018    Procedure: COMBINED PHACOEMULSIFICATION CLEAR CORNEA WITH STANDARD INTRAOCULAR LENS IMPLANT, ENDOSCOPIC CYCLOPHOTOCOAGULATION;  COMPLEX LEFT EYE PHACOEMULSIFICATION CLEAR CORNEA WITH STANDARD INTRAOCULAR LENS IMPLANT, ENDOSCOPIC CYCLOPHOTOCOAGULATION ;  Surgeon: Alli Mott MD;  Location: [facility]    ROTATOR CUFF REPAIR RT/LT  2004       FAMILY HISTORY:  Family History   Problem Relation Age of Onset    Heart Disease Father        SOCIAL HISTORY:  Social History     Socioeconomic History    Marital status:      Spouse name: None    Number of children: None    Years of education: None    Highest education level: None   Tobacco Use    Smoking status: Never    Smokeless tobacco: Never   Substance and Sexual Activity    Alcohol use: Yes     Comment: beer or two a day    Drug use: No       Review of Systems:  Skin:  Positive for bruising   Eyes:  Positive for glaucoma;glasses  ENT:  Negative    Respiratory:  Positive for dyspnea on exertion  Cardiovascular:  dizziness;lightheadedness;cyanosis;syncope or near-syncope;chest pain;palpitations;Negative for Positive for;edema  Gastroenterology: Positive for reflux  Genitourinary:  Positive for prostate problem;urinary frequency;nocturia  Musculoskeletal:  Positive for back pain;arthritis  Neurologic:  Negative numbness or tingling of hands  Psychiatric:  Negative    Heme/Lymph/Imm:  Negative    Endocrine:  Negative      Physical Exam:  Vitals: /68   Pulse 78   Ht 1.702 m (5' 7\")   Wt 58.5 kg (129 lb)   SpO2 100%   BMI 20.20 " kg/m      Constitutional:  cooperative, alert and oriented, well developed, well nourished, in no acute distress        Skin:  warm and dry to the touch, no apparent skin lesions or masses noted          Head:  normocephalic, no masses or lesions        Eyes:  pupils equal and round, conjunctivae and lids unremarkable, sclera white, no xanthalasma, EOMS intact, no nystagmus        Lymph:No Cervical lymphadenopathy present     ENT:  no pallor or cyanosis, dentition good        Neck:  carotid pulses are full and equal bilaterally, JVP normal, no carotid bruit        Respiratory:  normal breath sounds, clear to auscultation, normal A-P diameter, normal symmetry, normal respiratory excursion, no use of accessory muscles         Cardiac: regular rhythm;apical impulse not displaced   soft or inaudible A2   systolic murmur;grade 3        pulses full and equal, no bruits auscultated                                        GI:  abdomen soft, non-tender, BS normoactive, no mass, no HSM, no bruits        Extremities and Muscular Skeletal:  no deformities, clubbing, cyanosis, erythema observed              Neurological:  no gross motor deficits        Psych:  Alert and Oriented x 3        Recent Lab Results:  LIPID RESULTS:  Lab Results   Component Value Date    CHOL 122 09/01/2021    HDL 54 09/01/2021    LDL 45 09/01/2021    TRIG 114 09/01/2021       LIVER ENZYME RESULTS:  Lab Results   Component Value Date    AST 16 10/26/2021    AST 27 06/24/2021    ALT 25 10/26/2021    ALT 42 06/24/2021       CBC RESULTS:  Lab Results   Component Value Date    WBC 10.7 07/31/2023    WBC 10.1 06/26/2021    RBC 3.34 (L) 07/31/2023    RBC 3.75 (L) 06/26/2021    HGB 9.9 (L) 07/31/2023    HGB 8.4 (L) 06/26/2021    HCT 31.9 (L) 07/31/2023    HCT 28.4 (L) 06/26/2021    MCV 96 07/31/2023    MCV 76 (L) 06/26/2021    MCH 29.6 07/31/2023    MCH 22.4 (L) 06/26/2021    MCHC 31.0 (L) 07/31/2023    MCHC 29.6 (L) 06/26/2021    RDW 15.7 (H) 07/31/2023    RDW  "18.8 (H) 06/26/2021     07/31/2023     06/26/2021       BMP RESULTS:  Lab Results   Component Value Date     07/31/2023     06/26/2021    POTASSIUM 4.1 07/31/2023    POTASSIUM 4.3 10/26/2021    POTASSIUM 3.7 06/26/2021    CHLORIDE 110 (H) 07/31/2023    CHLORIDE 107 10/26/2021    CHLORIDE 116 (H) 06/26/2021    CO2 19 (L) 07/31/2023    CO2 21 10/26/2021    CO2 19 (L) 06/26/2021    ANIONGAP 10 07/31/2023    ANIONGAP 8 10/26/2021    ANIONGAP 6 06/26/2021     (H) 07/31/2023     (H) 10/26/2021     (H) 06/26/2021    BUN 30.4 (H) 07/31/2023    BUN 44 (H) 10/26/2021    BUN 22 06/26/2021    CR 1.65 (H) 07/31/2023    CR 1.44 (H) 06/26/2021    GFRESTIMATED 41 (L) 07/31/2023    GFRESTIMATED 45 (L) 06/26/2021    GFRESTBLACK 52 (L) 06/26/2021    CONNIE 9.0 07/31/2023    CONNIE 8.7 06/26/2021        A1C RESULTS:  No results found for: \"A1C\"    INR RESULTS:  Lab Results   Component Value Date    INR 1.06 05/06/2020           CC  No referring provider defined for this encounter.      Thank you for allowing me to participate in the care of your patient.      Sincerely,     DR SHILPA ALFRED MD     [facility]  "

## 2023-12-12 NOTE — PROGRESS NOTES
HPI and Plan:   My pleasure to see this very pleasant 83-year-old gentleman again for follow-up of aortic stenosis.    As well as severe aortic stenosis he does have severe illness, namely amyloidosis and myelo proliferative disorder..  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    He has carried the above diagnosis for more than a year.  He has not had a transfusion for a while but he is on erythropoietin.    When his hemoglobin is around 10 his shortness of breath is less but he can only walk about 50 yards before he is out of breath.  He has no PND orthopnea or ankle swelling and indeed by physical examination he has no evidence of CHF.  Otherwise the findings are consistent with severe aortic stenosis.    He would like something done with the valve.  We had a lengthy discussion about this.  I will talk to Dr. Raj Cordero regarding his prognosis.  In the meantime I have taken liberty of referring him to the valve clinic as well as requesting a CT TAVR.  His last GFR was 42 about a month and a half ago and I think we should be able to do this test.        Orders Placed This Encounter   Procedures    CT Angiogram TAVR    Follow-Up with Cardiology Structural/Valve       No orders of the defined types were placed in this encounter.      Encounter Diagnoses   Name Primary?    Nonrheumatic aortic valve stenosis Yes    MDS (myelodysplastic syndrome) (H)     Portal hypertension (H)     Drug-induced polyneuropathy (H24)     Unspecified severe protein-calorie malnutrition (H24)     Chronic kidney disease, stage 4 (severe) (H)     Immunocompromised (H24)        CURRENT MEDICATIONS:  Current Outpatient Medications   Medication Sig Dispense Refill    acetaminophen (TYLENOL) 500 MG tablet Take 500 mg by mouth every 8 hours as needed for mild pain      acyclovir (ZOVIRAX) 400 MG tablet Take 400 mg by mouth 2 times daily      allopurinol (ZYLOPRIM) 300 MG tablet Take 300 mg by  mouth daily.      cholecalciferol 25 MCG (1000 UT) TABS Take 1,000 Units by mouth daily       finasteride (PROSCAR) 5 MG tablet Take 5 mg by mouth daily      pantoprazole (PROTONIX) 20 MG EC tablet Take 20 mg by mouth daily      predniSONE (DELTASONE) 5 MG tablet Take 5 mg by mouth every other day       rosuvastatin (CRESTOR) 20 MG tablet Take 20 mg by mouth daily      tamsulosin (FLOMAX) 0.4 MG 24 hr capsule Take 0.4 mg by mouth every evening       cyclophosphamide (CYTOXAN) 50 MG capsule Take 500 mg by mouth once a week (Patient not taking: Reported on 12/12/2023)      dexamethasone (DECADRON) 4 MG tablet Take 20 mg by mouth Weekly Day of chemo (Patient not taking: Reported on 12/12/2023)      latanoprost (XALATAN) 0.005 % ophthalmic solution Place 1 drop into both eyes At Bedtime  (Patient not taking: Reported on 1/8/2023)      sodium bicarbonate 650 MG tablet Take 650 mg by mouth 2 times daily (Patient not taking: Reported on 7/31/2023)      triamcinolone (KENALOG) 0.1 % external cream Apply topically daily as needed for irritation (Patient not taking: Reported on 12/12/2023)         ALLERGIES   No Known Allergies    PAST MEDICAL HISTORY:  Past Medical History:   Diagnosis Date    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     Amyloidosis (H)     Anemia     Bacteremia     BPH (benign prostatic hyperplasia)     CKD (chronic kidney disease) stage 4, 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    Colon polyps     Coronary artery disease involving native coronary artery, angina presence unspecified, unspecified whether native or transplanted heart 05/01/2020    Added automatically from request for surgery [id number]    DJD (degenerative joint disease)     Elevated BP without diagnosis of hypertension     Gastroesophageal reflux disease     Gout     Gout     Hyperlipidemia     Hypertension     Leukocytosis     MDS (myelodysplastic syndrome) (H)     Metabolic acidosis     due to  CKD    Mixed hyperlipidemia 05/01/2020    Added automatically from request for surgery [id number]    Multiple myeloma (H)     Nonrheumatic aortic valve stenosis 05/01/2020    Added automatically from request for surgery [id number]    Rotator cuff tear arthropathy, left     Sinusitis     Spinal stenosis in cervical region 09/25/2017       PAST SURGICAL HISTORY:  Past Surgical History:   Procedure Laterality Date    APPENDECTOMY      ASPIRATION NEEDLE HIP Left 9/25/2017    Procedure: ASPIRATION NEEDLE HIP;;  Surgeon: Moises Elias MD;  Location:  OR    BACK SURGERY  11/07/2017    cervical spine surgery     COLONOSCOPY      CV CORONARY ANGIOGRAM N/A 5/6/2020    Procedure: Coronary Angiogram;  Surgeon: David Woods MD;  Location:  HEART CARDIAC CATH LAB    CV LEFT HEART CATH N/A 5/6/2020    Procedure: Left Heart Cath;  Surgeon: David Woods MD;  Location:  HEART CARDIAC CATH LAB    CV RIGHT HEART CATH MEASUREMENTS RECORDED N/A 5/6/2020    Procedure: Right Heart Cath;  Surgeon: David Woods MD;  Location:  HEART CARDIAC CATH LAB    DECOMPRESSION LUMBAR TWO LEVELS Bilateral 6/11/2018    Procedure: DECOMPRESSION LUMBAR TWO LEVELS;  BILATERAL LUMBAR 3-4 AND LUMBAR 4-5 DECOMPRESSION ;  Surgeon: Sd Vallejo MD;  Location:  OR    FUSION CERVICAL ANTERIOR ONE LEVEL WITH BONE ALLOGRAFT N/A 9/25/2017    Procedure: FUSION CERVICAL ANTERIOR ONE LEVEL WITH BONE ALLOGRAFT;  ANTERIOR CERVICAL DECOMPRESSION AND FUSIONC3-4 WITH INSTRUMENTATION, ALLOGRAFT AND BONE MARROW ASPIRATE OF THE LEFT ILIAC CREST ;  Surgeon: Moises Elias MD;  Location:  OR    PHACOEMULSIFICATION CLEAR CORNEA W/ STANDARD IOL IMPLANT, ENDOSCOPIC CYCLOPHOTOCOAGULATION, COMBINED Right 12/12/2017    Procedure: COMBINED PHACOEMULSIFICATION CLEAR CORNEA WITH STANDARD INTRAOCULAR LENS IMPLANT, ENDOSCOPIC CYCLOPHOTOCOAGULATION;  COMPLEX RIGHT EYE COMBINED PHACOEMULSIFICATION CLEAR CORNEA WITH STANDARD  "INTRAOCULAR LENS IMPLANT, ENDOSCOPIC CYCLOPHOTOCOAGULATION ;  Surgeon: Alli Mott MD;  Location: [facility]    PHACOEMULSIFICATION CLEAR CORNEA W/ STANDARD IOL IMPLANT, ENDOSCOPIC CYCLOPHOTOCOAGULATION, COMBINED Left 5/1/2018    Procedure: COMBINED PHACOEMULSIFICATION CLEAR CORNEA WITH STANDARD INTRAOCULAR LENS IMPLANT, ENDOSCOPIC CYCLOPHOTOCOAGULATION;  COMPLEX LEFT EYE PHACOEMULSIFICATION CLEAR CORNEA WITH STANDARD INTRAOCULAR LENS IMPLANT, ENDOSCOPIC CYCLOPHOTOCOAGULATION ;  Surgeon: Alli Mott MD;  Location: [facility]    ROTATOR CUFF REPAIR RT/LT  2004       FAMILY HISTORY:  Family History   Problem Relation Age of Onset    Heart Disease Father        SOCIAL HISTORY:  Social History     Socioeconomic History    Marital status:      Spouse name: None    Number of children: None    Years of education: None    Highest education level: None   Tobacco Use    Smoking status: Never    Smokeless tobacco: Never   Substance and Sexual Activity    Alcohol use: Yes     Comment: beer or two a day    Drug use: No       Review of Systems:  Skin:  Positive for bruising   Eyes:  Positive for glaucoma;glasses  ENT:  Negative    Respiratory:  Positive for dyspnea on exertion  Cardiovascular:  dizziness;lightheadedness;cyanosis;syncope or near-syncope;chest pain;palpitations;Negative for Positive for;edema  Gastroenterology: Positive for reflux  Genitourinary:  Positive for prostate problem;urinary frequency;nocturia  Musculoskeletal:  Positive for back pain;arthritis  Neurologic:  Negative numbness or tingling of hands  Psychiatric:  Negative    Heme/Lymph/Imm:  Negative    Endocrine:  Negative      Physical Exam:  Vitals: /68   Pulse 78   Ht 1.702 m (5' 7\")   Wt 58.5 kg (129 lb)   SpO2 100%   BMI 20.20 kg/m      Constitutional:  cooperative, alert and oriented, well developed, well nourished, in no acute distress        Skin:  warm and dry to the touch, no apparent skin lesions or masses noted      "     Head:  normocephalic, no masses or lesions        Eyes:  pupils equal and round, conjunctivae and lids unremarkable, sclera white, no xanthalasma, EOMS intact, no nystagmus        Lymph:No Cervical lymphadenopathy present     ENT:  no pallor or cyanosis, dentition good        Neck:  carotid pulses are full and equal bilaterally, JVP normal, no carotid bruit        Respiratory:  normal breath sounds, clear to auscultation, normal A-P diameter, normal symmetry, normal respiratory excursion, no use of accessory muscles         Cardiac: regular rhythm;apical impulse not displaced   soft or inaudible A2   systolic murmur;grade 3        pulses full and equal, no bruits auscultated                                        GI:  abdomen soft, non-tender, BS normoactive, no mass, no HSM, no bruits        Extremities and Muscular Skeletal:  no deformities, clubbing, cyanosis, erythema observed              Neurological:  no gross motor deficits        Psych:  Alert and Oriented x 3        Recent Lab Results:  LIPID RESULTS:  Lab Results   Component Value Date    CHOL 122 09/01/2021    HDL 54 09/01/2021    LDL 45 09/01/2021    TRIG 114 09/01/2021       LIVER ENZYME RESULTS:  Lab Results   Component Value Date    AST 16 10/26/2021    AST 27 06/24/2021    ALT 25 10/26/2021    ALT 42 06/24/2021       CBC RESULTS:  Lab Results   Component Value Date    WBC 10.7 07/31/2023    WBC 10.1 06/26/2021    RBC 3.34 (L) 07/31/2023    RBC 3.75 (L) 06/26/2021    HGB 9.9 (L) 07/31/2023    HGB 8.4 (L) 06/26/2021    HCT 31.9 (L) 07/31/2023    HCT 28.4 (L) 06/26/2021    MCV 96 07/31/2023    MCV 76 (L) 06/26/2021    MCH 29.6 07/31/2023    MCH 22.4 (L) 06/26/2021    MCHC 31.0 (L) 07/31/2023    MCHC 29.6 (L) 06/26/2021    RDW 15.7 (H) 07/31/2023    RDW 18.8 (H) 06/26/2021     07/31/2023     06/26/2021       BMP RESULTS:  Lab Results   Component Value Date     07/31/2023     06/26/2021    POTASSIUM 4.1 07/31/2023     "POTASSIUM 4.3 10/26/2021    POTASSIUM 3.7 06/26/2021    CHLORIDE 110 (H) 07/31/2023    CHLORIDE 107 10/26/2021    CHLORIDE 116 (H) 06/26/2021    CO2 19 (L) 07/31/2023    CO2 21 10/26/2021    CO2 19 (L) 06/26/2021    ANIONGAP 10 07/31/2023    ANIONGAP 8 10/26/2021    ANIONGAP 6 06/26/2021     (H) 07/31/2023     (H) 10/26/2021     (H) 06/26/2021    BUN 30.4 (H) 07/31/2023    BUN 44 (H) 10/26/2021    BUN 22 06/26/2021    CR 1.65 (H) 07/31/2023    CR 1.44 (H) 06/26/2021    GFRESTIMATED 41 (L) 07/31/2023    GFRESTIMATED 45 (L) 06/26/2021    GFRESTBLACK 52 (L) 06/26/2021    OCNNIE 9.0 07/31/2023    CONNIE 8.7 06/26/2021        A1C RESULTS:  No results found for: \"A1C\"    INR RESULTS:  Lab Results   Component Value Date    INR 1.06 05/06/2020           CC  No referring provider defined for this encounter.                 "

## 2023-12-13 ENCOUNTER — ANCILLARY PROCEDURE (OUTPATIENT)
Dept: CT IMAGING | Facility: CLINIC | Age: 83
End: 2023-12-13
Attending: INTERNAL MEDICINE
Payer: COMMERCIAL

## 2023-12-13 ENCOUNTER — TELEPHONE (OUTPATIENT)
Dept: CARDIOLOGY | Facility: CLINIC | Age: 83
End: 2023-12-13
Payer: COMMERCIAL

## 2023-12-13 DIAGNOSIS — E85.81 LIGHT CHAIN (AL) AMYLOIDOSIS (H): ICD-10-CM

## 2023-12-13 DIAGNOSIS — I35.0 NONRHEUMATIC AORTIC VALVE STENOSIS: ICD-10-CM

## 2023-12-13 DIAGNOSIS — N18.4 CHRONIC KIDNEY DISEASE, STAGE 4 (SEVERE) (H): Primary | ICD-10-CM

## 2023-12-13 PROCEDURE — 71250 CT THORAX DX C-: CPT

## 2023-12-13 NOTE — TELEPHONE ENCOUNTER
Structural Heart Clinic [facility]    TAVR referral received from: Dr. Chun    Called patient but no answer and unable to leave a voicemail. Will try calling patient again later today to review below information.     Chart and recent lab results reviewed.  TAVR CT: Can scheduled following valve clinic office visit.  Contrast allergy: No known   Contacted patient to introduce self, provide education on aortic stenosis.   Last dental visit: Will assess  Support/living situation: Will assess  Arranged TAVR consult as next opening when patient calls back.  Provided direct contact information.     Nena Malhotra RN  Structural Heart Coordinator  [facility]

## 2023-12-20 NOTE — TELEPHONE ENCOUNTER
Was able to get in touch with patient's wife, Kassidy and review that patient is scheduled for visit and TAVR CT scan. Reviewed that we use a contrast dye during the TAVR CT and we like to make sure patient's kidney function is stable normally within 30 days prior. Informed Kassidy that we would like to recheck patients kidney function this week if able to make sure the TAVR CT will be able to occur as planned on 12/26/2023.    Kassidy was agreeable with that plan. Message sent to  Heather to reach out and get patient scheduled for lab draw this week.

## 2023-12-21 ENCOUNTER — LAB (OUTPATIENT)
Dept: LAB | Facility: CLINIC | Age: 83
End: 2023-12-21
Payer: COMMERCIAL

## 2023-12-21 DIAGNOSIS — I35.0 NONRHEUMATIC AORTIC VALVE STENOSIS: ICD-10-CM

## 2023-12-21 DIAGNOSIS — N18.4 CHRONIC KIDNEY DISEASE, STAGE 4 (SEVERE) (H): ICD-10-CM

## 2023-12-21 LAB
ANION GAP SERPL CALCULATED.3IONS-SCNC: 13 MMOL/L (ref 7–15)
BUN SERPL-MCNC: 39.8 MG/DL (ref 8–23)
CALCIUM SERPL-MCNC: 9.7 MG/DL (ref 8.8–10.2)
CHLORIDE SERPL-SCNC: 108 MMOL/L (ref 98–107)
CREAT SERPL-MCNC: 1.69 MG/DL (ref 0.67–1.17)
DEPRECATED HCO3 PLAS-SCNC: 17 MMOL/L (ref 22–29)
EGFRCR SERPLBLD CKD-EPI 2021: 40 ML/MIN/1.73M2
GLUCOSE SERPL-MCNC: 100 MG/DL (ref 70–99)
POTASSIUM SERPL-SCNC: 4 MMOL/L (ref 3.4–5.3)
SODIUM SERPL-SCNC: 138 MMOL/L (ref 135–145)

## 2023-12-21 PROCEDURE — 80048 BASIC METABOLIC PNL TOTAL CA: CPT | Performed by: INTERNAL MEDICINE

## 2023-12-21 PROCEDURE — 36415 COLL VENOUS BLD VENIPUNCTURE: CPT | Performed by: INTERNAL MEDICINE

## 2023-12-26 ENCOUNTER — HOSPITAL ENCOUNTER (OUTPATIENT)
Dept: CARDIOLOGY | Facility: CLINIC | Age: 83
Discharge: HOME | End: 2023-12-26
Attending: INTERNAL MEDICINE | Admitting: INTERNAL MEDICINE
Payer: COMMERCIAL

## 2023-12-26 VITALS — DIASTOLIC BLOOD PRESSURE: 58 MMHG | HEART RATE: 91 BPM | SYSTOLIC BLOOD PRESSURE: 126 MMHG

## 2023-12-26 DIAGNOSIS — I35.0 NONRHEUMATIC AORTIC VALVE STENOSIS: ICD-10-CM

## 2023-12-26 PROCEDURE — 74174 CTA ABD&PLVS W/CONTRAST: CPT

## 2023-12-26 PROCEDURE — 74174 CTA ABD&PLVS W/CONTRAST: CPT | Mod: 26 | Performed by: INTERNAL MEDICINE

## 2023-12-26 PROCEDURE — 71275 CT ANGIOGRAPHY CHEST: CPT | Mod: 26 | Performed by: INTERNAL MEDICINE

## 2023-12-26 PROCEDURE — 250N000011 HC RX IP 250 OP 636: Performed by: INTERNAL MEDICINE

## 2023-12-26 RX ORDER — IOPAMIDOL 755 MG/ML
500 INJECTION, SOLUTION INTRAVASCULAR ONCE
Status: DISCONTINUED | OUTPATIENT
Start: 2023-12-26 | End: 2023-12-26

## 2023-12-26 RX ORDER — ONDANSETRON 2 MG/ML
4 INJECTION INTRAMUSCULAR; INTRAVENOUS
Status: DISCONTINUED | OUTPATIENT
Start: 2023-12-26 | End: 2023-12-27 | Stop reason: HOSPADM

## 2023-12-26 RX ORDER — DIPHENHYDRAMINE HYDROCHLORIDE 50 MG/ML
25-50 INJECTION INTRAMUSCULAR; INTRAVENOUS
Status: DISCONTINUED | OUTPATIENT
Start: 2023-12-26 | End: 2023-12-27 | Stop reason: HOSPADM

## 2023-12-26 RX ORDER — DIPHENHYDRAMINE HCL 25 MG
25 CAPSULE ORAL
Status: DISCONTINUED | OUTPATIENT
Start: 2023-12-26 | End: 2023-12-27 | Stop reason: HOSPADM

## 2023-12-26 RX ORDER — IOPAMIDOL 755 MG/ML
500 INJECTION, SOLUTION INTRAVASCULAR ONCE
Status: COMPLETED | OUTPATIENT
Start: 2023-12-26 | End: 2023-12-26

## 2023-12-26 RX ORDER — METHYLPREDNISOLONE SODIUM SUCCINATE 125 MG/2ML
125 INJECTION, POWDER, LYOPHILIZED, FOR SOLUTION INTRAMUSCULAR; INTRAVENOUS
Status: DISCONTINUED | OUTPATIENT
Start: 2023-12-26 | End: 2023-12-27 | Stop reason: HOSPADM

## 2023-12-26 RX ORDER — ACYCLOVIR 200 MG/1
0-1 CAPSULE ORAL
Status: DISCONTINUED | OUTPATIENT
Start: 2023-12-26 | End: 2023-12-27 | Stop reason: HOSPADM

## 2023-12-26 RX ADMIN — IOPAMIDOL 115 ML: 755 INJECTION, SOLUTION INTRAVENOUS at 14:12

## 2023-12-28 ENCOUNTER — OFFICE VISIT (OUTPATIENT)
Dept: CARDIOLOGY | Facility: CLINIC | Age: 83
End: 2023-12-28
Attending: INTERNAL MEDICINE
Payer: COMMERCIAL

## 2023-12-28 VITALS
SYSTOLIC BLOOD PRESSURE: 129 MMHG | HEART RATE: 74 BPM | HEIGHT: 67 IN | WEIGHT: 127.7 LBS | BODY MASS INDEX: 20.04 KG/M2 | DIASTOLIC BLOOD PRESSURE: 68 MMHG

## 2023-12-28 DIAGNOSIS — K76.6 PORTAL HYPERTENSION (H): ICD-10-CM

## 2023-12-28 DIAGNOSIS — R93.1 ABNORMAL FINDINGS ON DIAGNOSTIC IMAGING OF HEART AND CORONARY CIRCULATION: Primary | ICD-10-CM

## 2023-12-28 DIAGNOSIS — G62.0 DRUG-INDUCED POLYNEUROPATHY (H): ICD-10-CM

## 2023-12-28 DIAGNOSIS — I35.0 NONRHEUMATIC AORTIC VALVE STENOSIS: ICD-10-CM

## 2023-12-28 DIAGNOSIS — E43 UNSPECIFIED SEVERE PROTEIN-CALORIE MALNUTRITION (H): ICD-10-CM

## 2023-12-28 DIAGNOSIS — D46.9 MDS (MYELODYSPLASTIC SYNDROME) (H): ICD-10-CM

## 2023-12-28 DIAGNOSIS — D84.9 IMMUNOCOMPROMISED (H): ICD-10-CM

## 2023-12-28 DIAGNOSIS — N18.4 CHRONIC KIDNEY DISEASE, STAGE 4 (SEVERE) (H): ICD-10-CM

## 2023-12-28 PROCEDURE — 99215 OFFICE O/P EST HI 40 MIN: CPT | Performed by: INTERNAL MEDICINE

## 2023-12-28 PROCEDURE — 93000 ELECTROCARDIOGRAM COMPLETE: CPT | Performed by: INTERNAL MEDICINE
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
## 2023-12-28 NOTE — PROGRESS NOTES
TAVR Coordinator visit:  Provided additional education regarding TAVR procedure, after being present for discussion with physician. Explained the work-up process and next steps for patient. Patient provided our direct contact number and instructed to call with any questions.     Completed frailty testing and KCCQ.   5 meter walk: 5.6 seconds             5.6 seconds             5.6 seconds  Frailty score: 1/5 (eyeball)    KCCQ Results:   1a. 5  1b. 1  1c. 1  2. 5  3. 7  4. 7  5. 5  6. 3  7. 2  8a. 1  8b. 3  8c. 2    Preliminary STS Risk Score: 7.06%  NYHA Class: II    Patient reports he receives regular dental care every 6 months and has no history of oral infections. He will be seeing dentist again on 1/10 and will let us know if he needs any work done prior to valve replacement.      Donna Crump RN  Structural Heart Coordinator  [facility]

## 2023-12-28 NOTE — PATIENT INSTRUCTIONS
Next steps:  - Coronary angiogram  - Dentist appointment for an examination on 1/10 as scheduled  - Valve conference and then I will call with an update.    We will call you with the result(s) of the test(s) and to discuss the plan going forward.    It was wonderful to see you! Please call with any questions or concerns: [phone]    Donna Crump RN  Structural Heart Coordinator  [facility]    Coronary Angiogram   [place], [place]   If you need to change your appointment, please contact Heather at [phone]      In preparation for your procedure, we require that you do the following:  Nothing to eat or drink after midnight.  If you have an allergy to contrast dye, please follow the instructions given to you today regarding pre-treatment.   Take your usual morning medications with a small sip of water immediately upon arising on the morning of your procedure unless outlined below:  Aspirin:  Take 325mg of Aspirin the day before and the morning of the procedure.    You will be unable to drive after your procedure; please arrange to have someone drive you home. Make sure that there is a responsible adult with you for 24 hours after your procedure. Your procedure will be cancelled if you do not have transportation home or someone staying with you for 24 hrs.
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
## 2024-01-12 DIAGNOSIS — R06.00 DYSPNEA: ICD-10-CM

## 2024-01-12 DIAGNOSIS — R93.1 ABNORMAL FINDINGS ON DIAGNOSTIC IMAGING OF HEART/CORONARY CIRCULATION: ICD-10-CM

## 2024-01-12 DIAGNOSIS — I35.0 NONRHEUMATIC AORTIC VALVE STENOSIS: Primary | ICD-10-CM

## 2024-01-12 RX ORDER — ASPIRIN 325 MG
325 TABLET ORAL ONCE
Status: CANCELLED | OUTPATIENT
Start: 2024-01-12 | End: 2024-01-12

## 2024-01-12 RX ORDER — POTASSIUM CHLORIDE 1500 MG/1
20 TABLET, EXTENDED RELEASE ORAL
Status: CANCELLED | OUTPATIENT
Start: 2024-01-12

## 2024-01-12 RX ORDER — LIDOCAINE 40 MG/G
CREAM TOPICAL
Status: CANCELLED | OUTPATIENT
Start: 2024-01-12

## 2024-01-12 RX ORDER — ASPIRIN 81 MG/1
243 TABLET, CHEWABLE ORAL ONCE
Status: CANCELLED | OUTPATIENT
Start: 2024-01-12

## 2024-01-12 RX ORDER — SODIUM CHLORIDE 9 MG/ML
INJECTION, SOLUTION INTRAVENOUS CONTINUOUS
Status: CANCELLED | OUTPATIENT
Start: 2024-01-12

## 2024-01-15 ENCOUNTER — HOSPITAL ENCOUNTER (OUTPATIENT)
Facility: CLINIC | Age: 84
Discharge: HOME | End: 2024-01-15
Attending: INTERNAL MEDICINE | Admitting: INTERNAL MEDICINE
Payer: COMMERCIAL

## 2024-01-15 VITALS
SYSTOLIC BLOOD PRESSURE: 165 MMHG | DIASTOLIC BLOOD PRESSURE: 76 MMHG | RESPIRATION RATE: 16 BRPM | WEIGHT: 124 LBS | OXYGEN SATURATION: 100 % | HEART RATE: 79 BPM | BODY MASS INDEX: 19.46 KG/M2 | TEMPERATURE: 97.9 F | HEIGHT: 67 IN

## 2024-01-15 DIAGNOSIS — R06.00 DYSPNEA: ICD-10-CM

## 2024-01-15 DIAGNOSIS — R93.1 ABNORMAL FINDINGS ON DIAGNOSTIC IMAGING OF HEART/CORONARY CIRCULATION: ICD-10-CM

## 2024-01-15 DIAGNOSIS — R93.1 ABNORMAL FINDINGS ON DIAGNOSTIC IMAGING OF HEART AND CORONARY CIRCULATION: ICD-10-CM

## 2024-01-15 DIAGNOSIS — I35.0 NONRHEUMATIC AORTIC VALVE STENOSIS: ICD-10-CM

## 2024-01-15 PROBLEM — Z98.890 STATUS POST CORONARY ANGIOGRAM: Status: ACTIVE | Noted: 2020-05-06

## 2024-01-15 LAB
ABO/RH(D): ABNORMAL
ALBUMIN SERPL BCG-MCNC: 4.4 G/DL (ref 3.5–5.2)
ALP SERPL-CCNC: 109 U/L (ref 40–150)
ALT SERPL W P-5'-P-CCNC: 54 U/L (ref 0–70)
ANION GAP SERPL CALCULATED.3IONS-SCNC: 10 MMOL/L (ref 7–15)
ANTIBODY SCREEN, TUBE: NORMAL
ANTIBODY SCREEN: POSITIVE
ANTIBODY UNIDENTIFIED: NORMAL
APTT PPP: 28 SECONDS (ref 22–38)
AST SERPL W P-5'-P-CCNC: 73 U/L (ref 0–45)
BILIRUB SERPL-MCNC: 0.5 MG/DL
BUN SERPL-MCNC: 38.2 MG/DL (ref 8–23)
CALCIUM SERPL-MCNC: 9.3 MG/DL (ref 8.8–10.2)
CHLORIDE SERPL-SCNC: 116 MMOL/L (ref 98–107)
COHGB MFR BLD: 97 % (ref 92–100)
CREAT SERPL-MCNC: 1.68 MG/DL (ref 0.67–1.17)
DEPRECATED HCO3 PLAS-SCNC: 17 MMOL/L (ref 22–29)
EGFRCR SERPLBLD CKD-EPI 2021: 40 ML/MIN/1.73M2
ERYTHROCYTE [DISTWIDTH] IN BLOOD BY AUTOMATED COUNT: 15.7 % (ref 10–15)
GLUCOSE SERPL-MCNC: 85 MG/DL (ref 70–99)
HCO3 BLDA-SCNC: 13 MMOL/L (ref 21–28)
HCO3 BLDV-SCNC: 14 MMOL/L (ref 21–28)
HCT VFR BLD AUTO: 37 % (ref 40–53)
HGB BLD-MCNC: 11.5 G/DL (ref 13.3–17.7)
INR PPP: 1.03 (ref 0.85–1.15)
LACTATE BLD-SCNC: <0.3 MMOL/L
LACTATE BLD-SCNC: <0.3 MMOL/L
MCH RBC QN AUTO: 28.8 PG (ref 26.5–33)
MCHC RBC AUTO-ENTMCNC: 31.1 G/DL (ref 31.5–36.5)
MCV RBC AUTO: 93 FL (ref 78–100)
NT-PROBNP SERPL-MCNC: 5163 PG/ML (ref 0–1800)
PCO2 BLDA: 26 MM HG (ref 35–45)
PCO2 BLDV: 30 MM HG (ref 40–50)
PH BLDA: 7.3 [PH] (ref 7.35–7.45)
PH BLDV: 7.29 [PH] (ref 7.32–7.43)
PLATELET # BLD AUTO: 163 10E3/UL (ref 150–450)
PO2 BLDA: 100 MM HG (ref 80–105)
PO2 BLDV: 39 MM HG (ref 25–47)
POTASSIUM SERPL-SCNC: 4.6 MMOL/L (ref 3.4–5.3)
PROT SERPL-MCNC: 6.2 G/DL (ref 6.4–8.3)
RBC # BLD AUTO: 4 10E6/UL (ref 4.4–5.9)
SAO2 % BLDV: 69 % (ref 94–100)
SODIUM SERPL-SCNC: 143 MMOL/L (ref 135–145)
SPECIMEN EXPIRATION DATE: ABNORMAL
SPECIMEN EXPIRATION DATE: NORMAL
SPECIMEN EXPIRATION DATE: NORMAL
WBC # BLD AUTO: 8.6 10E3/UL (ref 4–11)

## 2024-01-15 PROCEDURE — 86880 COOMBS TEST DIRECT: CPT | Performed by: INTERNAL MEDICINE

## 2024-01-15 PROCEDURE — 86900 BLOOD TYPING SEROLOGIC ABO: CPT | Performed by: INTERNAL MEDICINE

## 2024-01-15 PROCEDURE — 82803 BLOOD GASES ANY COMBINATION: CPT | Mod: 91

## 2024-01-15 PROCEDURE — 36415 COLL VENOUS BLD VENIPUNCTURE: CPT | Performed by: INTERNAL MEDICINE

## 2024-01-15 PROCEDURE — 82374 ASSAY BLOOD CARBON DIOXIDE: CPT | Performed by: INTERNAL MEDICINE

## 2024-01-15 PROCEDURE — 86870 RBC ANTIBODY IDENTIFICATION: CPT | Performed by: INTERNAL MEDICINE

## 2024-01-15 PROCEDURE — 258N000003 HC RX IP 258 OP 636: Performed by: INTERNAL MEDICINE

## 2024-01-15 PROCEDURE — 82803 BLOOD GASES ANY COMBINATION: CPT

## 2024-01-15 PROCEDURE — 99153 MOD SED SAME PHYS/QHP EA: CPT | Performed by: INTERNAL MEDICINE

## 2024-01-15 PROCEDURE — 85610 PROTHROMBIN TIME: CPT | Performed by: INTERNAL MEDICINE

## 2024-01-15 PROCEDURE — 93005 ELECTROCARDIOGRAM TRACING: CPT

## 2024-01-15 PROCEDURE — 999N000054 HC STATISTIC EKG NON-CHARGEABLE

## 2024-01-15 PROCEDURE — 999N000071 HC STATISTIC HEART CATH LAB OR EP LAB

## 2024-01-15 PROCEDURE — 250N000009 HC RX 250: Performed by: INTERNAL MEDICINE

## 2024-01-15 PROCEDURE — 86970 RBC PRETX INCUBATJ W/CHEMICL: CPT | Performed by: INTERNAL MEDICINE

## 2024-01-15 PROCEDURE — 84999 UNLISTED CHEMISTRY PROCEDURE: CPT | Performed by: INTERNAL MEDICINE

## 2024-01-15 PROCEDURE — 272N000001 HC OR GENERAL SUPPLY STERILE: Performed by: INTERNAL MEDICINE

## 2024-01-15 PROCEDURE — 250N000011 HC RX IP 250 OP 636: Performed by: INTERNAL MEDICINE

## 2024-01-15 PROCEDURE — 85014 HEMATOCRIT: CPT | Performed by: INTERNAL MEDICINE

## 2024-01-15 PROCEDURE — 93460 R&L HRT ART/VENTRICLE ANGIO: CPT | Performed by: INTERNAL MEDICINE

## 2024-01-15 PROCEDURE — 93460 R&L HRT ART/VENTRICLE ANGIO: CPT | Mod: 26 | Performed by: INTERNAL MEDICINE

## 2024-01-15 PROCEDURE — 82247 BILIRUBIN TOTAL: CPT | Performed by: INTERNAL MEDICINE

## 2024-01-15 PROCEDURE — 83880 ASSAY OF NATRIURETIC PEPTIDE: CPT | Performed by: INTERNAL MEDICINE

## 2024-01-15 PROCEDURE — 99152 MOD SED SAME PHYS/QHP 5/>YRS: CPT | Performed by: INTERNAL MEDICINE

## 2024-01-15 PROCEDURE — 999N000184 HC STATISTIC TELEMETRY

## 2024-01-15 PROCEDURE — 86901 BLOOD TYPING SEROLOGIC RH(D): CPT | Performed by: INTERNAL MEDICINE

## 2024-01-15 PROCEDURE — 86850 RBC ANTIBODY SCREEN: CPT | Performed by: INTERNAL MEDICINE

## 2024-01-15 PROCEDURE — C1751 CATH, INF, PER/CENT/MIDLINE: HCPCS | Performed by: INTERNAL MEDICINE

## 2024-01-15 PROCEDURE — 250N000013 HC RX MED GY IP 250 OP 250 PS 637: Performed by: INTERNAL MEDICINE

## 2024-01-15 PROCEDURE — C1769 GUIDE WIRE: HCPCS | Performed by: INTERNAL MEDICINE

## 2024-01-15 PROCEDURE — 85730 THROMBOPLASTIN TIME PARTIAL: CPT | Performed by: INTERNAL MEDICINE

## 2024-01-15 RX ORDER — IOPAMIDOL 755 MG/ML
INJECTION, SOLUTION INTRAVASCULAR
Status: DISCONTINUED | OUTPATIENT
Start: 2024-01-15 | End: 2024-01-15 | Stop reason: HOSPADM

## 2024-01-15 RX ORDER — NALOXONE HYDROCHLORIDE 0.4 MG/ML
0.4 INJECTION, SOLUTION INTRAMUSCULAR; INTRAVENOUS; SUBCUTANEOUS
Status: DISCONTINUED | OUTPATIENT
Start: 2024-01-15 | End: 2024-01-15 | Stop reason: HOSPADM

## 2024-01-15 RX ORDER — NALOXONE HYDROCHLORIDE 0.4 MG/ML
0.2 INJECTION, SOLUTION INTRAMUSCULAR; INTRAVENOUS; SUBCUTANEOUS
Status: DISCONTINUED | OUTPATIENT
Start: 2024-01-15 | End: 2024-01-15 | Stop reason: HOSPADM

## 2024-01-15 RX ORDER — SODIUM CHLORIDE 9 MG/ML
INJECTION, SOLUTION INTRAVENOUS CONTINUOUS
Status: DISCONTINUED | OUTPATIENT
Start: 2024-01-15 | End: 2024-01-15 | Stop reason: HOSPADM

## 2024-01-15 RX ORDER — ASPIRIN 81 MG/1
243 TABLET, CHEWABLE ORAL ONCE
Status: COMPLETED | OUTPATIENT
Start: 2024-01-15 | End: 2024-01-15

## 2024-01-15 RX ORDER — POTASSIUM CHLORIDE 1500 MG/1
20 TABLET, EXTENDED RELEASE ORAL
Status: DISCONTINUED | OUTPATIENT
Start: 2024-01-15 | End: 2024-01-15 | Stop reason: HOSPADM

## 2024-01-15 RX ORDER — FENTANYL CITRATE 50 UG/ML
25 INJECTION, SOLUTION INTRAMUSCULAR; INTRAVENOUS
Status: DISCONTINUED | OUTPATIENT
Start: 2024-01-15 | End: 2024-01-15 | Stop reason: HOSPADM

## 2024-01-15 RX ORDER — ACETAMINOPHEN 325 MG/1
650 TABLET ORAL EVERY 4 HOURS PRN
Status: DISCONTINUED | OUTPATIENT
Start: 2024-01-15 | End: 2024-01-15 | Stop reason: HOSPADM

## 2024-01-15 RX ORDER — LIDOCAINE 40 MG/G
CREAM TOPICAL
Status: DISCONTINUED | OUTPATIENT
Start: 2024-01-15 | End: 2024-01-15 | Stop reason: HOSPADM

## 2024-01-15 RX ORDER — ATROPINE SULFATE 0.1 MG/ML
0.5 INJECTION INTRAVENOUS
Status: DISCONTINUED | OUTPATIENT
Start: 2024-01-15 | End: 2024-01-15 | Stop reason: HOSPADM

## 2024-01-15 RX ORDER — OXYCODONE HYDROCHLORIDE 5 MG/1
5 TABLET ORAL EVERY 4 HOURS PRN
Status: DISCONTINUED | OUTPATIENT
Start: 2024-01-15 | End: 2024-01-15 | Stop reason: HOSPADM

## 2024-01-15 RX ORDER — OXYCODONE HYDROCHLORIDE 5 MG/1
10 TABLET ORAL EVERY 4 HOURS PRN
Status: DISCONTINUED | OUTPATIENT
Start: 2024-01-15 | End: 2024-01-15 | Stop reason: HOSPADM

## 2024-01-15 RX ORDER — ASPIRIN 325 MG
325 TABLET ORAL ONCE
Status: COMPLETED | OUTPATIENT
Start: 2024-01-15 | End: 2024-01-15

## 2024-01-15 RX ORDER — FLUMAZENIL 0.1 MG/ML
0.2 INJECTION, SOLUTION INTRAVENOUS
Status: DISCONTINUED | OUTPATIENT
Start: 2024-01-15 | End: 2024-01-15 | Stop reason: HOSPADM

## 2024-01-15 RX ORDER — FENTANYL CITRATE 50 UG/ML
INJECTION, SOLUTION INTRAMUSCULAR; INTRAVENOUS
Status: DISCONTINUED | OUTPATIENT
Start: 2024-01-15 | End: 2024-01-15 | Stop reason: HOSPADM

## 2024-01-15 RX ADMIN — ASPIRIN 162 MG: 81 TABLET, COATED ORAL at 09:13

## 2024-01-15 RX ADMIN — SODIUM CHLORIDE: 9 INJECTION, SOLUTION INTRAVENOUS at 08:29

## 2024-01-15 ASSESSMENT — EJECTION FRACTION
EF_VALUE: .37
EF_VALUE: .36

## 2024-01-15 ASSESSMENT — ACTIVITIES OF DAILY LIVING (ADL)
ADLS_ACUITY_SCORE: 35

## 2024-01-15 NOTE — DISCHARGE INSTRUCTIONS
Cardiomems Implant Discharge Instructions - Femoral     After you go home:    Have an adult stay with you until tomorrow.  You may resume your normal diet.       For 24 hours - due to the sedation you received:  Relax and take it easy.  Do NOT make any important or legal decisions.  Do NOT drive or operate machines at home or at work.  Do NOT drink alcohol.    Care of Puncture Site:    For the first 24 hrs - check the puncture site every 1-2 hours while awake.  For 2 days, when you cough, sneeze, laugh or move your bowels, hold your hand over the puncture site and press firmly.  Remove the bandaid after 24 hours. If there is minor oozing, apply another bandaid and remove it after 12 hours.  It is normal to have a small bruise, pea sized lump or soreness at the site.  You may shower tomorrow. Do NOT take a bath, or use a hot tub or pool for at least 3 days. Do NOT scrub the site. Do not use lotion or powder near the puncture site.      Activity - For 2 days:      No stooping or squatting  Do NOT do any heavy activity such as exercise, lifting, or straining.   No housework, yard work or any activity that make you sweat  Do NOT lift more than 10 pounds    Bleeding:     If you start bleeding from the site in your groin, lie down flat and press firmly on the site for 10 minutes.   Once bleeding stops, lay flat for 2 hours.  Call [facility] as soon as you can.       Call 911 right away if you have heavy bleeding or bleeding that does not stop.      Medicines:    Take your medications, including blood thinners, unless your provider tells you not to.  If you have stopped any medicines, check with your provider about when to restart them.  If you have pain or shortness of breath, you may take Advil (ibuprofen) or Tylenol (acetaminophen).  Resume Coumadin tonight & have your INR checked in 3-5 days.    Follow Up Appointments:    An appointment has been set up for you for follow-up care    Call the clinic if:    You have  increased pain or a large or growing hard lump around the site.  The site is red, swollen, hot or tender.  Blood or fluid is draining from the site.  You have chills or a fever greater than 101 F (38 C).  Your leg feels numb, cool or changes color.  Increased pain in the chest and/or groin.  Increased shortness of breath  Chest pain not relieved by Tylenol or Advil  New pain in the back or belly that you cannot control with Tylenol.  Any questions or concerns.    Telling others about your device:    Before you leave the hospital, you will receive a temporary ID card. A permanent card will be mailed to you about 6 to 8 weeks later. Always carry the ID card with you. It has important details about your device.  You may also get a Medical Alert bracelet or tag that says you have a cardiomems.  Go to [email or web address].   Always tell doctors, dentists and other care providers that you have a device implanted in you.  Let us know before you plan any surgeries. Your care team must take special steps to keep you safe during certain procedures. These steps will depend on the type of device you have. Your provider will need to see your ID card. They may need to call us for instructions.    Device Safety:    Please refer to device  s booklet for further information.       [facility]    If you have questions or concerns - call either:    Virginia Meier RN @ [phone]     Angelita Meier RN @ [phone]

## 2024-01-15 NOTE — PROGRESS NOTES
Care Suites Discharge Nursing Note    Patient Information  Name: Rose Duong  Age: 83 year old    Discharge Education:  Discharge instructions reviewed: Yes  Additional education/resources provided: Per Cardiology  Patient/patient representative verbalizes understanding: Yes  Patient discharging on new medications: No  Medication education completed: Yes    Discharge Plans:   Discharge location: home  Discharge ride contacted: Yes  Approximate discharge time: 1335    Discharge Criteria:  Discharge criteria met and vital signs stable: Yes    Patient Belongs:  Patient belongings returned to patient: Yes    Prince Sotomayor RN

## 2024-01-15 NOTE — PROGRESS NOTES
Care Suites Post Procedure Note    Patient Information  Name: Rose Duong  Age: 83 year old    Post Procedure  Time patient returned to Care Suites: 1055  Concerns/abnormal assessment: none. VSS Denies pain. Right groin D/I. Taking water  If abnormal assessment, provider notified: N/A  Plan/Other: instructed on activity restrictions. Monitor    Guillermina Triplett RN

## 2024-01-15 NOTE — PRE-PROCEDURE
GENERAL PRE-PROCEDURE:   Procedure:  Left and right heart catheterization possible intervention  Date/Time:  1/15/2024 9:21 AM    Risks and benefits: Risks, benefits and alternatives were discussed    Patient states understanding of procedure being performed: Yes    Patient's understanding of procedure matches consent: Yes    Procedure consent matches procedure scheduled: Yes    Expected level of sedation:  Moderate  Appropriately NPO:  Yes  ASA Class:  1  Mallampati  :  Grade 2- soft palate, base of uvula, tonsillar pillars, and portion of posterior pharyngeal wall visible  Lungs:  Lungs clear with good breath sounds bilaterally  Heart:  Normal heart sounds and rate  History & Physical reviewed:  History and physical reviewed and no updates needed  Statement of review:  I have reviewed the lab findings, diagnostic data, medications, and the plan for sedation

## 2024-01-15 NOTE — PROGRESS NOTES
Care Suites Admission Nursing Note    Patient Information  Name: Rose Duong  Age: 83 year old  Reason for admission:Heart Cath  Care Suites arrival time: 0730           Patient Admission/Assessment   Pre-procedure assessment complete: Yes  If abnormal assessment/labs, provider notified: N/A  NPO: Yes  Medications held per instructions/orders: Yes  Consent: deferred  If applicable, pregnancy test status: deferred  Patient oriented to room: Yes  Education/questions answered: Yes  Plan/other: Prep for Procedure    Discharge Planning  Discharge name/phone number: Antonia  Overnight post sedation caregiver: Antonia  Discharge location: home    Prince Sotomayor RN

## 2024-01-16 LAB
ATRIAL RATE - MUSE: 74 BPM
DIASTOLIC BLOOD PRESSURE - MUSE: NORMAL MMHG
INTERPRETATION ECG - MUSE: NORMAL
P AXIS - MUSE: 58 DEGREES
PR INTERVAL - MUSE: 154 MS
QRS DURATION - MUSE: 90 MS
QT - MUSE: 384 MS
QTC - MUSE: 426 MS
R AXIS - MUSE: 44 DEGREES
SYSTOLIC BLOOD PRESSURE - MUSE: NORMAL MMHG
T AXIS - MUSE: 49 DEGREES
VENTRICULAR RATE- MUSE: 74 BPM

## 2024-01-17 LAB — SCANNED LAB RESULT: NORMAL

## 2024-01-25 ENCOUNTER — OFFICE VISIT (OUTPATIENT)
Dept: CARDIOLOGY | Facility: CLINIC | Age: 84
End: 2024-01-25
Payer: COMMERCIAL

## 2024-01-25 ENCOUNTER — DOCUMENTATION ONLY (OUTPATIENT)
Dept: CARDIOLOGY | Facility: CLINIC | Age: 84
End: 2024-01-25

## 2024-01-25 VITALS
HEIGHT: 67 IN | WEIGHT: 126.5 LBS | SYSTOLIC BLOOD PRESSURE: 126 MMHG | DIASTOLIC BLOOD PRESSURE: 58 MMHG | OXYGEN SATURATION: 99 % | BODY MASS INDEX: 19.85 KG/M2 | HEART RATE: 78 BPM

## 2024-01-25 DIAGNOSIS — I35.0 NONRHEUMATIC AORTIC VALVE STENOSIS: ICD-10-CM

## 2024-01-25 DIAGNOSIS — I35.0 SEVERE AORTIC STENOSIS: Primary | ICD-10-CM

## 2024-01-25 DIAGNOSIS — R06.02 SHORTNESS OF BREATH: ICD-10-CM

## 2024-01-25 DIAGNOSIS — I35.0 SEVERE AORTIC STENOSIS BY PRIOR ECHOCARDIOGRAM: Primary | ICD-10-CM

## 2024-01-25 PROCEDURE — 99215 OFFICE O/P EST HI 40 MIN: CPT | Performed by: NURSE PRACTITIONER

## 2024-01-25 NOTE — PROGRESS NOTES
STRUCTURAL HEART CLINIC  H&P    Referring Provider: Dr. Moore   Primary Cardiologist: Dr. 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     The patient has followed closely with Dr. Chun in regards to his aortic stenosis.  Historically he has become symptomatic when his hemoglobin is less than 10, but he now reports progressive dyspnea with minimal exertion despite stable hemoglobin levels.  His most recent echocardiogram did show progression of his aortic stenosis to the severe range with a valve area of 0.7 cm , mean gradient of 42 mmHg, V-max of 4.2 m/s, and dimensionless index of 0.23.  Subsequent TAVR guided CT showed favorable anatomy for transfemoral approach with a aortic valve calcium score of 2858.  Preprocedural coronary angiogram showed nonobstructive coronary disease.  Patient was evaluated in structural heart clinic where he deemed an appropriate TAVR candidate.      Today the patient is here with his daughter Inez.  He reports progressive dyspnea with minimal exertion.  He is only able to walk about 50 feet before needing to rest.  He otherwise denies chest pain, syncope Nutrini, or palpitations.  He has no PND or orthopnea.  No infectious symptoms.    EKG today shows normal sinus rhythm.    Assessment and Plan     Rose Duong presents for pre-operative H&P in preparation for a transcatheter aortic valve replacement on 2/15/2024 at [facility].     1. Severe aortic valve stenosis: Given the progression of his symptoms despite stable hemoglobin levels, transcatheter aortic valve replacement is indicated.  His renal function is stable and his  nephrologist is on board with pursuing TAVR.  We discussed the procedure in detail, including pre and post-procedure care, restrictions and follow-up.  He understands risks including 5-10% risk of heart block leading to permanent pacemaker placement, 3% risk of bleeding, infection, vascular complication, 1-3% risk of stroke and very low risk (<1%) of serious complications such as cardiac perforation, aortic root rupture, dissection or death.     All questions answered  Type and screen orders complete  Supplies for scrubbing provided  No known contrast dye allergy  No trouble with anesthesia in the past  Labs today WNL, no s/s of infection    2. Chronic diastolic heart failure, NYHA class II, Stage B: Secondary to valvular heart disease. No acute volume overload on exam, though patient does endorse significant exertional dyspnea. NT-BNP 5163. Not currently on diuretic therapy, recommended treatment is TAVR.    3. CAD: Pre-TAVR coronary angiogram showed nonobstructive coronary disease.     4.  Myeloproliferative disorder with anemia: Baseline hemoglobin is around 10, gets weekly injections if hemoglobin drops below this.      5.  CKD stage III-IV: Follows closely with outpatient nephrology, baseline creatinine appears to be around 1.6-1.8.      Medication Recommendations:  Antiplatelet: Take  mg perioperatively   Supplements: Hold morning of procedure    Patient is optimized and is acceptable candidate for the proposed procedure.  No further diagnostic evaluation is needed. Pre-procedure instructions provided in written format.     Rachel Wade, SANIYA, APRN, CNP  Structural Heart Nurse Practitioner  [facility]   Pager: [phone]    Current Medications:  Current Outpatient Medications   Medication Sig Dispense Refill    acetaminophen (TYLENOL) 500 MG tablet Take 500 mg by mouth every 8 hours as needed for mild pain      acyclovir (ZOVIRAX) 400 MG tablet Take 400 mg by mouth 2 times daily       allopurinol (ZYLOPRIM) 300 MG tablet Take 300 mg by mouth daily.      cholecalciferol 25 MCG (1000 UT) TABS Take 1,000 Units by mouth daily       finasteride (PROSCAR) 5 MG tablet Take 5 mg by mouth daily      pantoprazole (PROTONIX) 20 MG EC tablet Take 20 mg by mouth daily      predniSONE (DELTASONE) 5 MG tablet Take 5 mg by mouth every other day       rosuvastatin (CRESTOR) 20 MG tablet Take 20 mg by mouth daily      tamsulosin (FLOMAX) 0.4 MG 24 hr capsule Take 0.4 mg by mouth every evening          Allergies:   No Known Allergies    Past Medical History:  Past Medical History:   Diagnosis Date    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     Amyloidosis (H)     Anemia     Bacteremia     BPH (benign prostatic hyperplasia)     CKD (chronic kidney disease) stage 4, 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    Colon polyps     Coronary artery disease involving native coronary artery, angina presence unspecified, unspecified whether native or transplanted heart 05/01/2020    Added automatically from request for surgery [id number]    DJD (degenerative joint disease)     Elevated BP without diagnosis of hypertension     Gastroesophageal reflux disease     Gout     Gout     Hyperlipidemia     Hypertension     Leukocytosis     MDS (myelodysplastic syndrome) (H)     Metabolic acidosis     due to CKD    Mixed hyperlipidemia 05/01/2020    Added automatically from request for surgery [id number]    Multiple myeloma (H)     Nonrheumatic aortic valve stenosis 05/01/2020    Added automatically from request for surgery [id number]    Rotator cuff tear arthropathy, left     Sinusitis     Spinal stenosis in cervical region 09/25/2017       Past Surgical History:  Past Surgical History:   Procedure Laterality Date    APPENDECTOMY      ASPIRATION NEEDLE HIP Left 9/25/2017    Procedure: ASPIRATION NEEDLE HIP;;  Surgeon: Moises Elias MD;  Location:  OR    BACK SURGERY  11/07/2017     cervical spine surgery     COLONOSCOPY      CV CORONARY ANGIOGRAM N/A 5/6/2020    Procedure: Coronary Angiogram;  Surgeon: David Woods MD;  Location:  HEART CARDIAC CATH LAB    CV CORONARY ANGIOGRAM N/A 1/15/2024    Procedure: Coronary Angiogram;  Surgeon: Nicholas Moore MD;  Location:  HEART CARDIAC CATH LAB    CV LEFT HEART CATH N/A 5/6/2020    Procedure: Left Heart Cath;  Surgeon: David Woods MD;  Location:  HEART CARDIAC CATH LAB    CV PCI N/A 1/15/2024    Procedure: Percutaneous Coronary Intervention;  Surgeon: Nicholas Moore MD;  Location:  HEART CARDIAC CATH LAB    CV RIGHT HEART CATH MEASUREMENTS RECORDED N/A 5/6/2020    Procedure: Right Heart Cath;  Surgeon: David Woods MD;  Location:  HEART CARDIAC CATH LAB    CV RIGHT HEART CATH MEASUREMENTS RECORDED N/A 1/15/2024    Procedure: Right Heart Catheterization;  Surgeon: Nicholas Moore MD;  Location:  HEART CARDIAC CATH LAB    DECOMPRESSION LUMBAR TWO LEVELS Bilateral 6/11/2018    Procedure: DECOMPRESSION LUMBAR TWO LEVELS;  BILATERAL LUMBAR 3-4 AND LUMBAR 4-5 DECOMPRESSION ;  Surgeon: Sd Vallejo MD;  Location:  OR    FUSION CERVICAL ANTERIOR ONE LEVEL WITH BONE ALLOGRAFT N/A 9/25/2017    Procedure: FUSION CERVICAL ANTERIOR ONE LEVEL WITH BONE ALLOGRAFT;  ANTERIOR CERVICAL DECOMPRESSION AND FUSIONC3-4 WITH INSTRUMENTATION, ALLOGRAFT AND BONE MARROW ASPIRATE OF THE LEFT ILIAC CREST ;  Surgeon: Moises Elias MD;  Location:  OR    PHACOEMULSIFICATION CLEAR CORNEA W/ STANDARD IOL IMPLANT, ENDOSCOPIC CYCLOPHOTOCOAGULATION, COMBINED Right 12/12/2017    Procedure: COMBINED PHACOEMULSIFICATION CLEAR CORNEA WITH STANDARD INTRAOCULAR LENS IMPLANT, ENDOSCOPIC CYCLOPHOTOCOAGULATION;  COMPLEX RIGHT EYE COMBINED PHACOEMULSIFICATION CLEAR CORNEA WITH STANDARD INTRAOCULAR LENS IMPLANT, ENDOSCOPIC CYCLOPHOTOCOAGULATION ;  Surgeon: Alli Mott MD;  Location:  [facility]     "PHACOEMULSIFICATION CLEAR CORNEA W/ STANDARD IOL IMPLANT, ENDOSCOPIC CYCLOPHOTOCOAGULATION, COMBINED Left 5/1/2018    Procedure: COMBINED PHACOEMULSIFICATION CLEAR CORNEA WITH STANDARD INTRAOCULAR LENS IMPLANT, ENDOSCOPIC CYCLOPHOTOCOAGULATION;  COMPLEX LEFT EYE PHACOEMULSIFICATION CLEAR CORNEA WITH STANDARD INTRAOCULAR LENS IMPLANT, ENDOSCOPIC CYCLOPHOTOCOAGULATION ;  Surgeon: Alli Mott MD;  Location: [facility]    ROTATOR CUFF REPAIR RT/LT  2004       Family History:  Family History   Problem Relation Age of Onset    Heart Disease Father        Social History:  Social History     Socioeconomic History    Marital status:      Spouse name: None    Number of children: None    Years of education: None    Highest education level: None   Tobacco Use    Smoking status: Never    Smokeless tobacco: Never   Substance and Sexual Activity    Alcohol use: Yes     Comment: beer or two a day    Drug use: No       Review of Systems:  Constitutional: No fever, chills, or sweats. No weight gain/loss   ENT: No visual disturbance, ear ache, epistaxis, sore throat  Allergies/Immunologic: Negative.   Respiratory: No cough, hemoptysia  Cardiovascular: As per HPI  GI: No nausea, vomiting, hematemesis, melena, or hematochezia  : No urinary frequency, dysuria, or hematuria  Integument: Negative  Psychiatric: Negative  Neuro: Negative  Endocrinology: Negative   Musculoskeletal: Negative      Physical Exam:  Vitals: /58   Pulse 78   Ht 1.702 m (5' 7\")   Wt 57.4 kg (126 lb 8 oz)   SpO2 99%   BMI 19.81 kg/m     General: NAD  HEENT:  Dentition intact.    Neck: No jugular venous distension.   Heart: RRR with grade II PARVEEN at RUSB  Lungs: CTA.    Abdomen: Soft, nontender, nondistended.   Extremities: No clubbing, cyanosis, or edema.  The pulses are +4/4 at the radial, brachial, femoral, popliteal, DP, and PT sites bilaterally.  No bruits are noted.  Neurologic: Alert and oriented to person/place/time, normal speech, gait and " affect  Skin: No petechiae, purpura or rash.      Diagnostic Studies:  ECG: NSR  Personally reviewed and interpreted by me.    Coronary Angiogram: 1/15/2024  Conclusion         Prox RCA to Mid RCA lesion is 40% stenosed.    Dist RCA lesion is 30% stenosed.    Right sided filling pressures are normal.    Left sided filling pressures are moderately elevated.    Mild elevated pulmonary hypertension.    Left ventricular filling pressures are moderately elevated.    Normal cardiac output level.    Severe aortic stenosis    Severe mitral stenosis    Echocardiogram: 7/5/2023  Interpretation Summary     Left ventricular systolic function is normal.  The visual ejection fraction is 60-65%.  Global peak LV longitudinal strain is averaged at -16.7%. This suggests  abnormal strain (normal <-18%).  There is moderate mitral stenosis. mean 8mmHg, peak 16mmHg at HR 72bpm  Severe valvular aortic stenosis.  mean 42mmHg, peak 73mmhg, calc TC 0.71cm2 Compared to prior study, changes  are noted.      Laboratory Studies:  Personally reviewed and interpreted by me.    LIPID RESULTS:  Lab Results   Component Value Date    CHOL 122 09/01/2021    HDL 54 09/01/2021    LDL 45 09/01/2021    TRIG 114 09/01/2021       LIVER ENZYME RESULTS:  Lab Results   Component Value Date    AST 73 (H) 01/15/2024    AST 27 06/24/2021    ALT 54 01/15/2024    ALT 42 06/24/2021       CBC RESULTS:  Lab Results   Component Value Date    WBC 8.6 01/15/2024    WBC 10.1 06/26/2021    RBC 4.00 (L) 01/15/2024    RBC 3.75 (L) 06/26/2021    HGB 11.5 (L) 01/15/2024    HGB 8.4 (L) 06/26/2021    HCT 37.0 (L) 01/15/2024    HCT 28.4 (L) 06/26/2021    MCV 93 01/15/2024    MCV 76 (L) 06/26/2021    MCH 28.8 01/15/2024    MCH 22.4 (L) 06/26/2021    MCHC 31.1 (L) 01/15/2024    MCHC 29.6 (L) 06/26/2021    RDW 15.7 (H) 01/15/2024    RDW 18.8 (H) 06/26/2021     01/15/2024     06/26/2021       BMP RESULTS:  Lab Results   Component Value Date     01/15/2024    NA  "141 06/26/2021    POTASSIUM 4.6 01/15/2024    POTASSIUM 4.3 10/26/2021    POTASSIUM 3.7 06/26/2021    CHLORIDE 116 (H) 01/15/2024    CHLORIDE 107 10/26/2021    CHLORIDE 116 (H) 06/26/2021    CO2 17 (L) 01/15/2024    CO2 21 10/26/2021    CO2 19 (L) 06/26/2021    ANIONGAP 10 01/15/2024    ANIONGAP 8 10/26/2021    ANIONGAP 6 06/26/2021    GLC 85 01/15/2024     (H) 10/26/2021     (H) 06/26/2021    BUN 38.2 (H) 01/15/2024    BUN 44 (H) 10/26/2021    BUN 22 06/26/2021    CR 1.68 (H) 01/15/2024    CR 1.44 (H) 06/26/2021    GFRESTIMATED 40 (L) 01/15/2024    GFRESTIMATED 45 (L) 06/26/2021    GFRESTBLACK 52 (L) 06/26/2021    CONNIE 9.3 01/15/2024    CONNIE 8.7 06/26/2021        A1C RESULTS:  No results found for: \"A1C\"    INR RESULTS:  Lab Results   Component Value Date    INR 1.03 01/15/2024    INR 1.06 05/06/2020         "

## 2024-01-25 NOTE — PROGRESS NOTES
Patient was presented this morning at the multidisciplinary valve conference. Plan is to proceed with TAVR procedure.    Valve: Thompson  Approach: TF  Sedation: Conscious    Above information was reviewed with patient during office visit today with Rachel Wade NP. Patient will get scheduled for TAVR procedure with Dr. Moore on 02/13/2024. Informed patient we will get him scheduled for visit with a CV surgeon    Nena Malhotra RN  Structural Heart Coordinator  [facility]

## 2024-01-25 NOTE — LETTER
1/25/2024    Kian Johns MD  [facility] [address] [facility] [address]    RE: Rose Duong       Dear Colleague,     I had the pleasure of seeing Rose Duong in the [facility].      STRUCTURAL HEART CLINIC  H&P    Referring Provider: Dr. Moore   Primary Cardiologist: Dr. 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.     The patient has followed closely with Dr. Chun in regards to his aortic stenosis.  Historically he has become symptomatic when his hemoglobin is less than 10, but he now reports progressive dyspnea with minimal exertion despite stable hemoglobin levels.  His most recent echocardiogram did show progression of his aortic stenosis to the severe range with a valve area of 0.7 cm , mean gradient of 42 mmHg, V-max of 4.2 m/s, and dimensionless index of 0.23.  Subsequent TAVR guided CT showed favorable anatomy for transfemoral approach with a aortic valve calcium score of 2858.  Preprocedural coronary angiogram showed nonobstructive coronary disease.  Patient was evaluated in structural heart clinic where he deemed an appropriate TAVR candidate.      Today the patient is here with his daughter Inez.  He reports progressive dyspnea with minimal exertion.  He is only able to walk about 50 feet before needing to rest.  He otherwise denies chest pain, syncope Nutrini, or palpitations.  He has no PND or orthopnea.  No infectious symptoms.    EKG today shows normal sinus rhythm.    Assessment and Plan     Rose Duong presents for pre-operative H&P in preparation for a transcatheter aortic valve replacement on 2/15/2024 at [place]  [facility].     1. Severe aortic valve stenosis: Given the progression of his symptoms despite stable hemoglobin levels, transcatheter aortic valve replacement is indicated.  His renal function is stable and his nephrologist is on board with pursuing TAVR.  We discussed the procedure in detail, including pre and post-procedure care, restrictions and follow-up.  He understands risks including 5-10% risk of heart block leading to permanent pacemaker placement, 3% risk of bleeding, infection, vascular complication, 1-3% risk of stroke and very low risk (<1%) of serious complications such as cardiac perforation, aortic root rupture, dissection or death.     All questions answered  Type and screen orders complete  Supplies for scrubbing provided  No known contrast dye allergy  No trouble with anesthesia in the past  Labs today WNL, no s/s of infection    2. Chronic diastolic heart failure, NYHA class II, Stage B: Secondary to valvular heart disease. No acute volume overload on exam, though patient does endorse significant exertional dyspnea. NT-BNP 5163. Not currently on diuretic therapy, recommended treatment is TAVR.    3. CAD: Pre-TAVR coronary angiogram showed nonobstructive coronary disease.     4.  Myeloproliferative disorder with anemia: Baseline hemoglobin is around 10, gets weekly injections if hemoglobin drops below this.      5.  CKD stage III-IV: Follows closely with outpatient nephrology, baseline creatinine appears to be around 1.6-1.8.      Medication Recommendations:  Antiplatelet: Take  mg perioperatively   Supplements: Hold morning of procedure    Patient is optimized and is acceptable candidate for the proposed procedure.  No further diagnostic evaluation is needed. Pre-procedure instructions provided in written format.     Rachel Wade, DNP, APRN, CNP  Structural Heart Nurse Practitioner  [facility]   Pager: [phone]    Current Medications:  Current Outpatient  Medications   Medication Sig Dispense Refill    acetaminophen (TYLENOL) 500 MG tablet Take 500 mg by mouth every 8 hours as needed for mild pain      acyclovir (ZOVIRAX) 400 MG tablet Take 400 mg by mouth 2 times daily      allopurinol (ZYLOPRIM) 300 MG tablet Take 300 mg by mouth daily.      cholecalciferol 25 MCG (1000 UT) TABS Take 1,000 Units by mouth daily       finasteride (PROSCAR) 5 MG tablet Take 5 mg by mouth daily      pantoprazole (PROTONIX) 20 MG EC tablet Take 20 mg by mouth daily      predniSONE (DELTASONE) 5 MG tablet Take 5 mg by mouth every other day       rosuvastatin (CRESTOR) 20 MG tablet Take 20 mg by mouth daily      tamsulosin (FLOMAX) 0.4 MG 24 hr capsule Take 0.4 mg by mouth every evening          Allergies:   No Known Allergies    Past Medical History:  Past Medical History:   Diagnosis Date    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     Amyloidosis (H)     Anemia     Bacteremia     BPH (benign prostatic hyperplasia)     CKD (chronic kidney disease) stage 4, 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    Colon polyps     Coronary artery disease involving native coronary artery, angina presence unspecified, unspecified whether native or transplanted heart 05/01/2020    Added automatically from request for surgery [id number]    DJD (degenerative joint disease)     Elevated BP without diagnosis of hypertension     Gastroesophageal reflux disease     Gout     Gout     Hyperlipidemia     Hypertension     Leukocytosis     MDS (myelodysplastic syndrome) (H)     Metabolic acidosis     due to CKD    Mixed hyperlipidemia 05/01/2020    Added automatically from request for surgery [id number]    Multiple myeloma (H)     Nonrheumatic aortic valve stenosis 05/01/2020    Added automatically from request for surgery [id number]    Rotator cuff tear arthropathy, left     Sinusitis     Spinal stenosis in cervical region 09/25/2017       Past Surgical History:  Past Surgical  History:   Procedure Laterality Date    APPENDECTOMY      ASPIRATION NEEDLE HIP Left 9/25/2017    Procedure: ASPIRATION NEEDLE HIP;;  Surgeon: Moises Elias MD;  Location:  OR    BACK SURGERY  11/07/2017    cervical spine surgery     COLONOSCOPY      CV CORONARY ANGIOGRAM N/A 5/6/2020    Procedure: Coronary Angiogram;  Surgeon: David Woods MD;  Location:  HEART CARDIAC CATH LAB    CV CORONARY ANGIOGRAM N/A 1/15/2024    Procedure: Coronary Angiogram;  Surgeon: Nicholas Moore MD;  Location:  HEART CARDIAC CATH LAB    CV LEFT HEART CATH N/A 5/6/2020    Procedure: Left Heart Cath;  Surgeon: David Woods MD;  Location:  HEART CARDIAC CATH LAB    CV PCI N/A 1/15/2024    Procedure: Percutaneous Coronary Intervention;  Surgeon: Nicholas Moore MD;  Location:  HEART CARDIAC CATH LAB    CV RIGHT HEART CATH MEASUREMENTS RECORDED N/A 5/6/2020    Procedure: Right Heart Cath;  Surgeon: David Woods MD;  Location:  HEART CARDIAC CATH LAB    CV RIGHT HEART CATH MEASUREMENTS RECORDED N/A 1/15/2024    Procedure: Right Heart Catheterization;  Surgeon: Nicholas Moore MD;  Location:  HEART CARDIAC CATH LAB    DECOMPRESSION LUMBAR TWO LEVELS Bilateral 6/11/2018    Procedure: DECOMPRESSION LUMBAR TWO LEVELS;  BILATERAL LUMBAR 3-4 AND LUMBAR 4-5 DECOMPRESSION ;  Surgeon: Sd Vallejo MD;  Location: [facility] OR    FUSION CERVICAL ANTERIOR ONE LEVEL WITH BONE ALLOGRAFT N/A 9/25/2017    Procedure: FUSION CERVICAL ANTERIOR ONE LEVEL WITH BONE ALLOGRAFT;  ANTERIOR CERVICAL DECOMPRESSION AND FUSIONC3-4 WITH INSTRUMENTATION, ALLOGRAFT AND BONE MARROW ASPIRATE OF THE LEFT ILIAC CREST ;  Surgeon: Moises Elias MD;  Location:  OR    PHACOEMULSIFICATION CLEAR CORNEA W/ STANDARD IOL IMPLANT, ENDOSCOPIC CYCLOPHOTOCOAGULATION, COMBINED Right 12/12/2017    Procedure: COMBINED PHACOEMULSIFICATION CLEAR CORNEA WITH STANDARD INTRAOCULAR LENS IMPLANT, ENDOSCOPIC  "CYCLOPHOTOCOAGULATION;  COMPLEX RIGHT EYE COMBINED PHACOEMULSIFICATION CLEAR CORNEA WITH STANDARD INTRAOCULAR LENS IMPLANT, ENDOSCOPIC CYCLOPHOTOCOAGULATION ;  Surgeon: Alli Mott MD;  Location: [facility]    PHACOEMULSIFICATION CLEAR CORNEA W/ STANDARD IOL IMPLANT, ENDOSCOPIC CYCLOPHOTOCOAGULATION, COMBINED Left 5/1/2018    Procedure: COMBINED PHACOEMULSIFICATION CLEAR CORNEA WITH STANDARD INTRAOCULAR LENS IMPLANT, ENDOSCOPIC CYCLOPHOTOCOAGULATION;  COMPLEX LEFT EYE PHACOEMULSIFICATION CLEAR CORNEA WITH STANDARD INTRAOCULAR LENS IMPLANT, ENDOSCOPIC CYCLOPHOTOCOAGULATION ;  Surgeon: Alli Mott MD;  Location: [facility]    ROTATOR CUFF REPAIR RT/LT  2004       Family History:  Family History   Problem Relation Age of Onset    Heart Disease Father        Social History:  Social History     Socioeconomic History    Marital status:      Spouse name: None    Number of children: None    Years of education: None    Highest education level: None   Tobacco Use    Smoking status: Never    Smokeless tobacco: Never   Substance and Sexual Activity    Alcohol use: Yes     Comment: beer or two a day    Drug use: No       Review of Systems:  Constitutional: No fever, chills, or sweats. No weight gain/loss   ENT: No visual disturbance, ear ache, epistaxis, sore throat  Allergies/Immunologic: Negative.   Respiratory: No cough, hemoptysia  Cardiovascular: As per HPI  GI: No nausea, vomiting, hematemesis, melena, or hematochezia  : No urinary frequency, dysuria, or hematuria  Integument: Negative  Psychiatric: Negative  Neuro: Negative  Endocrinology: Negative   Musculoskeletal: Negative      Physical Exam:  Vitals: /58   Pulse 78   Ht 1.702 m (5' 7\")   Wt 57.4 kg (126 lb 8 oz)   SpO2 99%   BMI 19.81 kg/m     General: NAD  HEENT:  Dentition intact.    Neck: No jugular venous distension.   Heart: RRR with grade II PARVEEN at RUSB  Lungs: CTA.    Abdomen: Soft, nontender, nondistended.   Extremities: No clubbing, " cyanosis, or edema.  The pulses are +4/4 at the radial, brachial, femoral, popliteal, DP, and PT sites bilaterally.  No bruits are noted.  Neurologic: Alert and oriented to person/place/time, normal speech, gait and affect  Skin: No petechiae, purpura or rash.      Diagnostic Studies:  ECG: NSR  Personally reviewed and interpreted by me.    Coronary Angiogram: 1/15/2024  Conclusion         Prox RCA to Mid RCA lesion is 40% stenosed.    Dist RCA lesion is 30% stenosed.    Right sided filling pressures are normal.    Left sided filling pressures are moderately elevated.    Mild elevated pulmonary hypertension.    Left ventricular filling pressures are moderately elevated.    Normal cardiac output level.    Severe aortic stenosis    Severe mitral stenosis    Echocardiogram: 7/5/2023  Interpretation Summary     Left ventricular systolic function is normal.  The visual ejection fraction is 60-65%.  Global peak LV longitudinal strain is averaged at -16.7%. This suggests  abnormal strain (normal <-18%).  There is moderate mitral stenosis. mean 8mmHg, peak 16mmHg at HR 72bpm  Severe valvular aortic stenosis.  mean 42mmHg, peak 73mmhg, calc TC 0.71cm2 Compared to prior study, changes  are noted.      Laboratory Studies:  Personally reviewed and interpreted by me.    LIPID RESULTS:  Lab Results   Component Value Date    CHOL 122 09/01/2021    HDL 54 09/01/2021    LDL 45 09/01/2021    TRIG 114 09/01/2021       LIVER ENZYME RESULTS:  Lab Results   Component Value Date    AST 73 (H) 01/15/2024    AST 27 06/24/2021    ALT 54 01/15/2024    ALT 42 06/24/2021       CBC RESULTS:  Lab Results   Component Value Date    WBC 8.6 01/15/2024    WBC 10.1 06/26/2021    RBC 4.00 (L) 01/15/2024    RBC 3.75 (L) 06/26/2021    HGB 11.5 (L) 01/15/2024    HGB 8.4 (L) 06/26/2021    HCT 37.0 (L) 01/15/2024    HCT 28.4 (L) 06/26/2021    MCV 93 01/15/2024    MCV 76 (L) 06/26/2021    MCH 28.8 01/15/2024    MCH 22.4 (L) 06/26/2021    MCHC 31.1 (L)  "01/15/2024    MCHC 29.6 (L) 06/26/2021    RDW 15.7 (H) 01/15/2024    RDW 18.8 (H) 06/26/2021     01/15/2024     06/26/2021       BMP RESULTS:  Lab Results   Component Value Date     01/15/2024     06/26/2021    POTASSIUM 4.6 01/15/2024    POTASSIUM 4.3 10/26/2021    POTASSIUM 3.7 06/26/2021    CHLORIDE 116 (H) 01/15/2024    CHLORIDE 107 10/26/2021    CHLORIDE 116 (H) 06/26/2021    CO2 17 (L) 01/15/2024    CO2 21 10/26/2021    CO2 19 (L) 06/26/2021    ANIONGAP 10 01/15/2024    ANIONGAP 8 10/26/2021    ANIONGAP 6 06/26/2021    GLC 85 01/15/2024     (H) 10/26/2021     (H) 06/26/2021    BUN 38.2 (H) 01/15/2024    BUN 44 (H) 10/26/2021    BUN 22 06/26/2021    CR 1.68 (H) 01/15/2024    CR 1.44 (H) 06/26/2021    GFRESTIMATED 40 (L) 01/15/2024    GFRESTIMATED 45 (L) 06/26/2021    GFRESTBLACK 52 (L) 06/26/2021    CONNIE 9.3 01/15/2024    CONNIE 8.7 06/26/2021        A1C RESULTS:  No results found for: \"A1C\"    INR RESULTS:  Lab Results   Component Value Date    INR 1.03 01/15/2024    INR 1.06 05/06/2020     Thank you for allowing me to participate in the care of your patient.      Sincerely,     Rachel Wade, [facility]  cc: No referring provider defined for this encounter.  "

## 2024-01-25 NOTE — PATIENT INSTRUCTIONS
TAVR PRE-PROCEDURE INSTRUCTIONS:    - Your TAVR is scheduled Tuesday 02/13/2024, 1st case. Please check-in at 05:30AM at the Registration Desk in the [facility] at [facility].    - Use the Hibiclens soap I have provided you the night before and morning of the procedure. Wash from chin to toes, please avoid your face and eyes.    - Nothing to eat or drink the morning of your TAVR.     Medication instructions:  - You may take your morning medications with sips of water.   - Please hold all vitamins and supplements the morning of your procedure.  - You will need to take 325mg of aspirin the morning of your TAVR (4 x 81mg tablets).    -On the day of the procedure, you may have two visitors in the pre-operative area. Two visitors may see you when you get to your room in the Heart Center (2nd floor of [facility]).    - You will stay overnight on Tuesday night. We will monitor you overnight for signs of bleeding, stroke, as well as need for pacemaker. On Wednesday morning, you will have an echo of your new valve and work with cardiac rehab.     It was nice to see you today! Please call with any other questions, concerns, or any changes in your health:     Nena Malhotra RN  Structural Heart Coordinator  [facility]

## 2024-02-07 LAB
ABO/RH(D): ABNORMAL
ANTIBODY SCREEN: POSITIVE
SPECIMEN EXPIRATION DATE: ABNORMAL

## 2024-02-08 ENCOUNTER — LAB (OUTPATIENT)
Dept: LAB | Facility: CLINIC | Age: 84
End: 2024-02-08
Payer: COMMERCIAL

## 2024-02-08 DIAGNOSIS — R06.02 SHORTNESS OF BREATH: ICD-10-CM

## 2024-02-08 DIAGNOSIS — I35.0 SEVERE AORTIC STENOSIS: ICD-10-CM

## 2024-02-08 LAB
ANION GAP SERPL CALCULATED.3IONS-SCNC: 11 MMOL/L (ref 7–15)
ANTIBODY SCREEN, TUBE: NORMAL
ANTIBODY UNIDENTIFIED: NORMAL
BLOOD BANK CHART COMMENT: NORMAL
BUN SERPL-MCNC: 38.9 MG/DL (ref 8–23)
CALCIUM SERPL-MCNC: 9 MG/DL (ref 8.8–10.2)
CHLORIDE SERPL-SCNC: 113 MMOL/L (ref 98–107)
CREAT SERPL-MCNC: 1.73 MG/DL (ref 0.67–1.17)
DEPRECATED HCO3 PLAS-SCNC: 19 MMOL/L (ref 22–29)
EGFRCR SERPLBLD CKD-EPI 2021: 38 ML/MIN/1.73M2
ERYTHROCYTE [DISTWIDTH] IN BLOOD BY AUTOMATED COUNT: 13.8 % (ref 10–15)
GLUCOSE SERPL-MCNC: 92 MG/DL (ref 70–99)
HCT VFR BLD AUTO: 31.6 % (ref 40–53)
HGB BLD-MCNC: 10.1 G/DL (ref 13.3–17.7)
INR PPP: 1.03 (ref 0.85–1.15)
MCH RBC QN AUTO: 28.3 PG (ref 26.5–33)
MCHC RBC AUTO-ENTMCNC: 32 G/DL (ref 31.5–36.5)
MCV RBC AUTO: 89 FL (ref 78–100)
NT-PROBNP SERPL-MCNC: 6555 PG/ML (ref 0–1800)
PLATELET # BLD AUTO: 152 10E3/UL (ref 150–450)
POTASSIUM SERPL-SCNC: 4.3 MMOL/L (ref 3.4–5.3)
RBC # BLD AUTO: 3.57 10E6/UL (ref 4.4–5.9)
SODIUM SERPL-SCNC: 143 MMOL/L (ref 135–145)
SPECIMEN EXPIRATION DATE: NORMAL
WBC # BLD AUTO: 7.1 10E3/UL (ref 4–11)

## 2024-02-08 PROCEDURE — 86900 BLOOD TYPING SEROLOGIC ABO: CPT

## 2024-02-08 PROCEDURE — 83880 ASSAY OF NATRIURETIC PEPTIDE: CPT

## 2024-02-08 PROCEDURE — 85027 COMPLETE CBC AUTOMATED: CPT

## 2024-02-08 PROCEDURE — 36415 COLL VENOUS BLD VENIPUNCTURE: CPT

## 2024-02-08 PROCEDURE — 86870 RBC ANTIBODY IDENTIFICATION: CPT

## 2024-02-08 PROCEDURE — 86901 BLOOD TYPING SEROLOGIC RH(D): CPT

## 2024-02-08 PROCEDURE — 85610 PROTHROMBIN TIME: CPT

## 2024-02-08 PROCEDURE — 86880 COOMBS TEST DIRECT: CPT

## 2024-02-08 PROCEDURE — 84999 UNLISTED CHEMISTRY PROCEDURE: CPT

## 2024-02-08 PROCEDURE — 86970 RBC PRETX INCUBATJ W/CHEMICL: CPT

## 2024-02-08 PROCEDURE — 80048 BASIC METABOLIC PNL TOTAL CA: CPT

## 2024-02-09 ENCOUNTER — TELEPHONE (OUTPATIENT)
Dept: CARDIOLOGY | Facility: CLINIC | Age: 84
End: 2024-02-09
Payer: COMMERCIAL

## 2024-02-09 DIAGNOSIS — I35.0 SEVERE AORTIC STENOSIS: Primary | ICD-10-CM

## 2024-02-09 NOTE — TELEPHONE ENCOUNTER
Called and spoke with blood bank today who stated patient will unfortunately need to do another type and screen within 72 hours of his TAVR procedure. His blood extension form can not be extended out due to positive screening due to daratumumab therapy.     Called patient and reviewed the above information with him. He stated he understood and would be willing to get another type and screen drawn either, tomorrow, or on Monday.     Message sent to , Heather, to help arrange for additional type and screen.

## 2024-02-11 LAB
ABO/RH(D): ABNORMAL
ANTIBODY SCREEN: POSITIVE
SPECIMEN EXPIRATION DATE: ABNORMAL

## 2024-02-12 ENCOUNTER — LAB (OUTPATIENT)
Dept: LAB | Facility: CLINIC | Age: 84
DRG: 267 | End: 2024-02-12
Payer: COMMERCIAL

## 2024-02-12 DIAGNOSIS — I35.0 NONRHEUMATIC AORTIC VALVE STENOSIS: Primary | ICD-10-CM

## 2024-02-12 DIAGNOSIS — I35.0 SEVERE AORTIC STENOSIS: Primary | ICD-10-CM

## 2024-02-12 DIAGNOSIS — R06.02 SHORTNESS OF BREATH: ICD-10-CM

## 2024-02-12 DIAGNOSIS — I35.0 SEVERE AORTIC STENOSIS: ICD-10-CM

## 2024-02-12 DIAGNOSIS — I35.0 SEVERE AORTIC STENOSIS BY PRIOR ECHOCARDIOGRAM: ICD-10-CM

## 2024-02-12 PROCEDURE — 86880 COOMBS TEST DIRECT: CPT

## 2024-02-12 PROCEDURE — 84999 UNLISTED CHEMISTRY PROCEDURE: CPT

## 2024-02-12 PROCEDURE — 86921 COMPATIBILITY TEST INCUBATE: CPT

## 2024-02-12 PROCEDURE — 86900 BLOOD TYPING SEROLOGIC ABO: CPT

## 2024-02-12 PROCEDURE — 36415 COLL VENOUS BLD VENIPUNCTURE: CPT

## 2024-02-12 PROCEDURE — 86870 RBC ANTIBODY IDENTIFICATION: CPT

## 2024-02-12 PROCEDURE — 86970 RBC PRETX INCUBATJ W/CHEMICL: CPT

## 2024-02-12 PROCEDURE — 86922 COMPATIBILITY TEST ANTIGLOB: CPT

## 2024-02-12 PROCEDURE — 86901 BLOOD TYPING SEROLOGIC RH(D): CPT

## 2024-02-12 PROCEDURE — 86920 COMPATIBILITY TEST SPIN: CPT

## 2024-02-12 RX ORDER — LIDOCAINE 40 MG/G
CREAM TOPICAL
Status: CANCELLED | OUTPATIENT
Start: 2024-02-12

## 2024-02-12 RX ORDER — ASPIRIN 325 MG
325 TABLET ORAL ONCE
Status: CANCELLED | OUTPATIENT
Start: 2024-02-12 | End: 2024-02-12

## 2024-02-12 RX ORDER — CEFAZOLIN SODIUM 2 G/100ML
2 INJECTION, SOLUTION INTRAVENOUS
Status: CANCELLED | OUTPATIENT
Start: 2024-02-12

## 2024-02-12 RX ORDER — SODIUM CHLORIDE 9 MG/ML
INJECTION, SOLUTION INTRAVENOUS CONTINUOUS
Status: CANCELLED | OUTPATIENT
Start: 2024-02-12

## 2024-02-12 NOTE — PROGRESS NOTES
Called patient as pre-op called stating patient has a cold and they were concerned.     Called patient who stated he does have a little sniffle still but is not coughing, does not have a fever, and can breath normally while laying flat is his bed. Patient stated he would call us if anything changed.

## 2024-02-13 ENCOUNTER — HOSPITAL ENCOUNTER (INPATIENT)
Dept: CARDIOLOGY | Facility: CLINIC | Age: 84
Setting detail: SURGERY ADMIT
Discharge: HOME | DRG: 267 | End: 2024-02-13
Attending: INTERNAL MEDICINE | Admitting: INTERNAL MEDICINE
Payer: COMMERCIAL

## 2024-02-13 ENCOUNTER — HOSPITAL ENCOUNTER (INPATIENT)
Facility: CLINIC | Age: 84
LOS: 1 days | Discharge: HOME | DRG: 267 | End: 2024-02-14
Attending: INTERNAL MEDICINE | Admitting: INTERNAL MEDICINE
Payer: COMMERCIAL

## 2024-02-13 DIAGNOSIS — I35.0 SEVERE AORTIC STENOSIS: ICD-10-CM

## 2024-02-13 DIAGNOSIS — I44.7 LBBB (LEFT BUNDLE BRANCH BLOCK): ICD-10-CM

## 2024-02-13 DIAGNOSIS — I35.0 AORTIC STENOSIS, SEVERE: Primary | ICD-10-CM

## 2024-02-13 DIAGNOSIS — Z95.2 S/P TAVR (TRANSCATHETER AORTIC VALVE REPLACEMENT): ICD-10-CM

## 2024-02-13 LAB
ACT BLD: 135 SECONDS (ref 74–150)
ACT BLD: 259 SECONDS (ref 74–150)
ACT BLD: 294 SECONDS (ref 74–150)
BLD PROD TYP BPU: NORMAL
BLOOD COMPONENT TYPE: NORMAL
CODING SYSTEM: NORMAL
CROSSMATCH: NORMAL
ISSUE DATE AND TIME: NORMAL
ISSUE DATE AND TIME: NORMAL
SCANNED LAB RESULT: NORMAL
UNIT ABO/RH: NORMAL
UNIT NUMBER: NORMAL
UNIT STATUS: NORMAL
UNIT TYPE ISBT: 9500

## 2024-02-13 PROCEDURE — C1889 IMPLANT/INSERT DEVICE, NOC: HCPCS | Performed by: INTERNAL MEDICINE

## 2024-02-13 PROCEDURE — 99153 MOD SED SAME PHYS/QHP EA: CPT | Performed by: INTERNAL MEDICINE

## 2024-02-13 PROCEDURE — 93321 DOPPLER ECHO F-UP/LMTD STD: CPT | Mod: 26 | Performed by: INTERNAL MEDICINE

## 2024-02-13 PROCEDURE — 250N000009 HC RX 250: Performed by: INTERNAL MEDICINE

## 2024-02-13 PROCEDURE — P9016 RBC LEUKOCYTES REDUCED: HCPCS

## 2024-02-13 PROCEDURE — 250N000011 HC RX IP 250 OP 636: Performed by: INTERNAL MEDICINE

## 2024-02-13 PROCEDURE — C1894 INTRO/SHEATH, NON-LASER: HCPCS | Performed by: INTERNAL MEDICINE

## 2024-02-13 PROCEDURE — 33361 REPLACE AORTIC VALVE PERQ: CPT | Performed by: INTERNAL MEDICINE

## 2024-02-13 PROCEDURE — 33361 REPLACE AORTIC VALVE PERQ: CPT | Mod: 62 | Performed by: STUDENT IN AN ORGANIZED HEALTH CARE EDUCATION/TRAINING PROGRAM

## 2024-02-13 PROCEDURE — 210N000002 HC R&B HEART CARE

## 2024-02-13 PROCEDURE — 93325 DOPPLER ECHO COLOR FLOW MAPG: CPT | Mod: 26 | Performed by: INTERNAL MEDICINE

## 2024-02-13 PROCEDURE — C1898 LEAD, PMKR, OTHER THAN TRANS: HCPCS | Performed by: INTERNAL MEDICINE

## 2024-02-13 PROCEDURE — 02RF38Z REPLACEMENT OF AORTIC VALVE WITH ZOOPLASTIC TISSUE, PERCUTANEOUS APPROACH: ICD-10-PCS | Performed by: INTERNAL MEDICINE

## 2024-02-13 PROCEDURE — 250N000013 HC RX MED GY IP 250 OP 250 PS 637: Performed by: STUDENT IN AN ORGANIZED HEALTH CARE EDUCATION/TRAINING PROGRAM

## 2024-02-13 PROCEDURE — 85347 COAGULATION TIME ACTIVATED: CPT

## 2024-02-13 PROCEDURE — 250N000013 HC RX MED GY IP 250 OP 250 PS 637: Performed by: INTERNAL MEDICINE

## 2024-02-13 PROCEDURE — 99232 SBSQ HOSP IP/OBS MODERATE 35: CPT | Mod: 25 | Performed by: INTERNAL MEDICINE

## 2024-02-13 PROCEDURE — 93325 DOPPLER ECHO COLOR FLOW MAPG: CPT

## 2024-02-13 PROCEDURE — 250N000012 HC RX MED GY IP 250 OP 636 PS 637: Performed by: STUDENT IN AN ORGANIZED HEALTH CARE EDUCATION/TRAINING PROGRAM

## 2024-02-13 PROCEDURE — 33361 REPLACE AORTIC VALVE PERQ: CPT | Mod: 62 | Performed by: INTERNAL MEDICINE

## 2024-02-13 PROCEDURE — 272N000001 HC OR GENERAL SUPPLY STERILE: Performed by: INTERNAL MEDICINE

## 2024-02-13 PROCEDURE — C1769 GUIDE WIRE: HCPCS | Performed by: INTERNAL MEDICINE

## 2024-02-13 PROCEDURE — 99152 MOD SED SAME PHYS/QHP 5/>YRS: CPT | Performed by: INTERNAL MEDICINE

## 2024-02-13 PROCEDURE — 93308 TTE F-UP OR LMTD: CPT | Mod: 26 | Performed by: INTERNAL MEDICINE

## 2024-02-13 PROCEDURE — C1760 CLOSURE DEV, VASC: HCPCS | Performed by: INTERNAL MEDICINE

## 2024-02-13 PROCEDURE — 99152 MOD SED SAME PHYS/QHP 5/>YRS: CPT | Mod: GC | Performed by: INTERNAL MEDICINE

## 2024-02-13 PROCEDURE — 93005 ELECTROCARDIOGRAM TRACING: CPT

## 2024-02-13 PROCEDURE — 258N000003 HC RX IP 258 OP 636: Performed by: INTERNAL MEDICINE

## 2024-02-13 DEVICE — VALVE AORTIC TRANSCATHETER HEART 23MM SAPIEN 3 ULTRA RESILIA: Type: IMPLANTABLE DEVICE | Status: FUNCTIONAL

## 2024-02-13 DEVICE — DEVICE CLOSURE VASCULAR MANTA 18FR 2115: Type: IMPLANTABLE DEVICE | Status: FUNCTIONAL

## 2024-02-13 RX ORDER — VITAMIN B COMPLEX
25 TABLET ORAL DAILY
Status: DISCONTINUED | OUTPATIENT
Start: 2024-02-13 | End: 2024-02-14 | Stop reason: HOSPADM

## 2024-02-13 RX ORDER — ACYCLOVIR 400 MG/1
400 TABLET ORAL 2 TIMES DAILY
Status: DISCONTINUED | OUTPATIENT
Start: 2024-02-13 | End: 2024-02-13

## 2024-02-13 RX ORDER — ACETAMINOPHEN 500 MG
500 TABLET ORAL EVERY 8 HOURS PRN
Status: DISCONTINUED | OUTPATIENT
Start: 2024-02-13 | End: 2024-02-14 | Stop reason: HOSPADM

## 2024-02-13 RX ORDER — ACETAMINOPHEN 325 MG/1
650 TABLET ORAL EVERY 4 HOURS PRN
Status: DISCONTINUED | OUTPATIENT
Start: 2024-02-13 | End: 2024-02-14 | Stop reason: HOSPADM

## 2024-02-13 RX ORDER — LIDOCAINE 40 MG/G
CREAM TOPICAL
Status: DISCONTINUED | OUTPATIENT
Start: 2024-02-13 | End: 2024-02-13 | Stop reason: HOSPADM

## 2024-02-13 RX ORDER — AMLODIPINE BESYLATE 5 MG/1
5 TABLET ORAL DAILY
COMMUNITY

## 2024-02-13 RX ORDER — SODIUM CHLORIDE 9 MG/ML
INJECTION, SOLUTION INTRAVENOUS CONTINUOUS
Status: DISCONTINUED | OUTPATIENT
Start: 2024-02-13 | End: 2024-02-13 | Stop reason: HOSPADM

## 2024-02-13 RX ORDER — PANTOPRAZOLE SODIUM 20 MG/1
20 TABLET, DELAYED RELEASE ORAL DAILY
Status: DISCONTINUED | OUTPATIENT
Start: 2024-02-13 | End: 2024-02-14 | Stop reason: HOSPADM

## 2024-02-13 RX ORDER — ROSUVASTATIN CALCIUM 20 MG/1
20 TABLET, COATED ORAL DAILY
Status: DISCONTINUED | OUTPATIENT
Start: 2024-02-13 | End: 2024-02-13

## 2024-02-13 RX ORDER — TAMSULOSIN HYDROCHLORIDE 0.4 MG/1
0.4 CAPSULE ORAL EVERY EVENING
Status: DISCONTINUED | OUTPATIENT
Start: 2024-02-13 | End: 2024-02-14 | Stop reason: HOSPADM

## 2024-02-13 RX ORDER — NITROGLYCERIN 0.4 MG/1
0.4 TABLET SUBLINGUAL EVERY 5 MIN PRN
Status: DISCONTINUED | OUTPATIENT
Start: 2024-02-13 | End: 2024-02-14 | Stop reason: HOSPADM

## 2024-02-13 RX ORDER — FINASTERIDE 5 MG/1
5 TABLET, FILM COATED ORAL DAILY
Status: DISCONTINUED | OUTPATIENT
Start: 2024-02-13 | End: 2024-02-14 | Stop reason: HOSPADM

## 2024-02-13 RX ORDER — PROTAMINE SULFATE 10 MG/ML
INJECTION, SOLUTION INTRAVENOUS
Status: DISCONTINUED | OUTPATIENT
Start: 2024-02-13 | End: 2024-02-13 | Stop reason: HOSPADM

## 2024-02-13 RX ORDER — ASPIRIN 325 MG
325 TABLET ORAL ONCE
Status: COMPLETED | OUTPATIENT
Start: 2024-02-13 | End: 2024-02-13

## 2024-02-13 RX ORDER — HYDRALAZINE HYDROCHLORIDE 20 MG/ML
10 INJECTION INTRAMUSCULAR; INTRAVENOUS
Status: DISCONTINUED | OUTPATIENT
Start: 2024-02-13 | End: 2024-02-14 | Stop reason: HOSPADM

## 2024-02-13 RX ORDER — ALLOPURINOL 100 MG/1
200 TABLET ORAL DAILY
Status: DISCONTINUED | OUTPATIENT
Start: 2024-02-13 | End: 2024-02-14 | Stop reason: HOSPADM

## 2024-02-13 RX ORDER — FENTANYL CITRATE 50 UG/ML
INJECTION, SOLUTION INTRAMUSCULAR; INTRAVENOUS
Status: DISCONTINUED | OUTPATIENT
Start: 2024-02-13 | End: 2024-02-13 | Stop reason: HOSPADM

## 2024-02-13 RX ORDER — ACYCLOVIR 400 MG/1
400 TABLET ORAL 2 TIMES DAILY
Status: DISCONTINUED | OUTPATIENT
Start: 2024-02-13 | End: 2024-02-14 | Stop reason: HOSPADM

## 2024-02-13 RX ORDER — CEFAZOLIN SODIUM/WATER 2 G/20 ML
2 SYRINGE (ML) INTRAVENOUS
Status: DISCONTINUED | OUTPATIENT
Start: 2024-02-13 | End: 2024-02-13 | Stop reason: HOSPADM

## 2024-02-13 RX ORDER — PREDNISONE 5 MG/1
5 TABLET ORAL EVERY OTHER DAY
Status: DISCONTINUED | OUTPATIENT
Start: 2024-02-13 | End: 2024-02-14 | Stop reason: HOSPADM

## 2024-02-13 RX ORDER — ROSUVASTATIN CALCIUM 20 MG/1
20 TABLET, COATED ORAL DAILY
Status: DISCONTINUED | OUTPATIENT
Start: 2024-02-13 | End: 2024-02-14 | Stop reason: HOSPADM

## 2024-02-13 RX ORDER — PREDNISONE 5 MG/1
5 TABLET ORAL EVERY OTHER DAY
Status: DISCONTINUED | OUTPATIENT
Start: 2024-02-13 | End: 2024-02-13

## 2024-02-13 RX ORDER — IOPAMIDOL 755 MG/ML
INJECTION, SOLUTION INTRAVASCULAR
Status: DISCONTINUED | OUTPATIENT
Start: 2024-02-13 | End: 2024-02-13 | Stop reason: HOSPADM

## 2024-02-13 RX ORDER — ASPIRIN 81 MG/1
81 TABLET ORAL DAILY
Status: DISCONTINUED | OUTPATIENT
Start: 2024-02-14 | End: 2024-02-14 | Stop reason: HOSPADM

## 2024-02-13 RX ORDER — HEPARIN SODIUM 1000 [USP'U]/ML
INJECTION, SOLUTION INTRAVENOUS; SUBCUTANEOUS
Status: DISCONTINUED | OUTPATIENT
Start: 2024-02-13 | End: 2024-02-13 | Stop reason: HOSPADM

## 2024-02-13 RX ADMIN — ACYCLOVIR 400 MG: 400 TABLET ORAL at 19:32

## 2024-02-13 RX ADMIN — SODIUM CHLORIDE: 9 INJECTION, SOLUTION INTRAVENOUS at 06:39

## 2024-02-13 RX ADMIN — ALLOPURINOL 200 MG: 100 TABLET ORAL at 12:35

## 2024-02-13 RX ADMIN — ACYCLOVIR 400 MG: 400 TABLET ORAL at 12:35

## 2024-02-13 RX ADMIN — TAMSULOSIN HYDROCHLORIDE 0.4 MG: 0.4 CAPSULE ORAL at 19:33

## 2024-02-13 RX ADMIN — PREDNISONE 5 MG: 5 TABLET ORAL at 12:35

## 2024-02-13 RX ADMIN — Medication 25 MCG: at 12:35

## 2024-02-13 RX ADMIN — FINASTERIDE 5 MG: 5 TABLET, FILM COATED ORAL at 12:35

## 2024-02-13 RX ADMIN — ROSUVASTATIN CALCIUM 20 MG: 20 TABLET, FILM COATED ORAL at 12:35

## 2024-02-13 RX ADMIN — PANTOPRAZOLE SODIUM 20 MG: 20 TABLET, DELAYED RELEASE ORAL at 12:35

## 2024-02-13 ASSESSMENT — ACTIVITIES OF DAILY LIVING (ADL)
ADLS_ACUITY_SCORE: 27
ADLS_ACUITY_SCORE: 27
ADLS_ACUITY_SCORE: 38
ADLS_ACUITY_SCORE: 20
ADLS_ACUITY_SCORE: 27
ADLS_ACUITY_SCORE: 20
ADLS_ACUITY_SCORE: 27

## 2024-02-13 NOTE — OP NOTE
Cardiac Surgery - Operative Report    DATE OF SERVICE: 2/13/2024     PREOPERATIVE DIAGNOSES: Severe aortic stenosis.     POSTOPERATIVE DIAGNOSES: Severe aortic stenosis.     PROCEDURE PERFORMED: Transfemoral transcatheter aortic valve replacement      ATTENDING CARDIOLOGISTS:  Maxine Moore and Maribel    SURGEON: Michael Mulvihill, MD     ANESTHESIA:  Managed anesthesia care.     ESTIMATED BLOOD LOSS:  Minimal.     SPECIMEN:  None    Implant Name Type Inv. Item Serial No.  Lot No. LRB No. Used Action   VALVE AORTIC TRANSCATHETER HEART 23MM BRIAN 3 ULTRA RESILIA - [id number] Valve VALVE AORTIC TRANSCATHETER HEART 23MM BRIAN 3 ULTRA RESILIA [id number] [employer] [id number]  1 Implanted   DEVICE CLOSURE VASCULAR MANTA 18FR 2115 - [id number] Occlusion Device DEVICE CLOSURE VASCULAR MANTA 18FR 2115  [place] [employer] [facility] [id number]  1 Implanted        INDICATIONS FOR PROCEDURE: 83 with severe aortic stenosis.  They were evaluated by the multidisciplinary team. Transcatheter replacement was recommended. The patient understands the risks and benefits of the procedure and wishes to undergo the operation.     OPERATIVE FINDINGS:  There was no significant perivalvular leak after the procedure. Valve mean gradient was normal.    APPROACH: Right and left common femoral arteries, left common femoral vein    SHEATH: 14 Fr Thompson eSheath in the RCFA, 7Fr long sheath in the LCFA    CATHETERS: 6Fr angled pigtail, 6Fr AL-1     OPERATIVE DESCRIPTION IN DETAIL:  After obtaining informed consent, the patient was brought to the operating room and placed in the supine position on the operating room table.  Appropriate lines and devices for monitoring were placed by the anesthesia team.  The was sedated and prepped and draped before a timeout was performed to confirm the correct patient identity, as well as the procedure to be performed.      Access was obtained in the right and left common femoral arteries by the  interventional cardiology team.     A alphacityguideserquist wire was used to support the Thompson eSheath insertion. Heparin was administered for goal ACT ~300    The 6Fr pigtail catheter was positioned in the right-coronary cusp and angiography was used to confirm the optimal implant angle. The pigtail was then moved to the non-coronary cusp.     Access into the LV was obtained using an AL-1 catheter and a straight wire. The AL-1 catheter was used to exchange the straight wire for a J-wire and a pigtail catheter was then positioned in the left ventricle. The LVEDP was measured with a pressure of 17 mmHg. The pigtail catheter was used to advance a Savvy wire into in the LV and the pigtail was removed.     Under rapid pacing and with fluoroscopic guidance, the valve was successfully deployed with balloon expansion using final balloon volume of nominal + 1 and at a depth of 90/10 position.     Subsequent transthoracic echocardiography showed trace catheter-associated paravalvular insufficiency.     Protamine was administered. The valve sheath access arteriotomy was successfully closed with the MANTA device, and manual pressure was applied for successful hemostasis.     A final iliofemoral angiogram showed no evidence of extravasation or stenosis.     Michael Mulvihill, MD  2/13/2024 @ 10:32 AM

## 2024-02-13 NOTE — H&P
[facility]      Cardiology H&P  Rose Duong MRN: [id number]  1940  Date of Admission:2/13/2024  Primary care provider: Kian Johns      Assessment and Plan:     Rose Duong is a pleasant 83 year old admitted on 2/13/2024 for elective TAVR.     # Severe aortic stenosis s/p TAVR with 23 mm Thompson Melvi 3 valve   # LBBB, new  Sheath sites soft without hematoma. No arrhythmias. Neuro's intact.  Patient has new left bundle branch block postoperatively.  - Bedrest per protocol x 6 hours  - Neuro checks, per protocol  - Echocardiogram tomorrow  - Monitor groin sites  - EKG in morning   - ASA for anti-platelet therapy  - Cardiac rehab/PT/OT  - Continuous telemetry monitoring  - Plan for event monitor at discharge due to new underlying conduction normality  - Lifelong antibiotic prophylaxis prior to all dental procedures.      # Chronic diastolic heart failure, NYHA class II   NTpBNP 6555, LVEDP 17 mmHg. Appears euvolemic, no PTA diuretics.   - Daily weights  - Strict intake/output    # Myeloproliferative disorder with anemia  Baseline hemoglobin around 10, gets weekly injections if hemoglobin drops below this.  -Daily CBC    # CKD, stage III-IV   Follows closely with outpatient nephrology, baseline creatinine appears to be around 1.6-1.8.  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 today for transcatheter aortic valve replacement.      He has now status post 23 mm Thompson BRIAN 3 valve via FA.  His procedure went well without complication.  IntraOp LVEDP of 17 mmHg. he had no hemodynamic instability.  New left bundle branch block postoperatively.    The patient is resting comfortably.  He denies pain.  He is neurologically intact.  He had small hematoma at his right groin access site, resolved with manual pressure.  His groin sites have since remain soft.           Review of Systems:    10 point review of systems negative except for stated above in HPI.          Past Medical History:   Medical History reviewed.   Past Medical History:   Diagnosis Date    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     Amyloidosis (H)     Anemia     Bacteremia     BPH (benign prostatic hyperplasia)     CKD (chronic kidney disease) stage 4, 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    Colon polyps     Coronary artery disease involving native coronary artery, angina presence unspecified, unspecified whether native or transplanted heart 05/01/2020    Added automatically from request for surgery [id number]    DJD (degenerative joint disease)     Elevated BP without diagnosis of hypertension     Gastroesophageal reflux disease     Gout     Gout     Hyperlipidemia     Hypertension     Leukocytosis     MDS (myelodysplastic syndrome) (H)     Metabolic acidosis     due to CKD    Mixed hyperlipidemia 05/01/2020    Added automatically from request for surgery [id number]    Multiple myeloma (H)     Nonrheumatic aortic valve stenosis 05/01/2020    Added automatically from request for surgery [id number]    Rotator cuff tear arthropathy, left     Sinusitis     Spinal stenosis in cervical region 09/25/2017             Past Surgical History:   Surgical History reviewed.   Past Surgical History:   Procedure Laterality Date    APPENDECTOMY      ASPIRATION NEEDLE HIP  Left 9/25/2017    Procedure: ASPIRATION NEEDLE HIP;;  Surgeon: Moises Elias MD;  Location:  OR    BACK SURGERY  11/07/2017    cervical spine surgery     COLONOSCOPY      CV CORONARY ANGIOGRAM N/A 5/6/2020    Procedure: Coronary Angiogram;  Surgeon: David Woods MD;  Location:  HEART CARDIAC CATH LAB    CV CORONARY ANGIOGRAM N/A 1/15/2024    Procedure: Coronary Angiogram;  Surgeon: Nicholas Moore MD;  Location:  HEART CARDIAC CATH LAB    CV LEFT HEART CATH N/A 5/6/2020    Procedure: Left Heart Cath;  Surgeon: David Woods MD;  Location:  HEART CARDIAC CATH LAB    CV PCI N/A 1/15/2024    Procedure: Percutaneous Coronary Intervention;  Surgeon: Nicholas Moore MD;  Location:  HEART CARDIAC CATH LAB    CV RIGHT HEART CATH MEASUREMENTS RECORDED N/A 5/6/2020    Procedure: Right Heart Cath;  Surgeon: David Woods MD;  Location:  HEART CARDIAC CATH LAB    CV RIGHT HEART CATH MEASUREMENTS RECORDED N/A 1/15/2024    Procedure: Right Heart Catheterization;  Surgeon: Nicholas Moore MD;  Location:  HEART CARDIAC CATH LAB    DECOMPRESSION LUMBAR TWO LEVELS Bilateral 6/11/2018    Procedure: DECOMPRESSION LUMBAR TWO LEVELS;  BILATERAL LUMBAR 3-4 AND LUMBAR 4-5 DECOMPRESSION ;  Surgeon: Sd Vallejo MD;  Location:  OR    FUSION CERVICAL ANTERIOR ONE LEVEL WITH BONE ALLOGRAFT N/A 9/25/2017    Procedure: FUSION CERVICAL ANTERIOR ONE LEVEL WITH BONE ALLOGRAFT;  ANTERIOR CERVICAL DECOMPRESSION AND FUSIONC3-4 WITH INSTRUMENTATION, ALLOGRAFT AND BONE MARROW ASPIRATE OF THE LEFT ILIAC CREST ;  Surgeon: Moises Elias MD;  Location:  OR    PHACOEMULSIFICATION CLEAR CORNEA W/ STANDARD IOL IMPLANT, ENDOSCOPIC CYCLOPHOTOCOAGULATION, COMBINED Right 12/12/2017    Procedure: COMBINED PHACOEMULSIFICATION CLEAR CORNEA WITH STANDARD INTRAOCULAR LENS IMPLANT, ENDOSCOPIC CYCLOPHOTOCOAGULATION;  COMPLEX RIGHT EYE COMBINED PHACOEMULSIFICATION CLEAR CORNEA WITH  STANDARD INTRAOCULAR LENS IMPLANT, ENDOSCOPIC CYCLOPHOTOCOAGULATION ;  Surgeon: Alli Mott MD;  Location: [facility]    PHACOEMULSIFICATION CLEAR CORNEA W/ STANDARD IOL IMPLANT, ENDOSCOPIC CYCLOPHOTOCOAGULATION, COMBINED Left 5/1/2018    Procedure: COMBINED PHACOEMULSIFICATION CLEAR CORNEA WITH STANDARD INTRAOCULAR LENS IMPLANT, ENDOSCOPIC CYCLOPHOTOCOAGULATION;  COMPLEX LEFT EYE PHACOEMULSIFICATION CLEAR CORNEA WITH STANDARD INTRAOCULAR LENS IMPLANT, ENDOSCOPIC CYCLOPHOTOCOAGULATION ;  Surgeon: Alli Mott MD;  Location: [facility]    ROTATOR CUFF REPAIR RT/LT  2004             Social History:   Social History reviewed.  Social History     Tobacco Use    Smoking status: Never    Smokeless tobacco: Never   Substance Use Topics    Alcohol use: Yes     Comment: beer or two a day             Family History:   Family History reviewed.   Family History   Problem Relation Age of Onset    Heart Disease Father              Allergies:   No Known Allergies          Medications:   Medications Reviewed.   Current Facility-Administered Medications   Medication    acetaminophen (TYLENOL) tablet 500 mg    acetaminophen (TYLENOL) tablet 650 mg    acyclovir (ZOVIRAX) tablet 400 mg    allopurinol (ZYLOPRIM) tablet 200 mg    [START ON 2/14/2024] aspirin EC tablet 81 mg    finasteride (PROSCAR) tablet 5 mg    HOLD:  Metformin and metformin containing medications if patient received IV contrast with acute kidney injury or severe chronic kidney disease (stage IV or stage V; i.e., eGFR less than 30)    hydrALAZINE (APRESOLINE) injection 10 mg    nitroGLYcerin (NITROSTAT) sublingual tablet 0.4 mg    pantoprazole (PROTONIX) EC tablet 20 mg    predniSONE (DELTASONE) tablet 5 mg    rosuvastatin (CRESTOR) tablet 20 mg    tamsulosin (FLOMAX) capsule 0.4 mg    Vitamin D3 (CHOLECALCIFEROL) tablet 25 mcg             Physical Exam:   Vitals were reviewed.  Blood pressure 106/55, pulse 64, temperature 97.6  F (36.4  C), temperature source Oral,  "resp. rate 18, height 1.702 m (5' 7\"), weight 57.6 kg (127 lb), SpO2 99%.    General: AAOx3, NAD  Skin: Not jaundiced, no rash, no ecchymoses; incision site CDI, soft, non-tender, no signs of hematoma. No bruit  HEENT: MMM, PERRLA, EOM intact  CV: RRR, normal S1S2, grade I PARVEEN   Resp: Clear to auscultation bilaterally, no wheezes, rhonchi  Abd: Soft, non-tender, BS+, no masses appreciated  Extremities: warm and well perfused, palpable pulses, no edema  Neuro: No lateralizing symptoms or focal neurologic deficits        Labs:   Routine Labs:  Lab Results   Component Value Date    TROPONIN <0.015 08/30/2021    TROPONIN <0.015 08/09/2021     CMP  Recent Labs   Lab 02/08/24  0931      POTASSIUM 4.3   CHLORIDE 113*   CO2 19*   ANIONGAP 11   GLC 92   BUN 38.9*   CR 1.73*   GFRESTIMATED 38*   CONNIE 9.0     CBC  Recent Labs   Lab 02/08/24  0931   WBC 7.1   RBC 3.57*   HGB 10.1*   HCT 31.6*   MCV 89   MCH 28.3   MCHC 32.0   RDW 13.8        INR  Recent Labs   Lab 02/08/24  0931   INR 1.03           Diagnostics:    EKG 12Lead: Sinus rhythm with LBBB    Echo: 1/25/2024  Interpretation Summary     Pre TAVR     Normal LV size and function  Severe aortic stenosis  Mean gradient 42  No pericardial effusion     Post TAVR     S/P Thompson 23 TAVR  No PVL  Mean gradient 6 mm Hg  No pericardial effusion  Normal LVEF    Coronary Angiogram/Right heart Cath: 1/2024  Conclusion    1. Lifestyle-limiting severe aortic stenosis.  2. Successful transfemoral transcatheter aortic valve replacement with a 23mm [place] Melvi 3 valve.  3. Left heart catheterization with LVEDP of 17 mmHg.  4. Right and left common femoral arteriotomies successfully closed with closure devices.         Plan     Follow bedrest per protocol   Continued medical management and lifestyle modifications for cardiovascular risk factor optimizations.   Arterial sheath removed from femoral artery with closure device.   Femoral angiogram identifies arterial sheath " placement suitable for closure device.   Admit to inpatient        Post antiplatelet therapy of   Aspirin; .  1. Aspirin 81 mg po daily lifelong.  2. Bedrest per protocol.  3. Admit to the primary inpatient team for further evaluation and management.  4. Echocardiogram tomorrow.   5. Lifelong antibiotic prophylaxis prior to all dental procedures.     Medical Decision Making     EKG, CXR, labs and echo reviewed.  Plan to proceed with AVR>   Saw the patient 2/13/24    Jac Moore MD      30 MINUTES SPENT BY ME on the date of service doing chart review, history, exam, documentation & further activities per the note.

## 2024-02-13 NOTE — Clinical Note
Hemodynamic equipment used: 5 lead ECG, [employer] With 3 Leads, Machine BP Cuff and pulse oximeter probe.

## 2024-02-13 NOTE — PROVIDER NOTIFICATION
Provider Notification    Notified Person: NP    Notified Person Name: Rachel Wade    Notification Date/Time: 2/13/24 4630    Notification Interaction: hematoma on groin, no bruit, soft, non-tender, manual pressure held for approx 20 mins    Purpose of Notification:  rounded on patient - no new orders continue to monitor.    Orders Received:    Comments:

## 2024-02-13 NOTE — PROGRESS NOTES
"       Assessment and Plan:     Severe aortic stenosis, now status post TAVR today  New left bundle branch block  Chronic kidney disease, stage III-IV  Myeloproliferative disorder  Mild CAD    Stable following successful TAVR today with 23 mm BRIAN 3 ultra Resilia valve.  Development of new left bundle branch block following the procedure increases the risk of complete heart block in the near future.  We will reevaluate the echocardiogram and rhythm tomorrow before discharge and consider outpatient rhythm monitoring.    GEOVANNI NERI MD        Interval History:     doing well; no cp, sob, n/v/d, or abd pain.          Medications:      acyclovir  400 mg Oral BID    allopurinol  200 mg Oral Daily    [START ON 2/14/2024] aspirin  81 mg Oral Daily    finasteride  5 mg Oral Daily    pantoprazole  20 mg Oral Daily    predniSONE  5 mg Oral Every Other Day    rosuvastatin  20 mg Oral Daily    tamsulosin  0.4 mg Oral QPM    Vitamin D3  25 mcg Oral Daily            Physical Exam:   Blood pressure 106/55, pulse 64, temperature 97.6  F (36.4  C), temperature source Oral, resp. rate 18, height 1.702 m (5' 7\"), weight 57.6 kg (127 lb), SpO2 99%.  Vitals:    02/13/24 0644   Weight: 57.6 kg (127 lb)         Intake/Output Summary (Last 24 hours) at 2/13/2024 1545  Last data filed at 2/13/2024 1400  Gross per 24 hour   Intake 120 ml   Output 575 ml   Net -455 ml       02/08 1500 - 02/13 1459  In: 120 [P.O.:120]  Out: 575 [Urine:575]  Net: -455    Exam:  GENERAL APPEARANCE ADULT: Alert, in no acute distress  RESP: lungs clear to auscultation   CV: regular rate and rhythm, no murmur, rub, or gallop  ABDOMEN: normal bowel sounds, soft, nontender, no hepatosplenomegaly or other masses  EXTREMITIES: No edema         Data:   LABS (Last four results)  CMP  Recent Labs   Lab 02/08/24  0931      POTASSIUM 4.3   CHLORIDE 113*   CO2 19*   ANIONGAP 11   GLC 92   BUN 38.9*   CR 1.73*   GFRESTIMATED 38*   CONNIE 9.0     CBC  Recent Labs "   Lab 02/08/24 0931   WBC 7.1   RBC 3.57*   HGB 10.1*   HCT 31.6*   MCV 89   MCH 28.3   MCHC 32.0   RDW 13.8        INR  Recent Labs   Lab 02/08/24 0931   INR 1.03     TROPONINS   Lab Results   Component Value Date    TROPONIN <0.015 08/30/2021    TROPONIN <0.015 08/09/2021                                                                                                               GEOVANNI NERI MD

## 2024-02-14 ENCOUNTER — APPOINTMENT (OUTPATIENT)
Dept: CARDIOLOGY | Facility: CLINIC | Age: 84
DRG: 267 | End: 2024-02-14
Attending: STUDENT IN AN ORGANIZED HEALTH CARE EDUCATION/TRAINING PROGRAM
Payer: COMMERCIAL

## 2024-02-14 ENCOUNTER — APPOINTMENT (OUTPATIENT)
Dept: CARDIOLOGY | Facility: CLINIC | Age: 84
DRG: 267 | End: 2024-02-14
Attending: PHYSICIAN ASSISTANT
Payer: COMMERCIAL

## 2024-02-14 ENCOUNTER — APPOINTMENT (OUTPATIENT)
Dept: PHYSICAL THERAPY | Facility: CLINIC | Age: 84
DRG: 267 | End: 2024-02-14
Attending: STUDENT IN AN ORGANIZED HEALTH CARE EDUCATION/TRAINING PROGRAM
Payer: COMMERCIAL

## 2024-02-14 VITALS
RESPIRATION RATE: 16 BRPM | TEMPERATURE: 98.6 F | SYSTOLIC BLOOD PRESSURE: 125 MMHG | WEIGHT: 120.6 LBS | OXYGEN SATURATION: 99 % | HEIGHT: 67 IN | DIASTOLIC BLOOD PRESSURE: 60 MMHG | HEART RATE: 72 BPM | BODY MASS INDEX: 18.93 KG/M2

## 2024-02-14 DIAGNOSIS — Z95.3 S/P TAVR (TRANSCATHETER AORTIC VALVE REPLACEMENT), BIOPROSTHETIC: Primary | ICD-10-CM

## 2024-02-14 LAB
ANION GAP SERPL CALCULATED.3IONS-SCNC: 9 MMOL/L (ref 7–15)
BUN SERPL-MCNC: 34.1 MG/DL (ref 8–23)
CALCIUM SERPL-MCNC: 8.6 MG/DL (ref 8.8–10.2)
CHLORIDE SERPL-SCNC: 113 MMOL/L (ref 98–107)
CREAT SERPL-MCNC: 1.75 MG/DL (ref 0.67–1.17)
DEPRECATED HCO3 PLAS-SCNC: 19 MMOL/L (ref 22–29)
EGFRCR SERPLBLD CKD-EPI 2021: 38 ML/MIN/1.73M2
ERYTHROCYTE [DISTWIDTH] IN BLOOD BY AUTOMATED COUNT: 14 % (ref 10–15)
GLUCOSE SERPL-MCNC: 93 MG/DL (ref 70–99)
HCT VFR BLD AUTO: 25.6 % (ref 40–53)
HGB BLD-MCNC: 8.3 G/DL (ref 13.3–17.7)
LVEF ECHO: NORMAL
MAGNESIUM SERPL-MCNC: 1.6 MG/DL (ref 1.7–2.3)
MCH RBC QN AUTO: 28.9 PG (ref 26.5–33)
MCHC RBC AUTO-ENTMCNC: 32.4 G/DL (ref 31.5–36.5)
MCV RBC AUTO: 89 FL (ref 78–100)
PHOSPHATE SERPL-MCNC: 3.5 MG/DL (ref 2.5–4.5)
PLATELET # BLD AUTO: 154 10E3/UL (ref 150–450)
POTASSIUM SERPL-SCNC: 4 MMOL/L (ref 3.4–5.3)
RBC # BLD AUTO: 2.87 10E6/UL (ref 4.4–5.9)
SCANNED LAB RESULT: NORMAL
SODIUM SERPL-SCNC: 141 MMOL/L (ref 135–145)
WBC # BLD AUTO: 10.6 10E3/UL (ref 4–11)

## 2024-02-14 PROCEDURE — 93005 ELECTROCARDIOGRAM TRACING: CPT

## 2024-02-14 PROCEDURE — 99238 HOSP IP/OBS DSCHRG MGMT 30/<: CPT | Mod: FS | Performed by: PHYSICIAN ASSISTANT

## 2024-02-14 PROCEDURE — 93308 TTE F-UP OR LMTD: CPT | Mod: 26 | Performed by: INTERNAL MEDICINE

## 2024-02-14 PROCEDURE — 93321 DOPPLER ECHO F-UP/LMTD STD: CPT | Mod: 26 | Performed by: INTERNAL MEDICINE

## 2024-02-14 PROCEDURE — 97110 THERAPEUTIC EXERCISES: CPT | Mod: GP

## 2024-02-14 PROCEDURE — 97161 PT EVAL LOW COMPLEX 20 MIN: CPT | Mod: GP

## 2024-02-14 PROCEDURE — 80048 BASIC METABOLIC PNL TOTAL CA: CPT | Performed by: STUDENT IN AN ORGANIZED HEALTH CARE EDUCATION/TRAINING PROGRAM

## 2024-02-14 PROCEDURE — 84100 ASSAY OF PHOSPHORUS: CPT | Performed by: STUDENT IN AN ORGANIZED HEALTH CARE EDUCATION/TRAINING PROGRAM

## 2024-02-14 PROCEDURE — 93325 DOPPLER ECHO COLOR FLOW MAPG: CPT | Mod: 26 | Performed by: INTERNAL MEDICINE

## 2024-02-14 PROCEDURE — 36415 COLL VENOUS BLD VENIPUNCTURE: CPT | Performed by: STUDENT IN AN ORGANIZED HEALTH CARE EDUCATION/TRAINING PROGRAM

## 2024-02-14 PROCEDURE — 250N000013 HC RX MED GY IP 250 OP 250 PS 637: Performed by: STUDENT IN AN ORGANIZED HEALTH CARE EDUCATION/TRAINING PROGRAM

## 2024-02-14 PROCEDURE — 250N000013 HC RX MED GY IP 250 OP 250 PS 637: Performed by: INTERNAL MEDICINE

## 2024-02-14 PROCEDURE — 93325 DOPPLER ECHO COLOR FLOW MAPG: CPT

## 2024-02-14 PROCEDURE — 999N000096 CARDIAC MOBILE TELEMETRY MONITOR

## 2024-02-14 PROCEDURE — 85014 HEMATOCRIT: CPT | Performed by: STUDENT IN AN ORGANIZED HEALTH CARE EDUCATION/TRAINING PROGRAM

## 2024-02-14 PROCEDURE — 83735 ASSAY OF MAGNESIUM: CPT | Performed by: STUDENT IN AN ORGANIZED HEALTH CARE EDUCATION/TRAINING PROGRAM

## 2024-02-14 PROCEDURE — 93228 REMOTE 30 DAY ECG REV/REPORT: CPT | Performed by: INTERNAL MEDICINE

## 2024-02-14 PROCEDURE — 97530 THERAPEUTIC ACTIVITIES: CPT | Mod: GP

## 2024-02-14 RX ORDER — ASPIRIN 81 MG/1
81 TABLET ORAL DAILY
Qty: 30 TABLET | Refills: 3 | Status: SHIPPED | OUTPATIENT
Start: 2024-02-15

## 2024-02-14 RX ADMIN — FINASTERIDE 5 MG: 5 TABLET, FILM COATED ORAL at 08:58

## 2024-02-14 RX ADMIN — ASPIRIN 81 MG: 81 TABLET, COATED ORAL at 08:58

## 2024-02-14 RX ADMIN — Medication 25 MCG: at 08:58

## 2024-02-14 RX ADMIN — ALLOPURINOL 200 MG: 100 TABLET ORAL at 08:58

## 2024-02-14 RX ADMIN — PANTOPRAZOLE SODIUM 20 MG: 20 TABLET, DELAYED RELEASE ORAL at 08:58

## 2024-02-14 RX ADMIN — ACYCLOVIR 400 MG: 400 TABLET ORAL at 08:58

## 2024-02-14 RX ADMIN — ROSUVASTATIN CALCIUM 20 MG: 20 TABLET, FILM COATED ORAL at 08:58

## 2024-02-14 ASSESSMENT — ACTIVITIES OF DAILY LIVING (ADL)
ADLS_ACUITY_SCORE: 22
ADLS_ACUITY_SCORE: 22
ADLS_ACUITY_SCORE: 27
ADLS_ACUITY_SCORE: 27
ADLS_ACUITY_SCORE: 22
ADLS_ACUITY_SCORE: 27
ADLS_ACUITY_SCORE: 27

## 2024-02-14 NOTE — PLAN OF CARE
A&O x 4. VSS. RA. Tele: SR BBB. Off bedrest at 5pm. R) & L) groin unchanged. Neuro's intact. (+) BLE pulses via doppler. Denies pain/SOB. Up SBA. Voiding spontaneously. Will continue w/plan of care.

## 2024-02-14 NOTE — PLAN OF CARE
February 14, 2024  Shift:SANDY  Rose Duong  83 year old  YOB: 1940    Reason for admission:Severe aortic stenosis [I35.0]  Aortic stenosis, severe [I35.0]    Cognition/Mentation:intact  Neuros/CMS:intact, groin sites intact   VS:stable   Cardiac:AV paced, PAC's occasionally. Had asymptomatic 7 beat run of SVT.     GI:intact  :urinates in toilet frequently   Pulmonary:diminished lower lobes   Pain:denies pain  Drains/Lines:PIV SL  Skin:brusing at groin sites and scattered brusing    Activity:SBA  Diet: regular  Discharge:potentially today 2/14.    Shift summary:pt slept throughout night in between cares. Had 7 beat run of SVT but was asymptomatic. Has swelling on R groin site. Has not changed throughout shift. Was like this even when coming up. Neuros intact. Doppler pulses on feet are present.

## 2024-02-14 NOTE — PROGRESS NOTES
02/14/24 0800   Appointment Info   Signing Clinician's Name / Credentials (PT) Myriam Bingham, MOE   Living Environment   People in Home alone   Current Living Arrangements house   Home Accessibility stairs within home   Number of Stairs, Within Home, Primary six   Stair Railings, Within Home, Primary railings safe and in good condition;railing on right side (ascending)   Transportation Anticipated car, drives self;family or friend will provide   Self-Care   Usual Activity Tolerance moderate   Current Activity Tolerance good   Regular Exercise No   Equipment Currently Used at Home none   Fall history within last six months no   General Information   Onset of Illness/Injury or Date of Surgery 02/13/24   Referring Physician Dr. Moore   Patient/Family Therapy Goals Statement (PT) To go home   Pertinent History of Current Problem (include personal factors and/or comorbidities that impact the POC) Pt is an 83 year old male s/p TAVR   Existing Precautions/Restrictions cardiac   Cognition   Affect/Mental Status (Cognition) WFL   Orientation Status (Cognition) oriented x 4   Follows Commands (Cognition) WFL   Pain Assessment   Patient Currently in Pain No   Range of Motion (ROM)   Range of Motion ROM is WFL   Strength (Manual Muscle Testing)   Strength (Manual Muscle Testing) strength is WFL   Bed Mobility   Bed Mobility no deficits identified   Transfers   Transfers no deficits identified   Gait/Stairs (Locomotion)   [place] Level (Gait) independent   Balance   Balance Comments Good   Clinical Impression   Criteria for Skilled Therapeutic Intervention Yes, treatment indicated   PT Diagnosis (PT) Impaired endurance   Influenced by the following impairments Decreased endurance   Functional limitations due to impairments Difficulty with long distance ambulation   Clinical Presentation (PT Evaluation Complexity) stable   Clinical Presentation Rationale VSS, pain controlled   Clinical Decision Making (Complexity) low  complexity   Planned Therapy Interventions (PT) patient/family education;progressive activity/exercise;risk factor education;home program guidelines   Risk & Benefits of therapy have been explained evaluation/treatment results reviewed;care plan/treatment goals reviewed;current/potential barriers reviewed;risks/benefits reviewed;participants voiced agreement with care plan;participants included;patient   PT Total Evaluation Time   PT Roz, Low Complexity Minutes (27761) 10   PT Discharge Planning   PT Plan Progress ambulation distance, stairs, review education   PT Discharge Recommendation (DC Rec) home with outpatient cardiac rehab   PT Rationale for DC Rec Pt is independent with mobility and safe to discharge home. Pt will benefit from outpatient CR to further increase activity tolerance and for cardiac education.   PT Brief overview of current status Independent   Total Session Time   Total Session Time (sum of timed and untimed services) 10

## 2024-02-14 NOTE — PLAN OF CARE
[id number]. A&O x 4. Patient denies pain. VSS, on RA. Up with SBA to bathroom. Tele: SR w/ new BBB. B/l groin sites, WDL. R groin slightly ecchymotic, unchanged. Doppler pulses. Scattered bruising. Plan for EKG, echo, and cardiac rehab tomorrow. Likely discharge tomorrow w/ cardiac monitoring. Pt resting comfortably at this time.

## 2024-02-14 NOTE — PLAN OF CARE
Physical Therapy Discharge Summary    Reason for therapy discharge:    Discharged to home with outpatient therapy.    Progress towards therapy goal(s). See goals on Care Plan in [facility] electronic health record for goal details.  Goals partially met.  Barriers to achieving goals:   discharge on same date as initial evaluation.    Therapy recommendation(s):    Continued therapy is recommended.  Rationale/Recommendations:  For further cardiac education and endurance training.

## 2024-02-14 NOTE — DISCHARGE SUMMARY
[facility]- Discharge Summary  Primary Cardiologist: Dr. Chun  Date of Service: 02/14/24       Interval History:   SANFORD is significantly better. No discomfort at the femoral site. No lightheadedness.     ---------------------------------------------------------------------------------------------------------------------    Assessment:  Rose Duong is a 83 year old male who was admitted on 2/13/2024 for elective Transfemoral Aortic Valve Replacement (TAVR).       1) S/p TAVR   - received 23 mm ES3 valve  - ECG demonstrated new LBBB (140 -> 138 ms)  - Access Site without hematoma or bruit.   - Neuro check intact   - Antiplatelet therapy ASA  - CBC shows acute on chronic anemia; BMP stable   - limited echo today showed stable valve function with only trace perivalvular regurgitation; preserved LVEF; only trivial pericardial effusion  - plan for inpatient Cardiac rehab today    2) Chronic HFpEF  - appears euvolemic on exam    3) AL Amyloidosis, kidney involvement   - diagnosed with kidney bx in 2021, follows with heme/onc  - currently on maintenance chemo tx with low levels of Kappa light chains     4) MDS    5) CKD, stage IV  - renal function stable on current admission    6) Anemia of chronic disease   - hgb is slightly worse post procedure but no acute bleeding issues   - baseline around 9-10     ---------------------------------------------------------------------------------------------------------------------    Plan:   -- Continue with aspirin 81 mg daily   -- Due to new LBBB following TAVR, he will be discharged on 30-day MCOT   -- Referral to outpatient cardiac rehab   -- Follow up with structural team in 1 week   -- OK for discharge to home today     -------------------------------------------------------------------------------------------------------------    Physical Exam   Temp: 98.3  F (36.8  C) Temp src: Oral BP: 130/62 (Post Cr) Pulse: 82   Resp: 16  SpO2: 97 % O2 Device: None (Room air)    Vitals:    24 0644 24 0600   Weight: 57.6 kg (127 lb) 54.7 kg (120 lb 9.6 oz)       Gen- NAD. Sitting comfortably. Oxygen on room air.   Resp- CTA, janet  Card-RRR w/  PARVEEN at LSB  Abd- soft, nontender  Ext- trace pitting edema  Skin- no ecchymosis, hematoma or bruit at the femoral sites  Vasc-2+ DP pulses janet    Medications      acyclovir  400 mg Oral BID    allopurinol  200 mg Oral Daily    aspirin  81 mg Oral Daily    finasteride  5 mg Oral Daily    pantoprazole  20 mg Oral Daily    predniSONE  5 mg Oral Every Other Day    rosuvastatin  20 mg Oral Daily    tamsulosin  0.4 mg Oral QPM    Vitamin D3  25 mcg Oral Daily       Data   Recent Results (from the past 24 hour(s))   Echocardiogram Limited   Result Value    LVEF  65-70%    Narrative    [id number]  [id number]  [id number]  [id number]^SUMIT^PURVI     [facility]  Echocardiography Laboratory  [address] [place] [address]     Name: VALERIE BALLARD  MRN: [id number]  : 1940  Study Date: 2024 07:35 AM  Age: 83 yrs  Gender: Male  Patient Location: [facility]  Reason For Study: Aortic Valve Replacement  Ordering Physician: PURVI ARANA  Referring Physician: Kian Johns MD  Performed By: Jesus Arellano     BSA: 1.6 m2  Height: 67 in  Weight: 120 lb  HR: 76  BP: 116/61 mmHg  ______________________________________________________________________________  Procedure  Limited Echo Adult.  ______________________________________________________________________________  Interpretation Summary     There is a bioprosthetic aortic valve 23mm ES3.  The prosthetic aortic valve is well-seated with trace posterior PVL.  The gradient is normal for this prosthetic aortic valve.  The visual ejection fraction is 65-70%.  No regional wall motion abnormalities noted.  Trivial pericardial effusion  ______________________________________________________________________________  Left  Ventricle  The visual ejection fraction is 65-70%. No regional wall motion abnormalities  noted.     Atria  The left atrium is moderately dilated.     Mitral Valve  There is moderate mitral annular calcification. There is trace mitral  regurgitation. The mean mitral valve gradient is 7.0 mmHg. There is moderate  mitral stenosis.     Tricuspid Valve  There is trace tricuspid regurgitation.     Aortic Valve  There is trace aortic regurgitation. The mean AoV pressure gradient is 7.5  mmHg. There is a bioprosthetic aortic valve. The prosthetic aortic valve is  well-seated. The gradient is normal for this prosthetic aortic valve.     Pericardium  Trivial pericardial effusion.  ______________________________________________________________________________  MMode/2D Measurements & Calculations  LVOT diam: 2.2 cm  LVOT area: 3.8 cm2     Doppler Measurements & Calculations  MV max P.6 mmHg  MV mean P.0 mmHg  MV V2 VTI: 47.0 cm  MVA(VTI): 1.8 cm2  Ao V2 max: 184.5 cm/sec  Ao max P.0 mmHg  Ao V2 mean: 128.5 cm/sec  Ao mean P.5 mmHg  Ao V2 VTI: 34.1 cm  TC(I,D): 2.4 cm2  TC(V,D): 2.3 cm2  Ao acc time: 0.08 sec  LV V1 max P.9 mmHg  LV V1 max: 111.0 cm/sec  LV V1 VTI: 21.8 cm  SV(LVOT): 82.9 ml  SI(LVOT): 50.9 ml/m2  AV Dexter Ratio (DI): 0.60  TC Index (cm2/m2): 1.5     ______________________________________________________________________________  Report approved by: Jay Goodson 2024 09:29 AM             Juan Cagle PA-C   2024  Pager: [phone]

## 2024-02-16 ENCOUNTER — PATIENT OUTREACH (OUTPATIENT)
Dept: CARE COORDINATION | Facility: CLINIC | Age: 84
End: 2024-02-16
Payer: COMMERCIAL

## 2024-02-16 LAB
ATRIAL RATE - MUSE: 67 BPM
ATRIAL RATE - MUSE: 78 BPM
DIASTOLIC BLOOD PRESSURE - MUSE: NORMAL MMHG
DIASTOLIC BLOOD PRESSURE - MUSE: NORMAL MMHG
INTERPRETATION ECG - MUSE: NORMAL
INTERPRETATION ECG - MUSE: NORMAL
P AXIS - MUSE: 69 DEGREES
P AXIS - MUSE: 83 DEGREES
PR INTERVAL - MUSE: 178 MS
PR INTERVAL - MUSE: 188 MS
QRS DURATION - MUSE: 138 MS
QRS DURATION - MUSE: 140 MS
QT - MUSE: 436 MS
QT - MUSE: 474 MS
QTC - MUSE: 497 MS
QTC - MUSE: 500 MS
R AXIS - MUSE: 67 DEGREES
R AXIS - MUSE: 95 DEGREES
SYSTOLIC BLOOD PRESSURE - MUSE: NORMAL MMHG
SYSTOLIC BLOOD PRESSURE - MUSE: NORMAL MMHG
T AXIS - MUSE: 23 DEGREES
T AXIS - MUSE: 90 DEGREES
VENTRICULAR RATE- MUSE: 67 BPM
VENTRICULAR RATE- MUSE: 78 BPM

## 2024-02-16 NOTE — PROGRESS NOTES
"Clinic Care Coordination Contact  [facility]: Post-Discharge Note  SITUATION                                                      Admission:    Admission Date: 02/13/24   Reason for Admission: Elective Transfemoral Aortic Valve Replacement (TAVR).  Discharge:   Discharge Date: 02/14/24  Discharge Diagnosis: Aortic stenosis, severe    BACKGROUND                                                      Per hospital discharge summary and inpatient provider notes:    83 with severe aortic stenosis.  They were evaluated by the multidisciplinary team. Transcatheter replacement was recommended. The patient understands the risks and benefits of the procedure and wishes to undergo the operation.     ASSESSMENT      Discharge Assessment  How are you doing now that you are home?: \"I'm doing fine, I got everything. I just got done with my walk.\"  How are your symptoms? (Red Flag symptoms escalate to triage hotline per guidelines): Improved  Do you feel your condition is stable enough to be safe at home until your provider visit?: Yes  Does the patient have their discharge instructions? : Yes  Does the patient have questions regarding their discharge instructions? : No  Were you started on any new medications or were there changes to any of your previous medications? : Yes  Does the patient have all of their medications?: Yes  Do you have questions regarding any of your medications? : No  Do you have all of your needed medical supplies or equipment (DME)?  (i.e. oxygen tank, CPAP, cane, etc.): Yes  Discharge follow-up appointment scheduled within 14 calendar days? : Yes  Discharge Follow Up Appointment Date: 02/22/24  Discharge Follow Up Appointment Scheduled with?: Specialty Care Provider (Cardiology appt 2/22, Cardiac Rehab appt 2/23)    Post-op ([facility] CTA Only)  If the patient had a surgery or procedure, do they have any questions for a nurse?: No    PLAN                                                      Outpatient Plan:  "     Follow-up and recommended labs and tests  Follow up in structural clinic in 1 week.    Additional Services:   CARDIAC REHAB REFERRAL  Patient may choose their preference of the site for Cardiac Rehab.  If you have not heard from the scheduling office within 2 business days,  please call [phone] for [facility], [phone] for  [place] and [phone] for [place].  Preferred Location: [facility]  Scheduling Instructions: If you have not heard from the scheduling office  within 2 business days, please call [phone] for [employer] [facility],  [phone] for [place] and [phone] for [place].  Which Procedure did the patient have?: Valve Replacement  Special Programs: Outpatient Phase II Cardiac Rehab Program    Future tests that were ordered for you:   Adult Cardiac Mobile Telemetry Monitor  Set up today before discharge    Please Schedule the Following Appointments   Cardiology  [facility] will call you to coordinate your care as prescribed by the provider. If you don t hear from a representative within 2 business days, please call: [phone]  Adult Cardiac Mobile Telemetry Monitor - Expected Date: 2/14/2024    Future Appointments   Date Time Provider Department Center   2/22/2024 10:10 AM Rachel Wade CNP [facility] PSA CLIN   2/23/2024 10:00 AM 3, Sh Cardiac Rehab [facility]   3/14/2024 10:30 AM [facility]   3/14/2024 11:00 AM [id number]   3/14/2024  2:30 PM Rachel Wade CNP [facility] PSA CLIN   7/31/2024  8:15 AM Casimiro Chun MD [facility] CLIN         For any urgent concerns, please contact our 24 hour nurse triage line: [phone]         .acr

## 2024-02-22 ENCOUNTER — OFFICE VISIT (OUTPATIENT)
Dept: CARDIOLOGY | Facility: CLINIC | Age: 84
End: 2024-02-22
Payer: COMMERCIAL

## 2024-02-22 ENCOUNTER — HOSPITAL ENCOUNTER (OUTPATIENT)
Dept: ULTRASOUND IMAGING | Facility: CLINIC | Age: 84
Discharge: HOME | End: 2024-02-22
Attending: NURSE PRACTITIONER | Admitting: NURSE PRACTITIONER
Payer: COMMERCIAL

## 2024-02-22 VITALS
DIASTOLIC BLOOD PRESSURE: 61 MMHG | OXYGEN SATURATION: 100 % | HEART RATE: 78 BPM | HEIGHT: 67 IN | SYSTOLIC BLOOD PRESSURE: 134 MMHG | WEIGHT: 128 LBS | BODY MASS INDEX: 20.09 KG/M2

## 2024-02-22 DIAGNOSIS — I72.9 PSEUDOANEURYSM FOLLOWING PROCEDURE (H): ICD-10-CM

## 2024-02-22 DIAGNOSIS — T81.718A PSEUDOANEURYSM FOLLOWING PROCEDURE (H): ICD-10-CM

## 2024-02-22 DIAGNOSIS — Z95.2 S/P TAVR (TRANSCATHETER AORTIC VALVE REPLACEMENT): Primary | ICD-10-CM

## 2024-02-22 DIAGNOSIS — T14.8XXA HEMATOMA OF SKIN: ICD-10-CM

## 2024-02-22 PROCEDURE — 99214 OFFICE O/P EST MOD 30 MIN: CPT | Performed by: NURSE PRACTITIONER

## 2024-02-22 PROCEDURE — 93925 LOWER EXTREMITY STUDY: CPT

## 2024-02-22 PROCEDURE — 93925 LOWER EXTREMITY STUDY: CPT | Mod: 26 | Performed by: INTERNAL MEDICINE

## 2024-02-22 NOTE — LETTER
2/22/2024    Kian Johns MD  [facility] [address] [facility] [address]    RE: Rose Duong       Dear Colleague,     I had the pleasure of seeing Rose Duong in the [facility].  Cardiology Clinic Progress Note  Rose Duong MRN# [id number]   YOB: 1940 Age: 83 year old     Primary cardiologist: Dr. 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    His procedure went well without complication.  He developed new left bundle branch block postoperatively.  Postprocedure echocardiogram showed well-seated bioprosthetic aortic valve with a mean gradient of 7 mmHg, trace AI, and preserved LV function with EF 65-70%.  Given his new LBBB, he was placed on a 30-day event monitor.  He was tolerating cardiac rehab and discharged on POD#1.     Today he reports doing well from a cardiovascular standpoint.  He has noticed a significant improvement in his breathing.  He he has more energy and is less fatigued.  He notes some mild tenderness at his groin sites bilaterally. He otherwise denies chest pain, syncope or near syncope, or palpitations.  His blood pressure is well-controlled.    I reviewed his most recent labs.  His CBC showed a hemoglobin of 8.3.  BMP demonstrates creatinine of 1.57 and GFR 43.           Assessment and Plan:     ASSESSMENT:    Severe aortic stenosis s/p TAVR with 23 mm Thompson BRIAN 3 valve.  Overall the patient is recovering well.  He reports a significant improvement in his energy level and shortness of breath despite his low hemoglobin.  Small bilateral  hematomas at common femoral access site.  Patient reports ongoing mild tenderness at his groin access sites, on exam there are bilateral hematomas.  Manual pressure was held today in clinic with improvement.    Chronic diastolic heart failure, NYHA class II.  Euvolemic on exam, not on diuretic therapy.  New LBBB postoperatively.  30-day event monitor placed at discharge.  Myeloproliferative disorder with subsequent anemia.  Follows closely with outpatient oncology, his baseline hemoglobin is around 10.  He gets iron infusions if his hemoglobin drops below this.  CKD, stage III-IV with baseline creatinine around 1.6-1.8.  Follows with outpatient nephrology, renal function is stable.      PLAN:     Recommend bilateral lower extremity arterial duplex ultrasound to rule out pseudoaneurysm.  Continue aspirin 81 mg daily for antiplatelet therapy.  Start outpatient cardiac rehab.  We discussed the need for lifelong antibiotic prophylaxis prior to any dental procedure.  He has follow-up with oncology on 2/26/2024 with repeat labs.   Follow-up with me in 1 month with repeat echocardiogram, labs, and EKG.         Rachel Wade DNP, APRN, CNP  Page: [phone] (8a-5p M-F)    Orders this Visit:  Orders Placed This Encounter   Procedures    US Lower Extremity Arterial Duplex Bilateral     No orders of the defined types were placed in this encounter.    There are no discontinued medications.    Today's clinic visit entailed:  Review of the result(s) of each unique test - echo, labs, EKG, TAVR  Ordering of each unique test  Prescription drug management  25 minutes spent by me on the date of the encounter doing chart review, review of test results, interpretation of tests, patient visit, and documentation   Provider  Link to [facility] Help Grid     The level of medical decision making during this visit was of moderate complexity.           Review of Systems:     Review of Systems:  Skin:        Eyes:       ENT:       Respiratory:     "   Cardiovascular:       Gastroenterology:      Genitourinary:       Musculoskeletal:       Neurologic:       Psychiatric:       Heme/Lymph/Imm:       Endocrine:                 Physical Exam:   Vitals: /61   Pulse 78   Ht 1.702 m (5' 7\")   Wt 58.1 kg (128 lb)   SpO2 100%   BMI 20.05 kg/m    Constitutional:  cooperative        Skin:  warm and dry to the touch        Head:  normocephalic        Eyes:  pupils equal and round        ENT:  not assessed this visit        Neck:  JVP normal        Chest:  clear to auscultation        Cardiac: regular rhythm;normal S1 and S2       systolic ejection murmur;grade 1          Abdomen:  abdomen soft        Vascular: pulses full and equal                                      Extremities and Back:  no edema        Neurological:  no gross motor deficits;affect appropriate             Medications:     Current Outpatient Medications   Medication Sig Dispense Refill    acetaminophen (TYLENOL) 500 MG tablet Take 500 mg by mouth every 8 hours as needed for mild pain      acyclovir (ZOVIRAX) 400 MG tablet Take 400 mg by mouth 2 times daily      allopurinol (ZYLOPRIM) 300 MG tablet Take 300 mg by mouth daily.      amLODIPine (NORVASC) 5 MG tablet Take 5 mg by mouth daily      aspirin 81 MG EC tablet Take 1 tablet (81 mg) by mouth daily 30 tablet 3    cholecalciferol 25 MCG (1000 UT) TABS Take 1,000 Units by mouth daily       finasteride (PROSCAR) 5 MG tablet Take 5 mg by mouth daily      pantoprazole (PROTONIX) 20 MG EC tablet Take 20 mg by mouth daily      predniSONE (DELTASONE) 5 MG tablet Take 5 mg by mouth every other day       rosuvastatin (CRESTOR) 20 MG tablet Take 20 mg by mouth daily      tamsulosin (FLOMAX) 0.4 MG 24 hr capsule Take 0.4 mg by mouth every evening          Family History   Problem Relation Age of Onset    Heart Disease Father        Social History     Socioeconomic History    Marital status:      Spouse name: Not on file    Number of children: " Not on file    Years of education: Not on file    Highest education level: Not on file   Occupational History    Not on file   Tobacco Use    Smoking status: Never    Smokeless tobacco: Never   Substance and Sexual Activity    Alcohol use: Yes     Comment: beer or two a day    Drug use: No    Sexual activity: Not on file   Other Topics Concern    Parent/sibling w/ CABG, MI or angioplasty before 65F 55M? Not Asked   Social History Narrative    Not on file     Social Determinants of Health     Financial Resource Strain: Not on file   Food Insecurity: Not on file   Transportation Needs: Not on file   Physical Activity: Not on file   Stress: Not on file   Social Connections: Not on file   Interpersonal Safety: Not on file   Housing Stability: Not on file            Past Medical History:     Past Medical History:   Diagnosis Date    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     Amyloidosis (H)     Anemia     Bacteremia     BPH (benign prostatic hyperplasia)     CKD (chronic kidney disease) stage 4, 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    Colon polyps     Coronary artery disease involving native coronary artery, angina presence unspecified, unspecified whether native or transplanted heart 05/01/2020    Added automatically from request for surgery [id number]    DJD (degenerative joint disease)     Elevated BP without diagnosis of hypertension     Gastroesophageal reflux disease     Gout     Gout     Hyperlipidemia     Hypertension     Leukocytosis     MDS (myelodysplastic syndrome) (H)     Metabolic acidosis     due to CKD    Mixed hyperlipidemia 05/01/2020    Added automatically from request for surgery [id number]    Multiple myeloma (H)     Nonrheumatic aortic valve stenosis 05/01/2020    Added automatically from request for surgery [id number]    Rotator cuff tear arthropathy, left     Sinusitis     Spinal stenosis in cervical region 09/25/2017              Past Surgical History:      Past Surgical History:   Procedure Laterality Date    APPENDECTOMY      ASPIRATION NEEDLE HIP Left 9/25/2017    Procedure: ASPIRATION NEEDLE HIP;;  Surgeon: Moises Elias MD;  Location:  OR    BACK SURGERY  11/07/2017    cervical spine surgery     COLONOSCOPY      CV CORONARY ANGIOGRAM N/A 5/6/2020    Procedure: Coronary Angiogram;  Surgeon: David Woods MD;  Location:  HEART CARDIAC CATH LAB    CV CORONARY ANGIOGRAM N/A 1/15/2024    Procedure: Coronary Angiogram;  Surgeon: Nicholas Moore MD;  Location:  HEART CARDIAC CATH LAB    CV LEFT HEART CATH N/A 5/6/2020    Procedure: Left Heart Cath;  Surgeon: David Woods MD;  Location:  HEART CARDIAC CATH LAB    CV PCI N/A 1/15/2024    Procedure: Percutaneous Coronary Intervention;  Surgeon: Nicholas Moore MD;  Location:  HEART CARDIAC CATH LAB    CV RIGHT HEART CATH MEASUREMENTS RECORDED N/A 5/6/2020    Procedure: Right Heart Cath;  Surgeon: David Woods MD;  Location:  HEART CARDIAC CATH LAB    CV RIGHT HEART CATH MEASUREMENTS RECORDED N/A 1/15/2024    Procedure: Right Heart Catheterization;  Surgeon: Nicholas Moore MD;  Location:  HEART CARDIAC CATH LAB    CV TRANSCATHETER AORTIC VALVE REPLACEMENT-FEMORAL APPROACH N/A 2/13/2024    Procedure: Transcatheter Aortic Valve Replacement-Femoral Approach;  Surgeon: Nicholas Moore MD;  Location:  HEART CARDIAC CATH LAB    DECOMPRESSION LUMBAR TWO LEVELS Bilateral 6/11/2018    Procedure: DECOMPRESSION LUMBAR TWO LEVELS;  BILATERAL LUMBAR 3-4 AND LUMBAR 4-5 DECOMPRESSION ;  Surgeon: Sd Vallejo MD;  Location:  OR    FUSION CERVICAL ANTERIOR ONE LEVEL WITH BONE ALLOGRAFT N/A 9/25/2017    Procedure: FUSION CERVICAL ANTERIOR ONE LEVEL WITH BONE ALLOGRAFT;  ANTERIOR CERVICAL DECOMPRESSION AND FUSIONC3-4 WITH INSTRUMENTATION, ALLOGRAFT AND BONE MARROW ASPIRATE OF THE LEFT ILIAC CREST ;  Surgeon: Moises Elias MD;  Location:   OR    PHACOEMULSIFICATION CLEAR CORNEA W/ STANDARD IOL IMPLANT, ENDOSCOPIC CYCLOPHOTOCOAGULATION, COMBINED Right 12/12/2017    Procedure: COMBINED PHACOEMULSIFICATION CLEAR CORNEA WITH STANDARD INTRAOCULAR LENS IMPLANT, ENDOSCOPIC CYCLOPHOTOCOAGULATION;  COMPLEX RIGHT EYE COMBINED PHACOEMULSIFICATION CLEAR CORNEA WITH STANDARD INTRAOCULAR LENS IMPLANT, ENDOSCOPIC CYCLOPHOTOCOAGULATION ;  Surgeon: Alli Mott MD;  Location: [facility]    PHACOEMULSIFICATION CLEAR CORNEA W/ STANDARD IOL IMPLANT, ENDOSCOPIC CYCLOPHOTOCOAGULATION, COMBINED Left 5/1/2018    Procedure: COMBINED PHACOEMULSIFICATION CLEAR CORNEA WITH STANDARD INTRAOCULAR LENS IMPLANT, ENDOSCOPIC CYCLOPHOTOCOAGULATION;  COMPLEX LEFT EYE PHACOEMULSIFICATION CLEAR CORNEA WITH STANDARD INTRAOCULAR LENS IMPLANT, ENDOSCOPIC CYCLOPHOTOCOAGULATION ;  Surgeon: Alli Mott MD;  Location: [facility]    ROTATOR CUFF REPAIR RT/LT  2004              Allergies:   Patient has no known allergies.       Data:   All laboratory data reviewed:    Recent Labs   Lab Test 02/08/24  0931 09/01/21  0814 08/09/21  1804   LDL  --  45  --    HDL  --  54  --    NHDL  --  68  --    CHOL  --  122  --    TRIG  --  114  --    NTBNP 6,555*  --   --    IRON  --   --  11*   FEB  --   --  258   IRONSAT  --   --  4*   GEO  --   --  453*       Lab Results   Component Value Date    WBC 10.6 02/14/2024    WBC 10.1 06/26/2021    RBC 2.87 (L) 02/14/2024    RBC 3.75 (L) 06/26/2021    HGB 8.3 (L) 02/14/2024    HGB 8.4 (L) 06/26/2021    HCT 25.6 (L) 02/14/2024    HCT 28.4 (L) 06/26/2021    MCV 89 02/14/2024    MCV 76 (L) 06/26/2021    MCH 28.9 02/14/2024    MCH 22.4 (L) 06/26/2021    MCHC 32.4 02/14/2024    MCHC 29.6 (L) 06/26/2021    RDW 14.0 02/14/2024    RDW 18.8 (H) 06/26/2021     02/14/2024     06/26/2021       Lab Results   Component Value Date     02/14/2024     06/26/2021    POTASSIUM 4.0 02/14/2024    POTASSIUM 4.3 10/26/2021    POTASSIUM 3.7 06/26/2021    CHLORIDE  "113 (H) 02/14/2024    CHLORIDE 107 10/26/2021    CHLORIDE 116 (H) 06/26/2021    CO2 19 (L) 02/14/2024    CO2 21 10/26/2021    CO2 19 (L) 06/26/2021    ANIONGAP 9 02/14/2024    ANIONGAP 8 10/26/2021    ANIONGAP 6 06/26/2021    GLC 93 02/14/2024     (H) 10/26/2021     (H) 06/26/2021    BUN 34.1 (H) 02/14/2024    BUN 44 (H) 10/26/2021    BUN 22 06/26/2021    CR 1.75 (H) 02/14/2024    CR 1.44 (H) 06/26/2021    GFRESTIMATED 38 (L) 02/14/2024    GFRESTIMATED 45 (L) 06/26/2021    GFRESTBLACK 52 (L) 06/26/2021    CONNIE 8.6 (L) 02/14/2024    CONNIE 8.7 06/26/2021      Lab Results   Component Value Date    AST 73 (H) 01/15/2024    AST 27 06/24/2021    ALT 54 01/15/2024    ALT 42 06/24/2021       No results found for: \"A1C\"    Lab Results   Component Value Date    INR 1.03 02/08/2024    INR 1.03 01/15/2024    INR 1.06 05/06/2020         Thank you for allowing me to participate in the care of your patient.      Sincerely,     Rachel Wade, [facility]  cc:   No referring provider defined for this encounter.      "

## 2024-02-22 NOTE — PROGRESS NOTES
Cardiology Clinic Progress Note  Rose Duong MRN# [id number]   YOB: 1940 Age: 83 year old     Primary cardiologist: Dr. 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    His procedure went well without complication.  He developed new left bundle branch block postoperatively.  Postprocedure echocardiogram showed well-seated bioprosthetic aortic valve with a mean gradient of 7 mmHg, trace AI, and preserved LV function with EF 65-70%.  Given his new LBBB, he was placed on a 30-day event monitor.  He was tolerating cardiac rehab and discharged on POD#1.     Today he reports doing well from a cardiovascular standpoint.  He has noticed a significant improvement in his breathing.  He he has more energy and is less fatigued.  He notes some mild tenderness at his groin sites bilaterally. He otherwise denies chest pain, syncope or near syncope, or palpitations.  His blood pressure is well-controlled.    I reviewed his most recent labs.  His CBC showed a hemoglobin of 8.3.  BMP demonstrates creatinine of 1.57 and GFR 43.           Assessment and Plan:     ASSESSMENT:    Severe aortic stenosis s/p TAVR with 23 mm Thompson BRIAN 3 valve.  Overall the patient is recovering well.  He reports a significant improvement in his energy level and shortness of breath despite his low hemoglobin.  Small bilateral hematomas at common femoral access site.  Patient reports ongoing mild tenderness at his groin access sites, on exam there are bilateral hematomas.  Manual pressure was held today in clinic with improvement.    Chronic  diastolic heart failure, NYHA class II.  Euvolemic on exam, not on diuretic therapy.  New LBBB postoperatively.  30-day event monitor placed at discharge.  Myeloproliferative disorder with subsequent anemia.  Follows closely with outpatient oncology, his baseline hemoglobin is around 10.  He gets iron infusions if his hemoglobin drops below this.  CKD, stage III-IV with baseline creatinine around 1.6-1.8.  Follows with outpatient nephrology, renal function is stable.      PLAN:     Recommend bilateral lower extremity arterial duplex ultrasound to rule out pseudoaneurysm.  Continue aspirin 81 mg daily for antiplatelet therapy.  Start outpatient cardiac rehab.  We discussed the need for lifelong antibiotic prophylaxis prior to any dental procedure.  He has follow-up with oncology on 2/26/2024 with repeat labs.   Follow-up with me in 1 month with repeat echocardiogram, labs, and EKG.         aRchel Wade DNP, APRN, CNP  Page: [phone] (8a-5p M-F)    Orders this Visit:  Orders Placed This Encounter   Procedures    US Lower Extremity Arterial Duplex Bilateral     No orders of the defined types were placed in this encounter.    There are no discontinued medications.    Today's clinic visit entailed:  Review of the result(s) of each unique test - echo, labs, EKG, TAVR  Ordering of each unique test  Prescription drug management  25 minutes spent by me on the date of the encounter doing chart review, review of test results, interpretation of tests, patient visit, and documentation   Provider  Link to [facility] Help Grid     The level of medical decision making during this visit was of moderate complexity.           Review of Systems:     Review of Systems:  Skin:        Eyes:       ENT:       Respiratory:       Cardiovascular:       Gastroenterology:      Genitourinary:       Musculoskeletal:       Neurologic:       Psychiatric:       Heme/Lymph/Imm:       Endocrine:                 Physical Exam:   Vitals: /61    "Pulse 78   Ht 1.702 m (5' 7\")   Wt 58.1 kg (128 lb)   SpO2 100%   BMI 20.05 kg/m    Constitutional:  cooperative        Skin:  warm and dry to the touch        Head:  normocephalic        Eyes:  pupils equal and round        ENT:  not assessed this visit        Neck:  JVP normal        Chest:  clear to auscultation        Cardiac: regular rhythm;normal S1 and S2       systolic ejection murmur;grade 1          Abdomen:  abdomen soft        Vascular: pulses full and equal                                      Extremities and Back:  no edema        Neurological:  no gross motor deficits;affect appropriate             Medications:     Current Outpatient Medications   Medication Sig Dispense Refill    acetaminophen (TYLENOL) 500 MG tablet Take 500 mg by mouth every 8 hours as needed for mild pain      acyclovir (ZOVIRAX) 400 MG tablet Take 400 mg by mouth 2 times daily      allopurinol (ZYLOPRIM) 300 MG tablet Take 300 mg by mouth daily.      amLODIPine (NORVASC) 5 MG tablet Take 5 mg by mouth daily      aspirin 81 MG EC tablet Take 1 tablet (81 mg) by mouth daily 30 tablet 3    cholecalciferol 25 MCG (1000 UT) TABS Take 1,000 Units by mouth daily       finasteride (PROSCAR) 5 MG tablet Take 5 mg by mouth daily      pantoprazole (PROTONIX) 20 MG EC tablet Take 20 mg by mouth daily      predniSONE (DELTASONE) 5 MG tablet Take 5 mg by mouth every other day       rosuvastatin (CRESTOR) 20 MG tablet Take 20 mg by mouth daily      tamsulosin (FLOMAX) 0.4 MG 24 hr capsule Take 0.4 mg by mouth every evening          Family History   Problem Relation Age of Onset    Heart Disease Father        Social History     Socioeconomic History    Marital status:      Spouse name: Not on file    Number of children: Not on file    Years of education: Not on file    Highest education level: Not on file   Occupational History    Not on file   Tobacco Use    Smoking status: Never    Smokeless tobacco: Never   Substance and Sexual " Activity    Alcohol use: Yes     Comment: beer or two a day    Drug use: No    Sexual activity: Not on file   Other Topics Concern    Parent/sibling w/ CABG, MI or angioplasty before 65F 55M? Not Asked   Social History Narrative    Not on file     Social Determinants of Health     Financial Resource Strain: Not on file   Food Insecurity: Not on file   Transportation Needs: Not on file   Physical Activity: Not on file   Stress: Not on file   Social Connections: Not on file   Interpersonal Safety: Not on file   Housing Stability: Not on file            Past Medical History:     Past Medical History:   Diagnosis Date    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     Amyloidosis (H)     Anemia     Bacteremia     BPH (benign prostatic hyperplasia)     CKD (chronic kidney disease) stage 4, 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    Colon polyps     Coronary artery disease involving native coronary artery, angina presence unspecified, unspecified whether native or transplanted heart 05/01/2020    Added automatically from request for surgery [id number]    DJD (degenerative joint disease)     Elevated BP without diagnosis of hypertension     Gastroesophageal reflux disease     Gout     Gout     Hyperlipidemia     Hypertension     Leukocytosis     MDS (myelodysplastic syndrome) (H)     Metabolic acidosis     due to CKD    Mixed hyperlipidemia 05/01/2020    Added automatically from request for surgery [id number]    Multiple myeloma (H)     Nonrheumatic aortic valve stenosis 05/01/2020    Added automatically from request for surgery [id number]    Rotator cuff tear arthropathy, left     Sinusitis     Spinal stenosis in cervical region 09/25/2017              Past Surgical History:     Past Surgical History:   Procedure Laterality Date    APPENDECTOMY      ASPIRATION NEEDLE HIP Left 9/25/2017    Procedure: ASPIRATION NEEDLE HIP;;  Surgeon: Moises Elias MD;  Location:  OR    BACK  SURGERY  11/07/2017    cervical spine surgery     COLONOSCOPY      CV CORONARY ANGIOGRAM N/A 5/6/2020    Procedure: Coronary Angiogram;  Surgeon: David Woods MD;  Location:  HEART CARDI[facility] CATH LAB    CV CORONARY ANGIOGRAM N/A 1/15/2024    Procedure: Coronary Angiogram;  Surgeon: Nicholas Moore MD;  Location:  HEART [facility]ARDI[facility] CATH LAB    CV LEFT HEART CATH N/A 5/6/2020    Procedure: Left Heart Cath;  Surgeon: David Woods MD;  Location:  HEART [facility]ARD[facility] CATH LAB    CV PCI N/A 1/15/2024    Procedure: Percutaneous Coronary Intervention;  Surgeon: Nicholas Moore MD;  Location:  HEART CARDIAC CATH LAB    CV RIGHT HEART CATH MEASUREMENTS RECORDED N/A 5/6/2020    Procedure: Right Heart Cath;  Surgeon: David Woods MD;  Location:  HEART CARDIAC CATH LAB    CV RIGHT HEART CATH MEASUREMENTS RECORDED N/A 1/15/2024    Procedure: Right Heart Catheterization;  Surgeon: Nicholas Moore MD;  Location:  HEART CARDIAC CATH LAB    CV TRANSCATHETER AORTIC VALVE REPLACEMENT-FEMORAL APPROACH N/A 2/13/2024    Procedure: Transcatheter Aortic Valve Replacement-Femoral Approach;  Surgeon: Nicholas Moore MD;  Location:  HEART CARDIAC CATH LAB    DECOMPRESSION LUMBAR TWO LEVELS Bilateral 6/11/2018    Procedure: DECOMPRESSION LUMBAR TWO LEVELS;  BILATERAL LUMBAR 3-4 AND LUMBAR 4-5 DECOMPRESSION ;  Surgeon: Sd Vallejo MD;  Location: [facility] OR    FUSION CERVICAL ANTERIOR ONE LEVEL WITH BONE ALLOGRAFT N/A 9/25/2017    Procedure: FUSION CERVICAL ANTERIOR ONE LEVEL WITH BONE ALLOGRAFT;  ANTERIOR CERVICAL DECOMPRESSION AND FUSIONC3-4 WITH INSTRUMENTATION, ALLOGRAFT AND BONE MARROW ASPIRATE OF THE LEFT ILIAC CREST ;  Surgeon: Moises Elias MD;  Location: [facility] OR    PHACOEMULSIFICATION CLEAR CORNEA W/ STANDARD IOL IMPLANT, ENDOSCOPIC CYCLOPHOTOCOAGULATION, COMBINED Right 12/12/2017    Procedure: COMBINED PHACOEMULSIFICATION CLEAR CORNEA WITH STANDARD INTRAOCULAR LENS  IMPLANT, ENDOSCOPIC CYCLOPHOTOCOAGULATION;  COMPLEX RIGHT EYE COMBINED PHACOEMULSIFICATION CLEAR CORNEA WITH STANDARD INTRAOCULAR LENS IMPLANT, ENDOSCOPIC CYCLOPHOTOCOAGULATION ;  Surgeon: Alli Mott MD;  Location: [facility]    PHACOEMULSIFICATION CLEAR CORNEA W/ STANDARD IOL IMPLANT, ENDOSCOPIC CYCLOPHOTOCOAGULATION, COMBINED Left 5/1/2018    Procedure: COMBINED PHACOEMULSIFICATION CLEAR CORNEA WITH STANDARD INTRAOCULAR LENS IMPLANT, ENDOSCOPIC CYCLOPHOTOCOAGULATION;  COMPLEX LEFT EYE PHACOEMULSIFICATION CLEAR CORNEA WITH STANDARD INTRAOCULAR LENS IMPLANT, ENDOSCOPIC CYCLOPHOTOCOAGULATION ;  Surgeon: Alli Mott MD;  Location: [facility]    ROTATOR CUFF REPAIR RT/LT  2004              Allergies:   Patient has no known allergies.       Data:   All laboratory data reviewed:    Recent Labs   Lab Test 02/08/24  0931 09/01/21  0814 08/09/21  1804   LDL  --  45  --    HDL  --  54  --    NHDL  --  68  --    CHOL  --  122  --    TRIG  --  114  --    NTBNP 6,555*  --   --    IRON  --   --  11*   FEB  --   --  258   IRONSAT  --   --  4*   GEO  --   --  453*       Lab Results   Component Value Date    WBC 10.6 02/14/2024    WBC 10.1 06/26/2021    RBC 2.87 (L) 02/14/2024    RBC 3.75 (L) 06/26/2021    HGB 8.3 (L) 02/14/2024    HGB 8.4 (L) 06/26/2021    HCT 25.6 (L) 02/14/2024    HCT 28.4 (L) 06/26/2021    MCV 89 02/14/2024    MCV 76 (L) 06/26/2021    MCH 28.9 02/14/2024    MCH 22.4 (L) 06/26/2021    MCHC 32.4 02/14/2024    MCHC 29.6 (L) 06/26/2021    RDW 14.0 02/14/2024    RDW 18.8 (H) 06/26/2021     02/14/2024     06/26/2021       Lab Results   Component Value Date     02/14/2024     06/26/2021    POTASSIUM 4.0 02/14/2024    POTASSIUM 4.3 10/26/2021    POTASSIUM 3.7 06/26/2021    CHLORIDE 113 (H) 02/14/2024    CHLORIDE 107 10/26/2021    CHLORIDE 116 (H) 06/26/2021    CO2 19 (L) 02/14/2024    CO2 21 10/26/2021    CO2 19 (L) 06/26/2021    ANIONGAP 9 02/14/2024    ANIONGAP 8 10/26/2021    ANIONGAP 6  "06/26/2021    GLC 93 02/14/2024     (H) 10/26/2021     (H) 06/26/2021    BUN 34.1 (H) 02/14/2024    BUN 44 (H) 10/26/2021    BUN 22 06/26/2021    CR 1.75 (H) 02/14/2024    CR 1.44 (H) 06/26/2021    GFRESTIMATED 38 (L) 02/14/2024    GFRESTIMATED 45 (L) 06/26/2021    GFRESTBLACK 52 (L) 06/26/2021    CONNIE 8.6 (L) 02/14/2024    CONNIE 8.7 06/26/2021      Lab Results   Component Value Date    AST 73 (H) 01/15/2024    AST 27 06/24/2021    ALT 54 01/15/2024    ALT 42 06/24/2021       No results found for: \"A1C\"    Lab Results   Component Value Date    INR 1.03 02/08/2024    INR 1.03 01/15/2024    INR 1.06 05/06/2020         "

## 2024-02-22 NOTE — PATIENT INSTRUCTIONS
Today's Plan:     - Get ultrasound today.   - Start cardiac rehab.   - Follow-up with me in 1 month with repeat echocardiogram.     If you have questions or concerns please call my nurse team:  Nena RN ([phone]) and Taylor RN ([phone])    After Hours: [phone], option #2, ask for cardiologist on-call    Scheduling phone number: [phone]    Reminder: Please bring in all current medications, over the counter supplements and vitamin bottles to your next appointment.-    It was a pleasure seeing you today!     Rachel Wade, ANTWAN  2/22/2024    -

## 2024-02-23 ENCOUNTER — HOSPITAL ENCOUNTER (OUTPATIENT)
Dept: CARDIAC REHAB | Facility: CLINIC | Age: 84
Discharge: HOME | End: 2024-02-23
Attending: STUDENT IN AN ORGANIZED HEALTH CARE EDUCATION/TRAINING PROGRAM
Payer: COMMERCIAL

## 2024-02-23 DIAGNOSIS — I35.0 SEVERE AORTIC STENOSIS: ICD-10-CM

## 2024-02-23 PROCEDURE — 93798 PHYS/QHP OP CAR RHAB W/ECG: CPT | Performed by: OCCUPATIONAL THERAPIST

## 2024-02-23 PROCEDURE — 93797 PHYS/QHP OP CAR RHAB WO ECG: CPT | Mod: 59 | Performed by: OCCUPATIONAL THERAPIST

## 2024-03-05 ENCOUNTER — HOSPITAL ENCOUNTER (OUTPATIENT)
Dept: CARDIAC REHAB | Facility: CLINIC | Age: 84
Discharge: HOME | End: 2024-03-05
Attending: STUDENT IN AN ORGANIZED HEALTH CARE EDUCATION/TRAINING PROGRAM
Payer: COMMERCIAL

## 2024-03-05 PROCEDURE — 93798 PHYS/QHP OP CAR RHAB W/ECG: CPT

## 2024-03-14 ENCOUNTER — HOSPITAL ENCOUNTER (OUTPATIENT)
Dept: CARDIOLOGY | Facility: CLINIC | Age: 84
Discharge: HOME | End: 2024-03-14
Attending: INTERNAL MEDICINE | Admitting: INTERNAL MEDICINE
Payer: COMMERCIAL

## 2024-03-14 ENCOUNTER — OFFICE VISIT (OUTPATIENT)
Dept: CARDIOLOGY | Facility: CLINIC | Age: 84
End: 2024-03-14
Attending: INTERNAL MEDICINE
Payer: COMMERCIAL

## 2024-03-14 ENCOUNTER — LAB (OUTPATIENT)
Dept: LAB | Facility: CLINIC | Age: 84
End: 2024-03-14
Attending: INTERNAL MEDICINE
Payer: COMMERCIAL

## 2024-03-14 VITALS
WEIGHT: 127.6 LBS | HEIGHT: 67 IN | SYSTOLIC BLOOD PRESSURE: 132 MMHG | OXYGEN SATURATION: 99 % | HEART RATE: 90 BPM | DIASTOLIC BLOOD PRESSURE: 51 MMHG | BODY MASS INDEX: 20.03 KG/M2

## 2024-03-14 DIAGNOSIS — Z95.3 S/P TAVR (TRANSCATHETER AORTIC VALVE REPLACEMENT), BIOPROSTHETIC: ICD-10-CM

## 2024-03-14 LAB
ANION GAP SERPL CALCULATED.3IONS-SCNC: 11 MMOL/L (ref 7–15)
BUN SERPL-MCNC: 43.5 MG/DL (ref 8–23)
CALCIUM SERPL-MCNC: 9.2 MG/DL (ref 8.8–10.2)
CHLORIDE SERPL-SCNC: 112 MMOL/L (ref 98–107)
CREAT SERPL-MCNC: 1.87 MG/DL (ref 0.67–1.17)
DEPRECATED HCO3 PLAS-SCNC: 17 MMOL/L (ref 22–29)
EGFRCR SERPLBLD CKD-EPI 2021: 35 ML/MIN/1.73M2
ERYTHROCYTE [DISTWIDTH] IN BLOOD BY AUTOMATED COUNT: 16 % (ref 10–15)
GLUCOSE SERPL-MCNC: 92 MG/DL (ref 70–99)
HCT VFR BLD AUTO: 28.8 % (ref 40–53)
HGB BLD-MCNC: 9 G/DL (ref 13.3–17.7)
LVEF ECHO: NORMAL
MCH RBC QN AUTO: 29.5 PG (ref 26.5–33)
MCHC RBC AUTO-ENTMCNC: 31.3 G/DL (ref 31.5–36.5)
MCV RBC AUTO: 94 FL (ref 78–100)
PLATELET # BLD AUTO: 146 10E3/UL (ref 150–450)
POTASSIUM SERPL-SCNC: 5 MMOL/L (ref 3.4–5.3)
RBC # BLD AUTO: 3.05 10E6/UL (ref 4.4–5.9)
SODIUM SERPL-SCNC: 140 MMOL/L (ref 135–145)
WBC # BLD AUTO: 9.9 10E3/UL (ref 4–11)

## 2024-03-14 PROCEDURE — 85027 COMPLETE CBC AUTOMATED: CPT | Performed by: INTERNAL MEDICINE

## 2024-03-14 PROCEDURE — 99214 OFFICE O/P EST MOD 30 MIN: CPT | Mod: 25 | Performed by: NURSE PRACTITIONER

## 2024-03-14 PROCEDURE — 93306 TTE W/DOPPLER COMPLETE: CPT | Mod: 26 | Performed by: INTERNAL MEDICINE

## 2024-03-14 PROCEDURE — 93306 TTE W/DOPPLER COMPLETE: CPT

## 2024-03-14 PROCEDURE — 80048 BASIC METABOLIC PNL TOTAL CA: CPT | Performed by: INTERNAL MEDICINE

## 2024-03-14 PROCEDURE — 93000 ELECTROCARDIOGRAM COMPLETE: CPT | Mod: 59 | Performed by: INTERNAL MEDICINE

## 2024-03-14 PROCEDURE — 36415 COLL VENOUS BLD VENIPUNCTURE: CPT | Performed by: INTERNAL MEDICINE

## 2024-03-14 NOTE — PATIENT INSTRUCTIONS
Today's Plan:     - Follow-up with Dr. Chun in July with repeat echocardiogram.     If you have questions or concerns please call my nurse team:  Nena RN ([phone]) and Taylor RN ([phone])    After Hours: [phone], option #2, ask for cardiologist on-call    Scheduling phone number: [phone]    Reminder: Please bring in all current medications, over the counter supplements and vitamin bottles to your next appointment.-    It was a pleasure seeing you today!     Rachel Wade, ANTWAN  3/14/2024    -

## 2024-03-14 NOTE — PROGRESS NOTES
Cardiology Clinic Progress Note  Rose Duong MRN# [id number]   YOB: 1940 Age: 83 year old     Primary cardiologist: Dr. 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    His TAVR procedure went well without complication.  He developed new left bundle branch block postoperatively.  Postprocedure echocardiogram showed well-seated bioprosthetic aortic valve with a mean gradient of 7 mmHg, trace AI, and preserved LV function with EF 65-70%.  Given his new LBBB, he was placed on a 30-day event monitor.  He was tolerating cardiac rehab and discharged on POD#1.     When I last saw Isrrael, he had small bilateral hematomas at his access sites with associated tenderness.  He otherwise did report a significant improvement in his exertional symptoms.  He underwent duplex ultrasound that revealed hematoma but no pseudoaneurysm.     Today the patient continues to report significant improvement in his breathing.  He is tolerating activity without any exertional symptoms.  He continues to receive iron infusions when hemoglobin is less than 10.  He denies chest pain, shortness of breath, syncope or near syncope, or palpitations.  No PND orthopnea.     EKG today shows normal sinus rhythm with narrow complex.     Repeat echocardiogram today shows well-seated bioprosthetic aortic valve with a mean gradient of 13 mmHg, trace PVL, moderate to severe MAC with mild MR, and preserved LV systolic function.    His recent BMP demonstrates creatinine of 1.87, GFR 35.  His CBC shows hemoglobin of 9 and platelets 146.           Assessment and Plan:     ASSESSMENT:    Severe aortic stenosis  s/p TAVR with 23 mm Thompson BRIAN 3 valve.  He continues to do well without any exertional symptoms. Repeat echo shows well functioning valve with small PVL. His groin sites have healed well. On ASA 81 mg daily.   Chronic diastolic heart failure, NYHA class II.  Euvolemic on exam, not on diuretic therapy.  Transient LBBB postoperatively.  30-day event monitor showed no advanced AV block.   Myeloproliferative disorder with subsequent anemia.  Follows closely with hem/onc, iron infusions for hgb < 10.   CKD, stage III-IV and amyloid nephropathy. Follows with outpatient nephrology, cr up to 1.87, stable under 2.0.   Multiple myeloma with systemic AL amyloidosis. S/p chemotherapy in 2021 and 2023, under close observation.   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 Appears to have progressed slightly on most recent TTE.       PLAN:     Overall Isrrael has noticed a significant improvement since his TAVR procedure, he denies any significant exertional symptoms.  He will continue his current cardiac regimen.  He has opted out of cardiac rehab due to insurance coverage, which I think is reasonable.  We discussed the need for lifelong antibiotic prophylaxis prior to any dental procedure.  Follow-up with Dr. Chun in approximately 6 months, sooner if needed.         Rachel Wade, SANIYA, APRN, CNP  Page: [phone] (8a-5p M-F)    Orders this Visit:  No orders of the defined types were placed in this encounter.    No orders of the defined types were placed in this encounter.    There are no discontinued medications.    Today's clinic visit entailed:  Review of the result(s) of each unique test - echo, labs, EKG, tavr  Ordering of each unique test  Prescription drug management  35 minutes spent by me on the date of the encounter doing chart review, review of test results, interpretation of tests, patient visit, and documentation   Provider  Link to [facility] Help Grid     The level of medical decision making during this visit was of moderate  "complexity.           Review of Systems:     Review of Systems:  Skin:        Eyes:       ENT:       Respiratory:  Negative shortness of breath  Cardiovascular:  Negative;palpitations;chest pain;edema;fatigue;lightheadedness;dizziness    Gastroenterology:      Genitourinary:       Musculoskeletal:       Neurologic:       Psychiatric:       Heme/Lymph/Imm:       Endocrine:  Negative thyroid disorder;diabetes            Physical Exam:   Vitals: /51 (BP Location: Left arm, Patient Position: Sitting)   Pulse 90   Ht 1.702 m (5' 7\")   Wt 57.9 kg (127 lb 9.6 oz)   SpO2 99%   BMI 19.98 kg/m    Constitutional:  cooperative        Skin:  warm and dry to the touch        Head:  normocephalic        Eyes:  pupils equal and round        ENT:  no pallor or cyanosis        Neck:  JVP normal        Chest:  normal breath sounds, clear to auscultation, normal A-P diameter, normal symmetry, normal respiratory excursion, no use of accessory muscles        Cardiac: regular rhythm;normal S1 and S2       systolic ejection murmur;grade 1          Abdomen:  abdomen soft        Vascular: pulses full and equal                                      Extremities and Back:  no edema        Neurological:  no gross motor deficits;affect appropriate             Medications:     Current Outpatient Medications   Medication Sig Dispense Refill    acetaminophen (TYLENOL) 500 MG tablet Take 500 mg by mouth every 8 hours as needed for mild pain      acyclovir (ZOVIRAX) 400 MG tablet Take 400 mg by mouth 2 times daily      allopurinol (ZYLOPRIM) 300 MG tablet Take 300 mg by mouth daily.      amLODIPine (NORVASC) 5 MG tablet Take 5 mg by mouth daily      aspirin 81 MG EC tablet Take 1 tablet (81 mg) by mouth daily 30 tablet 3    cholecalciferol 25 MCG (1000 UT) TABS Take 1,000 Units by mouth daily       finasteride (PROSCAR) 5 MG tablet Take 5 mg by mouth daily      pantoprazole (PROTONIX) 20 MG EC tablet Take 20 mg by mouth daily      predniSONE " (DELTASONE) 5 MG tablet Take 5 mg by mouth every other day       rosuvastatin (CRESTOR) 20 MG tablet Take 20 mg by mouth daily      tamsulosin (FLOMAX) 0.4 MG 24 hr capsule Take 0.4 mg by mouth every evening          Family History   Problem Relation Age of Onset    Heart Disease Father        Social History     Socioeconomic History    Marital status:      Spouse name: Not on file    Number of children: Not on file    Years of education: Not on file    Highest education level: Not on file   Occupational History    Not on file   Tobacco Use    Smoking status: Never    Smokeless tobacco: Never   Substance and Sexual Activity    Alcohol use: Yes     Comment: beer or two a day    Drug use: No    Sexual activity: Not on file   Other Topics Concern    Parent/sibling w/ CABG, MI or angioplasty before 65F 55M? Not Asked   Social History Narrative    Not on file     Social Determinants of Health     Financial Resource Strain: Not on file   Food Insecurity: Not on file   Transportation Needs: Not on file   Physical Activity: Not on file   Stress: Not on file   Social Connections: Not on file   Interpersonal Safety: Not on file   Housing Stability: Not on file            Past Medical History:     Past Medical History:   Diagnosis Date    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     Amyloidosis (H)     Anemia     Bacteremia     BPH (benign prostatic hyperplasia)     CKD (chronic kidney disease) stage 4, 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    Colon polyps     Coronary artery disease involving native coronary artery, angina presence unspecified, unspecified whether native or transplanted heart 05/01/2020    Added automatically from request for surgery [id number]    DJD (degenerative joint disease)     Elevated BP without diagnosis of hypertension     Gastroesophageal reflux disease     Gout     Gout     Hyperlipidemia     Hypertension     Leukocytosis     MDS  (myelodysplastic syndrome) (H)     Metabolic acidosis     due to CKD    Mixed hyperlipidemia 05/01/2020    Added automatically from request for surgery [id number]    Multiple myeloma (H)     Nonrheumatic aortic valve stenosis 05/01/2020    Added automatically from request for surgery [id number]    Rotator cuff tear arthropathy, left     Sinusitis     Spinal stenosis in cervical region 09/25/2017              Past Surgical History:     Past Surgical History:   Procedure Laterality Date    APPENDECTOMY      ASPIRATION NEEDLE HIP Left 9/25/2017    Procedure: ASPIRATION NEEDLE HIP;;  Surgeon: Moises Elias MD;  Location:  OR    BACK SURGERY  11/07/2017    cervical spine surgery     COLONOSCOPY      CV CORONARY ANGIOGRAM N/A 5/6/2020    Procedure: Coronary Angiogram;  Surgeon: David Woods MD;  Location:  [facility]ATH [facility]    CV CORONARY ANGIOGRAM N/A 1/15/2024    Procedure: Coronary Angiogram;  Surgeon: Nicholas Moore MD;  Location:  [facility]    CV LEFT HEART CATH N/A 5/6/2020    Procedure: Left Heart Cath;  Surgeon: David Woods MD;  Location:  [facility]    CV PCI N/A 1/15/2024    Procedure: Percutaneous Coronary Intervention;  Surgeon: Nicholas Moore MD;  Location:  HE[facility]    CV RIGHT HEART CATH MEASUREMENTS RECORDED N/A 5/6/2020    Procedure: Right Heart Cath;  Surgeon: David Woods MD;  Location:  HE[facility]ARD[facility] CATH LAB    CV RIGHT HEART CATH MEASUREMENTS RECORDED N/A 1/15/2024    Procedure: Right Heart Catheterization;  Surgeon: Nicholas Moore MD;  Location:  HEART [facility]ARD[facility] CATH LAB    CV TRANSCATHETER AORTIC VALVE REPLACEMENT-FEMORAL APPROACH N/A 2/13/2024    Procedure: Transcatheter Aortic Valve Replacement-Femoral Approach;  Surgeon: Nicholas Moore MD;  Location:  HEART [facility]ARD[facility] CATH LAB    DECOMPRESSION LUMBAR TWO LEVELS Bilateral 6/11/2018    Procedure: DECOMPRESSION LUMBAR TWO LEVELS;   BILATERAL LUMBAR 3-4 AND LUMBAR 4-5 DECOMPRESSION ;  Surgeon: Sd Vallejo MD;  Location: [facility]    FUSION CERVICAL ANTERIOR ONE LEVEL WITH BONE ALLOGRAFT N/A 9/25/2017    Procedure: FUSION CERVICAL ANTERIOR ONE LEVEL WITH BONE ALLOGRAFT;  ANTERIOR CERVICAL DECOMPRESSION AND FUSIONC3-4 WITH INSTRUMENTATION, ALLOGRAFT AND BONE MARROW ASPIRATE OF THE LEFT ILIAC CREST ;  Surgeon: Moises Elias MD;  Location:  OR    PHACOEMULSIFICATION CLEAR CORNEA W/ STANDARD IOL IMPLANT, ENDOSCOPIC CYCLOPHOTOCOAGULATION, COMBINED Right 12/12/2017    Procedure: COMBINED PHACOEMULSIFICATION CLEAR CORNEA WITH STANDARD INTRAOCULAR LENS IMPLANT, ENDOSCOPIC CYCLOPHOTOCOAGULATION;  COMPLEX RIGHT EYE COMBINED PHACOEMULSIFICATION CLEAR CORNEA WITH STANDARD INTRAOCULAR LENS IMPLANT, ENDOSCOPIC CYCLOPHOTOCOAGULATION ;  Surgeon: Alli Mott MD;  Location:  [facility]    PHACOEMULSIFICATION CLEAR CORNEA W/ STANDARD IOL IMPLANT, ENDOSCOPIC CYCLOPHOTOCOAGULATION, COMBINED Left 5/1/2018    Procedure: COMBINED PHACOEMULSIFICATION CLEAR CORNEA WITH STANDARD INTRAOCULAR LENS IMPLANT, ENDOSCOPIC CYCLOPHOTOCOAGULATION;  COMPLEX LEFT EYE PHACOEMULSIFICATION CLEAR CORNEA WITH STANDARD INTRAOCULAR LENS IMPLANT, ENDOSCOPIC CYCLOPHOTOCOAGULATION ;  Surgeon: Alli Mott MD;  Location:  [facility]    ROTATOR CUFF REPAIR RT/LT  2004              Allergies:   Patient has no known allergies.       Data:   All laboratory data reviewed:    Recent Labs   Lab Test 02/08/24  0931 09/01/21  0814 08/09/21  1804   LDL  --  45  --    HDL  --  54  --    NHDL  --  68  --    CHOL  --  122  --    TRIG  --  114  --    NTBNP 6,555*  --   --    IRON  --   --  11*   FEB  --   --  258   IRONSAT  --   --  4*   GEO  --   --  453*       Lab Results   Component Value Date    WBC 9.9 03/14/2024    WBC 10.1 06/26/2021    RBC 3.05 (L) 03/14/2024    RBC 3.75 (L) 06/26/2021    HGB 9.0 (L) 03/14/2024    HGB 8.4 (L) 06/26/2021    HCT 28.8 (L) 03/14/2024    HCT 28.4 (L)  "06/26/2021    MCV 94 03/14/2024    MCV 76 (L) 06/26/2021    MCH 29.5 03/14/2024    MCH 22.4 (L) 06/26/2021    MCHC 31.3 (L) 03/14/2024    MCHC 29.6 (L) 06/26/2021    RDW 16.0 (H) 03/14/2024    RDW 18.8 (H) 06/26/2021     (L) 03/14/2024     06/26/2021       Lab Results   Component Value Date     03/14/2024     06/26/2021    POTASSIUM 5.0 03/14/2024    POTASSIUM 4.3 10/26/2021    POTASSIUM 3.7 06/26/2021    CHLORIDE 112 (H) 03/14/2024    CHLORIDE 107 10/26/2021    CHLORIDE 116 (H) 06/26/2021    CO2 17 (L) 03/14/2024    CO2 21 10/26/2021    CO2 19 (L) 06/26/2021    ANIONGAP 11 03/14/2024    ANIONGAP 8 10/26/2021    ANIONGAP 6 06/26/2021    GLC 92 03/14/2024     (H) 10/26/2021     (H) 06/26/2021    BUN 43.5 (H) 03/14/2024    BUN 44 (H) 10/26/2021    BUN 22 06/26/2021    CR 1.87 (H) 03/14/2024    CR 1.44 (H) 06/26/2021    GFRESTIMATED 35 (L) 03/14/2024    GFRESTIMATED 45 (L) 06/26/2021    GFRESTBLACK 52 (L) 06/26/2021    CONNIE 9.2 03/14/2024    CONNIE 8.7 06/26/2021      Lab Results   Component Value Date    AST 73 (H) 01/15/2024    AST 27 06/24/2021    ALT 54 01/15/2024    ALT 42 06/24/2021       No results found for: \"A1C\"    Lab Results   Component Value Date    INR 1.03 02/08/2024    INR 1.03 01/15/2024    INR 1.06 05/06/2020         KCCQ-12  5  4  3  5  6  7  5  5  5  5  5  5      "

## 2024-03-14 NOTE — LETTER
3/14/2024    Kian Johns MD  [facility] [address] [address]    RE: Rose Duong       Dear Colleague,     I had the pleasure of seeing Rose Duong in the [facility].  Cardiology Clinic Progress Note  Rose Duong MRN# [id number]   YOB: 1940 Age: 83 year old     Primary cardiologist: Dr. 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    His TAVR procedure went well without complication.  He developed new left bundle branch block postoperatively.  Postprocedure echocardiogram showed well-seated bioprosthetic aortic valve with a mean gradient of 7 mmHg, trace AI, and preserved LV function with EF 65-70%.  Given his new LBBB, he was placed on a 30-day event monitor.  He was tolerating cardiac rehab and discharged on POD#1.     When I last saw Isrrael, he had small bilateral hematomas at his access sites with associated tenderness.  He otherwise did report a significant improvement in his exertional symptoms.  He underwent duplex ultrasound that revealed hematoma but no pseudoaneurysm.     Today the patient continues to report significant improvement in his breathing.  He is tolerating activity without any exertional symptoms.  He continues to receive iron infusions when hemoglobin is less than 10.  He denies chest pain, shortness of breath, syncope or near syncope, or palpitations.  No PND orthopnea.     EKG today shows normal sinus rhythm with narrow complex.     Repeat echocardiogram today shows well-seated bioprosthetic aortic valve with a mean gradient of 13 mmHg, trace PVL, moderate to severe MAC with mild MR,  and preserved LV systolic function.    His recent BMP demonstrates creatinine of 1.87, GFR 35.  His CBC shows hemoglobin of 9 and platelets 146.           Assessment and Plan:     ASSESSMENT:    Severe aortic stenosis s/p TAVR with 23 mm Thompson BRIAN 3 valve.  He continues to do well without any exertional symptoms. Repeat echo shows well functioning valve with small PVL. His groin sites have healed well. On ASA 81 mg daily.   Chronic diastolic heart failure, NYHA class II.  Euvolemic on exam, not on diuretic therapy.  Transient LBBB postoperatively.  30-day event monitor showed no advanced AV block.   Myeloproliferative disorder with subsequent anemia.  Follows closely with hem/onc, iron infusions for hgb < 10.   CKD, stage III-IV and amyloid nephropathy. Follows with outpatient nephrology, cr up to 1.87, stable under 2.0.   Multiple myeloma with systemic AL amyloidosis. S/p chemotherapy in 2021 and 2023, under close observation.   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 Appears to have progressed slightly on most recent TTE.       PLAN:     Overall Isrrael has noticed a significant improvement since his TAVR procedure, he denies any significant exertional symptoms.  He will continue his current cardiac regimen.  He has opted out of cardiac rehab due to insurance coverage, which I think is reasonable.  We discussed the need for lifelong antibiotic prophylaxis prior to any dental procedure.  Follow-up with Dr. Chun in approximately 6 months, sooner if needed.         Rachel Wade, SANIYA, APRN, CNP  Page: [phone] (8a-5p M-F)    Orders this Visit:  No orders of the defined types were placed in this encounter.    No orders of the defined types were placed in this encounter.    There are no discontinued medications.    Today's clinic visit entailed:  Review of the result(s) of each unique test - echo, labs, EKG, tavr  Ordering of each unique test  Prescription drug management  35 minutes spent by me on the date of the  "encounter doing chart review, review of test results, interpretation of tests, patient visit, and documentation   Provider  Link to [facility] Help Grid     The level of medical decision making during this visit was of moderate complexity.           Review of Systems:     Review of Systems:  Skin:        Eyes:       ENT:       Respiratory:  Negative shortness of breath  Cardiovascular:  Negative;palpitations;chest pain;edema;fatigue;lightheadedness;dizziness    Gastroenterology:      Genitourinary:       Musculoskeletal:       Neurologic:       Psychiatric:       Heme/Lymph/Imm:       Endocrine:  Negative thyroid disorder;diabetes            Physical Exam:   Vitals: /51 (BP Location: Left arm, Patient Position: Sitting)   Pulse 90   Ht 1.702 m (5' 7\")   Wt 57.9 kg (127 lb 9.6 oz)   SpO2 99%   BMI 19.98 kg/m    Constitutional:  cooperative        Skin:  warm and dry to the touch        Head:  normocephalic        Eyes:  pupils equal and round        ENT:  no pallor or cyanosis        Neck:  JVP normal        Chest:  normal breath sounds, clear to auscultation, normal A-P diameter, normal symmetry, normal respiratory excursion, no use of accessory muscles        Cardiac: regular rhythm;normal S1 and S2       systolic ejection murmur;grade 1          Abdomen:  abdomen soft        Vascular: pulses full and equal                                      Extremities and Back:  no edema        Neurological:  no gross motor deficits;affect appropriate             Medications:     Current Outpatient Medications   Medication Sig Dispense Refill    acetaminophen (TYLENOL) 500 MG tablet Take 500 mg by mouth every 8 hours as needed for mild pain      acyclovir (ZOVIRAX) 400 MG tablet Take 400 mg by mouth 2 times daily      allopurinol (ZYLOPRIM) 300 MG tablet Take 300 mg by mouth daily.      amLODIPine (NORVASC) 5 MG tablet Take 5 mg by mouth daily      aspirin 81 MG EC tablet Take 1 tablet (81 mg) by mouth daily 30 tablet 3    " cholecalciferol 25 MCG (1000 UT) TABS Take 1,000 Units by mouth daily       finasteride (PROSCAR) 5 MG tablet Take 5 mg by mouth daily      pantoprazole (PROTONIX) 20 MG EC tablet Take 20 mg by mouth daily      predniSONE (DELTASONE) 5 MG tablet Take 5 mg by mouth every other day       rosuvastatin (CRESTOR) 20 MG tablet Take 20 mg by mouth daily      tamsulosin (FLOMAX) 0.4 MG 24 hr capsule Take 0.4 mg by mouth every evening          Family History   Problem Relation Age of Onset    Heart Disease Father        Social History     Socioeconomic History    Marital status:      Spouse name: Not on file    Number of children: Not on file    Years of education: Not on file    Highest education level: Not on file   Occupational History    Not on file   Tobacco Use    Smoking status: Never    Smokeless tobacco: Never   Substance and Sexual Activity    Alcohol use: Yes     Comment: beer or two a day    Drug use: No    Sexual activity: Not on file   Other Topics Concern    Parent/sibling w/ CABG, MI or angioplasty before 65F 55M? Not Asked   Social History Narrative    Not on file     Social Determinants of Health     Financial Resource Strain: Not on file   Food Insecurity: Not on file   Transportation Needs: Not on file   Physical Activity: Not on file   Stress: Not on file   Social Connections: Not on file   Interpersonal Safety: Not on file   Housing Stability: Not on file            Past Medical History:     Past Medical History:   Diagnosis Date    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     Amyloidosis (H)     Anemia     Bacteremia     BPH (benign prostatic hyperplasia)     CKD (chronic kidney disease) stage 4, 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    Colon polyps     Coronary artery disease involving native coronary artery, angina presence unspecified, unspecified whether native or transplanted heart 05/01/2020    Added automatically from request for surgery  [id number]    DJD (degenerative joint disease)     Elevated BP without diagnosis of hypertension     Gastroesophageal reflux disease     Gout     Gout     Hyperlipidemia     Hypertension     Leukocytosis     MDS (myelodysplastic syndrome) (H)     Metabolic acidosis     due to CKD    Mixed hyperlipidemia 05/01/2020    Added automatically from request for surgery [id number]    Multiple myeloma (H)     Nonrheumatic aortic valve stenosis 05/01/2020    Added automatically from request for surgery [id number]    Rotator cuff tear arthropathy, left     Sinusitis     Spinal stenosis in cervical region 09/25/2017              Past Surgical History:     Past Surgical History:   Procedure Laterality Date    APPENDECTOMY      ASPIRATION NEEDLE HIP Left 9/25/2017    Procedure: ASPIRATION NEEDLE HIP;;  Surgeon: Moises Elias MD;  Location:  OR    BACK SURGERY  11/07/2017    cervical spine surgery     COLONOSCOPY      CV CORONARY ANGIOGRAM N/A 5/6/2020    Procedure: Coronary Angiogram;  Surgeon: David Woods MD;  Location:  HEART CARDIAC CATH LAB    CV CORONARY ANGIOGRAM N/A 1/15/2024    Procedure: Coronary Angiogram;  Surgeon: Nicholas Moore MD;  Location:  HEART CARDIAC CATH LAB    CV LEFT HEART CATH N/A 5/6/2020    Procedure: Left Heart Cath;  Surgeon: David Woods MD;  Location:  HEART CARDIAC CATH LAB    CV PCI N/A 1/15/2024    Procedure: Percutaneous Coronary Intervention;  Surgeon: Nicholas Moore MD;  Location:  HEART CARDIAC CATH LAB    CV RIGHT HEART CATH MEASUREMENTS RECORDED N/A 5/6/2020    Procedure: Right Heart Cath;  Surgeon: David Woods MD;  Location:  HEART CARDIAC CATH LAB    CV RIGHT HEART CATH MEASUREMENTS RECORDED N/A 1/15/2024    Procedure: Right Heart Catheterization;  Surgeon: Nicholas Moore MD;  Location:  HEART CARDIAC CATH LAB    CV TRANSCATHETER AORTIC VALVE REPLACEMENT-FEMORAL APPROACH N/A 2/13/2024    Procedure: Transcatheter Aortic  Valve Replacement-Femoral Approach;  Surgeon: Nicholas Moore MD;  Location:  HEART CARDIAC CATH LAB    DECOMPRESSION LUMBAR TWO LEVELS Bilateral 6/11/2018    Procedure: DECOMPRESSION LUMBAR TWO LEVELS;  BILATERAL LUMBAR 3-4 AND LUMBAR 4-5 DECOMPRESSION ;  Surgeon: Sd Vallejo MD;  Location:  OR    FUSION CERVICAL ANTERIOR ONE LEVEL WITH BONE ALLOGRAFT N/A 9/25/2017    Procedure: FUSION CERVICAL ANTERIOR ONE LEVEL WITH BONE ALLOGRAFT;  ANTERIOR CERVICAL DECOMPRESSION AND FUSIONC3-4 WITH INSTRUMENTATION, ALLOGRAFT AND BONE MARROW ASPIRATE OF THE LEFT ILIAC CREST ;  Surgeon: Moises Elias MD;  Location:  OR    PHACOEMULSIFICATION CLEAR CORNEA W/ STANDARD IOL IMPLANT, ENDOSCOPIC CYCLOPHOTOCOAGULATION, COMBINED Right 12/12/2017    Procedure: COMBINED PHACOEMULSIFICATION CLEAR CORNEA WITH STANDARD INTRAOCULAR LENS IMPLANT, ENDOSCOPIC CYCLOPHOTOCOAGULATION;  COMPLEX RIGHT EYE COMBINED PHACOEMULSIFICATION CLEAR CORNEA WITH STANDARD INTRAOCULAR LENS IMPLANT, ENDOSCOPIC CYCLOPHOTOCOAGULATION ;  Surgeon: lAli Mott MD;  Location:  EC    PHACOEMULSIFICATION CLEAR CORNEA W/ STANDARD IOL IMPLANT, ENDOSCOPIC CYCLOPHOTOCOAGULATION, COMBINED Left 5/1/2018    Procedure: COMBINED PHACOEMULSIFICATION CLEAR CORNEA WITH STANDARD INTRAOCULAR LENS IMPLANT, ENDOSCOPIC CYCLOPHOTOCOAGULATION;  COMPLEX LEFT EYE PHACOEMULSIFICATION CLEAR CORNEA WITH STANDARD INTRAOCULAR LENS IMPLANT, ENDOSCOPIC CYCLOPHOTOCOAGULATION ;  Surgeon: Alli Mott MD;  Location:  EC    ROTATOR CUFF REPAIR RT/LT  2004              Allergies:   Patient has no known allergies.       Data:   All laboratory data reviewed:    Recent Labs   Lab Test 02/08/24  0931 09/01/21  0814 08/09/21  1804   LDL  --  45  --    HDL  --  54  --    NHDL  --  68  --    CHOL  --  122  --    TRIG  --  114  --    NTBNP 6,555*  --   --    IRON  --   --  11*   FEB  --   --  258   IRONSAT  --   --  4*   GEO  --   --  453*       Lab Results   Component  "Value Date    WBC 9.9 03/14/2024    WBC 10.1 06/26/2021    RBC 3.05 (L) 03/14/2024    RBC 3.75 (L) 06/26/2021    HGB 9.0 (L) 03/14/2024    HGB 8.4 (L) 06/26/2021    HCT 28.8 (L) 03/14/2024    HCT 28.4 (L) 06/26/2021    MCV 94 03/14/2024    MCV 76 (L) 06/26/2021    MCH 29.5 03/14/2024    MCH 22.4 (L) 06/26/2021    MCHC 31.3 (L) 03/14/2024    MCHC 29.6 (L) 06/26/2021    RDW 16.0 (H) 03/14/2024    RDW 18.8 (H) 06/26/2021     (L) 03/14/2024     06/26/2021       Lab Results   Component Value Date     03/14/2024     06/26/2021    POTASSIUM 5.0 03/14/2024    POTASSIUM 4.3 10/26/2021    POTASSIUM 3.7 06/26/2021    CHLORIDE 112 (H) 03/14/2024    CHLORIDE 107 10/26/2021    CHLORIDE 116 (H) 06/26/2021    CO2 17 (L) 03/14/2024    CO2 21 10/26/2021    CO2 19 (L) 06/26/2021    ANIONGAP 11 03/14/2024    ANIONGAP 8 10/26/2021    ANIONGAP 6 06/26/2021    GLC 92 03/14/2024     (H) 10/26/2021     (H) 06/26/2021    BUN 43.5 (H) 03/14/2024    BUN 44 (H) 10/26/2021    BUN 22 06/26/2021    CR 1.87 (H) 03/14/2024    CR 1.44 (H) 06/26/2021    GFRESTIMATED 35 (L) 03/14/2024    GFRESTIMATED 45 (L) 06/26/2021    GFRESTBLACK 52 (L) 06/26/2021    CONNIE 9.2 03/14/2024    CONNIE 8.7 06/26/2021      Lab Results   Component Value Date    AST 73 (H) 01/15/2024    AST 27 06/24/2021    ALT 54 01/15/2024    ALT 42 06/24/2021       No results found for: \"A1C\"    Lab Results   Component Value Date    INR 1.03 02/08/2024    INR 1.03 01/15/2024    INR 1.06 05/06/2020         [id number]  5  4  3  5  6  7  5  5  5  5  5  5        Thank you for allowing me to participate in the care of your patient.      Sincerely,     Rachel Wade, [facility]  cc:   Nicholas Moore MD  [address]  [place] [id number]  "

## 2024-04-01 ENCOUNTER — TRANSFERRED RECORDS (OUTPATIENT)
Dept: HEALTH INFORMATION MANAGEMENT | Facility: CLINIC | Age: 84
End: 2024-04-01
Payer: COMMERCIAL

## 2024-07-31 ENCOUNTER — OFFICE VISIT (OUTPATIENT)
Dept: CARDIOLOGY | Facility: CLINIC | Age: 84
End: 2024-07-31
Attending: INTERNAL MEDICINE
Payer: COMMERCIAL

## 2024-07-31 VITALS
HEART RATE: 76 BPM | DIASTOLIC BLOOD PRESSURE: 67 MMHG | BODY MASS INDEX: 20.03 KG/M2 | HEIGHT: 67 IN | SYSTOLIC BLOOD PRESSURE: 137 MMHG | WEIGHT: 127.6 LBS

## 2024-07-31 DIAGNOSIS — Z95.3 S/P TAVR (TRANSCATHETER AORTIC VALVE REPLACEMENT), BIOPROSTHETIC: ICD-10-CM

## 2024-07-31 PROCEDURE — 99213 OFFICE O/P EST LOW 20 MIN: CPT | Performed by: INTERNAL MEDICINE

## 2024-07-31 NOTE — PROGRESS NOTES
HPI and Plan:   Mr. Duong returns for follow-up of aortic valve replacement.    He had 23 mm Thompson BRIAN valve placed in early 2024.  Since then it has made a huge difference to his shortness of breath which is pretty much resolved.  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    Last echocardiogram 6 months ago shows a mean gradient of just over 10 mmHg and no significant regurgitation    Overall he continues to be stable.  I will see him in 6 months time and he will have repeat echocardiography prior to clinic visit.  Orders Placed This Encounter   Procedures    Follow-Up with Cardiology    Echocardiogram Complete       No orders of the defined types were placed in this encounter.      Encounter Diagnosis   Name Primary?    S/p TAVR (transcatheter aortic valve replacement), bioprosthetic        CURRENT MEDICATIONS:  Current Outpatient Medications   Medication Sig Dispense Refill    acetaminophen (TYLENOL) 500 MG tablet Take 500 mg by mouth every 8 hours as needed for mild pain      acyclovir (ZOVIRAX) 400 MG tablet Take 400 mg by mouth 2 times daily      allopurinol (ZYLOPRIM) 300 MG tablet Take 300 mg by mouth daily.      amLODIPine (NORVASC) 5 MG tablet Take 5 mg by mouth daily      aspirin 81 MG EC tablet Take 1 tablet (81 mg) by mouth daily 30 tablet 3    cholecalciferol 25 MCG (1000 UT) TABS Take 1,000 Units by mouth daily       finasteride (PROSCAR) 5 MG tablet Take 5 mg by mouth daily      pantoprazole (PROTONIX) 20 MG EC tablet Take 20 mg by mouth daily      predniSONE (DELTASONE) 5 MG tablet Take 5 mg by mouth every other day        rosuvastatin (CRESTOR) 20 MG tablet Take 20 mg by mouth daily      tamsulosin (FLOMAX) 0.4 MG 24 hr capsule Take 0.4 mg by mouth every evening          ALLERGIES   No Known Allergies    PAST MEDICAL HISTORY:  Past Medical History:   Diagnosis Date    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     Amyloidosis (H)     Anemia     Bacteremia     BPH (benign prostatic hyperplasia)     CKD (chronic kidney disease) stage 4, 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    Colon polyps     Coronary artery disease involving native coronary artery, angina presence unspecified, unspecified whether native or transplanted heart 05/01/2020    Added automatically from request for surgery [id number]    DJD (degenerative joint disease)     Elevated BP without diagnosis of hypertension     Gastroesophageal reflux disease     Gout     Gout     Hyperlipidemia     Hypertension     Leukocytosis     MDS (myelodysplastic syndrome) (H)     Metabolic acidosis     due to CKD    Mixed hyperlipidemia 05/01/2020    Added automatically from request for surgery [id number]    Multiple myeloma (H)     Nonrheumatic aortic valve stenosis 05/01/2020    Added automatically from request for surgery [id number]    Rotator cuff tear arthropathy, left     Sinusitis     Spinal stenosis in cervical region 09/25/2017       PAST SURGICAL HISTORY:  Past Surgical History:   Procedure Laterality Date    APPENDECTOMY      ASPIRATION NEEDLE HIP Left 9/25/2017    Procedure: ASPIRATION NEEDLE HIP;;  Surgeon: Moises Elias MD;  Location:  OR    BACK SURGERY  11/07/2017    cervical spine surgery     COLONOSCOPY      CV CORONARY ANGIOGRAM N/A 5/6/2020    Procedure: Coronary Angiogram;  Surgeon: David Woods MD;  Location:  HEART CARDIAC CATH LAB    CV CORONARY ANGIOGRAM N/A 1/15/2024    Procedure: Coronary Angiogram;  Surgeon: Nicholas Moore MD;  Location:  [facility]ATH [facility]    CV LEFT HEART CATH N/A  5/6/2020    Procedure: Left Heart Cath;  Surgeon: David Woods MD;  Location:  [facility]ARD[facility]    CV PCI N/A 1/15/2024    Procedure: Percutaneous Coronary Intervention;  Surgeon: Nicholas Moore MD;  Location:  HE[facility] CARDI[facility] CATH LAB    CV RIGHT HEART CATH MEASUREMENTS RECORDED N/A 5/6/2020    Procedure: Right Heart Cath;  Surgeon: David Woods MD;  Location:  HEART CARDIAC CATH LAB    CV RIGHT HEART CATH MEASUREMENTS RECORDED N/A 1/15/2024    Procedure: Right Heart Catheterization;  Surgeon: Nicholas Moore MD;  Location:  HEART CARDIAC CATH LAB    CV TRANSCATHETER AORTIC VALVE REPLACEMENT-FEMORAL APPROACH N/A 2/13/2024    Procedure: Transcatheter Aortic Valve Replacement-Femoral Approach;  Surgeon: Nicholas Moore MD;  Location:  HEART CARDIAC CATH LAB    DECOMPRESSION LUMBAR TWO LEVELS Bilateral 6/11/2018    Procedure: DECOMPRESSION LUMBAR TWO LEVELS;  BILATERAL LUMBAR 3-4 AND LUMBAR 4-5 DECOMPRESSION ;  Surgeon: Sd Vallejo MD;  Location:  OR    FUSION CERVICAL ANTERIOR ONE LEVEL WITH BONE ALLOGRAFT N/A 9/25/2017    Procedure: FUSION CERVICAL ANTERIOR ONE LEVEL WITH BONE ALLOGRAFT;  ANTERIOR CERVICAL DECOMPRESSION AND FUSIONC3-4 WITH INSTRUMENTATION, ALLOGRAFT AND BONE MARROW ASPIRATE OF THE LEFT ILIAC CREST ;  Surgeon: Moises Elias MD;  Location:  OR    PHACOEMULSIFICATION CLEAR CORNEA W/ STANDARD IOL IMPLANT, ENDOSCOPIC CYCLOPHOTOCOAGULATION, COMBINED Right 12/12/2017    Procedure: COMBINED PHACOEMULSIFICATION CLEAR CORNEA WITH STANDARD INTRAOCULAR LENS IMPLANT, ENDOSCOPIC CYCLOPHOTOCOAGULATION;  COMPLEX RIGHT EYE COMBINED PHACOEMULSIFICATION CLEAR CORNEA WITH STANDARD INTRAOCULAR LENS IMPLANT, ENDOSCOPIC CYCLOPHOTOCOAGULATION ;  Surgeon: Alli Mott MD;  Location:  [facility]    PHACOEMULSIFICATION CLEAR CORNEA W/ STANDARD IOL IMPLANT, ENDOSCOPIC CYCLOPHOTOCOAGULATION, COMBINED Left 5/1/2018    Procedure: COMBINED  PHACOEMULSIFICATION CLEAR CORNEA WITH STANDARD INTRAOCULAR LENS IMPLANT, ENDOSCOPIC CYCLOPHOTOCOAGULATION;  COMPLEX LEFT EYE PHACOEMULSIFICATION CLEAR CORNEA WITH STANDARD INTRAOCULAR LENS IMPLANT, ENDOSCOPIC CYCLOPHOTOCOAGULATION ;  Surgeon: Alli Mott MD;  Location: [facility]    ROTATOR CUFF REPAIR RT/LT  2004       FAMILY HISTORY:  Family History   Problem Relation Age of Onset    Heart Disease Father        SOCIAL HISTORY:  Social History     Socioeconomic History    Marital status:      Spouse name: None    Number of children: None    Years of education: None    Highest education level: None   Tobacco Use    Smoking status: Never    Smokeless tobacco: Never   Substance and Sexual Activity    Alcohol use: Yes     Comment: beer or two a day    Drug use: No     Social Determinants of Health     Financial Resource Strain: Low Risk  (4/13/2023)    [facility]    Financial Resource Strain     Difficulty of Paying Living Expenses: 3   Food Insecurity: No Food Insecurity (4/13/2023)    [facility], [facility] [employer] [facility]    Food Insecurity     Worried About Running Out of Food in the Last Year: 1   Transportation Needs: No Transportation Needs (4/13/2023)    [facility], [facility] [facility]    Transportation Needs     Lack of Transportation (Medical): 1    [facility]    Social Connections   Housing Stability: Low Risk  (4/13/2023)    [facility], [facility] [employer] [facility]    Housing Stability     Unable to Pay for Housing in the Last Year: 1       Review of Systems:  Skin:  Positive for bruising   Eyes:  Negative    ENT:  Negative    Respiratory:  Negative   "  Cardiovascular:  Negative;palpitations;chest pain;syncope or near-syncope;cyanosis;lightheadedness;dizziness;fatigue;edema Positive for  Gastroenterology: Negative    Genitourinary:  Positive for prostate problem  Musculoskeletal:  Negative    Neurologic:  Negative    Psychiatric:  Negative    Heme/Lymph/Imm:  Positive for easy bruising  Endocrine:  Negative      Physical Exam:  Vitals: /67   Pulse 76   Ht 1.702 m (5' 7\")   Wt 57.9 kg (127 lb 9.6 oz)   BMI 19.98 kg/m      Constitutional:  cooperative, alert and oriented, well developed, well nourished, in no acute distress        Skin:  warm and dry to the touch, no apparent skin lesions or masses noted          Head:  normocephalic, no masses or lesions        Eyes:  pupils equal and round, conjunctivae and lids unremarkable, sclera white, no xanthalasma, EOMS intact, no nystagmus        Lymph:No Cervical lymphadenopathy present     ENT:  no pallor or cyanosis, dentition good        Neck:  carotid pulses are full and equal bilaterally, JVP normal, no carotid bruit        Respiratory:  normal breath sounds, clear to auscultation, normal A-P diameter, normal symmetry, normal respiratory excursion, no use of accessory muscles         Cardiac: regular rhythm;apical impulse not displaced   soft or inaudible A2   systolic murmur;grade 3        pulses full and equal, no bruits auscultated                                        GI:  abdomen soft, non-tender, BS normoactive, no mass, no HSM, no bruits        Extremities and Muscular Skeletal:  no deformities, clubbing, cyanosis, erythema observed              Neurological:  no gross motor deficits        Psych:  Alert and Oriented x 3        Recent Lab Results:  LIPID RESULTS:  Lab Results   Component Value Date    CHOL 122 09/01/2021    HDL 54 09/01/2021    LDL 45 09/01/2021    TRIG 114 09/01/2021       LIVER ENZYME RESULTS:  Lab Results   Component Value Date    AST 73 (H) 01/15/2024    AST 27 06/24/2021    " "ALT 54 01/15/2024    ALT 42 06/24/2021       CBC RESULTS:  Lab Results   Component Value Date    WBC 9.9 03/14/2024    WBC 10.1 06/26/2021    RBC 3.05 (L) 03/14/2024    RBC 3.75 (L) 06/26/2021    HGB 9.0 (L) 03/14/2024    HGB 8.4 (L) 06/26/2021    HCT 28.8 (L) 03/14/2024    HCT 28.4 (L) 06/26/2021    MCV 94 03/14/2024    MCV 76 (L) 06/26/2021    MCH 29.5 03/14/2024    MCH 22.4 (L) 06/26/2021    MCHC 31.3 (L) 03/14/2024    MCHC 29.6 (L) 06/26/2021    RDW 16.0 (H) 03/14/2024    RDW 18.8 (H) 06/26/2021     (L) 03/14/2024     06/26/2021       BMP RESULTS:  Lab Results   Component Value Date     03/14/2024     06/26/2021    POTASSIUM 5.0 03/14/2024    POTASSIUM 4.3 10/26/2021    POTASSIUM 3.7 06/26/2021    CHLORIDE 112 (H) 03/14/2024    CHLORIDE 107 10/26/2021    CHLORIDE 116 (H) 06/26/2021    CO2 17 (L) 03/14/2024    CO2 21 10/26/2021    CO2 19 (L) 06/26/2021    ANIONGAP 11 03/14/2024    ANIONGAP 8 10/26/2021    ANIONGAP 6 06/26/2021    GLC 92 03/14/2024     (H) 10/26/2021     (H) 06/26/2021    BUN 43.5 (H) 03/14/2024    BUN 44 (H) 10/26/2021    BUN 22 06/26/2021    CR 1.87 (H) 03/14/2024    CR 1.44 (H) 06/26/2021    GFRESTIMATED 35 (L) 03/14/2024    GFRESTIMATED 45 (L) 06/26/2021    GFRESTBLACK 52 (L) 06/26/2021    CONNIE 9.2 03/14/2024    CONNIE 8.7 06/26/2021        A1C RESULTS:  No results found for: \"A1C\"    INR RESULTS:  Lab Results   Component Value Date    INR 1.03 02/08/2024    INR 1.03 01/15/2024    INR 1.06 05/06/2020           CC  Nicholas Moore MD  [address]  [place] [id number]                 "

## 2024-07-31 NOTE — LETTER
7/31/2024    Kian Johns MD  [facility] [address] [facility] [address]    RE: Rose SANCHEZ Ad       Dear Colleague,     I had the pleasure of seeing Rose Duong in the [facility].  HPI and Plan:   Mr. Duong returns for follow-up of aortic valve replacement.    He had 23 mm Thompson BRIAN valve placed in early 2024.  Since then it has made a huge difference to his shortness of breath which is pretty much resolved.  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    Last echocardiogram 6 months ago shows a mean gradient of just over 10 mmHg and no significant regurgitation    Overall he continues to be stable.  I will see him in 6 months time and he will have repeat echocardiography prior to clinic visit.  Orders Placed This Encounter   Procedures    Follow-Up with Cardiology    Echocardiogram Complete       No orders of the defined types were placed in this encounter.      Encounter Diagnosis   Name Primary?    S/p TAVR (transcatheter aortic valve replacement), bioprosthetic        CURRENT MEDICATIONS:  Current Outpatient Medications   Medication Sig Dispense Refill    acetaminophen (TYLENOL) 500 MG tablet Take 500 mg by mouth every 8 hours as needed for mild pain      acyclovir (ZOVIRAX) 400 MG tablet Take 400 mg by mouth 2 times daily      allopurinol (ZYLOPRIM) 300 MG tablet Take 300 mg by mouth daily.      amLODIPine (NORVASC) 5 MG tablet Take 5 mg by mouth daily      aspirin 81 MG EC tablet Take 1 tablet (81 mg) by mouth daily 30 tablet 3    cholecalciferol 25 MCG (1000 UT) TABS Take 1,000 Units by mouth daily        finasteride (PROSCAR) 5 MG tablet Take 5 mg by mouth daily      pantoprazole (PROTONIX) 20 MG EC tablet Take 20 mg by mouth daily      predniSONE (DELTASONE) 5 MG tablet Take 5 mg by mouth every other day       rosuvastatin (CRESTOR) 20 MG tablet Take 20 mg by mouth daily      tamsulosin (FLOMAX) 0.4 MG 24 hr capsule Take 0.4 mg by mouth every evening          ALLERGIES   No Known Allergies    PAST MEDICAL HISTORY:  Past Medical History:   Diagnosis Date    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     Amyloidosis (H)     Anemia     Bacteremia     BPH (benign prostatic hyperplasia)     CKD (chronic kidney disease) stage 4, 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    Colon polyps     Coronary artery disease involving native coronary artery, angina presence unspecified, unspecified whether native or transplanted heart 05/01/2020    Added automatically from request for surgery [id number]    DJD (degenerative joint disease)     Elevated BP without diagnosis of hypertension     Gastroesophageal reflux disease     Gout     Gout     Hyperlipidemia     Hypertension     Leukocytosis     MDS (myelodysplastic syndrome) (H)     Metabolic acidosis     due to CKD    Mixed hyperlipidemia 05/01/2020    Added automatically from request for surgery [id number]    Multiple myeloma (H)     Nonrheumatic aortic valve stenosis 05/01/2020    Added automatically from request for surgery [id number]    Rotator cuff tear arthropathy, left     Sinusitis     Spinal stenosis in cervical region 09/25/2017       PAST SURGICAL HISTORY:  Past Surgical History:   Procedure Laterality Date    APPENDECTOMY      ASPIRATION NEEDLE HIP Left 9/25/2017    Procedure: ASPIRATION NEEDLE HIP;;  Surgeon: Moises Elias MD;  Location:  OR    BACK SURGERY  11/07/2017    cervical spine surgery     COLONOSCOPY      CV CORONARY ANGIOGRAM N/A 5/6/2020    Procedure: Coronary Angiogram;  Surgeon: David Woods MD;   Location:  HEART CARDIAC CATH LAB    CV CORONARY ANGIOGRAM N/A 1/15/2024    Procedure: Coronary Angiogram;  Surgeon: Nicholas Moore MD;  Location:  HEART CARDIAC CATH LAB    CV LEFT HEART CATH N/A 5/6/2020    Procedure: Left Heart Cath;  Surgeon: David Woods MD;  Location:  HEART CARDIAC CATH LAB    CV PCI N/A 1/15/2024    Procedure: Percutaneous Coronary Intervention;  Surgeon: Nicholas Moore MD;  Location:  HEART CARDIAC CATH LAB    CV RIGHT HEART CATH MEASUREMENTS RECORDED N/A 5/6/2020    Procedure: Right Heart Cath;  Surgeon: David Woods MD;  Location:  HEART CARDIAC CATH LAB    CV RIGHT HEART CATH MEASUREMENTS RECORDED N/A 1/15/2024    Procedure: Right Heart Catheterization;  Surgeon: Nicholas Moore MD;  Location:  HEART CARDIAC CATH LAB    CV TRANSCATHETER AORTIC VALVE REPLACEMENT-FEMORAL APPROACH N/A 2/13/2024    Procedure: Transcatheter Aortic Valve Replacement-Femoral Approach;  Surgeon: Nicholas Moore MD;  Location:  HEART CARDIAC CATH LAB    DECOMPRESSION LUMBAR TWO LEVELS Bilateral 6/11/2018    Procedure: DECOMPRESSION LUMBAR TWO LEVELS;  BILATERAL LUMBAR 3-4 AND LUMBAR 4-5 DECOMPRESSION ;  Surgeon: Sd Vallejo MD;  Location: [facility]    FUSION CERVICAL ANTERIOR ONE LEVEL WITH BONE ALLOGRAFT N/A 9/25/2017    Procedure: FUSION CERVICAL ANTERIOR ONE LEVEL WITH BONE ALLOGRAFT;  ANTERIOR CERVICAL DECOMPRESSION AND FUSIONC3-4 WITH INSTRUMENTATION, ALLOGRAFT AND BONE MARROW ASPIRATE OF THE LEFT ILIAC CREST ;  Surgeon: Mosies Elias MD;  Location:  OR    PHACOEMULSIFICATION CLEAR CORNEA W/ STANDARD IOL IMPLANT, ENDOSCOPIC CYCLOPHOTOCOAGULATION, COMBINED Right 12/12/2017    Procedure: COMBINED PHACOEMULSIFICATION CLEAR CORNEA WITH STANDARD INTRAOCULAR LENS IMPLANT, ENDOSCOPIC CYCLOPHOTOCOAGULATION;  COMPLEX RIGHT EYE COMBINED PHACOEMULSIFICATION CLEAR CORNEA WITH STANDARD INTRAOCULAR LENS IMPLANT, ENDOSCOPIC CYCLOPHOTOCOAGULATION  ;  Surgeon: Alli Mott MD;  Location: [facility]    PHACOEMULSIFICATION CLEAR CORNEA W/ STANDARD IOL IMPLANT, ENDOSCOPIC CYCLOPHOTOCOAGULATION, COMBINED Left 5/1/2018    Procedure: COMBINED PHACOEMULSIFICATION CLEAR CORNEA WITH STANDARD INTRAOCULAR LENS IMPLANT, ENDOSCOPIC CYCLOPHOTOCOAGULATION;  COMPLEX LEFT EYE PHACOEMULSIFICATION CLEAR CORNEA WITH STANDARD INTRAOCULAR LENS IMPLANT, ENDOSCOPIC CYCLOPHOTOCOAGULATION ;  Surgeon: Alli Mott MD;  Location: [facility]    ROTATOR CUFF REPAIR RT/LT  2004       FAMILY HISTORY:  Family History   Problem Relation Age of Onset    Heart Disease Father        SOCIAL HISTORY:  Social History     Socioeconomic History    Marital status:      Spouse name: None    Number of children: None    Years of education: None    Highest education level: None   Tobacco Use    Smoking status: Never    Smokeless tobacco: Never   Substance and Sexual Activity    Alcohol use: Yes     Comment: beer or two a day    Drug use: No     Social Determinants of Health     Financial Resource Strain: Low Risk  (4/13/2023)    Received from [facility] [employer] [facility] [employer] [facility], [facility]    Financial Resource Strain     Difficulty of Paying Living Expenses: 3   Food Insecurity: No Food Insecurity (4/13/2023)    Received from [facility] [employer] [facility] [employer] [facility], [facility]    Food Insecurity     Worried About Running Out of Food in the Last Year: 1   Transportation Needs: No Transportation Needs (4/13/2023)    Received from [facility] [employer] [facility] [employer] [facility], [facility]    Transportation Needs     Lack of Transportation (Medical): 1    Received from [facility]    Social Connections   Housing Stability: Low Risk  (4/13/2023)    [facility] [facility], [facility]     "Housing Stability     Unable to Pay for Housing in the Last Year: 1       Review of Systems:  Skin:  Positive for bruising   Eyes:  Negative    ENT:  Negative    Respiratory:  Negative    Cardiovascular:  Negative;palpitations;chest pain;syncope or near-syncope;cyanosis;lightheadedness;dizziness;fatigue;edema Positive for  Gastroenterology: Negative    Genitourinary:  Positive for prostate problem  Musculoskeletal:  Negative    Neurologic:  Negative    Psychiatric:  Negative    Heme/Lymph/Imm:  Positive for easy bruising  Endocrine:  Negative      Physical Exam:  Vitals: /67   Pulse 76   Ht 1.702 m (5' 7\")   Wt 57.9 kg (127 lb 9.6 oz)   BMI 19.98 kg/m      Constitutional:  cooperative, alert and oriented, well developed, well nourished, in no acute distress        Skin:  warm and dry to the touch, no apparent skin lesions or masses noted          Head:  normocephalic, no masses or lesions        Eyes:  pupils equal and round, conjunctivae and lids unremarkable, sclera white, no xanthalasma, EOMS intact, no nystagmus        Lymph:No Cervical lymphadenopathy present     ENT:  no pallor or cyanosis, dentition good        Neck:  carotid pulses are full and equal bilaterally, JVP normal, no carotid bruit        Respiratory:  normal breath sounds, clear to auscultation, normal A-P diameter, normal symmetry, normal respiratory excursion, no use of accessory muscles         Cardiac: regular rhythm;apical impulse not displaced   soft or inaudible A2   systolic murmur;grade 3        pulses full and equal, no bruits auscultated                                        GI:  abdomen soft, non-tender, BS normoactive, no mass, no HSM, no bruits        Extremities and Muscular Skeletal:  no deformities, clubbing, cyanosis, erythema observed              Neurological:  no gross motor deficits        Psych:  Alert and Oriented x 3        Recent Lab Results:  LIPID RESULTS:  Lab Results   Component Value Date    CHOL 122 " "09/01/2021    HDL 54 09/01/2021    LDL 45 09/01/2021    TRIG 114 09/01/2021       LIVER ENZYME RESULTS:  Lab Results   Component Value Date    AST 73 (H) 01/15/2024    AST 27 06/24/2021    ALT 54 01/15/2024    ALT 42 06/24/2021       CBC RESULTS:  Lab Results   Component Value Date    WBC 9.9 03/14/2024    WBC 10.1 06/26/2021    RBC 3.05 (L) 03/14/2024    RBC 3.75 (L) 06/26/2021    HGB 9.0 (L) 03/14/2024    HGB 8.4 (L) 06/26/2021    HCT 28.8 (L) 03/14/2024    HCT 28.4 (L) 06/26/2021    MCV 94 03/14/2024    MCV 76 (L) 06/26/2021    MCH 29.5 03/14/2024    MCH 22.4 (L) 06/26/2021    MCHC 31.3 (L) 03/14/2024    MCHC 29.6 (L) 06/26/2021    RDW 16.0 (H) 03/14/2024    RDW 18.8 (H) 06/26/2021     (L) 03/14/2024     06/26/2021       BMP RESULTS:  Lab Results   Component Value Date     03/14/2024     06/26/2021    POTASSIUM 5.0 03/14/2024    POTASSIUM 4.3 10/26/2021    POTASSIUM 3.7 06/26/2021    CHLORIDE 112 (H) 03/14/2024    CHLORIDE 107 10/26/2021    CHLORIDE 116 (H) 06/26/2021    CO2 17 (L) 03/14/2024    CO2 21 10/26/2021    CO2 19 (L) 06/26/2021    ANIONGAP 11 03/14/2024    ANIONGAP 8 10/26/2021    ANIONGAP 6 06/26/2021    GLC 92 03/14/2024     (H) 10/26/2021     (H) 06/26/2021    BUN 43.5 (H) 03/14/2024    BUN 44 (H) 10/26/2021    BUN 22 06/26/2021    CR 1.87 (H) 03/14/2024    CR 1.44 (H) 06/26/2021    GFRESTIMATED 35 (L) 03/14/2024    GFRESTIMATED 45 (L) 06/26/2021    GFRESTBLACK 52 (L) 06/26/2021    CONNIE 9.2 03/14/2024    CONNIE 8.7 06/26/2021        A1C RESULTS:  No results found for: \"A1C\"    INR RESULTS:  Lab Results   Component Value Date    INR 1.03 02/08/2024    INR 1.03 01/15/2024    INR 1.06 05/06/2020       CC  Nicholas Moore MD  [address]  [place]  [place] [address]        Thank you for allowing me to participate in the care of your patient.      Sincerely,     DR SHILPA ALFRED MD     [facility]  "

## 2025-01-06 ENCOUNTER — TRANSFERRED RECORDS (OUTPATIENT)
Dept: HEALTH INFORMATION MANAGEMENT | Facility: CLINIC | Age: 85
End: 2025-01-06
Payer: COMMERCIAL

## 2025-02-11 ENCOUNTER — OFFICE VISIT (OUTPATIENT)
Dept: CARDIOLOGY | Facility: CLINIC | Age: 85
End: 2025-02-11
Attending: INTERNAL MEDICINE
Payer: COMMERCIAL

## 2025-02-11 ENCOUNTER — HOSPITAL ENCOUNTER (OUTPATIENT)
Dept: CARDIOLOGY | Facility: CLINIC | Age: 85
Discharge: HOME | End: 2025-02-11
Attending: INTERNAL MEDICINE
Payer: COMMERCIAL

## 2025-02-11 ENCOUNTER — LAB (OUTPATIENT)
Dept: LAB | Facility: CLINIC | Age: 85
End: 2025-02-11
Attending: INTERNAL MEDICINE
Payer: COMMERCIAL

## 2025-02-11 VITALS
SYSTOLIC BLOOD PRESSURE: 122 MMHG | BODY MASS INDEX: 19.78 KG/M2 | HEIGHT: 67 IN | DIASTOLIC BLOOD PRESSURE: 68 MMHG | HEART RATE: 81 BPM | WEIGHT: 126 LBS | OXYGEN SATURATION: 100 %

## 2025-02-11 DIAGNOSIS — Z95.3 S/P TAVR (TRANSCATHETER AORTIC VALVE REPLACEMENT), BIOPROSTHETIC: ICD-10-CM

## 2025-02-11 LAB
ANION GAP SERPL CALCULATED.3IONS-SCNC: 13 MMOL/L (ref 7–15)
BUN SERPL-MCNC: 28.9 MG/DL (ref 8–23)
CALCIUM SERPL-MCNC: 9.3 MG/DL (ref 8.8–10.4)
CHLORIDE SERPL-SCNC: 107 MMOL/L (ref 98–107)
CREAT SERPL-MCNC: 1.69 MG/DL (ref 0.67–1.17)
EGFRCR SERPLBLD CKD-EPI 2021: 39 ML/MIN/1.73M2
ERYTHROCYTE [DISTWIDTH] IN BLOOD BY AUTOMATED COUNT: 15.4 % (ref 10–15)
GLUCOSE SERPL-MCNC: 100 MG/DL (ref 70–99)
HCO3 SERPL-SCNC: 19 MMOL/L (ref 22–29)
HCT VFR BLD AUTO: 32.6 % (ref 40–53)
HGB BLD-MCNC: 10.2 G/DL (ref 13.3–17.7)
LVEF ECHO: NORMAL
MCH RBC QN AUTO: 28.6 PG (ref 26.5–33)
MCHC RBC AUTO-ENTMCNC: 31.3 G/DL (ref 31.5–36.5)
MCV RBC AUTO: 91 FL (ref 78–100)
PLATELET # BLD AUTO: 194 10E3/UL (ref 150–450)
POTASSIUM SERPL-SCNC: 4 MMOL/L (ref 3.4–5.3)
RBC # BLD AUTO: 3.57 10E6/UL (ref 4.4–5.9)
SODIUM SERPL-SCNC: 139 MMOL/L (ref 135–145)
WBC # BLD AUTO: 10.7 10E3/UL (ref 4–11)

## 2025-02-11 PROCEDURE — 93000 ELECTROCARDIOGRAM COMPLETE: CPT | Performed by: PHYSICIAN ASSISTANT

## 2025-02-11 PROCEDURE — 99215 OFFICE O/P EST HI 40 MIN: CPT | Mod: 25 | Performed by: PHYSICIAN ASSISTANT

## 2025-02-11 PROCEDURE — 93306 TTE W/DOPPLER COMPLETE: CPT

## 2025-02-11 PROCEDURE — 93306 TTE W/DOPPLER COMPLETE: CPT | Mod: 26 | Performed by: INTERNAL MEDICINE

## 2025-02-11 PROCEDURE — 36415 COLL VENOUS BLD VENIPUNCTURE: CPT | Performed by: INTERNAL MEDICINE

## 2025-02-11 PROCEDURE — 85027 COMPLETE CBC AUTOMATED: CPT | Performed by: INTERNAL MEDICINE

## 2025-02-11 PROCEDURE — 80048 BASIC METABOLIC PNL TOTAL CA: CPT | Performed by: INTERNAL MEDICINE

## 2025-02-11 NOTE — PROGRESS NOTES
Primary Cardiologist: Dr. 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    He returns to clinic today by himself stating he is doing well.  He denies shortness of breath, chest pain, palpitations, orthopnea, PND, or bleeding issues.  He continues to receive infusions for treatment of his multiple myeloma as well as amyloidosis every other month.  He is not active during the winter months but during summer he does a lot of yard work.  He feels like his energy level has improved significantly after his TAVR procedure.  He has been compliant with his medications including baby aspirin.    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    His recent blood work shows stable findings.  He does have chronic kidney disease and his creatinine appears to be at his baseline.  CBC shows chronic mild anemia.  ECG shows normal sinus rhythm with no evidence of bundle branch block.  Echocardiogram has been reviewed and it continues to show hyperdynamic LV function.  His valve area is normal.  He does have at least moderate aortic regurgitation which used to be trace to mild about 6 months ago.  I will  forward these results to Dr. Moore for further evaluation.  It appears that some of the images were poor quality.  We will see if we need to proceed with ADY for further evaluation after Dr. Moore's assessment.  He has no symptoms to suggest angina, heart failure, arrhythmia.  Will continue with current medications at this time.    47 minutes spent on the date of the encounter with chart review, patient visit, care coordination, and documentation.    The longitudinal plan of care for the diagnose(s)/condition(s) as documented were addressed during this visit. Due to the added complexity in care, I will continue to support Rose SANCHEZ Sanketsaira  in the subsequent management and with ongoing continuity of care.     This note was completed in part using Dragon voice recognition software. Although reviewed after completion, some word and grammatical errors may occur.    Orders this Visit:  No orders of the defined types were placed in this encounter.    No orders of the defined types were placed in this encounter.    There are no discontinued medications.      Encounter Diagnosis   Name Primary?    S/p TAVR (transcatheter aortic valve replacement), bioprosthetic        CURRENT MEDICATIONS:  Current Outpatient Medications   Medication Sig Dispense Refill    acetaminophen (TYLENOL) 500 MG tablet Take 500 mg by mouth every 8 hours as needed for mild pain      acyclovir (ZOVIRAX) 400 MG tablet Take 400 mg by mouth 2 times daily      allopurinol (ZYLOPRIM) 300 MG tablet Take 300 mg by mouth daily.      amLODIPine (NORVASC) 5 MG tablet Take 5 mg by mouth daily      aspirin 81 MG EC tablet Take 1 tablet (81 mg) by mouth daily 30 tablet 3    cholecalciferol 25 MCG (1000 UT) TABS Take 1,000 Units by mouth daily       finasteride (PROSCAR) 5 MG tablet Take 5 mg by mouth daily      pantoprazole (PROTONIX) 20 MG EC tablet Take 20 mg by mouth daily      predniSONE (DELTASONE) 5 MG tablet Take 5 mg by mouth every other day        rosuvastatin (CRESTOR) 20 MG tablet Take 20 mg by mouth daily      tamsulosin (FLOMAX) 0.4 MG 24 hr capsule Take 0.4 mg by mouth every evening          ALLERGIES   No Known Allergies    PAST MEDICAL HISTORY:  Past Medical History:   Diagnosis Date    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     Amyloidosis (H)     Anemia     Bacteremia     BPH (benign prostatic hyperplasia)     CKD (chronic kidney disease) stage 4, 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    Colon polyps     Coronary artery disease involving native coronary artery, angina presence unspecified, unspecified whether native or transplanted heart 05/01/2020    Added automatically from request for surgery [id number]    DJD (degenerative joint disease)     Elevated BP without diagnosis of hypertension     Gastroesophageal reflux disease     Gout     Gout     Hyperlipidemia     Hypertension     Leukocytosis     MDS (myelodysplastic syndrome) (H)     Metabolic acidosis     due to CKD    Mixed hyperlipidemia 05/01/2020    Added automatically from request for surgery [id number]    Multiple myeloma (H)     Nonrheumatic aortic valve stenosis 05/01/2020    Added automatically from request for surgery [id number]    Rotator cuff tear arthropathy, left     Sinusitis     Spinal stenosis in cervical region 09/25/2017       PAST SURGICAL HISTORY:  Past Surgical History:   Procedure Laterality Date    APPENDECTOMY      ASPIRATION NEEDLE HIP Left 9/25/2017    Procedure: ASPIRATION NEEDLE HIP;;  Surgeon: Moises Elias MD;  Location:  OR    BACK SURGERY  11/07/2017    cervical spine surgery     COLONOSCOPY      CV CORONARY ANGIOGRAM N/A 5/6/2020    Procedure: Coronary Angiogram;  Surgeon: David Woods MD;  Location:  [facility]    CV CORONARY ANGIOGRAM N/A 1/15/2024    Procedure: Coronary Angiogram;  Surgeon: Nicholas Moore MD;  Location:  [facility]    CV LEFT HEART CATH N/A  5/6/2020    Procedure: Left Heart Cath;  Surgeon: David Woods MD;  Location:  [facility]    CV PCI N/A 1/15/2024    Procedure: Percutaneous Coronary Intervention;  Surgeon: Nicholas Moore MD;  Location:  [facility]    CV RIGHT HEART CATH MEASUREMENTS RECORDED N/A 5/6/2020    Procedure: Right Heart Cath;  Surgeon: David Woods MD;  Location:  [facility]    CV RIGHT HEART CATH MEASUREMENTS RECORDED N/A 1/15/2024    Procedure: Right Heart Catheterization;  Surgeon: Nicholas Moore MD;  Location:  [facility]    CV TRANSCATHETER AORTIC VALVE REPLACEMENT-FEMORAL APPROACH N/A 2/13/2024    Procedure: Transcatheter Aortic Valve Replacement-Femoral Approach;  Surgeon: Nicholas Moore MD;  Location:  [facility]    DECOMPRESSION LUMBAR TWO LEVELS Bilateral 6/11/2018    Procedure: DECOMPRESSION LUMBAR TWO LEVELS;  BILATERAL LUMBAR 3-4 AND LUMBAR 4-5 DECOMPRESSION ;  Surgeon: Sd Vallejo MD;  Location:  OR    FUSION CERVICAL ANTERIOR ONE LEVEL WITH BONE ALLOGRAFT N/A 9/25/2017    Procedure: FUSION CERVICAL ANTERIOR ONE LEVEL WITH BONE ALLOGRAFT;  ANTERIOR CERVICAL DECOMPRESSION AND FUSIONC3-4 WITH INSTRUMENTATION, ALLOGRAFT AND BONE MARROW ASPIRATE OF THE LEFT ILIAC CREST ;  Surgeon: Moises Elias MD;  Location:  OR    PHACOEMULSIFICATION CLEAR CORNEA W/ STANDARD IOL IMPLANT, ENDOSCOPIC CYCLOPHOTOCOAGULATION, COMBINED Right 12/12/2017    Procedure: COMBINED PHACOEMULSIFICATION CLEAR CORNEA WITH STANDARD INTRAOCULAR LENS IMPLANT, ENDOSCOPIC CYCLOPHOTOCOAGULATION;  COMPLEX RIGHT EYE COMBINED PHACOEMULSIFICATION CLEAR CORNEA WITH STANDARD INTRAOCULAR LENS IMPLANT, ENDOSCOPIC CYCLOPHOTOCOAGULATION ;  Surgeon: Alli Mott MD;  Location:  [facility]    PHACOEMULSIFICATION CLEAR CORNEA W/ STANDARD IOL IMPLANT, ENDOSCOPIC CYCLOPHOTOCOAGULATION, COMBINED Left 5/1/2018    Procedure: COMBINED  PHACOEMULSIFICATION CLEAR CORNEA WITH STANDARD INTRAOCULAR LENS IMPLANT, ENDOSCOPIC CYCLOPHOTOCOAGULATION;  COMPLEX LEFT EYE PHACOEMULSIFICATION CLEAR CORNEA WITH STANDARD INTRAOCULAR LENS IMPLANT, ENDOSCOPIC CYCLOPHOTOCOAGULATION ;  Surgeon: Alli Mott MD;  Location: [facility]    ROTATOR CUFF REPAIR RT/LT  2004       FAMILY HISTORY:  Family History   Problem Relation Age of Onset    Heart Disease Father        SOCIAL HISTORY:  Social History     Socioeconomic History    Marital status:    Tobacco Use    Smoking status: Never    Smokeless tobacco: Never   Substance and Sexual Activity    Alcohol use: Yes     Comment: beer or two a day    Drug use: No     Social Drivers of Health     Financial Resource Strain: Low Risk  (4/13/2023)    [facility]    Financial Resource Strain     Difficulty of Paying Living Expenses: 3   Food Insecurity: No Food Insecurity (4/13/2023)    Received from [facility] [employer] [facility] [employer] [facility], [facility]    Food Insecurity     Worried About Running Out of Food in the Last Year: 1   Transportation Needs: No Transportation Needs (4/13/2023)    Received from [facility] [employer] [facility], [facility]    Transportation Needs     Lack of Transportation (Medical): 1    Received from [facility] [employer] [facility]    Social Connections   Housing Stability: Low Risk  (4/13/2023)    Received from [facility] [employer] [facility], [facility]    Housing Stability     Unable to Pay for Housing in the Last Year: 1       Review of Systems:  Skin:  Positive for bruising   Eyes:  Negative    ENT:  Negative    Respiratory:  Negative    Cardiovascular:  Negative, palpitations, chest pain, syncope or near-syncope, cyanosis, lightheadedness, dizziness, fatigue,  "edema    Gastroenterology: Negative    Genitourinary:  Positive for prostate problem  Musculoskeletal:  Positive for arthritis  Neurologic:  Positive for    Psychiatric:  Negative    Heme/Lymph/Imm:  Positive for easy bruising  Endocrine:  Negative      Physical Exam:  Vitals: BP (!) 148/71   Pulse 81   Ht 1.702 m (5' 7\")   Wt 57.2 kg (126 lb)   SpO2 100%   BMI 19.73 kg/m       GEN:  NAD  NECK: No JVD  C/V:  Regular rate and rhythm; soft holosystolic murmur auscultated along LSB.  RESP: Clear to auscultation bilaterally without wheezing, rales, or rhonchi.  GI: Abdomen soft, nontender, nondistended.   EXTREM: No pitting LE edema.   NEURO: Alert and oriented, cooperative. No obvious focal deficits.   PSYCH: Normal affect.  SKIN: Warm and dry.       Recent Lab Results:  LIPID RESULTS:  Lab Results   Component Value Date    CHOL 122 09/01/2021    HDL 54 09/01/2021    LDL 45 09/01/2021    TRIG 114 09/01/2021       LIVER ENZYME RESULTS:  Lab Results   Component Value Date    AST 73 (H) 01/15/2024    AST 27 06/24/2021    ALT 54 01/15/2024    ALT 42 06/24/2021       CBC RESULTS:  Lab Results   Component Value Date    WBC 10.7 02/11/2025    WBC 10.1 06/26/2021    RBC 3.57 (L) 02/11/2025    RBC 3.75 (L) 06/26/2021    HGB 10.2 (L) 02/11/2025    HGB 8.4 (L) 06/26/2021    HCT 32.6 (L) 02/11/2025    HCT 28.4 (L) 06/26/2021    MCV 91 02/11/2025    MCV 76 (L) 06/26/2021    MCH 28.6 02/11/2025    MCH 22.4 (L) 06/26/2021    MCHC 31.3 (L) 02/11/2025    MCHC 29.6 (L) 06/26/2021    RDW 15.4 (H) 02/11/2025    RDW 18.8 (H) 06/26/2021     02/11/2025     06/26/2021       BMP RESULTS:  Lab Results   Component Value Date     02/11/2025     06/26/2021    POTASSIUM 4.0 02/11/2025    POTASSIUM 4.3 10/26/2021    POTASSIUM 3.7 06/26/2021    CHLORIDE 107 02/11/2025    CHLORIDE 107 10/26/2021    CHLORIDE 116 (H) 06/26/2021    CO2 19 (L) 02/11/2025    CO2 21 10/26/2021    CO2 19 (L) 06/26/2021    ANIONGAP 13 02/11/2025 " "   ANIONGAP 8 10/26/2021    ANIONGAP 6 06/26/2021     (H) 02/11/2025     (H) 10/26/2021     (H) 06/26/2021    BUN 28.9 (H) 02/11/2025    BUN 44 (H) 10/26/2021    BUN 22 06/26/2021    CR 1.69 (H) 02/11/2025    CR 1.44 (H) 06/26/2021    GFRESTIMATED 39 (L) 02/11/2025    GFRESTIMATED 45 (L) 06/26/2021    GFRESTBLACK 52 (L) 06/26/2021    CONNIE 9.3 02/11/2025    CONNIE 8.7 06/26/2021        A1C RESULTS:  No results found for: \"A1C\"    INR RESULTS:  Lab Results   Component Value Date    INR 1.03 02/08/2024    INR 1.03 01/15/2024    INR 1.06 05/06/2020             Juan Cagle PA-C  February 11, 2025     "

## 2025-02-11 NOTE — LETTER
2/11/2025    Kian Johns MD  [facility] [address] in [place] [address]    RE: Rose Duong       Dear Colleague,     I had the pleasure of seeing Rose Colessaira in the [facility].  Primary Cardiologist: Dr. 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    He returns to clinic today by himself stating he is doing well.  He denies shortness of breath, chest pain, palpitations, orthopnea, PND, or bleeding issues.  He continues to receive infusions for treatment of his multiple myeloma as well as amyloidosis every other month.  He is not active during the winter months but during summer he does a lot of yard work.  He feels like his energy level has improved significantly after his TAVR procedure.  He has been compliant with his medications including baby aspirin.    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    His recent blood work shows stable findings.  He does have chronic kidney disease and his creatinine appears to be at his baseline.  CBC shows chronic mild anemia.  ECG shows normal sinus rhythm with no evidence of bundle branch block.   Echocardiogram has been reviewed and it continues to show hyperdynamic LV function.  His valve area is normal.  He does have at least moderate aortic regurgitation which used to be trace to mild about 6 months ago.  I will forward these results to Dr. Moore for further evaluation.  It appears that some of the images were poor quality.  We will see if we need to proceed with ADY for further evaluation after Dr. Moore's assessment.  He has no symptoms to suggest angina, heart failure, arrhythmia.  Will continue with current medications at this time.    47 minutes spent on the date of the encounter with chart review, patient visit, care coordination, and documentation.    The longitudinal plan of care for the diagnose(s)/condition(s) as documented were addressed during this visit. Due to the added complexity in care, I will continue to support Rose Duong  in the subsequent management and with ongoing continuity of care.     This note was completed in part using Dragon voice recognition software. Although reviewed after completion, some word and grammatical errors may occur.    Orders this Visit:  No orders of the defined types were placed in this encounter.    No orders of the defined types were placed in this encounter.    There are no discontinued medications.      Encounter Diagnosis   Name Primary?     S/p TAVR (transcatheter aortic valve replacement), bioprosthetic        CURRENT MEDICATIONS:  Current Outpatient Medications   Medication Sig Dispense Refill     acetaminophen (TYLENOL) 500 MG tablet Take 500 mg by mouth every 8 hours as needed for mild pain       acyclovir (ZOVIRAX) 400 MG tablet Take 400 mg by mouth 2 times daily       allopurinol (ZYLOPRIM) 300 MG tablet Take 300 mg by mouth daily.       amLODIPine (NORVASC) 5 MG tablet Take 5 mg by mouth daily       aspirin 81 MG EC tablet Take 1 tablet (81 mg) by mouth daily 30 tablet 3     cholecalciferol 25 MCG (1000 UT) TABS Take 1,000 Units by mouth  daily        finasteride (PROSCAR) 5 MG tablet Take 5 mg by mouth daily       pantoprazole (PROTONIX) 20 MG EC tablet Take 20 mg by mouth daily       predniSONE (DELTASONE) 5 MG tablet Take 5 mg by mouth every other day        rosuvastatin (CRESTOR) 20 MG tablet Take 20 mg by mouth daily       tamsulosin (FLOMAX) 0.4 MG 24 hr capsule Take 0.4 mg by mouth every evening          ALLERGIES   No Known Allergies    PAST MEDICAL HISTORY:  Past Medical History:   Diagnosis Date     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      Amyloidosis (H)      Anemia      Bacteremia      BPH (benign prostatic hyperplasia)      CKD (chronic kidney disease) stage 4, 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     Colon polyps      Coronary artery disease involving native coronary artery, angina presence unspecified, unspecified whether native or transplanted heart 05/01/2020    Added automatically from request for surgery [id number]     DJD (degenerative joint disease)      Elevated BP without diagnosis of hypertension      Gastroesophageal reflux disease      Gout      Gout      Hyperlipidemia      Hypertension      Leukocytosis      MDS (myelodysplastic syndrome) (H)      Metabolic acidosis     due to CKD     Mixed hyperlipidemia 05/01/2020    Added automatically from request for surgery [id number]     Multiple myeloma (H)      Nonrheumatic aortic valve stenosis 05/01/2020    Added automatically from request for surgery [id number]     Rotator cuff tear arthropathy, left      Sinusitis      Spinal stenosis in cervical region 09/25/2017       PAST SURGICAL HISTORY:  Past Surgical History:   Procedure Laterality Date     APPENDECTOMY       ASPIRATION NEEDLE HIP Left 9/25/2017    Procedure: ASPIRATION NEEDLE HIP;;  Surgeon: Moises Elias MD;  Location:  OR     BACK SURGERY  11/07/2017    cervical spine surgery      COLONOSCOPY       CV CORONARY ANGIOGRAM N/A 5/6/2020    Procedure: Coronary  Angiogram;  Surgeon: David Woods MD;  Location:  HEART CARDIAC CATH LAB     CV CORONARY ANGIOGRAM N/A 1/15/2024    Procedure: Coronary Angiogram;  Surgeon: Nicholas Moore MD;  Location:  HEART CARDIAC CATH LAB     CV LEFT HEART CATH N/A 5/6/2020    Procedure: Left Heart Cath;  Surgeon: David Woods MD;  Location:  HEART CARDIAC CATH LAB     CV PCI N/A 1/15/2024    Procedure: Percutaneous Coronary Intervention;  Surgeon: Nicholas Moore MD;  Location:  HEART CARDIAC CATH LAB     CV RIGHT HEART CATH MEASUREMENTS RECORDED N/A 5/6/2020    Procedure: Right Heart Cath;  Surgeon: David Woods MD;  Location:  HEART CARDIAC CATH LAB     CV RIGHT HEART CATH MEASUREMENTS RECORDED N/A 1/15/2024    Procedure: Right Heart Catheterization;  Surgeon: Nicholas Moore MD;  Location:  HEART CARDIAC CATH LAB     CV TRANSCATHETER AORTIC VALVE REPLACEMENT-FEMORAL APPROACH N/A 2/13/2024    Procedure: Transcatheter Aortic Valve Replacement-Femoral Approach;  Surgeon: Nicholas Moore MD;  Location:  HEART CARDIAC CATH LAB     DECOMPRESSION LUMBAR TWO LEVELS Bilateral 6/11/2018    Procedure: DECOMPRESSION LUMBAR TWO LEVELS;  BILATERAL LUMBAR 3-4 AND LUMBAR 4-5 DECOMPRESSION ;  Surgeon: Sd Vallejo MD;  Location: [facility] OR     FUSION CERVICAL ANTERIOR ONE LEVEL WITH BONE ALLOGRAFT N/A 9/25/2017    Procedure: FUSION CERVICAL ANTERIOR ONE LEVEL WITH BONE ALLOGRAFT;  ANTERIOR CERVICAL DECOMPRESSION AND FUSIONC3-4 WITH INSTRUMENTATION, ALLOGRAFT AND BONE MARROW ASPIRATE OF THE LEFT ILIAC CREST ;  Surgeon: Moises Elias MD;  Location: [facility] OR     PHACOEMULSIFICATION CLEAR CORNEA W/ STANDARD IOL IMPLANT, ENDOSCOPIC CYCLOPHOTOCOAGULATION, COMBINED Right 12/12/2017    Procedure: COMBINED PHACOEMULSIFICATION CLEAR CORNEA WITH STANDARD INTRAOCULAR LENS IMPLANT, ENDOSCOPIC CYCLOPHOTOCOAGULATION;  COMPLEX RIGHT EYE COMBINED PHACOEMULSIFICATION CLEAR CORNEA WITH STANDARD  INTRAOCULAR LENS IMPLANT, ENDOSCOPIC CYCLOPHOTOCOAGULATION ;  Surgeon: Alli Mott MD;  Location: [facility]     PHACOEMULSIFICATION CLEAR CORNEA W/ STANDARD IOL IMPLANT, ENDOSCOPIC CYCLOPHOTOCOAGULATION, COMBINED Left 5/1/2018    Procedure: COMBINED PHACOEMULSIFICATION CLEAR CORNEA WITH STANDARD INTRAOCULAR LENS IMPLANT, ENDOSCOPIC CYCLOPHOTOCOAGULATION;  COMPLEX LEFT EYE PHACOEMULSIFICATION CLEAR CORNEA WITH STANDARD INTRAOCULAR LENS IMPLANT, ENDOSCOPIC CYCLOPHOTOCOAGULATION ;  Surgeon: Alli Mott MD;  Location: [facility]     ROTATOR CUFF REPAIR RT/LT  2004       FAMILY HISTORY:  Family History   Problem Relation Age of Onset     Heart Disease Father        SOCIAL HISTORY:  Social History     Socioeconomic History     Marital status:    Tobacco Use     Smoking status: Never     Smokeless tobacco: Never   Substance and Sexual Activity     Alcohol use: Yes     Comment: beer or two a day     Drug use: No     Social Drivers of Health     Financial Resource Strain: Low Risk  (4/13/2023)    Received from [facility]    Financial Resource Strain      Difficulty of Paying Living Expenses: 3   Food Insecurity: No Food Insecurity (4/13/2023)    Received from [facility]    Food Insecurity      Worried About Running Out of Food in the Last Year: 1   Transportation Needs: No Transportation Needs (4/13/2023)    Received from [facility] [employer] [facility]    Transportation Needs      Lack of Transportation (Medical): 1    Received from [facility]    Social Connections   Housing Stability: Low Risk  (4/13/2023)    Received from [facility], [facility]    Housing Stability      Unable to Pay for Housing  "in the Last Year: 1       Review of Systems:  Skin:  Positive for bruising   Eyes:  Negative    ENT:  Negative    Respiratory:  Negative    Cardiovascular:  Negative, palpitations, chest pain, syncope or near-syncope, cyanosis, lightheadedness, dizziness, fatigue, edema    Gastroenterology: Negative    Genitourinary:  Positive for prostate problem  Musculoskeletal:  Positive for arthritis  Neurologic:  Positive for    Psychiatric:  Negative    Heme/Lymph/Imm:  Positive for easy bruising  Endocrine:  Negative      Physical Exam:  Vitals: BP (!) 148/71   Pulse 81   Ht 1.702 m (5' 7\")   Wt 57.2 kg (126 lb)   SpO2 100%   BMI 19.73 kg/m       GEN:  NAD  NECK: No JVD  C/V:  Regular rate and rhythm; soft holosystolic murmur auscultated along LSB.  RESP: Clear to auscultation bilaterally without wheezing, rales, or rhonchi.  GI: Abdomen soft, nontender, nondistended.   EXTREM: No pitting LE edema.   NEURO: Alert and oriented, cooperative. No obvious focal deficits.   PSYCH: Normal affect.  SKIN: Warm and dry.       Recent Lab Results:  LIPID RESULTS:  Lab Results   Component Value Date    CHOL 122 09/01/2021    HDL 54 09/01/2021    LDL 45 09/01/2021    TRIG 114 09/01/2021       LIVER ENZYME RESULTS:  Lab Results   Component Value Date    AST 73 (H) 01/15/2024    AST 27 06/24/2021    ALT 54 01/15/2024    ALT 42 06/24/2021       CBC RESULTS:  Lab Results   Component Value Date    WBC 10.7 02/11/2025    WBC 10.1 06/26/2021    RBC 3.57 (L) 02/11/2025    RBC 3.75 (L) 06/26/2021    HGB 10.2 (L) 02/11/2025    HGB 8.4 (L) 06/26/2021    HCT 32.6 (L) 02/11/2025    HCT 28.4 (L) 06/26/2021    MCV 91 02/11/2025    MCV 76 (L) 06/26/2021    MCH 28.6 02/11/2025    MCH 22.4 (L) 06/26/2021    MCHC 31.3 (L) 02/11/2025    MCHC 29.6 (L) 06/26/2021    RDW 15.4 (H) 02/11/2025    RDW 18.8 (H) 06/26/2021     02/11/2025     06/26/2021       BMP RESULTS:  Lab Results   Component Value Date     02/11/2025     06/26/2021 " "   POTASSIUM 4.0 02/11/2025    POTASSIUM 4.3 10/26/2021    POTASSIUM 3.7 06/26/2021    CHLORIDE 107 02/11/2025    CHLORIDE 107 10/26/2021    CHLORIDE 116 (H) 06/26/2021    CO2 19 (L) 02/11/2025    CO2 21 10/26/2021    CO2 19 (L) 06/26/2021    ANIONGAP 13 02/11/2025    ANIONGAP 8 10/26/2021    ANIONGAP 6 06/26/2021     (H) 02/11/2025     (H) 10/26/2021     (H) 06/26/2021    BUN 28.9 (H) 02/11/2025    BUN 44 (H) 10/26/2021    BUN 22 06/26/2021    CR 1.69 (H) 02/11/2025    CR 1.44 (H) 06/26/2021    GFRESTIMATED 39 (L) 02/11/2025    GFRESTIMATED 45 (L) 06/26/2021    GFRESTBLACK 52 (L) 06/26/2021    CONNIE 9.3 02/11/2025    CONNIE 8.7 06/26/2021        A1C RESULTS:  No results found for: \"A1C\"    INR RESULTS:  Lab Results   Component Value Date    INR 1.03 02/08/2024    INR 1.03 01/15/2024    INR 1.06 05/06/2020             Juan Cagle PA-C  February 11, 2025       Thank you for allowing me to participate in the care of your patient.      Sincerely,     Juan Cagle PA-C     [facility]  cc:   Nicholas Moore MD  [address]  [place] [id number]      "

## 2025-02-17 ENCOUNTER — MEDICAL CORRESPONDENCE (OUTPATIENT)
Dept: HEALTH INFORMATION MANAGEMENT | Facility: CLINIC | Age: 85
End: 2025-02-17
Payer: COMMERCIAL

## 2025-03-31 ENCOUNTER — TRANSFERRED RECORDS (OUTPATIENT)
Dept: HEALTH INFORMATION MANAGEMENT | Facility: CLINIC | Age: 85
End: 2025-03-31
Payer: COMMERCIAL

## (undated) DEVICE — WIRE GUIDE 0.035"X150CM EMERALD STR 502542

## (undated) DEVICE — LINEN TOWEL PACK X5 5464

## (undated) DEVICE — GLIDEWIRE STRAIGHT .035CM GR3501

## (undated) DEVICE — SPONGE SURGIFOAM 01GM POWDER 1978

## (undated) DEVICE — WIRE GUIDE LUNDERQUIST 0.035"X260CM DBL CVD

## (undated) DEVICE — SYR 20ML LL W/O NDL 302830

## (undated) DEVICE — GAUGE DEPTH LOCATOR MANTA 8FR 188F

## (undated) DEVICE — SUCTION MANIFOLD NEPTUNE SGL

## (undated) DEVICE — IMM COLLAR CERVICAL MED UNIVERSAL 3X24" 79-83500

## (undated) DEVICE — MANIFOLD KIT ANGIO AUTOMATED 014613

## (undated) DEVICE — EYE KNIFE VISITEC 1.1MM ANG 45DEG SIDEPORT 373710

## (undated) DEVICE — BLADE CLIPPER 4412A

## (undated) DEVICE — DRSG GAUZE 4X4" 3033

## (undated) DEVICE — CATH ANGIO LANGSTRON 6FRX110CM 145DEG 4H 5540

## (undated) DEVICE — DEFIB PRO-PADZ LVP LQD GEL ADULT 8900-2105-01

## (undated) DEVICE — GLOVE PROTEXIS MICRO 7.0  2D73PM70

## (undated) DEVICE — EYE CANN IRR 30GA  ANTERIOR CHAMBER 581273

## (undated) DEVICE — CUSHION INSERT LG PRONE VIEW JACKSON TABLE

## (undated) DEVICE — SYR 10ML FINGER CONTROL W/O NDL 309695

## (undated) DEVICE — KIT HAND CONTROL ANGIOTOUCH ACIST 65CM AT-P65

## (undated) DEVICE — GLOVE PROTEXIS BLUE W/NEU-THERA 7.5  2D73EB75

## (undated) DEVICE — NDL PERC ENTRY THINWALL 18GA 7.0" G00166

## (undated) DEVICE — EYE DRSG PAD OVAL

## (undated) DEVICE — SYR 01ML 27GA 0.5" NDL TBC 309623

## (undated) DEVICE — EYE RING MALYUGIN PUPIL EXPANDER 7.0MM MAL-000-2

## (undated) DEVICE — CLOSURE ANGIOSEAL 6FR 610130

## (undated) DEVICE — SOL WATER IRRIG 1000ML BOTTLE 2F7114

## (undated) DEVICE — INTRO GLIDESHEATH SLENDER 6FR 10X45CM 60-1060

## (undated) DEVICE — SPONGE RAY-TEC 4X8" 7318

## (undated) DEVICE — PREP DURAPREP 06ML APL 8635

## (undated) DEVICE — GLOVE PROTEXIS MICRO 6.5  2D73PM65

## (undated) DEVICE — TAPE MICROPORE 2"X1.5YD 1530S-2

## (undated) DEVICE — SU VICRYL 3-0 PS-1 18" UND J683

## (undated) DEVICE — DRSG ADAPTIC 3X3" 6112

## (undated) DEVICE — INTRODUCER CATH VASC 5FRX10CM  MPIS-501-NT-U-SST

## (undated) DEVICE — Device

## (undated) DEVICE — EYE PACK CUSTOM ANTERIOR 30DEG TIP CENTURION PPK6682-04

## (undated) DEVICE — EYE SHIELD PLASTIC

## (undated) DEVICE — KIT VALVE AORTIC SAPIEN 3 ULTRA RESILIA OD23 MM S3URCM23A

## (undated) DEVICE — EYE KNIFE SLIT XSTAR VISITEC 2.6MM 45DEG 373726

## (undated) DEVICE — CATH ANGIO SUPERTORQUE AL1 6FRX100CM 532-645

## (undated) DEVICE — TOTE ANGIO CORP PC15AT SAN32CC83O

## (undated) DEVICE — TUBING SUCTION SOFT 20'X3/16" 0036570

## (undated) DEVICE — WIRE GUIDE 0.035"X260CM SAFE-T-J EXCHANGE G00517

## (undated) DEVICE — DRSG BANDAID 1X3" FABRIC LATEX FREE

## (undated) DEVICE — DRSG BANDAID 1X3" FABRIC CURITY LATEX FREE KC44101

## (undated) DEVICE — SU PROLENE 5-0 P-2 18" 8686G

## (undated) DEVICE — ESU ELEC BLADE 4" COATED

## (undated) DEVICE — SAVVYWIRE XS

## (undated) DEVICE — DRSG STERI STRIP 1/2X4" R1547

## (undated) DEVICE — GLOVE PROTEXIS MICRO 8.0  2D73PM80

## (undated) DEVICE — CATH DIAG 4FR JL 4.5 538417

## (undated) DEVICE — DRAIN JACKSON PRATT 07FR ROUND SU130-1320

## (undated) DEVICE — PACK SPINE SM CUSTOM SNE15SSFSK

## (undated) DEVICE — PREP CHLORAPREP 26ML TINTED ORANGE  260815

## (undated) DEVICE — EYE CANN IRR 25GA HYDRODISSECTING 585037

## (undated) DEVICE — EYE SOL BSS 15ML BOTTLE 65079515

## (undated) DEVICE — SPONGE KITTNER 31001010

## (undated) DEVICE — DRAPE SHEET REV FOLD 3/4 9349

## (undated) DEVICE — TWISTED PAIR NEEDLE ELECTRODES

## (undated) DEVICE — COMPENSATOR PRESSURE MONITORING MANIFOLDS, ONE-VALVE, HANDLES OFF, RIGHT PORTS, ROTATING ADAPTER, MEDIUM PRESSURE

## (undated) DEVICE — CATH ANGIO INFINITI AL1 4FRX100CM 538445

## (undated) DEVICE — EYE SOL BSS 500ML

## (undated) DEVICE — PREP SKIN SCRUB TRAY 4461A

## (undated) DEVICE — EYE CANN IRR 25GA CYSTOTOME 581610

## (undated) DEVICE — RAD CLOSURE ANGIOSEAL 8FR  610131

## (undated) DEVICE — NDL ANGIOCATH 14GA 2" 4088

## (undated) DEVICE — BLADE KNIFE SURG 15 371115

## (undated) DEVICE — PACK CATARACT CUSTOM SO DALE SEY32CTFCX

## (undated) DEVICE — SU VICRYL 3-0 SH 27" J316H

## (undated) DEVICE — CATH ANGIO INFINITI 3DRC 4FRX100CM 538476

## (undated) DEVICE — BUR ROUND 5MM CARBIDE XLONG 5093-230

## (undated) DEVICE — SU VICRYL 4-0 PS-2 18" UND J496H

## (undated) DEVICE — NDL BX BONE MARROW 11GA 4"

## (undated) DEVICE — TUBE ENDOTRACHEAL NIM EMG 8.0MM 8229308

## (undated) DEVICE — CATH ANGIO JUDKINS R4 6FRX100CM INFINITI 534621T

## (undated) DEVICE — VALVE HEMOSTASIS .096" COPILOT MECH 1003331

## (undated) DEVICE — RAD G/W INQWIRE .035X260CM J-TIP EXCHANGE IQ35F260J1O5RS

## (undated) DEVICE — INTRO SHEATH 7FRX10CM PINNACLE RSS702

## (undated) DEVICE — NDL SPINAL 18GA 3.5" 405184

## (undated) DEVICE — DECANTER VIAL 2006S

## (undated) DEVICE — GLOVE PROTEXIS W/NEU-THERA 7.0  2D73TE70

## (undated) DEVICE — CATH DIAG 4FR ANG PIG 538453S

## (undated) DEVICE — CABLE PACING ALLIGATOR CLIP 12FT 5833SL

## (undated) DEVICE — ESU GROUND PAD UNIVERSAL W/O CORD

## (undated) DEVICE — DRAIN JACKSON PRATT RESERVOIR 100ML SU130-1305

## (undated) DEVICE — MANIFOLD IV 1 VLV MED PRSS OFFHNDL STRL 70058107

## (undated) DEVICE — INTRO SHEATH 4FRX10CM PINNACLE RSS402

## (undated) DEVICE — SU VICRYL 2-0 CT-1 27" J339H

## (undated) DEVICE — SPONGE SURGIFOAM 50

## (undated) DEVICE — GLOVE PROTEXIS POWDER FREE 9.0 ORTHOPEDIC 2D73ET90

## (undated) DEVICE — INTRO SHEATH 6FRX10CM PINNACLE RSS602

## (undated) DEVICE — EYE PACK BVI READYPAK KIT #1

## (undated) DEVICE — CATH ANGIO JUDKINS JL4 6FRX100CM INFINITI 534620T

## (undated) DEVICE — INTRO TERUMO 7FRX25CM W/MARKER RSB703

## (undated) DEVICE — SU VICRYL 1 MO-4 18" J702D

## (undated) DEVICE — DRILL BIT MEDT ELITE 11MM SS 7080510

## (undated) DEVICE — NDL 19GA 1.5"

## (undated) DEVICE — CATH ANGIO INFINITI PIGTAIL 145 6 SH 6FRX110CM  534-652S

## (undated) DEVICE — NDL 30GA 0.5" 305106

## (undated) DEVICE — GLOVE PROTEXIS POWDER FREE 6.5 ORTHOPEDIC 2D73ET65

## (undated) DEVICE — GLOVE PROTEXIS POWDER FREE 8.0 ORTHOPEDIC 2D73ET80

## (undated) DEVICE — PIN DISTRACTION ANCHOR FOR SCR 14MM MDS9091414

## (undated) DEVICE — GUIDEWIRE VASC 0.014IN DIA 185

## (undated) DEVICE — LEAD PACER MYOCARDIAL BIPOLAR TEMPORARY 53CM 6495F

## (undated) DEVICE — GLOVE PROTEXIS BLUE W/NEU-THERA 6.5  2D73EB65

## (undated) DEVICE — SYR ANGIOGRAPHY MULTIUSE KIT ACIST 014612

## (undated) DEVICE — INFLATION DEVICE ATRION QL2530

## (undated) RX ORDER — BUPIVACAINE HYDROCHLORIDE AND EPINEPHRINE 5; 5 MG/ML; UG/ML
INJECTION, SOLUTION EPIDURAL; INTRACAUDAL; PERINEURAL
Status: DISPENSED
Start: 2018-06-11

## (undated) RX ORDER — ACETAZOLAMIDE 500 MG/1
CAPSULE, EXTENDED RELEASE ORAL
Status: DISPENSED
Start: 2017-12-12

## (undated) RX ORDER — HYDROMORPHONE HYDROCHLORIDE 1 MG/ML
INJECTION, SOLUTION INTRAMUSCULAR; INTRAVENOUS; SUBCUTANEOUS
Status: DISPENSED
Start: 2017-09-25

## (undated) RX ORDER — ONDANSETRON 2 MG/ML
INJECTION INTRAMUSCULAR; INTRAVENOUS
Status: DISPENSED
Start: 2017-09-25

## (undated) RX ORDER — BETAMETHASONE SODIUM PHOSPHATE AND BETAMETHASONE ACETATE 3; 3 MG/ML; MG/ML
INJECTION, SUSPENSION INTRA-ARTICULAR; INTRALESIONAL; INTRAMUSCULAR; SOFT TISSUE
Status: DISPENSED
Start: 2018-06-11

## (undated) RX ORDER — HYDROMORPHONE HYDROCHLORIDE 1 MG/ML
INJECTION, SOLUTION INTRAMUSCULAR; INTRAVENOUS; SUBCUTANEOUS
Status: DISPENSED
Start: 2018-06-11

## (undated) RX ORDER — ACETAZOLAMIDE 500 MG/1
CAPSULE, EXTENDED RELEASE ORAL
Status: DISPENSED
Start: 2018-05-01

## (undated) RX ORDER — ASPIRIN 81 MG/1
TABLET ORAL
Status: DISPENSED
Start: 2024-01-15

## (undated) RX ORDER — PROTAMINE SULFATE 10 MG/ML
INJECTION, SOLUTION INTRAVENOUS
Status: DISPENSED
Start: 2024-02-13

## (undated) RX ORDER — FENTANYL CITRATE 50 UG/ML
INJECTION, SOLUTION INTRAMUSCULAR; INTRAVENOUS
Status: DISPENSED
Start: 2018-06-11

## (undated) RX ORDER — LIDOCAINE HYDROCHLORIDE 20 MG/ML
INJECTION, SOLUTION EPIDURAL; INFILTRATION; INTRACAUDAL; PERINEURAL
Status: DISPENSED
Start: 2018-06-11

## (undated) RX ORDER — PROPOFOL 10 MG/ML
INJECTION, EMULSION INTRAVENOUS
Status: DISPENSED
Start: 2017-09-25

## (undated) RX ORDER — GLYCOPYRROLATE 0.2 MG/ML
INJECTION, SOLUTION INTRAMUSCULAR; INTRAVENOUS
Status: DISPENSED
Start: 2018-06-11

## (undated) RX ORDER — HEPARIN SODIUM 200 [USP'U]/100ML
INJECTION, SOLUTION INTRAVENOUS
Status: DISPENSED
Start: 2024-02-13

## (undated) RX ORDER — FENTANYL CITRATE 50 UG/ML
INJECTION, SOLUTION INTRAMUSCULAR; INTRAVENOUS
Status: DISPENSED
Start: 2024-02-13

## (undated) RX ORDER — ONDANSETRON 2 MG/ML
INJECTION INTRAMUSCULAR; INTRAVENOUS
Status: DISPENSED
Start: 2018-05-01

## (undated) RX ORDER — ALBUTEROL SULFATE 90 UG/1
AEROSOL, METERED RESPIRATORY (INHALATION)
Status: DISPENSED
Start: 2017-09-25

## (undated) RX ORDER — LIDOCAINE HYDROCHLORIDE 10 MG/ML
INJECTION, SOLUTION EPIDURAL; INFILTRATION; INTRACAUDAL; PERINEURAL
Status: DISPENSED
Start: 2017-12-12

## (undated) RX ORDER — LIDOCAINE HYDROCHLORIDE 10 MG/ML
INJECTION, SOLUTION EPIDURAL; INFILTRATION; INTRACAUDAL; PERINEURAL
Status: DISPENSED
Start: 2024-01-15

## (undated) RX ORDER — HEPARIN SODIUM 1000 [USP'U]/ML
INJECTION, SOLUTION INTRAVENOUS; SUBCUTANEOUS
Status: DISPENSED
Start: 2024-01-15

## (undated) RX ORDER — FENTANYL CITRATE 50 UG/ML
INJECTION, SOLUTION INTRAMUSCULAR; INTRAVENOUS
Status: DISPENSED
Start: 2017-09-25

## (undated) RX ORDER — PROPOFOL 10 MG/ML
INJECTION, EMULSION INTRAVENOUS
Status: DISPENSED
Start: 2018-06-11

## (undated) RX ORDER — HEPARIN SODIUM 1000 [USP'U]/ML
INJECTION, SOLUTION INTRAVENOUS; SUBCUTANEOUS
Status: DISPENSED
Start: 2024-02-13

## (undated) RX ORDER — FENTANYL CITRATE 50 UG/ML
INJECTION, SOLUTION INTRAMUSCULAR; INTRAVENOUS
Status: DISPENSED
Start: 2024-01-15

## (undated) RX ORDER — LIDOCAINE HYDROCHLORIDE 10 MG/ML
INJECTION, SOLUTION EPIDURAL; INFILTRATION; INTRACAUDAL; PERINEURAL
Status: DISPENSED
Start: 2018-05-01

## (undated) RX ORDER — LIDOCAINE HYDROCHLORIDE 10 MG/ML
INJECTION, SOLUTION EPIDURAL; INFILTRATION; INTRACAUDAL; PERINEURAL
Status: DISPENSED
Start: 2018-06-11

## (undated) RX ORDER — LIDOCAINE HYDROCHLORIDE 20 MG/ML
INJECTION, SOLUTION EPIDURAL; INFILTRATION; INTRACAUDAL; PERINEURAL
Status: DISPENSED
Start: 2017-09-25

## (undated) RX ORDER — CEFAZOLIN SODIUM 2 G/100ML
INJECTION, SOLUTION INTRAVENOUS
Status: DISPENSED
Start: 2018-06-11

## (undated) RX ORDER — LIDOCAINE HYDROCHLORIDE 10 MG/ML
INJECTION, SOLUTION EPIDURAL; INFILTRATION; INTRACAUDAL; PERINEURAL
Status: DISPENSED
Start: 2024-02-13

## (undated) RX ORDER — HEPARIN SODIUM 200 [USP'U]/100ML
INJECTION, SOLUTION INTRAVENOUS
Status: DISPENSED
Start: 2024-01-15